# Patient Record
Sex: MALE | Race: WHITE | NOT HISPANIC OR LATINO | Employment: OTHER | URBAN - METROPOLITAN AREA
[De-identification: names, ages, dates, MRNs, and addresses within clinical notes are randomized per-mention and may not be internally consistent; named-entity substitution may affect disease eponyms.]

---

## 2017-01-09 DIAGNOSIS — I48.91 ATRIAL FIBRILLATION (H): Primary | ICD-10-CM

## 2017-01-09 RX ORDER — FLECAINIDE ACETATE 150 MG/1
TABLET ORAL
Qty: 90 TABLET | Refills: 3 | Status: SHIPPED | OUTPATIENT
Start: 2017-01-09 | End: 2017-06-11

## 2017-01-11 ENCOUNTER — PRE VISIT (OUTPATIENT)
Dept: CARDIOLOGY | Facility: CLINIC | Age: 77
End: 2017-01-11

## 2017-01-13 ENCOUNTER — HOSPITAL ENCOUNTER (OUTPATIENT)
Dept: CARDIOLOGY | Facility: CLINIC | Age: 77
Discharge: HOME | End: 2017-01-13
Attending: INTERNAL MEDICINE | Admitting: INTERNAL MEDICINE
Payer: COMMERCIAL

## 2017-01-13 ENCOUNTER — HOSPITAL ENCOUNTER (OUTPATIENT)
Dept: CARDIOLOGY | Facility: CLINIC | Age: 77
End: 2017-01-13
Attending: INTERNAL MEDICINE
Payer: COMMERCIAL

## 2017-01-13 DIAGNOSIS — I10 ESSENTIAL HYPERTENSION WITH GOAL BLOOD PRESSURE LESS THAN 140/90: ICD-10-CM

## 2017-01-13 DIAGNOSIS — I74.9 EMBOLISM AND THROMBOSIS (H): ICD-10-CM

## 2017-01-13 DIAGNOSIS — R00.1 BRADYCARDIA: ICD-10-CM

## 2017-01-13 DIAGNOSIS — E78.5 HYPERLIPIDEMIA LDL GOAL <130: ICD-10-CM

## 2017-01-13 DIAGNOSIS — R55 SYNCOPE, UNSPECIFIED SYNCOPE TYPE: ICD-10-CM

## 2017-01-13 DIAGNOSIS — I48.0 PAROXYSMAL ATRIAL FIBRILLATION (H): ICD-10-CM

## 2017-01-13 LAB
ALT SERPL W P-5'-P-CCNC: <5 U/L (ref 5–30)
ANION GAP SERPL CALCULATED.3IONS-SCNC: 10.2 MMOL/L (ref 6–17)
BUN SERPL-MCNC: 9 MG/DL (ref 7–30)
CALCIUM SERPL-MCNC: 8.7 MG/DL (ref 8.5–10.5)
CHLORIDE SERPL-SCNC: 103 MMOL/L (ref 98–107)
CHOLEST SERPL-MCNC: 185 MG/DL
CO2 SERPL-SCNC: 30 MMOL/L (ref 23–29)
CREAT SERPL-MCNC: 1 MG/DL (ref 0.7–1.3)
GFR SERPL CREATININE-BSD FRML MDRD: 72 ML/MIN/1.7M2
GLUCOSE SERPL-MCNC: 115 MG/DL (ref 70–105)
HDLC SERPL-MCNC: 84 MG/DL
LDLC SERPL CALC-MCNC: 89 MG/DL
NONHDLC SERPL-MCNC: 101 MG/DL
POTASSIUM SERPL-SCNC: 4.2 MMOL/L (ref 3.5–5.1)
SODIUM SERPL-SCNC: 139 MMOL/L (ref 136–145)
TRIGL SERPL-MCNC: 58 MG/DL

## 2017-01-13 PROCEDURE — 93306 TTE W/DOPPLER COMPLETE: CPT

## 2017-01-13 PROCEDURE — 36415 COLL VENOUS BLD VENIPUNCTURE: CPT | Performed by: INTERNAL MEDICINE

## 2017-01-13 PROCEDURE — 93227 XTRNL ECG REC<48 HR R&I: CPT | Performed by: INTERNAL MEDICINE

## 2017-01-13 PROCEDURE — 84460 ALANINE AMINO (ALT) (SGPT): CPT | Performed by: INTERNAL MEDICINE

## 2017-01-13 PROCEDURE — 93306 TTE W/DOPPLER COMPLETE: CPT | Mod: 26 | Performed by: INTERNAL MEDICINE

## 2017-01-13 PROCEDURE — 93225 XTRNL ECG REC<48 HRS REC: CPT

## 2017-01-13 PROCEDURE — 80048 BASIC METABOLIC PNL TOTAL CA: CPT | Performed by: INTERNAL MEDICINE

## 2017-01-13 PROCEDURE — 80061 LIPID PANEL: CPT | Performed by: INTERNAL MEDICINE

## 2017-01-19 ENCOUNTER — OFFICE VISIT (OUTPATIENT)
Dept: CARDIOLOGY | Facility: CLINIC | Age: 77
End: 2017-01-19
Attending: INTERNAL MEDICINE
Payer: COMMERCIAL

## 2017-01-19 VITALS
WEIGHT: 185 LBS | SYSTOLIC BLOOD PRESSURE: 138 MMHG | BODY MASS INDEX: 26.48 KG/M2 | HEIGHT: 70 IN | HEART RATE: 56 BPM | DIASTOLIC BLOOD PRESSURE: 80 MMHG

## 2017-01-19 DIAGNOSIS — R55 SYNCOPE, UNSPECIFIED SYNCOPE TYPE: ICD-10-CM

## 2017-01-19 DIAGNOSIS — R00.1 BRADYCARDIA: ICD-10-CM

## 2017-01-19 DIAGNOSIS — E78.5 HYPERLIPIDEMIA LDL GOAL <130: ICD-10-CM

## 2017-01-19 DIAGNOSIS — I74.9 EMBOLISM AND THROMBOSIS (H): ICD-10-CM

## 2017-01-19 DIAGNOSIS — I10 ESSENTIAL HYPERTENSION WITH GOAL BLOOD PRESSURE LESS THAN 140/90: ICD-10-CM

## 2017-01-19 DIAGNOSIS — I48.0 PAROXYSMAL ATRIAL FIBRILLATION (H): ICD-10-CM

## 2017-01-19 PROCEDURE — 99214 OFFICE O/P EST MOD 30 MIN: CPT | Performed by: INTERNAL MEDICINE

## 2017-01-19 NOTE — MR AVS SNAPSHOT
After Visit Summary   1/19/2017    Eder Horan    MRN: [id number]           Patient Information     Date Of Birth          1940        Visit Information        Provider Department      1/19/2017 2:15 PM Jaya Franklin MD [facility]        Today's Diagnoses     Paroxysmal atrial fibrillation (H)         Essential hypertension with goal blood pressure less than 140/90         Embolism and thrombosis         Bradycardia         Syncope, unspecified syncope type         Hyperlipidemia LDL goal <130            Follow-ups after your visit        Additional Services     Follow-Up with Cardiac Advanced Practice Provider           Follow-Up with Cardiologist                 Your next 10 appointments already scheduled     Feb 02, 2017 11:00 AM   Return Visit with Reece Daniels MD   [facility] ([facility]    [address] [place] [id number] [phone]              Future tests that were ordered for you today     Open Future Orders        Priority Expected Expires Ordered    EKG 12-lead complete w/read - Clinics Routine 7/18/2017 1/19/2018 1/19/2017    Follow-Up with Cardiologist Routine 7/18/2017 1/19/2018 1/19/2017    Basic metabolic panel Routine 7/18/2017 1/19/2018 1/19/2017    Lipid Profile Routine 7/18/2017 1/19/2018 1/19/2017    ALT Routine 7/18/2017 1/19/2018 1/19/2017    Basic metabolic panel Routine 4/19/2017 1/19/2018 1/19/2017    Follow-Up with Cardiac Advanced Practice Provider Routine 4/19/2017 1/19/2018 1/19/2017            Who to contact     If you have questions or need follow up information about today's clinic visit or your schedule please contact [facility] directly at [phone].  Normal or non-critical lab and imaging results will be communicated to you by MyChart, letter or phone within 4 business days after the  "clinic has received the results. If you do not hear from us within 7 days, please contact the clinic through [employer] or phone. If you have a critical or abnormal lab result, we will notify you by phone as soon as possible.  Submit refill requests through [employer] or call your pharmacy and they will forward the refill request to us. Please allow 3 business days for your refill to be completed.          Additional Information About Your Visit        [employer] Information     [employer] gives you secure access to your electronic health record. If you see a primary care provider, you can also send messages to your care team and make appointments. If you have questions, please call your primary care clinic.  If you do not have a primary care provider, please call [phone] and they will assist you.        Care EveryWhere ID     This is your Care EveryWhere ID. This could be used by other organizations to access your [place] medical records  [id number]        Your Vitals Were     Pulse Height BMI (Body Mass Index)             56 1.778 m (5' 10\") 26.54 kg/m2          Blood Pressure from Last 3 Encounters:   01/19/17 138/80   10/28/16 124/90   08/11/16 142/82    Weight from Last 3 Encounters:   01/19/17 83.915 kg (185 lb)   10/28/16 81.693 kg (180 lb 1.6 oz)   07/26/16 78.926 kg (174 lb)              We Performed the Following     Follow-Up with Cardiologist        Primary Care Provider Office Phone # Fax #    Richmond Martinez [phone]       [facility] [address] [place] [id number]        Thank you!     Thank you for choosing [facility] [place]  for your care. Our goal is always to provide you with excellent care. Hearing back from our patients is one way we can continue to improve our services. Please take a few minutes to complete the written survey that you may receive in the mail after your visit with us. Thank you!             Your Updated Medication " List - Protect others around you: Learn how to safely use, store and throw away your medicines at www.disposemymeds.org.          This list is accurate as of: 1/19/17  2:56 PM.  Always use your most recent med list.                   Brand Name Dispense Instructions for use    albuterol 108 (90 BASE) MCG/ACT Inhaler    albuterol    3 Inhaler    Inhale 2 puffs into the lungs every 4 hours as needed for shortness of breath / dyspnea       aspirin 325 MG EC tablet      ONE DAILY       flecainide acetate 150 MG Tabs     90 tablet    Take one half tablet, 75 mg, every 12 hours       fluticasone 110 MCG/ACT Inhaler    FLOVENT HFA    3 Inhaler    Inhale 2 puffs into the lungs 2 times daily       levothyroxine 125 MCG tablet    SYNTHROID/LEVOTHROID    90 tablet    Take 1 tablet (125 mcg) by mouth daily       order for DME     1 Device    Equipment being ordered: spacer for inhaler

## 2017-01-19 NOTE — LETTER
1/19/2017    Richmond Martinez MD  [facility]   [address] [facility] [address]    RE: Eder Horan       Dear Colleague,    I had the pleasure of seeing Eder Horan in the [facility].    The patient is a 77-year-old mildly overweight white male with a history of paroxysmal atrial fibrillation and rapid ventricular response initially admitted to [facility] in 2008.  He was started on Cardizem for rate control as well as warfarin for anticoagulation for DC cardioversion.  This attempt was unsuccessful and he was seen by Electrophysiology and has stopped flecainide with repeat cardioversion being successful.  Initially, he was on flecainide and Cardizem with the latter being discontinued with heart rates in the 40s.  Since his last clinic visit he has had no significant symptoms of chest discomfort, shortness of breath at rest, palpitations, nausea, vomiting, diaphoresis or syncope.  He has occasional lightheadedness if he changes position too quickly.  He denies PND, orthopnea, fevers, chills or sweats.  He has no history of myocardial infarction, active coronary disease or congestive heart failure.  He is able to participate in most activities without significant restriction.  He has had some symptoms of COPD/asthma which is worse in the hot and humid months.  Previous Cardiolite stress testing in 2015 showed no evidence of ischemia or infarct with ejection fraction of 52%.  Holter monitor has shown sinus bradycardia with rate varying from 44 to 89, average rate of 61, with isolated supraventricular and ventricular ectopy.  Echocardiography has shown intact left ventricular function with ejection fraction 55%-60% with mild to moderate dilatation of the left atrium and trace to mild mitral insufficiency present.  Again, the most recent Holter monitor demonstrated a heart rate varying from 42 to 83 with average rate of 58 with rare to occasional  supraventricular ectopic beats and rare ventricular ectopic beats without significant tachydysrhythmia.      MEDICATIONS:   1.  Flecainide 75 mg twice a day.   2.  Levothyroxine 25 mcg a day.   3.  Flovent inhaler as directed.   4.  Albuterol inhaler as directed.   5.  Aspirin 325 mg a day.      LABORATORY DATA:  Demonstrated cholesterol 185, HDL 84, LDL 89, triglycerides 58, sodium 139, potassium 4.2, BUN 9, creatinine 1.0.  ALT is less than 5.      PHYSICAL EXAMINATION:   VITAL SIGNS:  Blood pressure 138/80 with a heart rate of 56 and regular.  Weight was 185 pounds, which is up 9 pounds from clinic visit last summer.   NECK:  Without significant jugular venous distention, carotid bruit or palpable thyroid.   CHEST:  Essentially clear to percussion and auscultation with slight decreased breath sounds at the bases.   CARDIAC:  Regular rhythm is a soft S4 gallop, 1-2/6 systolic murmur at the left sternal border with minimal radiation.  No diastolic murmur, rub or S3.   EXTREMITIES:  Without cyanosis or edema.      CLINICAL IMPRESSION:   1.  Stable cardiac condition.   2.  History of paroxysmal atrial fibrillation, not recently.   3.  Chronic obstructive pulmonary disease with isolated bronchospasm.   4.  Previous history of mild borderline hypertension with slight decrease in systolic blood pressures since he has not been strict with his diet.   5.  History of borderline hyperlipidemia, at adequate goal at this time.      DISCUSSION:  The patient has done reasonably well clinically.  He has not had persistent or recurrent tachydysrhythmia, chest discomfort, shortness of breath, palpitations or syncope.  He continues to prefer not to take Coumadin, although he does have a CHADS-VASc score of at least 2 if not 3 with regard to his hypertension and age.  He has had no recurrent persistent dysrhythmia other than isolated PACs and extreme short runs of PAT.  Anticoagulation and stroke have been discussed with the patient  who wants to continue with aspirin.  He will need close continued followup of his serum lipids, basic metabolic panel and blood pressure.      RECOMMENDATIONS:   1.  Continue present medications.   2.  Diet and exercise as tolerated, avoiding caffeine, salt and cholesterol.   3.  Electrocardiogram in 6 months to follow QT interval.   4.  Aggressive pulmonary therapy.   5.  Routine medical followup.   6.  Cardiology followup up in 6 months.   7.  Close followup of serum lipids, basic metabolic panel and blood pressure with followup with Amira Mendes in 3 months to determine if additional antihypertensive medication may be necessary such as low dose losartan or lisinopril.      Again, thank you for allowing me to participate in the care of your patient.      Sincerely,    Jaya Franklin MD     [facility]

## 2017-01-20 NOTE — PROGRESS NOTES
HISTORY OF PRESENT ILLNESS:  The patient is a 77-year-old mildly overweight white male with a history of paroxysmal atrial fibrillation and rapid ventricular response initially admitted to [facility] in 2008.  He was started on Cardizem for rate control as well as warfarin for anticoagulation for DC cardioversion.  This attempt was unsuccessful and he was seen by Electrophysiology and has stopped flecainide with repeat cardioversion being successful.  Initially, he was on flecainide and Cardizem with the latter being discontinued with heart rates in the 40s.  Since his last clinic visit he has had no significant symptoms of chest discomfort, shortness of breath at rest, palpitations, nausea, vomiting, diaphoresis or syncope.  He has occasional lightheadedness if he changes position too quickly.  He denies PND, orthopnea, fevers, chills or sweats.  He has no history of myocardial infarction, active coronary disease or congestive heart failure.  He is able to participate in most activities without significant restriction.  He has had some symptoms of COPD/asthma which is worse in the hot and humid months.  Previous Cardiolite stress testing in 2015 showed no evidence of ischemia or infarct with ejection fraction of 52%.  Holter monitor has shown sinus bradycardia with rate varying from 44 to 89, average rate of 61, with isolated supraventricular and ventricular ectopy.  Echocardiography has shown intact left ventricular function with ejection fraction 55%-60% with mild to moderate dilatation of the left atrium and trace to mild mitral insufficiency present.  Again, the most recent Holter monitor demonstrated a heart rate varying from 42 to 83 with average rate of 58 with rare to occasional supraventricular ectopic beats and rare ventricular ectopic beats without significant tachydysrhythmia.      MEDICATIONS:   1.  Flecainide 75 mg twice a day.   2.  Levothyroxine 25 mcg a day.   3.  Flovent inhaler  as directed.   4.  Albuterol inhaler as directed.   5.  Aspirin 325 mg a day.      LABORATORY DATA:  Demonstrated cholesterol 185, HDL 84, LDL 89, triglycerides 58, sodium 139, potassium 4.2, BUN 9, creatinine 1.0.  ALT is less than 5.      PHYSICAL EXAMINATION:   VITAL SIGNS:  Blood pressure 138/80 with a heart rate of 56 and regular.  Weight was 185 pounds, which is up 9 pounds from clinic visit last summer.   NECK:  Without significant jugular venous distention, carotid bruit or palpable thyroid.   CHEST:  Essentially clear to percussion and auscultation with slight decreased breath sounds at the bases.   CARDIAC:  Regular rhythm is a soft S4 gallop, 1-2/6 systolic murmur at the left sternal border with minimal radiation.  No diastolic murmur, rub or S3.   EXTREMITIES:  Without cyanosis or edema.      CLINICAL IMPRESSION:   1.  Stable cardiac condition.   2.  History of paroxysmal atrial fibrillation, not recently.   3.  Chronic obstructive pulmonary disease with isolated bronchospasm.   4.  Previous history of mild borderline hypertension with slight decrease in systolic blood pressures since he has not been strict with his diet.   5.  History of borderline hyperlipidemia, at adequate goal at this time.      DISCUSSION:  The patient has done reasonably well clinically.  He has not had persistent or recurrent tachydysrhythmia, chest discomfort, shortness of breath, palpitations or syncope.  He continues to prefer not to take Coumadin, although he does have a CHADS-VASc score of at least 2 if not 3 with regard to his hypertension and age.  He has had no recurrent persistent dysrhythmia other than isolated PACs and extreme short runs of PAT.  Anticoagulation and stroke have been discussed with the patient who wants to continue with aspirin.  He will need close continued followup of his serum lipids, basic metabolic panel and blood pressure.      RECOMMENDATIONS:   1.  Continue present medications.   2.  Diet and  exercise as tolerated, avoiding caffeine, salt and cholesterol.   3.  Electrocardiogram in 6 months to follow QT interval.   4.  Aggressive pulmonary therapy.   5.  Routine medical followup.   6.  Cardiology followup up in 6 months.   7.  Close followup of serum lipids, basic metabolic panel and blood pressure with followup with Amira Mendes in 3 months to determine if additional antihypertensive medication may be necessary such as low dose losartan or lisinopril.      cc:   [place] [address]         MAURIL[facility] CONDE MD, [facility]             D: 2017 15:08   T: 2017 08:17   MT: PATRICIA      Name:     AUGUSTINE MYRICK   MRN:      [id number]        Account:      [id number]   :      1940           Service Date: 2017      Document: [id number]

## 2017-01-30 ENCOUNTER — TRANSFERRED RECORDS (OUTPATIENT)
Dept: HEALTH INFORMATION MANAGEMENT | Facility: CLINIC | Age: 77
End: 2017-01-30

## 2017-02-02 ENCOUNTER — OFFICE VISIT (OUTPATIENT)
Dept: DERMATOLOGY | Facility: CLINIC | Age: 77
End: 2017-02-02
Payer: COMMERCIAL

## 2017-02-02 ENCOUNTER — TELEPHONE (OUTPATIENT)
Dept: DERMATOLOGY | Facility: CLINIC | Age: 77
End: 2017-02-02

## 2017-02-02 VITALS — HEART RATE: 55 BPM | DIASTOLIC BLOOD PRESSURE: 93 MMHG | SYSTOLIC BLOOD PRESSURE: 144 MMHG | OXYGEN SATURATION: 97 %

## 2017-02-02 DIAGNOSIS — L82.1 SK (SEBORRHEIC KERATOSIS): ICD-10-CM

## 2017-02-02 DIAGNOSIS — L81.4 LENTIGO: ICD-10-CM

## 2017-02-02 DIAGNOSIS — C44.319 BASAL CELL CARCINOMA OF RIGHT CHEEK: Primary | ICD-10-CM

## 2017-02-02 DIAGNOSIS — Z85.828 HISTORY OF SKIN CANCER: ICD-10-CM

## 2017-02-02 PROCEDURE — 88331 PATH CONSLTJ SURG 1 BLK 1SPC: CPT | Performed by: DERMATOLOGY

## 2017-02-02 PROCEDURE — 99214 OFFICE O/P EST MOD 30 MIN: CPT | Mod: 25 | Performed by: DERMATOLOGY

## 2017-02-02 PROCEDURE — 11100 HC BIOPSY SKIN/SUBQ/MUC MEM, SINGLE LESION: CPT | Performed by: DERMATOLOGY

## 2017-02-02 NOTE — NURSING NOTE
"Chief Complaint   Patient presents with     Derm Problem     6 month FSE       Initial /93 mmHg  Pulse 55  SpO2 97% Estimated body mass index is 26.54 kg/(m^2) as calculated from the following:    Height as of 1/19/17: 1.778 m (5' 10\").    Weight as of 1/19/17: 83.915 kg (185 lb)..  BP completed using cuff size: bambi Lucas LPN    "

## 2017-02-02 NOTE — PATIENT INSTRUCTIONS
Wound Care Instructions     FOR SUPERFICIAL WOUNDS     [facility] [phone]    [facility] [phone]                       AFTER 24 HOURS YOU SHOULD REMOVE THE BANDAGE AND BEGIN DAILY DRESSING CHANGES AS FOLLOWS:     1) Remove Dressing.     2) Clean and dry the area with tap water using a Q-tip or sterile gauze pad.     3) Apply Vaseline, Aquaphor, Polysporin ointment or Bacitracin ointment over entire wound.  Do NOT use Neosporin ointment.     4) Cover the wound with a band-aid, or a sterile non-stick gauze pad and micropore paper tape      REPEAT THESE INSTRUCTIONS AT LEAST ONCE A DAY UNTIL THE WOUND HAS COMPLETELY HEALED.    It is an old wives tale that a wound heals better when it is exposed to air and allowed to dry out. The wound will heal faster with a better cosmetic result if it is kept moist with ointment and covered with a bandage.    **Do not let the wound dry out.**      Supplies Needed:      *Cotton tipped applicators (Q-tips)    *Polysporin Ointment or Bacitracin Ointment (NOT NEOSPORIN)    *Band-aids or non-stick gauze pads and micropore paper tape.      PATIENT INFORMATION:    During the healing process you will notice a number of changes. All wounds develop a small halo of redness surrounding the wound.  This means healing is occurring. Severe itching with extensive redness usually indicates sensitivity to the ointment or bandage tape used to dress the wound.  You should call our office if this develops.      Swelling  and/or discoloration around your surgical site is common, particularly when performed around the eye.    All wounds normally drain.  The larger the wound the more drainage there will be.  After 7-10 days, you will notice the wound beginning to shrink and new skin will begin to grow.  The wound is healed when you can see skin has formed over the entire area.  A healed wound has a healthy, shiny look to the surface and is red to dark pink in color  to normalize.  Wounds may take approximately 4-6 weeks to heal.  Larger wounds may take 6-8 weeks.  After the wound is healed you may discontinue dressing changes.    You may experience a sensation of tightness as your wound heals. This is normal and will gradually subside.    Your healed wound may be sensitive to temperature changes. This sensitivity improves with time, but if you re having a lot of discomfort, try to avoid temperature extremes.    Patients frequently experience itching after their wound appears to have healed because of the continue healing under the skin.  Plain Vaseline will help relieve the itching.        POSSIBLE COMPLICATIONS    BLEEDIN. Leave the bandage in place.  2. Use tightly rolled up gauze or a cloth to apply direct pressure over the bandage for 30  minutes.  3. Reapply pressure for an additional 30 minutes if necessary  4. Use additional gauze and tape to maintain pressure once the bleeding has stopped.

## 2017-02-02 NOTE — MR AVS SNAPSHOT
After Visit Summary   2/2/2017    Eder Horan    MRN: [id number]           Patient Information     Date Of Birth          1940        Visit Information        Provider Department      2/2/2017 11:00 AM Reece Daniels MD [facility]        Care Instructions          Wound Care Instructions     FOR SUPERFICIAL WOUNDS     [facility] [phone]    [facility] [phone]                       AFTER 24 HOURS YOU SHOULD REMOVE THE BANDAGE AND BEGIN DAILY DRESSING CHANGES AS FOLLOWS:     1) Remove Dressing.     2) Clean and dry the area with tap water using a Q-tip or sterile gauze pad.     3) Apply Vaseline, Aquaphor, Polysporin ointment or Bacitracin ointment over entire wound.  Do NOT use Neosporin ointment.     4) Cover the wound with a band-aid, or a sterile non-stick gauze pad and micropore paper tape      REPEAT THESE INSTRUCTIONS AT LEAST ONCE A DAY UNTIL THE WOUND HAS COMPLETELY HEALED.    It is an old wives tale that a wound heals better when it is exposed to air and allowed to dry out. The wound will heal faster with a better cosmetic result if it is kept moist with ointment and covered with a bandage.    **Do not let the wound dry out.**      Supplies Needed:      *Cotton tipped applicators (Q-tips)    *Polysporin Ointment or Bacitracin Ointment (NOT NEOSPORIN)    *Band-aids or non-stick gauze pads and micropore paper tape.      PATIENT INFORMATION:    During the healing process you will notice a number of changes. All wounds develop a small halo of redness surrounding the wound.  This means healing is occurring. Severe itching with extensive redness usually indicates sensitivity to the ointment or bandage tape used to dress the wound.  You should call our office if this develops.      Swelling  and/or discoloration around your surgical site is common, particularly when performed around the eye.    All wounds normally drain.  The  larger the wound the more drainage there will be.  After 7-10 days, you will notice the wound beginning to shrink and new skin will begin to grow.  The wound is healed when you can see skin has formed over the entire area.  A healed wound has a healthy, shiny look to the surface and is red to dark pink in color to normalize.  Wounds may take approximately 4-6 weeks to heal.  Larger wounds may take 6-8 weeks.  After the wound is healed you may discontinue dressing changes.    You may experience a sensation of tightness as your wound heals. This is normal and will gradually subside.    Your healed wound may be sensitive to temperature changes. This sensitivity improves with time, but if you re having a lot of discomfort, try to avoid temperature extremes.    Patients frequently experience itching after their wound appears to have healed because of the continue healing under the skin.  Plain Vaseline will help relieve the itching.        POSSIBLE COMPLICATIONS    BLEEDIN. Leave the bandage in place.  2. Use tightly rolled up gauze or a cloth to apply direct pressure over the bandage for 30  minutes.  3. Reapply pressure for an additional 30 minutes if necessary  4. Use additional gauze and tape to maintain pressure once the bleeding has stopped.        Follow-ups after your visit        Your next 10 appointments already scheduled     2017 12:10 PM   LAB with [facility]   [facility] ([facility])    [address] [place] [id number] [phone]           Patient must bring picture ID.  Patient should be prepared to give a urine specimen  Please do not eat 10-12 hours before your appointment if you are coming in fasting for labs on lipids, cholesterol, or glucose (sugar).  Pregnant women should follow their Care Team instructions. Water with medications is okay. Do not drink coffee or other fluids.   If you have concerns about taking   your medications, please ask at office or if scheduling via [email or web address], send a message by clicking on Secure Messaging, Message Your Care Team.            Apr 21, 2017  1:10 PM   Return Visit with MARCELO Skinner CNP   [facility]ART AT [place] ([facility])    [address] [place] [id number] [phone]              Who to contact     If you have questions or need follow up information about today's clinic visit or your schedule please contact [facility] directly at [phone].  Normal or non-critical lab and imaging results will be communicated to you by [employer], letter or phone within 4 business days after the clinic has received the results. If you do not hear from us within 7 days, please contact the clinic through [employer] or phone. If you have a critical or abnormal lab result, we will notify you by phone as soon as possible.  Submit refill requests through [email or web address] or call your pharmacy and they will forward the refill request to us. Please allow 3 business days for your refill to be completed.          Additional Information About Your Visit        [employer] Information     [email or web address] gives you secure access to your electronic health record. If you see a primary care provider, you can also send messages to your care team and make appointments. If you have questions, please call your primary care clinic.  If you do not have a primary care provider, please call [phone] and they will assist you.        Care EveryWhere ID     This is your Care EveryWhere ID. This could be used by other organizations to access your [facility] medical records  [id number]        Your Vitals Were     Pulse Pulse Oximetry                55 97%           Blood Pressure from Last 3 Encounters:   02/02/17 144/93   01/19/17 138/80   10/28/16 124/90    Weight from Last 3 Encounters:   01/19/17 83.915 kg (185 lb)   10/28/16 81.693 kg (180 lb 1.6 oz)    07/26/16 78.926 kg (174 lb)              Today, you had the following     No orders found for display       Primary Care Provider Office Phone # Fax #    Richmond Martinez [phone]       [facility] [address]  [place] [facility] [address]        Thank you!     Thank you for choosing [facility]  for your care. Our goal is always to provide you with excellent care. Hearing back from our patients is one way we can continue to improve our services. Please take a few minutes to complete the written survey that you may receive in the mail after your visit with us. Thank you!             Your Updated Medication List - Protect others around you: Learn how to safely use, store and throw away your medicines at www.disposemymeds.org.          This list is accurate as of: 2/2/17 11:53 AM.  Always use your most recent med list.                   Brand Name Dispense Instructions for use    albuterol 108 (90 BASE) MCG/ACT Inhaler    albuterol    3 Inhaler    Inhale 2 puffs into the lungs every 4 hours as needed for shortness of breath / dyspnea       aspirin 325 MG EC tablet      ONE DAILY       flecainide acetate 150 MG Tabs     90 tablet    Take one half tablet, 75 mg, every 12 hours       fluticasone 110 MCG/ACT Inhaler    FLOVENT HFA    3 Inhaler    Inhale 2 puffs into the lungs 2 times daily       levothyroxine 125 MCG tablet    SYNTHROID/LEVOTHROID    90 tablet    Take 1 tablet (125 mcg) by mouth daily       order for DME     1 Device    Equipment being ordered: spacer for inhaler

## 2017-02-02 NOTE — Clinical Note
[facility]  [address]  [address]  [phone]    2/2/2017     Eder Horan  [address] [address]      Dear Eder:    You are scheduled for Mohs Surgery on: March 30 th @ 7 30 am.    Please check in at [facility], [address].     You don't need to arrive more than 5-10 minutes prior to your appointment time.     Be sure to eat a good breakfast and bathe and wash your hair prior to surgery. Please bring  with you.     If you are taking any anti-coagulants that are prescribed by your Doctor (such as Coumadin/Warfarin, Plavix, Aspirin, Ibuprofen), please continue taking them.     However, if you are taking anti-coagulants over the counter without a Doctor's order for a medical condition, please discontinue them 10 days prior to surgery.     Please wear loose comfortable clothing as it could possibly be 4-6 hours until your surgery is completed depending upon how many layers of tissue need to be removed.      Thank you,    VALENTIN Daniels MD

## 2017-02-02 NOTE — PROGRESS NOTES
Eder Horan is a 77 year old year old male patient here today for f/u hx of non-melanoma skin cancer.  Today he notes spot on right cheek   .  Patient states this has been present for new not that long.  Patient reports the following symptoms:  none .  Patient reports the following previous treatments none.  Patient reports the following modifying factors none.  Associated symptoms: none.  Patient has no other skin complaints today.  Remainder of the HPI, Meds, PMH, Allergies, FH, and SH was reviewed in chart.    Pertinent Hx:   Non-melanoma skin cancer    Past Medical History   Diagnosis Date     ALLERGIC RHINITIS       DVT       Left calf 2003. Right calf 6/06     PLANTAR FASCITIS      Tobacco use disorder      Lateral epicondylitis  of elbow 7/05     left     Atrial fibrillation (H) 6/08     COPD (chronic obstructive pulmonary disease) (H)      Prostate cancer (H) 6/09     on Lupron therapy     Actinic keratosis 6/09     face     Back pain      s/p LESI through ortho Feb 2010     Hyperlipidemia LDL goal <130 5/10/2011     TMJ (temporomandibular joint syndrome) 4/12/2012     Syncope      Bradycardia      Hypertension      HYPOTHYROIDISM       hypothyroidism     Squamous cell carcinoma (H)  Sept 2014      right lower lid, s/p MOHS     Basal cell carcinoma      Malignant melanoma (H)        Past Surgical History   Procedure Laterality Date     C nonspecific procedure  1/07     Left groin exploration and left inguinal herniorrhaphy     C nonspecific procedure  5/08     Prostate bx (benign)     C nonspecific procedure  8/09     Wide local excision (left side), robotic-assisted laparoscopic prostatectomy and   Left pelvic lymph node dissection     C nonspecific procedure  August 2010     Right inguinal herniorrhaphy with mesh     Eye surgery       2013 lump from lower  left eye lid removed     Cardioversion  10-16-08     failed     Abdomen surgery  2004,06,09     Hernias (2) Prostate Removal(2009)     Biopsy  2009      Prostate     Colonoscopy  2004     Genitourinary surgery  2009     Prostate     Hernia repair  2004 - 2006        Family History   Problem Relation Age of Onset     C.A.D. Father      heart attack- angina     Coronary Artery Disease Father      Myocardial Infarction Father      CANCER Mother      lung     Lung Cancer Mother      Lung Cancer Sister      Unknown/Adopted Maternal Grandmother      Unknown/Adopted Maternal Grandfather      Unknown/Adopted Paternal Grandmother      Unknown/Adopted Paternal Grandfather      Unknown/Adopted Sister        Social History     Social History     Marital Status:      Spouse Name: N/A     Number of Children: 3     Years of Education: N/A     Occupational History     [employer]      Retired     Social History Main Topics     Smoking status: Former Smoker     Types: Cigars     Quit date: 12/01/2006     Smokeless tobacco: Never Used      Comment: pipe daily     Alcohol Use: 0.0 oz/week     0 Standard drinks or equivalent per week      Comment: 2-3 drinks a day:  Wine/Beer     Drug Use: No     Sexual Activity: No     Other Topics Concern     Parent/Sibling W/ Cabg, Mi Or Angioplasty Before 65f 55m? No     Caffeine Concern No     2-3 cups of coffee a day     Sleep Concern No     Stress Concern No     Weight Concern No     Special Diet No     Exercise No     Seat Belt Yes     Social History Narrative       Outpatient Encounter Prescriptions as of 2/2/2017   Medication Sig Dispense Refill     flecainide acetate 150 MG TABS Take one half tablet, 75 mg, every 12 hours 90 tablet 3     levothyroxine (SYNTHROID, LEVOTHROID) 125 MCG tablet Take 1 tablet (125 mcg) by mouth daily 90 tablet 1     fluticasone (FLOVENT HFA) 110 MCG/ACT inhaler Inhale 2 puffs into the lungs 2 times daily 3 Inhaler 3     albuterol (ALBUTEROL) 108 (90 BASE) MCG/ACT inhaler Inhale 2 puffs into the lungs every 4 hours as needed for shortness of breath / dyspnea 3 Inhaler 3     ORDER FOR DME Equipment being  ordered: spacer for inhaler 1 Device 1     ASPIRIN 325 MG PO TBEC ONE DAILY  3     No facility-administered encounter medications on file as of 2/2/2017.             Review Of Systems  Skin: As above  Eyes: negative  Ears/Nose/Throat: negative  Respiratory: No shortness of breath, dyspnea on exertion, cough, or hemoptysis  Cardiovascular: negative  Gastrointestinal: negative  Genitourinary: negative  Musculoskeletal: negative  Neurologic: negative  Psychiatric: negative  Hematologic/Lymphatic/Immunologic: negative  Endocrine: negative      O:   NAD, WDWN, Alert & Oriented, Mood & Affect wnl, Vitals stable   Here today alone   /93 mmHg  Pulse 55  SpO2 97%   General appearance normal   Vitals stable   Alert, oriented and in no acute distress      Following lymph nodes palpated: Occipital, Cervical, Supraclavicular no lad   Stuck on papules and brown macules on trunk and ext    r lat zygoma 5mm pink pearly papule           The remainder of the full exam was unremarkable; the following areas were examined:  conjunctiva/lids, oral mucosa, neck, peripheral vascular system, abdomen, lymph nodes, digits/nails, eccrine and apocrine glands, scalp/hair, face, neck, chest, abdomen, buttocks, back, RUE, LUE, RLE, LLE       Eyes: Conjunctivae/lids:Normal     ENT: Lips, buccal mucosa, tongue: normal    MSK:Normal    Cardiovascular: peripheral edema none    Pulm: Breathing Normal    Lymph Nodes: No Head and Neck Lymphadenopathy     Neuro/Psych: Orientation:Normal; Mood/Affect:Normal      MICRO:   R lat zygoma:Orthokeratosis of epidermis with a proliferation of nests of basaloid cells, with peripheral palisading and a haphazard arrangement in the center extending into the dermis, forming nodules.  The tumor cells have hyperchromatic nuclei. Poor cytoplasm and intercellular bridging.    A/P:  1. R lat zygoma r/o basal cell carcinoma   TANGENTIAL BIOPSY IN HOUSE:  After consent, anesthesia with LEC and prep, tangential  excision performed and dx above confirmed with frozen section histology.  No complications and routine wound care.  Patient told result basal cell carcinoma  Schedule excision    2. Seborrheic keratosis, lentiog, hx of non-melanoma skin cancer   .      BENIGN LESIONS DISCUSSED WITH PATIENT:  I discussed the specifics of tumor, prognosis, and genetics of benign lesions.  I explained that treatment of these lesions would be purely cosmetic and not medically neccessary.  I discussed with patient different removal options including excision, cautery and /or laser.      Nature and genetics of benign skin lesions dicussed with patient.  Signs and Symptoms of skin cancer discussed with patient.  Patient encouraged to perform monthly skin exams.  UV precautions reviewed with patient.  Skin care regimen reviewed with patient: Eliminate harsh soaps, i.e. Dial, zest, irsih spring; Mild soaps such as Cetaphil or Dove sensitive skin, avoid hot or cold showers, aggressive use of emollients including vanicream, cetaphil or cerave discussed with patient.    Risks of non-melanoma skin cancer discussed with patient   Return to clinic 6 months

## 2017-02-02 NOTE — TELEPHONE ENCOUNTER
----- Message from Reece Daniels MD sent at 2/2/2017 12:55 PM CST -----  r lat zygoma basal cell carcinoma schedule excision

## 2017-02-09 ENCOUNTER — OFFICE VISIT (OUTPATIENT)
Dept: INTERNAL MEDICINE | Facility: CLINIC | Age: 77
End: 2017-02-09
Payer: COMMERCIAL

## 2017-02-09 ENCOUNTER — TELEPHONE (OUTPATIENT)
Dept: DERMATOLOGY | Facility: CLINIC | Age: 77
End: 2017-02-09

## 2017-02-09 VITALS
DIASTOLIC BLOOD PRESSURE: 76 MMHG | TEMPERATURE: 97.9 F | OXYGEN SATURATION: 98 % | WEIGHT: 189 LBS | HEART RATE: 49 BPM | SYSTOLIC BLOOD PRESSURE: 118 MMHG | BODY MASS INDEX: 27.12 KG/M2

## 2017-02-09 DIAGNOSIS — C44.310 BCC (BASAL CELL CARCINOMA), FACE: ICD-10-CM

## 2017-02-09 DIAGNOSIS — J44.9 CHRONIC OBSTRUCTIVE PULMONARY DISEASE, UNSPECIFIED COPD TYPE (H): ICD-10-CM

## 2017-02-09 DIAGNOSIS — E03.9 HYPOTHYROIDISM, UNSPECIFIED TYPE: ICD-10-CM

## 2017-02-09 DIAGNOSIS — I48.0 PAROXYSMAL ATRIAL FIBRILLATION (H): ICD-10-CM

## 2017-02-09 DIAGNOSIS — C61 PROSTATE CANCER (H): ICD-10-CM

## 2017-02-09 LAB — TSH SERPL DL<=0.05 MIU/L-ACNC: 1.52 MU/L (ref 0.4–4)

## 2017-02-09 PROCEDURE — 99214 OFFICE O/P EST MOD 30 MIN: CPT | Performed by: INTERNAL MEDICINE

## 2017-02-09 PROCEDURE — 84443 ASSAY THYROID STIM HORMONE: CPT | Performed by: INTERNAL MEDICINE

## 2017-02-09 PROCEDURE — 36415 COLL VENOUS BLD VENIPUNCTURE: CPT | Performed by: INTERNAL MEDICINE

## 2017-02-09 RX ORDER — LEVOTHYROXINE SODIUM 125 UG/1
125 TABLET ORAL DAILY
Qty: 90 TABLET | Refills: 3 | Status: SHIPPED | OUTPATIENT
Start: 2017-02-09 | End: 2018-02-28

## 2017-02-09 NOTE — PROGRESS NOTES
"  SUBJECTIVE:                                                    Eder Horan is a 77 year old male who presents to clinic today for the following health issues:      Medication Followup     Taking Medication as prescribed: yes- Flovent is no longer covered by insurance    Side Effects:  Lightheadedness on 2-05-17    Medication Helping Symptoms:  yes     Pt's past medical history, family history, habits, medications and allergies were reviewed with the patient today.  See snap shot for  HCM status. Most recent lab results reviewed with pt. Problem list and histories reviewed & adjusted, as indicated.  Additional history as below:    F/U re: COPD and hypothyroidism. Hx parox A fib. NO A fib on recent Holter. On ASA and not willing to use  other AC. See recent cardiology visit for discussion. Will have f/u appt with  Derm for Moh's for right facial cheek BCC. Sees Urology every 4 mos and last  hormone shot 8 mos ago.  Using Flovent 2 puff daily and last Albuterol use Dec 2016.  Breathing stable. Denies SOB today. Rare NP cough. Needs different steroid inhaler. Due for thyroid lab recheck with Levothyroxine. Energy \"OK\"     Additional ROS:   Constitutional, HEENT, Cardiovascular, Pulmonary, GI and , Neuro, MSK and Psych review of systems/symptoms are otherwise negative or unchanged from previous, except as noted above.      OBJECTIVE:  /76 mmHg  Pulse 49  Temp(Src) 97.9  F (36.6  C) (Oral)  Wt 189 lb (85.73 kg)  SpO2 98%   Estimated body mass index is 27.12 kg/(m^2) as calculated from the following:    Height as of 1/19/17: 5' 10\" (1.778 m).    Weight as of this encounter: 189 lb (85.73 kg).  Eye: PERRL, EOMI  HENT: ear canals and TM's normal and nose and mouth without ulcers or lesions . Bandaid on right facial cheek from recent bx. Exam deferred as will be seeing Derm back for Moh's  Neck: no adenopathy. Thyroid normal to palpation. No bruits  Pulm: Lungs clear to auscultation with slight prolonged exp " phase. No W/R  CV: Regular rates and rhythm  GI: Soft, nontender, Normal active bowel sounds, No hepatosplenomegaly or masses palpable  Ext: Peripheral pulses intact. Trace BLE ankle nonpitting edema.  Neuro: Normal strength and tone, sensory exam grossly normal    Assessment/Plan: (See plan discussion below for further details)  1. Chronic obstructive pulmonary disease, unspecified COPD type (H)  Stable. Change Flovent to QVAR as below for formulary  - beclomethasone (QVAR) 40 MCG/ACT Inhaler; Inhale 2 puffs into the lungs 2 times daily  Dispense: 3 Inhaler; Refill: 3    2. Paroxysmal atrial fibrillation (H)  Currently NSR. Declines  Change ASA to AC despite discussion of data with lower stroke risk with AC. Will respect pt's wishes. On Flecainide    3. BCC (basal cell carcinoma), face  Follow-up with Derm as scheduled for Moh's    4. Prostate cancer (H)  Stable. Contnue management per Urology. Urology managing PSA's    5. Hypothyroidism, unspecified type  Continue meds. Labs as ordered  - TSH  - levothyroxine (SYNTHROID/LEVOTHROID) 125 MCG tablet; Take 1 tablet (125 mcg) by mouth daily  Dispense: 90 tablet; Refill: 3  - TSH with free T4 reflex; Future    Plan discussion:   Stop Flovent when run out and then change to QVAR 2 puffs twice a day (or daily if breathing doing well)  Thyroid lab  Continue other meds   Dermatology appt for BCC.  Will have them contact you  re: if need to be off of Aspirin  before the procedure       Richmond Martinez MD  Internal Medicine Department  [facility]

## 2017-02-09 NOTE — NURSING NOTE
"Chief Complaint   Patient presents with     Recheck Medication       Initial /76 mmHg  Pulse 49  Temp(Src) 97.9  F (36.6  C) (Oral)  Wt 189 lb (85.73 kg)  SpO2 98% Estimated body mass index is 27.12 kg/(m^2) as calculated from the following:    Height as of 1/19/17: 5' 10\" (1.778 m).    Weight as of this encounter: 189 lb (85.73 kg).  Medication Reconciliation: complete    "

## 2017-02-09 NOTE — TELEPHONE ENCOUNTER
Pt scheduled for Mohs 3/3 and would like to know if he can continue to take aspirin once daily as he does currently.  Dr. Martinez stopped by Derm.

## 2017-02-09 NOTE — PATIENT INSTRUCTIONS
Bring Health Care Directive to next office visit  Stop Flovent when run out and then change to QVAR 2 puffs twice a day (or daily if breathing doing well)  Thyroid lab  Continue other meds   Dermatology appt for BCC.  Will have them contact you  re: if need to be off of Aspirin  before the procedure

## 2017-02-09 NOTE — MR AVS SNAPSHOT
After Visit Summary   2/9/2017    Eder Horan    MRN: [id number]           Patient Information     Date Of Birth          1940        Visit Information        Provider Department      2/9/2017 3:00 PM Richmond Martinez MD [facility]        Today's Diagnoses     Hypothyroidism, unspecified type    -  1     Chronic obstructive pulmonary disease, unspecified COPD type (H)         Paroxysmal atrial fibrillation (H)         BCC (basal cell carcinoma), face         Prostate cancer (H)           Care Instructions    Bring Health Care Directive to next office visit  Stop Flovent when run out and then change to QVAR 2 puffs twice a day (or daily if breathing doing well)  Thyroid lab  Continue other meds   Dermatology appt for BCC.  Will have them contact you  re: if need to be off of Aspirin  before the procedure        Follow-ups after your visit        Your next 10 appointments already scheduled     Mar 30, 2017  7:30 AM   MOHS with Reece Daniels MD   [facility] ([facility])    [address] [id number] [phone]            Apr 21, 2017 12:10 PM   LAB with PEREZ LAB   [facility] PHYSICIANS HEART AT [place] ([facility])    [address] [place] [id number] [phone]           Patient must bring picture ID.  Patient should be prepared to give a urine specimen  Please do not eat 10-12 hours before your appointment if you are coming in fasting for labs on lipids, cholesterol, or glucose (sugar).  Pregnant women should follow their Care Team instructions. Water with medications is okay. Do not drink coffee or other fluids.   If you have concerns about taking  your medications, please ask at office or if scheduling via Gigit, send a message by clicking on Secure Messaging, Message Your Care Team.            Apr 21, 2017  1:10 PM   Return Visit with Amira CHRISTIANSON  MARCELO Mendes CNP   [facility] ER HEART AT [place] ([facility])    [id number]  [place] [address] [phone]              Who to contact     If you have questions or need follow up information about today's clinic visit or your schedule please contact [facility] directly at [phone].  Normal or non-critical lab and imaging results will be communicated to you by upurskillhart, letter or phone within 4 business days after the clinic has received the results. If you do not hear from us within 7 days, please contact the clinic through upurskillhart or phone. If you have a critical or abnormal lab result, we will notify you by phone as soon as possible.  Submit refill requests through WorldDesk or call your pharmacy and they will forward the refill request to us. Please allow 3 business days for your refill to be completed.          Additional Information About Your Visit        upurskillharSongFlame Information     WorldDesk gives you secure access to your electronic health record. If you see a primary care provider, you can also send messages to your care team and make appointments. If you have questions, please call your primary care clinic.  If you do not have a primary care provider, please call [phone] and they will assist you.        Care EveryWhere ID     This is your Care EveryWhere ID. This could be used by other organizations to access your [place] medical records  [id number]        Your Vitals Were     Pulse Temperature Pulse Oximetry             49 97.9  F (36.6  C) (Oral) 98%          Blood Pressure from Last 3 Encounters:   02/09/17 118/76   02/02/17 144/93   01/19/17 138/80    Weight from Last 3 Encounters:   02/09/17 189 lb (85.73 kg)   01/19/17 185 lb (83.915 kg)   10/28/16 180 lb 1.6 oz (81.693 kg)              We Performed the Following     TSH          Today's Medication Changes          These changes are accurate as of: 2/9/17  3:39 PM.   If you have any questions, ask your nurse or doctor.               Start taking these medicines.        Dose/Directions    beclomethasone 40 MCG/ACT Inhaler   Commonly known as:  QVAR   Used for:  Chronic obstructive pulmonary disease, unspecified COPD type (H)   Started by:  Richmond Martinez MD        Dose:  2 puff   Inhale 2 puffs into the lungs 2 times daily   Quantity:  3 Inhaler   Refills:  3            Where to get your medicines      These medications were sent to [id number] - [facility] [address]  [address] [address] [facility] [place] [facility] [address]     Phone:  [phone]    - beclomethasone 40 MCG/ACT Inhaler             Primary Care Provider Office Phone # Fax #    Richmond Martinez [phone]       [facility] [address]  [facility] [address]        Thank you!     Thank you for choosing [facility]  for your care. Our goal is always to provide you with excellent care. Hearing back from our patients is one way we can continue to improve our services. Please take a few minutes to complete the written survey that you may receive in the mail after your visit with us. Thank you!             Your Updated Medication List - Protect others around you: Learn how to safely use, store and throw away your medicines at www.disposemymeds.org.          This list is accurate as of: 2/9/17  3:39 PM.  Always use your most recent med list.                   Brand Name Dispense Instructions for use    albuterol 108 (90 BASE) MCG/ACT Inhaler    albuterol    3 Inhaler    Inhale 2 puffs into the lungs every 4 hours as needed for shortness of breath / dyspnea       aspirin 325 MG EC tablet      ONE DAILY       beclomethasone 40 MCG/ACT Inhaler    QVAR    3 Inhaler    Inhale 2 puffs into the lungs 2 times daily       flecainide acetate 150 MG Tabs     90 tablet    Take one half tablet, 75 mg, every 12 hours       fluticasone 110 MCG/ACT Inhaler    FLOVENT HFA     3 Inhaler    Inhale 2 puffs into the lungs 2 times daily       levothyroxine 125 MCG tablet    SYNTHROID/LEVOTHROID    90 tablet    Take 1 tablet (125 mcg) by mouth daily       order for DME     1 Device    Equipment being ordered: spacer for inhaler

## 2017-02-09 NOTE — LETTER
[facility]  [address] [address]  [phone]        January 15, 2018    Eder Horan  [address] [address]              Dear Eedr Horan    This is to remind you that your non-fasting labs is due.    You may call our office at [phone] to schedule an appointment.    Please disregard this notice if you have already had your labs drawn or made an appointment.        Sincerely,        Richmond Martinez MD

## 2017-03-16 ENCOUNTER — OFFICE VISIT (OUTPATIENT)
Dept: DERMATOLOGY | Facility: CLINIC | Age: 77
End: 2017-03-16
Payer: COMMERCIAL

## 2017-03-16 VITALS — DIASTOLIC BLOOD PRESSURE: 85 MMHG | HEART RATE: 55 BPM | SYSTOLIC BLOOD PRESSURE: 149 MMHG | OXYGEN SATURATION: 96 %

## 2017-03-16 DIAGNOSIS — C44.319 BASAL CELL CARCINOMA OF RIGHT CHEEK: Primary | ICD-10-CM

## 2017-03-16 PROCEDURE — 13132 CMPLX RPR F/C/C/M/N/AX/G/H/F: CPT | Performed by: DERMATOLOGY

## 2017-03-16 PROCEDURE — 17311 MOHS 1 STAGE H/N/HF/G: CPT | Performed by: DERMATOLOGY

## 2017-03-16 NOTE — NURSING NOTE
Surgical Office Location:  [facility]  [address] [place] [address]

## 2017-03-16 NOTE — NURSING NOTE
"Initial /85  Pulse 55  SpO2 96% Estimated body mass index is 27.12 kg/(m^2) as calculated from the following:    Height as of 1/19/17: 1.778 m (5' 10\").    Weight as of 2/9/17: 85.7 kg (189 lb). .      "

## 2017-03-16 NOTE — PROGRESS NOTES
Eder Horan is a 77 year old year old male patient here today for evaluation and managment of basal cell carcinoma on right cheek.  Patient has no other skin complaints today.  Remainder of the HPI, Meds, PMH, Allergies, FH, and SH was reviewed in chart.    Pertinent Hx:   Non-melanoma skin cancer   Past Medical History   Diagnosis Date     Actinic keratosis 6/09     face     ALLERGIC RHINITIS       Atrial fibrillation (H) 6/08     Back pain      s/p LESI through ortho Feb 2010     Basal cell carcinoma      Bradycardia      COPD (chronic obstructive pulmonary disease) (H)      DVT       Left calf 2003. Right calf 6/06     Hyperlipidemia LDL goal <130 5/10/2011     Hypertension      HYPOTHYROIDISM       hypothyroidism     Lateral epicondylitis  of elbow 7/05     left     Malignant melanoma (H)      PLANTAR FASCITIS      Prostate cancer (H) 6/09     on Lupron therapy     Squamous cell carcinoma (H)  Sept 2014      right lower lid, s/p MOHS     Syncope      TMJ (temporomandibular joint syndrome) 4/12/2012     Tobacco use disorder        Past Surgical History   Procedure Laterality Date     C nonspecific procedure  1/07     Left groin exploration and left inguinal herniorrhaphy     C nonspecific procedure  5/08     Prostate bx (benign)     C nonspecific procedure  8/09     Wide local excision (left side), robotic-assisted laparoscopic prostatectomy and   Left pelvic lymph node dissection     C nonspecific procedure  August 2010     Right inguinal herniorrhaphy with mesh     Eye surgery       2013 lump from lower  left eye lid removed     Cardioversion  10-16-08     failed     Abdomen surgery  2004,06,09     Hernias (2) Prostate Removal(2009)     Biopsy  2009     Prostate     Colonoscopy  2004     Genitourinary surgery  2009     Prostate     Hernia repair  2004 - 2006        Family History   Problem Relation Age of Onset     C.A.D. Father      heart attack- angina     Coronary Artery Disease Father      Myocardial  Infarction Father      CANCER Mother      lung     Lung Cancer Mother      Lung Cancer Sister      Unknown/Adopted Maternal Grandmother      Unknown/Adopted Maternal Grandfather      Unknown/Adopted Paternal Grandmother      Unknown/Adopted Paternal Grandfather      Unknown/Adopted Sister        Social History     Social History     Marital status:      Spouse name: N/A     Number of children: 3     Years of education: N/A     Occupational History     [employer]      Retired     Social History Main Topics     Smoking status: Former Smoker     Types: Cigars     Quit date: 12/1/2006     Smokeless tobacco: Never Used      Comment: pipe daily     Alcohol use 0.0 oz/week     0 Standard drinks or equivalent per week      Comment: 2-3 drinks a day:  Wine/Beer     Drug use: No     Sexual activity: No     Other Topics Concern     Parent/Sibling W/ Cabg, Mi Or Angioplasty Before 65f 55m? No     Caffeine Concern No     2-3 cups of coffee a day     Sleep Concern No     Stress Concern No     Weight Concern No     Special Diet No     Exercise No     Seat Belt Yes     Social History Narrative       Outpatient Encounter Prescriptions as of 3/16/2017   Medication Sig Dispense Refill     beclomethasone (QVAR) 40 MCG/ACT Inhaler Inhale 2 puffs into the lungs 2 times daily 3 Inhaler 3     levothyroxine (SYNTHROID/LEVOTHROID) 125 MCG tablet Take 1 tablet (125 mcg) by mouth daily 90 tablet 3     flecainide acetate 150 MG TABS Take one half tablet, 75 mg, every 12 hours 90 tablet 3     albuterol (ALBUTEROL) 108 (90 BASE) MCG/ACT inhaler Inhale 2 puffs into the lungs every 4 hours as needed for shortness of breath / dyspnea 3 Inhaler 3     ORDER FOR DME Equipment being ordered: spacer for inhaler 1 Device 1     ASPIRIN 325 MG PO TBEC ONE DAILY  3     No facility-administered encounter medications on file as of 3/16/2017.              Review Of Systems  Skin: As above  Eyes: negative  Ears/Nose/Throat: negative  Respiratory: No  shortness of breath, dyspnea on exertion, cough, or hemoptysis  Cardiovascular: negative  Gastrointestinal: negative  Genitourinary: negative  Musculoskeletal: negative  Neurologic: negative  Psychiatric: negative  Hematologic/Lymphatic/Immunologic: negative  Endocrine: negative      O:   NAD, WDWN, Alert & Oriented, Mood & Affect wnl, Vitals stable   Here today alone   /85  Pulse 55  SpO2 96%   General appearance normal   Vitals stable   Alert, oriented and in no acute distress     r cheek 5mm pink pearly papule      Eyes: Conjunctivae/lids:Normal     ENT: Lips, buccal mucosa, tongue: normal    MSK:Normal    Cardiovascular: peripheral edema none    Pulm: Breathing Normal    Neuro/Psych: Orientation:Normal; Mood/Affect:Normal      A/P:  1. r cheek basal cell carcinoma   MOHS:   Location    After PGACAC discussed with patient, decision for Mohs surgery was made. Indication for Mohs was Location. Patient confirmed the site with Dr. Daniels.  After anesthesia with LEC, the tumor was excised using standard Mohs technique in 1 stages(s).  CLEAR MARGINS OBTAINED and Final defect size was 1 cm.       REPAIR COMPLEX: Because of the tightness of the surrounding skin and Because of the size and full thickness nature of the defect, a complex closure was planned. After LEC anesthesia and prep, Burow's triangles were excised in the relaxed skin tension lines. The wound edges were widely undermined by dissection in the subcutaneous plane until adequate tissue mobility was obtained. Hemostasis was obtained. The wound edges were closed in a layered fashion using Vicryl and Fast Absorbing Plain Gut sutures. Postoperative length was 3.6 cm.   EBL minimal; complications none; wound care routine.  The patient was discharged in good condition and will return in one week for wound evaluation.    BENIGN LESIONS DISCUSSED WITH PATIENT:  I discussed the specifics of tumor, prognosis, and genetics of benign lesions.  I explained that  treatment of these lesions would be purely cosmetic and not medically neccessary.  I discussed with patient different removal options including excision, cautery and /or laser.      Nature and genetics of benign skin lesions dicussed with patient.  Signs and Symptoms of skin cancer discussed with patient.  Patient encouraged to perform monthly skin exams.  UV precautions reviewed with patient.  Skin care regimen reviewed with patient: Eliminate harsh soaps, i.e. Dial, zest, irsih spring; Mild soaps such as Cetaphil or Dove sensitive skin, avoid hot or cold showers, aggressive use of emollients including vanicream, cetaphil or cerave discussed with patient.    Risks of non-melanoma skin cancer discussed with patient   Return to clinic 6 months

## 2017-03-16 NOTE — PATIENT INSTRUCTIONS
Sutured Wound Care     [facility]: [phone]    [facility]: [phone]          ? No strenuous activity for 48 hours. Resume moderate activity in 48 hours. No heavy exercising until you are seen for follow up in one week.     ? Take Tylenol as needed for discomfort.                         ? Do not drink alcoholic beverages for 48 hours.     ? Keep the pressure bandage in place for 24 hours. If the bandage becomes blood tinged or loose, reinforce it with gauze and tape.        (Refer to the reverse side of this page for management of bleeding).    ? Remove pressure bandage in 24 hours     ? Leave the flat bandage in place until your follow up appointment.    ? Keep the bandage dry. Wash around it carefully.    ? If the tape becomes soiled or starts to come off, reinforce it with additional paper tape.    ? Do not smoke for 3 weeks; smoking is detrimental to wound healing.    ? It is normal to have swelling and bruising around the surgical site. The bruising will fade in approximately 10-14 days. Elevate the area to reduce swelling.    ? Numbness, itchiness and sensitivity to temperature changes can occur after surgery and may take up to 18 months to normalize.      POSSIBLE COMPLICATIONS    BLEEDIN. Leave the bandage in place.  2. Use tightly rolled up gauze or a cloth to apply direct pressure over the bandage for 20   minutes.  3. Reapply pressure for an additional 20 minutes if necessary  4. Call the office or go to the nearest emergency room if pressure fails to stop the bleeding.  5. Use additional gauze and tape to maintain pressure once the bleeding has stopped.        PAIN:    1. Post operative pain should slowly get better, never worse.  2. A severe increase in pain may indicate a problem. Call the office if this occurs.    In case of emergency phone:Dr Daniels [phone]

## 2017-03-16 NOTE — MR AVS SNAPSHOT
After Visit Summary   3/16/2017    Eder Horan    MRN: [id number]           Patient Information     Date Of Birth          1940        Visit Information        Provider Department      3/16/2017 9:15 AM Reece Daniels MD [facility]        Today's Diagnoses     Basal cell carcinoma of right cheek    -  1      Care Instructions      Sutured Wound Care     [facility]: [phone]    [facility]: [phone]          ? No strenuous activity for 48 hours. Resume moderate activity in 48 hours. No heavy exercising until you are seen for follow up in one week.     ? Take Tylenol as needed for discomfort.                         ? Do not drink alcoholic beverages for 48 hours.     ? Keep the pressure bandage in place for 24 hours. If the bandage becomes blood tinged or loose, reinforce it with gauze and tape.        (Refer to the reverse side of this page for management of bleeding).    ? Remove pressure bandage in 24 hours     ? Leave the flat bandage in place until your follow up appointment.    ? Keep the bandage dry. Wash around it carefully.    ? If the tape becomes soiled or starts to come off, reinforce it with additional paper tape.    ? Do not smoke for 3 weeks; smoking is detrimental to wound healing.    ? It is normal to have swelling and bruising around the surgical site. The bruising will fade in approximately 10-14 days. Elevate the area to reduce swelling.    ? Numbness, itchiness and sensitivity to temperature changes can occur after surgery and may take up to 18 months to normalize.      POSSIBLE COMPLICATIONS    BLEEDIN. Leave the bandage in place.  2. Use tightly rolled up gauze or a cloth to apply direct pressure over the bandage for 20   minutes.  3. Reapply pressure for an additional 20 minutes if necessary  4. Call the office or go to the nearest emergency room if pressure fails to stop the bleeding.  5. Use additional  gauze and tape to maintain pressure once the bleeding has stopped.        PAIN:    1. Post operative pain should slowly get better, never worse.  2. A severe increase in pain may indicate a problem. Call the office if this occurs.    In case of emergency phone:Dr Daniels [phone]          Follow-ups after your visit        Your next 10 appointments already scheduled     Apr 21, 2017 12:10 PM CDT   LAB with PEREZ LAB   [facility] ([facility])    [address] [place] [address] [address]  [facility] [id number] [phone]           Patient must bring picture ID.  Patient should be prepared to give a urine specimen  Please do not eat 10-12 hours before your appointment if you are coming in fasting for labs on lipids, cholesterol, or glucose (sugar).  Pregnant women should follow their Care Team instructions. Water with medications is okay. Do not drink coffee or other fluids.   If you have concerns about taking  your medications, please ask at office or if scheduling via [employer], send a message by clicking on Secure Messaging, Message Your Care Team.            Apr 21, 2017  1:10 PM CDT   Return Visit with MARCELO Skinner CNP   [facility] ([facility])    [address] [place] [address] [address]  [facility] [id number] [phone]              Who to contact     If you have questions or need follow up information about today's clinic visit or your schedule please contact [facility] directly at [phone].  Normal or non-critical lab and imaging results will be communicated to you by MyChart, letter or phone within 4 business days after the clinic has received the results. If you do not hear from us within 7 days, please contact the clinic through MyChart or phone. If you have a critical or abnormal lab result, we will notify you by phone as soon as possible.  Submit refill requests through Lat49hart or call  your pharmacy and they will forward the refill request to us. Please allow 3 business days for your refill to be completed.          Additional Information About Your Visit        Getonichart Information     Scifiniti gives you secure access to your electronic health record. If you see a primary care provider, you can also send messages to your care team and make appointments. If you have questions, please call your primary care clinic.  If you do not have a primary care provider, please call [phone] and they will assist you.        Care EveryWhere ID     This is your Care EveryWhere ID. This could be used by other organizations to access your [place] medical records  [id number]        Your Vitals Were     Pulse Pulse Oximetry                55 96%           Blood Pressure from Last 3 Encounters:   03/16/17 149/85   02/09/17 118/76   02/02/17 (!) 144/93    Weight from Last 3 Encounters:   02/09/17 85.7 kg (189 lb)   01/19/17 83.9 kg (185 lb)   10/28/16 81.7 kg (180 lb 1.6 oz)              We Performed the Following     MOHS HEAD/NCK/HND/FT/GEN 1ST STAGE UP T0 5 BLOCKS     REPAIR COMPLEX, WOUND HEAD/AXIL/GEN/HND/FT 2.6-7.5 CM        Primary Care Provider Office Phone # Fax #    Richmond Martinez [phone]       [facility] [address] [facility] [address]        Thank you!     Thank you for choosing [facility]  for your care. Our goal is always to provide you with excellent care. Hearing back from our patients is one way we can continue to improve our services. Please take a few minutes to complete the written survey that you may receive in the mail after your visit with us. Thank you!             Your Updated Medication List - Protect others around you: Learn how to safely use, store and throw away your medicines at www.disposemymeds.org.          This list is accurate as of: 3/16/17 11:34 AM.  Always use your most recent med list.                   Brand Name  Dispense Instructions for use    albuterol 108 (90 BASE) MCG/ACT Inhaler    albuterol    3 Inhaler    Inhale 2 puffs into the lungs every 4 hours as needed for shortness of breath / dyspnea       aspirin 325 MG EC tablet      ONE DAILY       beclomethasone 40 MCG/ACT Inhaler    QVAR    3 Inhaler    Inhale 2 puffs into the lungs 2 times daily       flecainide acetate 150 MG Tabs     90 tablet    Take one half tablet, 75 mg, every 12 hours       levothyroxine 125 MCG tablet    SYNTHROID/LEVOTHROID    90 tablet    Take 1 tablet (125 mcg) by mouth daily       order for DME     1 Device    Equipment being ordered: spacer for inhaler

## 2017-03-24 ENCOUNTER — ALLIED HEALTH/NURSE VISIT (OUTPATIENT)
Dept: DERMATOLOGY | Facility: CLINIC | Age: 77
End: 2017-03-24
Payer: COMMERCIAL

## 2017-03-24 DIAGNOSIS — C44.319 BASAL CELL CARCINOMA OF CHEEK: Primary | ICD-10-CM

## 2017-03-24 PROCEDURE — 99207 ZZC NO CHARGE NURSE ONLY: CPT

## 2017-03-24 NOTE — MR AVS SNAPSHOT
After Visit Summary   3/24/2017    Eder Horan    MRN: [id number]           Patient Information     Date Of Birth          1940        Visit Information        Provider Department      3/24/2017 9:20 AM  DERM NURSE [facility]        Care Instructions    WOUND CARE INSTRUCTIONS  for  ONE WEEK AFTER SURGERY          1) Leave flat bandage on your skin for one week after today s bandage change.  2) In one week when you remove the bandage, you may resume your regular skin care routine, including washing with mild soap and water, applying moisturizer, make-up and sunscreen.    3) If there are any open or bleeding areas at the incision/graft site you should begin to cover the area with a bandage daily as follows:    1) Clean and dry the area with plain tap water using a Q-tip or sterile gauze pad.  2) Apply Polysporin or Bacitracin ointment to the open area.  3) Cover the wound with a band-aid or a sterile non-stick gauze pad and micropore paper tape.             *Once the bandages are removed, the scar will be red and firm (especially in the lip/chin area). This is normal and will fade in time. It might take 6-12 months for this to happen.     *Massaging the area will help the scar soften and fade quicker. Begin to massage the area one month after the bandages have been removed. To massage apply pressure directly and firmly over the scar with the fingertips and move in a circular motion. Massage the area for a few minutes several times a day. Continue to massage the site for several months.    *Approximately 6-8 weeks after surgery it is not uncommon to see the formation of  tender pimple-like  bump along the scar. This is normal. As the scar continues to mature and the stitches underneath the skin begin to dissolve, this might occur. Do not pick or squeeze, this will resolve on it s own. Should one break open producing a small amount of drainage, apply Polysporin or  Bacitracin ointment a few times a day until the wound is completely healed.    *Numbness in the surgical area is expected. It might take 12-18 months for the feeling to return to normal. During this time sensations of itchiness, tingling and occasional sharp pains might be noted. These feelings are normal and will subside once the nerves have completely healed.         IN CASE OF EMERGENCY: Dr Daniels [phone]       If you were seen in [place] call: [phone]          Follow-ups after your visit        Your next 10 appointments already scheduled     Apr 21, 2017 12:10 PM CDT   LAB with PEREZ LAB   [facility] ([facility])    [address] [place] [address] [id number] [phone]           Patient must bring picture ID.  Patient should be prepared to give a urine specimen  Please do not eat 10-12 hours before your appointment if you are coming in fasting for labs on lipids, cholesterol, or glucose (sugar).  Pregnant women should follow their Care Team instructions. Water with medications is okay. Do not drink coffee or other fluids.   If you have concerns about taking  your medications, please ask at office or if scheduling via [employer], send a message by clicking on Secure Messaging, Message Your Care Team.            Apr 21, 2017  1:10 PM CDT   Return Visit with MARCELO Skinner CNP   [facility] [place] ([facility])    [address] [place] [phone]              Who to contact     If you have questions or need follow up information about today's clinic visit or your schedule please contact [facility] directly at [phone].  Normal or non-critical lab and imaging results will be communicated to you by MyChart, letter or phone within 4 business days after the clinic has received the results. If you do not hear from us within 7 days,  please contact the clinic through [employer] or phone. If you have a critical or abnormal lab result, we will notify you by phone as soon as possible.  Submit refill requests through [employer] or call your pharmacy and they will forward the refill request to us. Please allow 3 business days for your refill to be completed.          Additional Information About Your Visit        [email or web address] Information     [employer] gives you secure access to your electronic health record. If you see a primary care provider, you can also send messages to your care team and make appointments. If you have questions, please call your primary care clinic.  If you do not have a primary care provider, please call [phone] and they will assist you.        Care EveryWhere ID     This is your Care EveryWhere ID. This could be used by other organizations to access your [place] medical records  [id number]         Blood Pressure from Last 3 Encounters:   03/16/17 149/85   02/09/17 118/76   02/02/17 (!) 144/93    Weight from Last 3 Encounters:   02/09/17 85.7 kg (189 lb)   01/19/17 83.9 kg (185 lb)   10/28/16 81.7 kg (180 lb 1.6 oz)              Today, you had the following     No orders found for display       Primary Care Provider Office Phone # Fax #    Richmond Martinez [phone]       [facility] [address] [facility] [address]        Thank you!     Thank you for choosing [facility]  for your care. Our goal is always to provide you with excellent care. Hearing back from our patients is one way we can continue to improve our services. Please take a few minutes to complete the written survey that you may receive in the mail after your visit with us. Thank you!             Your Updated Medication List - Protect others around you: Learn how to safely use, store and throw away your medicines at www.disposemymeds.org.          This list is accurate as of: 3/24/17  9:23 AM.  Always use your most  recent med list.                   Brand Name Dispense Instructions for use    albuterol 108 (90 BASE) MCG/ACT Inhaler    albuterol    3 Inhaler    Inhale 2 puffs into the lungs every 4 hours as needed for shortness of breath / dyspnea       aspirin 325 MG EC tablet      ONE DAILY       beclomethasone 40 MCG/ACT Inhaler    QVAR    3 Inhaler    Inhale 2 puffs into the lungs 2 times daily       flecainide acetate 150 MG Tabs     90 tablet    Take one half tablet, 75 mg, every 12 hours       levothyroxine 125 MCG tablet    SYNTHROID/LEVOTHROID    90 tablet    Take 1 tablet (125 mcg) by mouth daily       order for DME     1 Device    Equipment being ordered: spacer for inhaler

## 2017-03-24 NOTE — PATIENT INSTRUCTIONS
WOUND CARE INSTRUCTIONS  for  ONE WEEK AFTER SURGERY          1) Leave flat bandage on your skin for one week after today s bandage change.  2) In one week when you remove the bandage, you may resume your regular skin care routine, including washing with mild soap and water, applying moisturizer, make-up and sunscreen.    3) If there are any open or bleeding areas at the incision/graft site you should begin to cover the area with a bandage daily as follows:    1) Clean and dry the area with plain tap water using a Q-tip or sterile gauze pad.  2) Apply Polysporin or Bacitracin ointment to the open area.  3) Cover the wound with a band-aid or a sterile non-stick gauze pad and micropore paper tape.             *Once the bandages are removed, the scar will be red and firm (especially in the lip/chin area). This is normal and will fade in time. It might take 6-12 months for this to happen.     *Massaging the area will help the scar soften and fade quicker. Begin to massage the area one month after the bandages have been removed. To massage apply pressure directly and firmly over the scar with the fingertips and move in a circular motion. Massage the area for a few minutes several times a day. Continue to massage the site for several months.    *Approximately 6-8 weeks after surgery it is not uncommon to see the formation of  tender pimple-like  bump along the scar. This is normal. As the scar continues to mature and the stitches underneath the skin begin to dissolve, this might occur. Do not pick or squeeze, this will resolve on it s own. Should one break open producing a small amount of drainage, apply Polysporin or Bacitracin ointment a few times a day until the wound is completely healed.    *Numbness in the surgical area is expected. It might take 12-18 months for the feeling to return to normal. During this time sensations of itchiness, tingling and occasional sharp pains might be noted. These feelings are normal  and will subside once the nerves have completely healed.         IN CASE OF EMERGENCY: Dr Daniels [phone]       If you were seen in [place] call: [phone]

## 2017-03-24 NOTE — NURSING NOTE
Pt returned to clinic for post surgery 1 week follow up bandage change. Pt has no complaints, denies pain. Bandage removed from right cheek, area cleansed with normal saline. Site is healing and wound edges approximating well. Reapplied new steri strips and paper tape.    Advised to watch for signs/sx of infection; spreading redness, drainage, odor, fever. Call or report promptly to clinic. Pt given written instructions and informed to rtc as needed. Patient verbalized understanding.

## 2017-03-28 ENCOUNTER — OFFICE VISIT (OUTPATIENT)
Dept: INTERNAL MEDICINE | Facility: CLINIC | Age: 77
End: 2017-03-28
Payer: COMMERCIAL

## 2017-03-28 VITALS
WEIGHT: 191 LBS | SYSTOLIC BLOOD PRESSURE: 130 MMHG | BODY MASS INDEX: 27.35 KG/M2 | OXYGEN SATURATION: 99 % | DIASTOLIC BLOOD PRESSURE: 88 MMHG | HEIGHT: 70 IN | HEART RATE: 54 BPM | TEMPERATURE: 98.3 F

## 2017-03-28 DIAGNOSIS — L08.9 LOCAL INFECTION OF SKIN AND SUBCUTANEOUS TISSUE: Primary | ICD-10-CM

## 2017-03-28 PROCEDURE — 99213 OFFICE O/P EST LOW 20 MIN: CPT | Performed by: PHYSICIAN ASSISTANT

## 2017-03-28 RX ORDER — CEPHALEXIN 500 MG/1
500 CAPSULE ORAL 3 TIMES DAILY
Qty: 30 CAPSULE | Refills: 0 | Status: SHIPPED | OUTPATIENT
Start: 2017-03-28 | End: 2017-04-07

## 2017-03-28 NOTE — PROGRESS NOTES
"  SUBJECTIVE:                                                    Eder Horan is a 77 year old male who presents to clinic today for the following health issues:      Concern - Lump/Boil under R Armpit     Onset: x2 wks    Description:   Red, Hard, a little painful to the touch     Intensity: mild    Progression of Symptoms:  same    Accompanying Signs & Symptoms:  none       Previous history of similar problem:   no    Precipitating factors:   Worsened by: n/a    Alleviating factors:  Improved by: n/a       Therapies Tried and outcome: none       -------------------------------------    Problem list and histories reviewed & adjusted, as indicated.  Additional history: as documented    Labs reviewed in EPIC    Reviewed and updated as needed this visit by clinical staff  Tobacco  Allergies       Reviewed and updated as needed this visit by Provider         ROS:  Constitutional, derm, cardiovascular, pulmonary,  systems are negative, except as otherwise noted.    OBJECTIVE:                                                    /88 (BP Location: Left arm, Patient Position: Chair, Cuff Size: Adult Regular)  Pulse 54  Temp 98.3  F (36.8  C) (Oral)  Ht 5' 10\" (1.778 m)  Wt 191 lb (86.6 kg)  SpO2 99%  BMI 27.41 kg/m2  Body mass index is 27.41 kg/(m^2).  GENERAL: healthy, alert and no distress  RESP: lungs clear to auscultation - no rales, rhonchi or wheezes  CV: regular rates and rhythm and normal S1 S2, no S3 or S4  SKIN: right axilla,  Red firm tender nodule, warm to touch, inflammed- induration  No fluctuance       Diagnostic Test Results:  none      ASSESSMENT/PLAN:                                                            1. Local infection of skin and subcutaneous tissue    - cephALEXin (KEFLEX) 500 MG capsule; Take 1 capsule (500 mg) by mouth 3 times daily for 10 days  Dispense: 30 capsule; Refill: 0    See Patient Instructions    Cleo Zacarias PA-C  [facility]    "

## 2017-03-28 NOTE — MR AVS SNAPSHOT
After Visit Summary   3/28/2017    Eder Horan    MRN: [id number]           Patient Information     Date Of Birth          1940        Visit Information        Provider Department      3/28/2017 10:00 AM Cleo Zacarias PA-C [facility]        Today's Diagnoses     Local infection of skin and subcutaneous tissue    -  1      Care Instructions    Warm moist heat packs to the arm pit area twice a day    Follow up if not improving.         Follow-ups after your visit        Your next 10 appointments already scheduled     Apr 21, 2017 12:10 PM CDT   LAB with PEREZ LAB   [facility] ([facility])    [address] [place] [phone]           Patient must bring picture ID.  Patient should be prepared to give a urine specimen  Please do not eat 10-12 hours before your appointment if you are coming in fasting for labs on lipids, cholesterol, or glucose (sugar).  Pregnant women should follow their Care Team instructions. Water with medications is okay. Do not drink coffee or other fluids.   If you have concerns about taking  your medications, please ask at office or if scheduling via [employer], send a message by clicking on Secure Messaging, Message Your Care Team.            Apr 21, 2017  1:10 PM CDT   Return Visit with MARCELO Skinner CNP   [facility] ([facility])    [address] [place] [phone]              Who to contact     If you have questions or need follow up information about today's clinic visit or your schedule please contact [facility] directly at [phone].  Normal or non-critical lab and imaging results will be communicated to you by MyChart, letter or phone within 4 business days after the clinic has received the results. If you do not hear from us within 7  "days, please contact the clinic through [employer] or phone. If you have a critical or abnormal lab result, we will notify you by phone as soon as possible.  Submit refill requests through [employer] or call your pharmacy and they will forward the refill request to us. Please allow 3 business days for your refill to be completed.          Additional Information About Your Visit        [employer] Information     [employer] gives you secure access to your electronic health record. If you see a primary care provider, you can also send messages to your care team and make appointments. If you have questions, please call your primary care clinic.  If you do not have a primary care provider, please call [phone] and they will assist you.        Care EveryWhere ID     This is your Care EveryWhere ID. This could be used by other organizations to access your [place] medical records  [id number]        Your Vitals Were     Pulse Temperature Height Pulse Oximetry BMI (Body Mass Index)       54 98.3  F (36.8  C) (Oral) 5' 10\" (1.778 m) 99% 27.41 kg/m2        Blood Pressure from Last 3 Encounters:   03/28/17 130/88   03/16/17 149/85   02/09/17 118/76    Weight from Last 3 Encounters:   03/28/17 191 lb (86.6 kg)   02/09/17 189 lb (85.7 kg)   01/19/17 185 lb (83.9 kg)              Today, you had the following     No orders found for display         Today's Medication Changes          These changes are accurate as of: 3/28/17 10:12 AM.  If you have any questions, ask your nurse or doctor.               Start taking these medicines.        Dose/Directions    cephALEXin 500 MG capsule   Commonly known as:  KEFLEX   Used for:  Local infection of skin and subcutaneous tissue   Started by:  Cleo Zacarias PA-C        Dose:  500 mg   Take 1 capsule (500 mg) by mouth 3 times daily for 10 days   Quantity:  30 capsule   Refills:  0            Where to get your medicines      These medications were sent to [id number] - [place], " [place] [facility] [address] [address] [facility]  [address] [facility] [address]     Phone:  [phone]     cephALEXin 500 MG capsule                Primary Care Provider Office Phone # Fax #    Richmond Martinez [phone]       [facility] [address]  [facility] [address]        Thank you!     Thank you for choosing [facility]  for your care. Our goal is always to provide you with excellent care. Hearing back from our patients is one way we can continue to improve our services. Please take a few minutes to complete the written survey that you may receive in the mail after your visit with us. Thank you!             Your Updated Medication List - Protect others around you: Learn how to safely use, store and throw away your medicines at www.disposemymeds.org.          This list is accurate as of: 3/28/17 10:12 AM.  Always use your most recent med list.                   Brand Name Dispense Instructions for use    albuterol 108 (90 BASE) MCG/ACT Inhaler    albuterol    3 Inhaler    Inhale 2 puffs into the lungs every 4 hours as needed for shortness of breath / dyspnea       aspirin 325 MG EC tablet      ONE DAILY       beclomethasone 40 MCG/ACT Inhaler    QVAR    3 Inhaler    Inhale 2 puffs into the lungs 2 times daily       cephALEXin 500 MG capsule    KEFLEX    30 capsule    Take 1 capsule (500 mg) by mouth 3 times daily for 10 days       flecainide acetate 150 MG Tabs     90 tablet    Take one half tablet, 75 mg, every 12 hours       levothyroxine 125 MCG tablet    SYNTHROID/LEVOTHROID    90 tablet    Take 1 tablet (125 mcg) by mouth daily       order for DME     1 Device    Equipment being ordered: spacer for inhaler

## 2017-03-28 NOTE — NURSING NOTE
"Chief Complaint   Patient presents with     Mass     Under R arm pit x2 wks        Initial /88 (BP Location: Left arm, Patient Position: Chair, Cuff Size: Adult Regular)  Pulse 54  Temp 98.3  F (36.8  C) (Oral)  Ht 5' 10\" (1.778 m)  Wt 191 lb (86.6 kg)  SpO2 99%  BMI 27.41 kg/m2 Estimated body mass index is 27.41 kg/(m^2) as calculated from the following:    Height as of this encounter: 5' 10\" (1.778 m).    Weight as of this encounter: 191 lb (86.6 kg).  Medication Reconciliation: complete    "

## 2017-04-21 ENCOUNTER — OFFICE VISIT (OUTPATIENT)
Dept: CARDIOLOGY | Facility: CLINIC | Age: 77
End: 2017-04-21
Attending: INTERNAL MEDICINE
Payer: COMMERCIAL

## 2017-04-21 VITALS
SYSTOLIC BLOOD PRESSURE: 138 MMHG | BODY MASS INDEX: 26.63 KG/M2 | HEIGHT: 70 IN | DIASTOLIC BLOOD PRESSURE: 79 MMHG | WEIGHT: 186 LBS | HEART RATE: 49 BPM

## 2017-04-21 DIAGNOSIS — I48.0 PAROXYSMAL ATRIAL FIBRILLATION (H): ICD-10-CM

## 2017-04-21 DIAGNOSIS — I10 ESSENTIAL HYPERTENSION WITH GOAL BLOOD PRESSURE LESS THAN 140/90: ICD-10-CM

## 2017-04-21 DIAGNOSIS — I74.9 EMBOLISM AND THROMBOSIS (H): ICD-10-CM

## 2017-04-21 DIAGNOSIS — R55 SYNCOPE, UNSPECIFIED SYNCOPE TYPE: ICD-10-CM

## 2017-04-21 DIAGNOSIS — R00.1 BRADYCARDIA: Primary | ICD-10-CM

## 2017-04-21 DIAGNOSIS — R00.1 BRADYCARDIA: ICD-10-CM

## 2017-04-21 DIAGNOSIS — E78.5 HYPERLIPIDEMIA LDL GOAL <130: ICD-10-CM

## 2017-04-21 LAB
ANION GAP SERPL CALCULATED.3IONS-SCNC: 8.2 MMOL/L (ref 6–17)
BUN SERPL-MCNC: 9 MG/DL (ref 7–30)
CALCIUM SERPL-MCNC: 8.7 MG/DL (ref 8.5–10.5)
CHLORIDE SERPL-SCNC: 105 MMOL/L (ref 98–107)
CO2 SERPL-SCNC: 30 MMOL/L (ref 23–29)
CREAT SERPL-MCNC: 0.8 MG/DL (ref 0.7–1.3)
GFR SERPL CREATININE-BSD FRML MDRD: >90 ML/MIN/1.7M2
GLUCOSE SERPL-MCNC: 109 MG/DL (ref 70–105)
POTASSIUM SERPL-SCNC: 4.2 MMOL/L (ref 3.5–5.1)
SODIUM SERPL-SCNC: 139 MMOL/L (ref 136–145)

## 2017-04-21 PROCEDURE — 80048 BASIC METABOLIC PNL TOTAL CA: CPT | Performed by: INTERNAL MEDICINE

## 2017-04-21 PROCEDURE — 36415 COLL VENOUS BLD VENIPUNCTURE: CPT | Performed by: INTERNAL MEDICINE

## 2017-04-21 PROCEDURE — 99214 OFFICE O/P EST MOD 30 MIN: CPT | Performed by: NURSE PRACTITIONER

## 2017-04-21 NOTE — LETTER
4/21/2017    Richmond Martinez MD  [facility]   [address] [facility] [address]    RE: Eder Horan       Dear Colleague,    I had the pleasure of seeing Eder Horan in the [facility].    Mr. Horan is a delightful 77-year-old gentleman here today for followup.  I had the pleasure of seeing him last 04/30/2015.  He has a history of paroxysmal atrial fibrillation with rapid ventricular response.  He was admitted back in [facility] in 2008.  He was seen by Electrophysiology and placed on flecainide with a repeat cardioversion.  He was previously on Cardizem with flecainide, which was discontinued secondary to bradycardia with heart rates in the 40s.      He also has a history of questionable COPD and asthma.  He is on a QVAR and p.r.n. albuterol.  He also has known hypothyroidism and borderline hypertension.      Last Holter monitor in 01/2017 showed poor heart rate variance.  His minimum heart rate was 42 beats per minute, average of 58 and a max of 83 beats per minute.  The patient has had issues with fatigue, especially with exertion.  He stated that he thought it was always related to his lungs.  He would try to take albuterol and may have a little improvement, but nothing of great significance.  Also, he has been trying to get out in the yard to do some work, but states that he fatigues easily.  He felt it was secondary to being lazy or aging and did not think that it could be related to the low pulse rate.      His blood pressure today is normal at 138/79.  His initial heart rate was noted on automatic cuff at 49 beats per minute and reassessed by myself between 50-54 beats per minute.      Echocardiogram 01/2017 shows an EF of 55%-60% with grade I diastolic dysfunction.  He had mild mitral valve prolapse with mild mitral insufficiency.  He also had mild tricuspid insufficiency.     7/24/2015 stress test demonstrated mainly fixed inferoseptal defect that likely  is due to diaphragmatic attenuation. No ischemia is seen.Compared to the prior study from 2014, there was no significant change . Lipids are well controlled with diet.     He denies lightheadedness, dizziness, syncope or chest discomfort.  All other review of systems and past medical history are noted below.      Outpatient Encounter Prescriptions as of 4/21/2017   Medication Sig Dispense Refill     levothyroxine (SYNTHROID/LEVOTHROID) 125 MCG tablet Take 1 tablet (125 mcg) by mouth daily 90 tablet 3     [DISCONTINUED] beclomethasone (QVAR) 40 MCG/ACT Inhaler Inhale 2 puffs into the lungs 2 times daily 3 Inhaler 3     [DISCONTINUED] flecainide acetate 150 MG TABS Take one half tablet, 75 mg, every 12 hours 90 tablet 3     albuterol (ALBUTEROL) 108 (90 BASE) MCG/ACT inhaler Inhale 2 puffs into the lungs every 4 hours as needed for shortness of breath / dyspnea 3 Inhaler 3     ORDER FOR DME Equipment being ordered: spacer for inhaler 1 Device 1     [DISCONTINUED] ASPIRIN 325 MG PO TBEC ONE DAILY  3     No facility-administered encounter medications on file as of 4/21/2017.      ASSESSMENT AND PLAN:  Mr. Horan is a delightful 77-year-old gentleman here today for followup.  Again, he is being treated for paroxysmal atrial fibrillation with flecainide at 75 mg b.i.d.  He has a CHADS-VASc score of 2, which is age.  He is not on medication for hypertension, and he is normotensive today.      I am concerned about possible chronotropic incompetence.  If we were to stop his flecainide, he will revert back to atrial fibrillation.  He was seen by Dr. Tamez for initial consultation, who placed him on flecainide.  If his fatigue persists, I would recommend that he follow up with EP to discuss possible pacemaker implantation.  We briefly discussed this today.  I did give the patient an informational booklet to review.  He will try to pay more attention to his energy level.  He will discuss this further with Dr. Franklin.  If his energy  decreases or becomes concerning, I have asked that he contact us.      As always, thank you for including us in his care.  Feel free to contact us if you have questions or concerns regarding today's assessment and plan.     Sincerely,    Amira Mendes NP, APRN [facility]

## 2017-04-21 NOTE — PROGRESS NOTES
HPI and Plan: [id number]  See dictation    Orders Placed This Encounter   Procedures     EKG 12-lead complete with read (future- to be scheduled)       No orders of the defined types were placed in this encounter.      There are no discontinued medications.      Encounter Diagnoses   Name Primary?     Paroxysmal atrial fibrillation (H)      Essential hypertension with goal blood pressure less than 140/90      Embolism and thrombosis      Bradycardia      Syncope, unspecified syncope type      Hyperlipidemia LDL goal <130        CURRENT MEDICATIONS:  Current Outpatient Prescriptions   Medication Sig Dispense Refill     beclomethasone (QVAR) 40 MCG/ACT Inhaler Inhale 2 puffs into the lungs 2 times daily 3 Inhaler 3     levothyroxine (SYNTHROID/LEVOTHROID) 125 MCG tablet Take 1 tablet (125 mcg) by mouth daily 90 tablet 3     flecainide acetate 150 MG TABS Take one half tablet, 75 mg, every 12 hours 90 tablet 3     albuterol (ALBUTEROL) 108 (90 BASE) MCG/ACT inhaler Inhale 2 puffs into the lungs every 4 hours as needed for shortness of breath / dyspnea 3 Inhaler 3     ORDER FOR DME Equipment being ordered: spacer for inhaler 1 Device 1     ASPIRIN 325 MG PO TBEC ONE DAILY  3       ALLERGIES     Allergies   Allergen Reactions     Bactrim [Sulfa Drugs] Rash       PAST MEDICAL HISTORY:  Past Medical History:   Diagnosis Date     Actinic keratosis 6/09    face     ALLERGIC RHINITIS       Atrial fibrillation (H) 6/08     Back pain     s/p LESI through ortho Feb 2010     Basal cell carcinoma      Bradycardia      COPD (chronic obstructive pulmonary disease) (H)      DVT      Left calf 2003. Right calf 6/06     Hyperlipidemia LDL goal <130 5/10/2011     Hypertension      HYPOTHYROIDISM      hypothyroidism     Lateral epicondylitis  of elbow 7/05    left     Malignant melanoma (H)      PLANTAR FASCITIS      Prostate cancer (H) 6/09    on Lupron therapy     Squamous cell carcinoma (H)  Sept 2014     right lower lid, s/p MOHS      Syncope      TMJ (temporomandibular joint syndrome) 4/12/2012     Tobacco use disorder        PAST SURGICAL HISTORY:  Past Surgical History:   Procedure Laterality Date     ABDOMEN SURGERY  2004,06,09    Hernias (2) Prostate Removal(2009)     BIOPSY  2009    Prostate     C NONSPECIFIC PROCEDURE  1/07    Left groin exploration and left inguinal herniorrhaphy     C NONSPECIFIC PROCEDURE  5/08    Prostate bx (benign)     C NONSPECIFIC PROCEDURE  8/09    Wide local excision (left side), robotic-assisted laparoscopic prostatectomy and   Left pelvic lymph node dissection     C NONSPECIFIC PROCEDURE  August 2010    Right inguinal herniorrhaphy with mesh     CARDIOVERSION  10-16-08    failed     COLONOSCOPY  2004     EYE SURGERY      2013 lump from lower  left eye lid removed     GENITOURINARY SURGERY  2009    Prostate     HERNIA REPAIR  2004 - 2006       FAMILY HISTORY:  Family History   Problem Relation Age of Onset     C.A.D. Father      heart attack- angina     Coronary Artery Disease Father      Myocardial Infarction Father      CANCER Mother      lung     Lung Cancer Mother      Lung Cancer Sister      Unknown/Adopted Maternal Grandmother      Unknown/Adopted Maternal Grandfather      Unknown/Adopted Paternal Grandmother      Unknown/Adopted Paternal Grandfather      Unknown/Adopted Sister        SOCIAL HISTORY:  Social History     Social History     Marital status:      Spouse name: N/A     Number of children: 3     Years of education: N/A     Occupational History     [employer]      Retired     Social History Main Topics     Smoking status: Former Smoker     Types: Cigars     Quit date: 12/1/2006     Smokeless tobacco: Never Used      Comment: pipe daily     Alcohol use 0.0 oz/week     0 Standard drinks or equivalent per week      Comment: 2-3 drinks a day:  Wine/Beer     Drug use: No     Sexual activity: No     Other Topics Concern     Parent/Sibling W/ Cabg, Mi Or Angioplasty Before 65f 55m? No      "Caffeine Concern No     2-3 cups of coffee a day     Sleep Concern No     Stress Concern No     Weight Concern No     Special Diet No     Exercise No     Seat Belt Yes     Social History Narrative       Review of Systems:  Skin:  Negative       Eyes:  Positive for glasses    ENT:  Negative      Respiratory:  Positive for dyspnea on exertion     Cardiovascular:    Positive for;dizziness    Gastroenterology: Negative      Genitourinary:  Positive for nocturia    Musculoskeletal:  Negative      Neurologic:  Negative      Psychiatric:  Negative      Heme/Lymph/Imm:  Positive for allergies    Endocrine:  Positive for thyroid disorder      Physical Exam:  Vitals: /79  Pulse (!) 49  Ht 1.778 m (5' 10\")  Wt 84.4 kg (186 lb)  BMI 26.69 kg/m2    Constitutional:  cooperative, alert and oriented, well developed, well nourished, in no acute distress        Skin:  warm and dry to the touch, no apparent skin lesions or masses noted        Head:  normocephalic, no masses or lesions        Eyes:  pupils equal and round, conjunctivae and lids unremarkable, sclera white, no xanthalasma, EOMS intact, no nystagmus        ENT:  no pallor or cyanosis, dentition good        Neck:  JVP normal        Chest:  clear to auscultation          Cardiac: regular rhythm;normal S1 and S2 bradycardic S4   systolic murmur;LUSB;grade 1          Abdomen:  abdomen soft        Vascular: not assessed this visit                                        Extremities and Back:  no deformities, clubbing, cyanosis, erythema observed;no edema              Neurological:  affect appropriate, oriented to time, person and place;no gross motor deficits              CC  Jaya Franklin MD   [facility]  [address]  [place] [id number]              "

## 2017-04-21 NOTE — PATIENT INSTRUCTIONS
Monitor your energy level  Are you always tired or unable to do your normal activities?  You can discuss further with  in July or call if you have questions or concerns call

## 2017-04-21 NOTE — MR AVS SNAPSHOT
After Visit Summary   4/21/2017    Eder Horan    MRN: [id number]           Patient Information     Date Of Birth          1940        Visit Information        Provider Department      4/21/2017 1:10 PM Amira Mendes APRN CNP [facility]        Today's Diagnoses     Paroxysmal atrial fibrillation (H)        Essential hypertension with goal blood pressure less than 140/90        Embolism and thrombosis        Bradycardia        Syncope, unspecified syncope type        Hyperlipidemia LDL goal <130          Care Instructions    Monitor your energy level  Are you always tired or unable to do your normal activities?  You can discuss further with  in July or call if you have questions or concerns call        Follow-ups after your visit        Your next 10 appointments already scheduled     Jul 27, 2017  8:20 AM CDT   LAB with [facility]   [facility] ([facility])    [address] [place] [id number] [phone]           Patient must bring picture ID.  Patient should be prepared to give a urine specimen  Please do not eat 10-12 hours before your appointment if you are coming in fasting for labs on lipids, cholesterol, or glucose (sugar).  Pregnant women should follow their Care Team instructions. Water with medications is okay. Do not drink coffee or other fluids.   If you have concerns about taking  your medications, please ask at office or if scheduling via [facility], send a message by clicking on Secure Messaging, Message Your Care Team.            Jul 27, 2017  9:15 AM CDT   Return Visit with Jaya Franklin MD   [facility] ([facility])    [address] [place], [place] [id number]  [facility] [id number] [phone]              Future tests that were ordered for you today     Open Future Orders        Priority Expected Expires Ordered  "   EKG 12-lead complete with read (future- to be scheduled) Routine 7/10/2017 4/21/2018 4/21/2017            Who to contact     If you have questions or need follow up information about today's clinic visit or your schedule please contact [facility] [place] directly at [phone].  Normal or non-critical lab and imaging results will be communicated to you by MyChart, letter or phone within 4 business days after the clinic has received the results. If you do not hear from us within 7 days, please contact the clinic through [facility] Inchart or phone. If you have a critical or abnormal lab result, we will notify you by phone as soon as possible.  Submit refill requests through [employer] or call your pharmacy and they will forward the refill request to us. Please allow 3 business days for your refill to be completed.          Additional Information About Your Visit        [facility] Inchart Information     [facility] gives you secure access to your electronic health record. If you see a primary care provider, you can also send messages to your care team and make appointments. If you have questions, please call your primary care clinic.  If you do not have a primary care provider, please call [phone] and they will assist you.        Care EveryWhere ID     This is your Care EveryWhere ID. This could be used by other organizations to access your [facility] medical records  [id number]        Your Vitals Were     Pulse Height BMI (Body Mass Index)             49 1.778 m (5' 10\") 26.69 kg/m2          Blood Pressure from Last 3 Encounters:   04/21/17 138/79   03/28/17 130/88   03/16/17 149/85    Weight from Last 3 Encounters:   04/21/17 84.4 kg (186 lb)   03/28/17 86.6 kg (191 lb)   02/09/17 85.7 kg (189 lb)              We Performed the Following     Follow-Up with Cardiac Advanced Practice Provider        Primary Care Provider Office Phone # Fax #    Richmond Martinez [phone]       [place] " [facility] [address] [facility] [address]        Thank you!     Thank you for choosing [facility] [place]  for your care. Our goal is always to provide you with excellent care. Hearing back from our patients is one way we can continue to improve our services. Please take a few minutes to complete the written survey that you may receive in the mail after your visit with us. Thank you!             Your Updated Medication List - Protect others around you: Learn how to safely use, store and throw away your medicines at www.disposemymeds.org.          This list is accurate as of: 4/21/17  1:34 PM.  Always use your most recent med list.                   Brand Name Dispense Instructions for use    albuterol 108 (90 BASE) MCG/ACT Inhaler    albuterol    3 Inhaler    Inhale 2 puffs into the lungs every 4 hours as needed for shortness of breath / dyspnea       aspirin 325 MG EC tablet      ONE DAILY       beclomethasone 40 MCG/ACT Inhaler    QVAR    3 Inhaler    Inhale 2 puffs into the lungs 2 times daily       flecainide acetate 150 MG Tabs     90 tablet    Take one half tablet, 75 mg, every 12 hours       levothyroxine 125 MCG tablet    SYNTHROID/LEVOTHROID    90 tablet    Take 1 tablet (125 mcg) by mouth daily       order for DME     1 Device    Equipment being ordered: spacer for inhaler

## 2017-04-24 NOTE — PROGRESS NOTES
HISTORY OF PRESENT ILLNESS:  Mr. Horan is a delightful 77-year-old gentleman here today for followup.  I had the pleasure of seeing him last 04/30/2015.  He has a history of paroxysmal atrial fibrillation with rapid ventricular response.  He was admitted back in [facility] in 2008.  He was seen by Electrophysiology and placed on flecainide with a repeat cardioversion.  He was previously on Cardizem with flecainide, which was discontinued secondary to bradycardia with heart rates in the 40s.      He also has a history of questionable COPD and asthma.  He is on a QVAR and p.r.n. albuterol.  He also has known hypothyroidism and borderline hypertension.      Last Holter monitor in 01/2017 showed poor heart rate variance.  His minimum heart rate was 42 beats per minute, average of 58 and a max of 83 beats per minute.  The patient has had issues with fatigue, especially with exertion.  He stated that he thought it was always related to his lungs.  He would try to take albuterol and may have a little improvement, but nothing of great significance.  Also, he has been trying to get out in the yard to do some work, but states that he fatigues easily.  He felt it was secondary to being lazy or aging and did not think that it could be related to the low pulse rate.      His blood pressure today is normal at 138/79.  His initial heart rate was noted on automatic cuff at 49 beats per minute and reassessed by myself between 50-54 beats per minute.      Echocardiogram 01/2017 shows an EF of 55%-60% with grade I diastolic dysfunction.  He had mild mitral valve prolapse with mild mitral insufficiency.  He also had mild tricuspid insufficiency.     7/24/2015 stress test demonstrated mainly fixed inferoseptal defect that likely is due to diaphragmatic attenuation. No ischemia is seen.Compared to the prior study from 2014, there was no significant change . Lipids are well controlled with diet.     He denies lightheadedness,  dizziness, syncope or chest discomfort.  All other review of systems and past medical history are noted below.      ASSESSMENT AND PLAN:  Mr. Horan is a delightful 77-year-old gentleman here today for followup.  Again, he is being treated for paroxysmal atrial fibrillation with flecainide at 75 mg b.i.d.  He has a CHADS-VASc score of 2, which is age.  He is not on medication for hypertension, and he is normotensive today.      I am concerned about possible chronotropic incompetence.  If we were to stop his flecainide, he will revert back to atrial fibrillation.  He was seen by Dr. Tamez for initial consultation, who placed him on flecainide.  If his fatigue persists, I would recommend that he follow up with EP to discuss possible pacemaker implantation.  We briefly discussed this today.  I did give the patient an informational booklet to review.  He will try to pay more attention to his energy level.  He will discuss this further with Dr. Franklin.  If his energy decreases or becomes concerning, I have asked that he contact us.      As always, thank you for including us in his care.  Feel free to contact us if you have questions or concerns regarding today's assessment and plan.         YUMIKO MURILLO NP             D: 2017 14:57   T: 2017 01:11   MT: NICOLAS      Name:     AUGUSTINE HORAN   MRN:      [id number]        Account:      [id number]   :      1940           Service Date: 2017      Document: [id number]

## 2017-06-11 ENCOUNTER — HOSPITAL ENCOUNTER (EMERGENCY)
Facility: CLINIC | Age: 77
Discharge: OTHER INSTITUTION | End: 2017-06-11
Attending: EMERGENCY MEDICINE | Admitting: EMERGENCY MEDICINE
Payer: COMMERCIAL

## 2017-06-11 ENCOUNTER — APPOINTMENT (OUTPATIENT)
Dept: CT IMAGING | Facility: CLINIC | Age: 77
End: 2017-06-11
Attending: EMERGENCY MEDICINE
Payer: COMMERCIAL

## 2017-06-11 ENCOUNTER — HOSPITAL ENCOUNTER (INPATIENT)
Facility: CLINIC | Age: 77
LOS: 2 days | Discharge: HOME | DRG: 065 | End: 2017-06-13
Attending: PSYCHIATRY & NEUROLOGY | Admitting: PSYCHIATRY & NEUROLOGY
Payer: COMMERCIAL

## 2017-06-11 VITALS
RESPIRATION RATE: 9 BRPM | OXYGEN SATURATION: 98 % | TEMPERATURE: 97.9 F | DIASTOLIC BLOOD PRESSURE: 90 MMHG | BODY MASS INDEX: 26.41 KG/M2 | HEART RATE: 54 BPM | SYSTOLIC BLOOD PRESSURE: 150 MMHG | WEIGHT: 184.5 LBS | HEIGHT: 70 IN

## 2017-06-11 DIAGNOSIS — I63.49 CEREBROVASCULAR ACCIDENT (CVA) DUE TO EMBOLISM OF OTHER CEREBRAL ARTERY (H): ICD-10-CM

## 2017-06-11 DIAGNOSIS — J34.89 DRY NOSE: ICD-10-CM

## 2017-06-11 DIAGNOSIS — E78.00 PURE HYPERCHOLESTEROLEMIA: ICD-10-CM

## 2017-06-11 DIAGNOSIS — I48.0 PAROXYSMAL ATRIAL FIBRILLATION (H): Primary | ICD-10-CM

## 2017-06-11 DIAGNOSIS — I63.9 ACUTE ISCHEMIC STROKE (H): ICD-10-CM

## 2017-06-11 LAB
ALBUMIN UR-MCNC: 10 MG/DL
ANION GAP SERPL CALCULATED.3IONS-SCNC: 5 MMOL/L (ref 3–14)
ANION GAP SERPL CALCULATED.3IONS-SCNC: 7 MMOL/L (ref 3–14)
APPEARANCE UR: CLEAR
APTT PPP: 24 SEC (ref 22–37)
APTT PPP: 30 SEC (ref 22–37)
BASOPHILS # BLD AUTO: 0 10E9/L (ref 0–0.2)
BASOPHILS NFR BLD AUTO: 0.5 %
BILIRUB UR QL STRIP: NEGATIVE
BUN SERPL-MCNC: 12 MG/DL (ref 7–30)
BUN SERPL-MCNC: 12 MG/DL (ref 7–30)
CALCIUM SERPL-MCNC: 8 MG/DL (ref 8.5–10.1)
CALCIUM SERPL-MCNC: 8.4 MG/DL (ref 8.5–10.1)
CHLORIDE SERPL-SCNC: 107 MMOL/L (ref 94–109)
CHLORIDE SERPL-SCNC: 108 MMOL/L (ref 94–109)
CO2 SERPL-SCNC: 25 MMOL/L (ref 20–32)
CO2 SERPL-SCNC: 27 MMOL/L (ref 20–32)
COLOR UR AUTO: YELLOW
CREAT SERPL-MCNC: 0.71 MG/DL (ref 0.66–1.25)
CREAT SERPL-MCNC: 0.81 MG/DL (ref 0.66–1.25)
DIFFERENTIAL METHOD BLD: NORMAL
EOSINOPHIL # BLD AUTO: 0.2 10E9/L (ref 0–0.7)
EOSINOPHIL NFR BLD AUTO: 2.4 %
ERYTHROCYTE [DISTWIDTH] IN BLOOD BY AUTOMATED COUNT: 13.9 % (ref 10–15)
ERYTHROCYTE [DISTWIDTH] IN BLOOD BY AUTOMATED COUNT: 13.9 % (ref 10–15)
ERYTHROCYTE [DISTWIDTH] IN BLOOD BY AUTOMATED COUNT: NORMAL % (ref 10–15)
GFR SERPL CREATININE-BSD FRML MDRD: ABNORMAL ML/MIN/1.7M2
GFR SERPL CREATININE-BSD FRML MDRD: ABNORMAL ML/MIN/1.7M2
GLUCOSE BLDC GLUCOMTR-MCNC: 127 MG/DL (ref 70–99)
GLUCOSE SERPL-MCNC: 113 MG/DL (ref 70–99)
GLUCOSE SERPL-MCNC: 77 MG/DL (ref 70–99)
GLUCOSE UR STRIP-MCNC: NEGATIVE MG/DL
HBA1C MFR BLD: 5.5 % (ref 4.3–6)
HCT VFR BLD AUTO: 42.8 % (ref 40–53)
HCT VFR BLD AUTO: 43.3 % (ref 40–53)
HCT VFR BLD AUTO: NORMAL % (ref 40–53)
HGB BLD-MCNC: 14.1 G/DL (ref 13.3–17.7)
HGB BLD-MCNC: 14.7 G/DL (ref 13.3–17.7)
HGB BLD-MCNC: NORMAL G/DL (ref 13.3–17.7)
HGB UR QL STRIP: NEGATIVE
IMM GRANULOCYTES # BLD: 0 10E9/L (ref 0–0.4)
IMM GRANULOCYTES NFR BLD: 0.2 %
INR PPP: 0.87 (ref 0.86–1.14)
INR PPP: 0.91 (ref 0.86–1.14)
KETONES UR STRIP-MCNC: 40 MG/DL
LEUKOCYTE ESTERASE UR QL STRIP: NEGATIVE
LYMPHOCYTES # BLD AUTO: 1.4 10E9/L (ref 0.8–5.3)
LYMPHOCYTES NFR BLD AUTO: 23.3 %
MCH RBC QN AUTO: 32 PG (ref 26.5–33)
MCH RBC QN AUTO: 32.4 PG (ref 26.5–33)
MCH RBC QN AUTO: NORMAL PG (ref 26.5–33)
MCHC RBC AUTO-ENTMCNC: 32.9 G/DL (ref 31.5–36.5)
MCHC RBC AUTO-ENTMCNC: 33.9 G/DL (ref 31.5–36.5)
MCHC RBC AUTO-ENTMCNC: NORMAL G/DL (ref 31.5–36.5)
MCV RBC AUTO: 95 FL (ref 78–100)
MCV RBC AUTO: 97 FL (ref 78–100)
MCV RBC AUTO: NORMAL FL (ref 78–100)
MONOCYTES # BLD AUTO: 0.8 10E9/L (ref 0–1.3)
MONOCYTES NFR BLD AUTO: 12.8 %
MRSA DNA SPEC QL NAA+PROBE: NORMAL
NEUTROPHILS # BLD AUTO: 3.7 10E9/L (ref 1.6–8.3)
NEUTROPHILS NFR BLD AUTO: 60.8 %
NITRATE UR QL: NEGATIVE
NRBC # BLD AUTO: 0 10*3/UL
NRBC BLD AUTO-RTO: 0 /100
PH UR STRIP: 8 PH (ref 5–7)
PLATELET # BLD AUTO: 198 10E9/L (ref 150–450)
PLATELET # BLD AUTO: 229 10E9/L (ref 150–450)
PLATELET # BLD AUTO: NORMAL 10E9/L (ref 150–450)
POTASSIUM SERPL-SCNC: 4.2 MMOL/L (ref 3.4–5.3)
POTASSIUM SERPL-SCNC: 4.2 MMOL/L (ref 3.4–5.3)
RBC # BLD AUTO: 4.41 10E12/L (ref 4.4–5.9)
RBC # BLD AUTO: 4.54 10E12/L (ref 4.4–5.9)
RBC # BLD AUTO: NORMAL 10E12/L (ref 4.4–5.9)
RBC #/AREA URNS AUTO: 2 /HPF (ref 0–2)
SODIUM SERPL-SCNC: 139 MMOL/L (ref 133–144)
SODIUM SERPL-SCNC: 141 MMOL/L (ref 133–144)
SP GR UR STRIP: >1.05 (ref 1–1.03)
SPECIMEN SOURCE: NORMAL
TROPONIN I SERPL-MCNC: 0.02 UG/L (ref 0–0.04)
URN SPEC COLLECT METH UR: ABNORMAL
UROBILINOGEN UR STRIP-MCNC: NORMAL MG/DL (ref 0–2)
WBC # BLD AUTO: 6.2 10E9/L (ref 4–11)
WBC # BLD AUTO: 9.5 10E9/L (ref 4–11)
WBC # BLD AUTO: NORMAL 10E9/L (ref 4–11)
WBC #/AREA URNS AUTO: <1 /HPF (ref 0–2)

## 2017-06-11 PROCEDURE — 85027 COMPLETE CBC AUTOMATED: CPT | Performed by: PSYCHIATRY & NEUROLOGY

## 2017-06-11 PROCEDURE — 85730 THROMBOPLASTIN TIME PARTIAL: CPT | Performed by: PSYCHIATRY & NEUROLOGY

## 2017-06-11 PROCEDURE — 80048 BASIC METABOLIC PNL TOTAL CA: CPT | Performed by: PSYCHIATRY & NEUROLOGY

## 2017-06-11 PROCEDURE — 36415 COLL VENOUS BLD VENIPUNCTURE: CPT | Performed by: PSYCHIATRY & NEUROLOGY

## 2017-06-11 PROCEDURE — 87640 STAPH A DNA AMP PROBE: CPT | Performed by: PSYCHIATRY & NEUROLOGY

## 2017-06-11 PROCEDURE — 85730 THROMBOPLASTIN TIME PARTIAL: CPT | Performed by: EMERGENCY MEDICINE

## 2017-06-11 PROCEDURE — 25000128 H RX IP 250 OP 636: Performed by: EMERGENCY MEDICINE

## 2017-06-11 PROCEDURE — 70460 CT HEAD/BRAIN W/DYE: CPT

## 2017-06-11 PROCEDURE — 96376 TX/PRO/DX INJ SAME DRUG ADON: CPT | Mod: 59

## 2017-06-11 PROCEDURE — 25000128 H RX IP 250 OP 636

## 2017-06-11 PROCEDURE — 81001 URINALYSIS AUTO W/SCOPE: CPT | Performed by: PSYCHIATRY & NEUROLOGY

## 2017-06-11 PROCEDURE — 87641 MR-STAPH DNA AMP PROBE: CPT | Performed by: PSYCHIATRY & NEUROLOGY

## 2017-06-11 PROCEDURE — 70470 CT HEAD/BRAIN W/O & W/DYE: CPT | Mod: XS

## 2017-06-11 PROCEDURE — 99285 EMERGENCY DEPT VISIT HI MDM: CPT | Mod: 25

## 2017-06-11 PROCEDURE — 25000132 ZZH RX MED GY IP 250 OP 250 PS 637: Performed by: PSYCHIATRY & NEUROLOGY

## 2017-06-11 PROCEDURE — 93005 ELECTROCARDIOGRAM TRACING: CPT

## 2017-06-11 PROCEDURE — 25000125 ZZHC RX 250: Performed by: PSYCHIATRY & NEUROLOGY

## 2017-06-11 PROCEDURE — 25000125 ZZHC RX 250: Performed by: EMERGENCY MEDICINE

## 2017-06-11 PROCEDURE — 96375 TX/PRO/DX INJ NEW DRUG ADDON: CPT

## 2017-06-11 PROCEDURE — 85025 COMPLETE CBC W/AUTO DIFF WBC: CPT | Performed by: EMERGENCY MEDICINE

## 2017-06-11 PROCEDURE — 80048 BASIC METABOLIC PNL TOTAL CA: CPT | Performed by: EMERGENCY MEDICINE

## 2017-06-11 PROCEDURE — 00000146 ZZHCL STATISTIC GLUCOSE BY METER IP

## 2017-06-11 PROCEDURE — 93010 ELECTROCARDIOGRAM REPORT: CPT | Performed by: INTERNAL MEDICINE

## 2017-06-11 PROCEDURE — 85610 PROTHROMBIN TIME: CPT | Performed by: EMERGENCY MEDICINE

## 2017-06-11 PROCEDURE — 20000004 ZZH R&B ICU UMMC

## 2017-06-11 PROCEDURE — 25000128 H RX IP 250 OP 636: Performed by: PSYCHIATRY & NEUROLOGY

## 2017-06-11 PROCEDURE — 85610 PROTHROMBIN TIME: CPT | Performed by: PSYCHIATRY & NEUROLOGY

## 2017-06-11 PROCEDURE — 84484 ASSAY OF TROPONIN QUANT: CPT | Performed by: PSYCHIATRY & NEUROLOGY

## 2017-06-11 PROCEDURE — 83036 HEMOGLOBIN GLYCOSYLATED A1C: CPT | Performed by: PSYCHIATRY & NEUROLOGY

## 2017-06-11 PROCEDURE — 96365 THER/PROPH/DIAG IV INF INIT: CPT | Mod: 59

## 2017-06-11 PROCEDURE — 70498 CT ANGIOGRAPHY NECK: CPT

## 2017-06-11 RX ORDER — POTASSIUM CHLORIDE 750 MG/1
20-40 TABLET, EXTENDED RELEASE ORAL
Status: DISCONTINUED | OUTPATIENT
Start: 2017-06-11 | End: 2017-06-13 | Stop reason: HOSPADM

## 2017-06-11 RX ORDER — SODIUM CHLORIDE 9 MG/ML
INJECTION, SOLUTION INTRAVENOUS CONTINUOUS
Status: DISPENSED | OUTPATIENT
Start: 2017-06-11 | End: 2017-06-12

## 2017-06-11 RX ORDER — POTASSIUM CHLORIDE 29.8 MG/ML
20 INJECTION INTRAVENOUS
Status: DISCONTINUED | OUTPATIENT
Start: 2017-06-11 | End: 2017-06-13 | Stop reason: HOSPADM

## 2017-06-11 RX ORDER — ONDANSETRON 2 MG/ML
INJECTION INTRAMUSCULAR; INTRAVENOUS
Status: COMPLETED
Start: 2017-06-11 | End: 2017-06-11

## 2017-06-11 RX ORDER — ONDANSETRON 2 MG/ML
4 INJECTION INTRAMUSCULAR; INTRAVENOUS ONCE
Status: COMPLETED | OUTPATIENT
Start: 2017-06-11 | End: 2017-06-11

## 2017-06-11 RX ORDER — IOPAMIDOL 755 MG/ML
120 INJECTION, SOLUTION INTRAVASCULAR ONCE
Status: COMPLETED | OUTPATIENT
Start: 2017-06-11 | End: 2017-06-11

## 2017-06-11 RX ORDER — LABETALOL HYDROCHLORIDE 5 MG/ML
10 INJECTION, SOLUTION INTRAVENOUS
Status: DISCONTINUED | OUTPATIENT
Start: 2017-06-11 | End: 2017-06-13 | Stop reason: HOSPADM

## 2017-06-11 RX ORDER — GADOBUTROL 604.72 MG/ML
0.1 INJECTION INTRAVENOUS ONCE
Status: COMPLETED | OUTPATIENT
Start: 2017-06-11 | End: 2017-06-12

## 2017-06-11 RX ORDER — PROCHLORPERAZINE MALEATE 5 MG
5 TABLET ORAL EVERY 6 HOURS PRN
Status: DISCONTINUED | OUTPATIENT
Start: 2017-06-11 | End: 2017-06-13 | Stop reason: HOSPADM

## 2017-06-11 RX ORDER — PROCHLORPERAZINE 25 MG
12.5 SUPPOSITORY, RECTAL RECTAL EVERY 12 HOURS PRN
Status: DISCONTINUED | OUTPATIENT
Start: 2017-06-11 | End: 2017-06-13 | Stop reason: HOSPADM

## 2017-06-11 RX ORDER — NALOXONE HYDROCHLORIDE 0.4 MG/ML
.1-.4 INJECTION, SOLUTION INTRAMUSCULAR; INTRAVENOUS; SUBCUTANEOUS
Status: DISCONTINUED | OUTPATIENT
Start: 2017-06-11 | End: 2017-06-13 | Stop reason: HOSPADM

## 2017-06-11 RX ORDER — ACETAMINOPHEN 325 MG/1
650 TABLET ORAL EVERY 4 HOURS PRN
Status: DISCONTINUED | OUTPATIENT
Start: 2017-06-11 | End: 2017-06-13 | Stop reason: HOSPADM

## 2017-06-11 RX ORDER — POTASSIUM CHLORIDE 1.5 G/1.58G
20-40 POWDER, FOR SOLUTION ORAL
Status: DISCONTINUED | OUTPATIENT
Start: 2017-06-11 | End: 2017-06-13 | Stop reason: HOSPADM

## 2017-06-11 RX ORDER — ONDANSETRON 4 MG/1
4 TABLET, ORALLY DISINTEGRATING ORAL EVERY 6 HOURS PRN
Status: DISCONTINUED | OUTPATIENT
Start: 2017-06-11 | End: 2017-06-13 | Stop reason: HOSPADM

## 2017-06-11 RX ORDER — HYDRALAZINE HYDROCHLORIDE 20 MG/ML
10 INJECTION INTRAMUSCULAR; INTRAVENOUS
Status: DISCONTINUED | OUTPATIENT
Start: 2017-06-11 | End: 2017-06-11 | Stop reason: HOSPADM

## 2017-06-11 RX ORDER — MAGNESIUM SULFATE HEPTAHYDRATE 40 MG/ML
4 INJECTION, SOLUTION INTRAVENOUS EVERY 4 HOURS PRN
Status: DISCONTINUED | OUTPATIENT
Start: 2017-06-11 | End: 2017-06-13 | Stop reason: HOSPADM

## 2017-06-11 RX ORDER — LIDOCAINE 40 MG/G
CREAM TOPICAL
Status: DISCONTINUED | OUTPATIENT
Start: 2017-06-11 | End: 2017-06-13 | Stop reason: HOSPADM

## 2017-06-11 RX ORDER — POTASSIUM CL/LIDO/0.9 % NACL 10MEQ/0.1L
10 INTRAVENOUS SOLUTION, PIGGYBACK (ML) INTRAVENOUS
Status: DISCONTINUED | OUTPATIENT
Start: 2017-06-11 | End: 2017-06-13 | Stop reason: HOSPADM

## 2017-06-11 RX ORDER — LEVOTHYROXINE SODIUM 125 UG/1
125 TABLET ORAL DAILY
Status: DISCONTINUED | OUTPATIENT
Start: 2017-06-12 | End: 2017-06-13 | Stop reason: HOSPADM

## 2017-06-11 RX ORDER — ALBUTEROL SULFATE 90 UG/1
2 AEROSOL, METERED RESPIRATORY (INHALATION) EVERY 4 HOURS PRN
Status: DISCONTINUED | OUTPATIENT
Start: 2017-06-11 | End: 2017-06-13 | Stop reason: HOSPADM

## 2017-06-11 RX ORDER — PANTOPRAZOLE SODIUM 40 MG/1
40 TABLET, DELAYED RELEASE ORAL DAILY
Status: DISCONTINUED | OUTPATIENT
Start: 2017-06-11 | End: 2017-06-13

## 2017-06-11 RX ORDER — HYDRALAZINE HYDROCHLORIDE 20 MG/ML
10-20 INJECTION INTRAMUSCULAR; INTRAVENOUS
Status: DISCONTINUED | OUTPATIENT
Start: 2017-06-11 | End: 2017-06-13 | Stop reason: HOSPADM

## 2017-06-11 RX ORDER — POTASSIUM CHLORIDE 7.45 MG/ML
10 INJECTION INTRAVENOUS
Status: DISCONTINUED | OUTPATIENT
Start: 2017-06-11 | End: 2017-06-13 | Stop reason: HOSPADM

## 2017-06-11 RX ORDER — ONDANSETRON 2 MG/ML
4 INJECTION INTRAMUSCULAR; INTRAVENOUS EVERY 6 HOURS PRN
Status: DISCONTINUED | OUTPATIENT
Start: 2017-06-11 | End: 2017-06-13 | Stop reason: HOSPADM

## 2017-06-11 RX ADMIN — SODIUM CHLORIDE: 9 INJECTION, SOLUTION INTRAVENOUS at 18:21

## 2017-06-11 RX ADMIN — IOPAMIDOL 120 ML: 755 INJECTION, SOLUTION INTRAVENOUS at 11:46

## 2017-06-11 RX ADMIN — ALTEPLASE 7.53 MG: KIT at 12:46

## 2017-06-11 RX ADMIN — HYDRALAZINE HYDROCHLORIDE 10 MG: 20 INJECTION INTRAMUSCULAR; INTRAVENOUS at 20:05

## 2017-06-11 RX ADMIN — ONDANSETRON 4 MG: 2 INJECTION INTRAMUSCULAR; INTRAVENOUS at 17:55

## 2017-06-11 RX ADMIN — ONDANSETRON 4 MG: 2 INJECTION INTRAMUSCULAR; INTRAVENOUS at 15:34

## 2017-06-11 RX ADMIN — SODIUM CHLORIDE 100 ML: 9 INJECTION, SOLUTION INTRAVENOUS at 13:51

## 2017-06-11 RX ADMIN — Medication 75 MG: at 20:05

## 2017-06-11 RX ADMIN — SODIUM CHLORIDE 500 ML: 9 INJECTION, SOLUTION INTRAVENOUS at 12:09

## 2017-06-11 RX ADMIN — PANTOPRAZOLE SODIUM 40 MG: 40 TABLET, DELAYED RELEASE ORAL at 20:05

## 2017-06-11 RX ADMIN — ONDANSETRON 4 MG: 2 SOLUTION INTRAMUSCULAR; INTRAVENOUS at 15:34

## 2017-06-11 RX ADMIN — ALTEPLASE 67.8 MG: KIT at 12:49

## 2017-06-11 RX ADMIN — SODIUM CHLORIDE 100 ML: 9 INJECTION, SOLUTION INTRAVENOUS at 11:46

## 2017-06-11 ASSESSMENT — ACTIVITIES OF DAILY LIVING (ADL)
BATHING: 0-->INDEPENDENT
RETIRED_COMMUNICATION: 0-->UNDERSTANDS/COMMUNICATES WITHOUT DIFFICULTY
DRESS: 0-->INDEPENDENT
RETIRED_EATING: 0-->INDEPENDENT
COGNITION: 0 - NO COGNITION ISSUES REPORTED
FALL_HISTORY_WITHIN_LAST_SIX_MONTHS: NO
TRANSFERRING: 0-->INDEPENDENT
SWALLOWING: 0-->SWALLOWS FOODS/LIQUIDS WITHOUT DIFFICULTY
TOILETING: 0-->INDEPENDENT
WHICH_OF_THE_ABOVE_FUNCTIONAL_RISKS_HAD_A_RECENT_ONSET_OR_CHANGE?: COMMUNICATION/SPEECH
AMBULATION: 0-->INDEPENDENT

## 2017-06-11 ASSESSMENT — VISUAL ACUITY
OU: NORMAL ACUITY
OU: NORMAL ACUITY;GLASSES
OU: NORMAL ACUITY
OU: NORMAL ACUITY;GLASSES
OU: NORMAL ACUITY
OU: NORMAL ACUITY;GLASSES
OU: NORMAL ACUITY
OU: NORMAL ACUITY;GLASSES
OU: NORMAL ACUITY;GLASSES
OU: NORMAL ACUITY
OU: NORMAL ACUITY;GLASSES
OU: NORMAL ACUITY

## 2017-06-11 ASSESSMENT — ENCOUNTER SYMPTOMS
NUMBNESS: 1
SPEECH DIFFICULTY: 1
DIZZINESS: 1
HEADACHES: 0
ABDOMINAL PAIN: 0

## 2017-06-11 NOTE — CONSULTS
NEUROLOGY EVALUATION       REQUESTING PHYSICIAN:  None stated.        HISTORY OF PRESENT ILLNESS:  Mr. Eder Horan is a 77-year-old gentleman I am evaluating urgently for code stroke.  This morning, he got up and was fine.  He went to [employer] and was fine.  He came home and was starting to do some work in the yard when he had the acute onset of symptoms at about 10:45 a.m.  He was noted to have slurring of his speech and tingling in his left upper extremity.  He did notice mild difficulty getting words out.  Blood sugar was 93 as reported by EMS.  He did not have any headache.  There was no chest pain, no visual change with any of this.  As noted above, he felt fine otherwise this morning.  No recent cold or flu-like illnesses.  He has no prior history of stroke or TIA symptoms.  He does have a history of atrial fibrillation and has been on flecainide and aspirin.  The option of Coumadin had been reviewed with his cardiologist and Mr. Horan chose not to be on anticoagulation.  He has also had episodes of bradyarrhythmia.  Other diagnoses listed include a history of prostate cancer, melanoma which is not active, hypothyroidism, hypertension, hyperlipidemia.      MEDICATIONS:  His home medications include aspirin, QVAR, Synthroid, flecainide and albuterol.      PAST SURGICAL HISTORY:  Listed in his medical records.      FAMILY HISTORY:  Reveals his father had coronary artery disease.  His mother had lung cancer.      PERTINENT REVIEW OF SYSTEMS:  As noted above.      PHYSICAL EXAMINATION:  At the time of my examination, blood pressure 152/97, temperature 97.9, pulse 54, respirations 13.  He is alert and oriented.  His speech is mild to moderately dysarthric.  Occasionally, there is a problem with fluency.  Extraocular movements are full, visual fields are intact.  Face, tongue and palate movement are symmetric.  Strength testing of the upper and lower extremities is symmetric.  Alternating motion rates are symmetric.   Finger-to-nose testing is minimally dysmetric on the left compared to the right.  Sensation to light touch and pinprick are diminished on the left side of his body compared to the right.  Joint position sense is intact.  Deep tendon reflexes are symmetric and there are no Babinski signs elicited.  I did not attempt to ambulate him.      DIAGNOSTIC DATA:  CT of the head without contrast does reveal some areas of reduced attenuation in the periventricular white matter consistent with chronic ischemic change.  No definite acute changes are identified.  Chemistry profile is unremarkable, glucose 77.  CBC is normal.  INR is 0.87.  CT perfusion study and CT angiography are pending at this time.      IMPRESSION:  That of an acute stroke.  The primary deficits are primarily dysarthria but even a question of a hint of aphasia.  I do think it is mainly dysarthria though as well as left-sided sensory loss.  He is well within the window for TPA.  I had a lengthy discussion with he and his wife about TPA.  I explained that this medication can hopefully lead to a better outcome from a stroke than not giving it.  However, I did indicate that there are definite risks with TPA including approximately 3% risk of bleeding which on rare occasions can even be fatal.  As a result of our discussion, he would like to move forward with the TPA.  Even though his NIH stroke scale was relatively low at about 2 based on sensory change and dysarthria, the dysarthria is bothersome to him and he would like to move forward with the TPA.  We are awaiting the results of CT angiography to see whether or not he would be a candidate for Interventional Radiology.  This was also reviewed with the patient and his wife.      Finally, as the TPA is hanging we have been informed that there may not be beds available in the Intensive Care Unit.  If that is the case, we may need to consider [facility] or transfer the patient to a facility that could  provide the close followup he needs relative to status post TPA.        I did spend 1 hour in critical care time as he is at high risk of neurological complications and decompensation.         MIKEY GREENFIELD MD             D: 2017 12:47   T: 2017 13:30   MT: maximilian      Name:     AUGUSTINE MYRICK   MRN:      [id number]        Account:       [id number]   :      1940           Consult Date:  2017      Document: [id number]

## 2017-06-11 NOTE — PHARMACY-ADMISSION MEDICATION HISTORY
Admission medication history interview status for the 6/11/2017  admission is complete. See EPIC admission navigator for prior to admission medications     Medication history source reliability:Good    Actions taken by pharmacist (provider contacted, etc):Discussed PTA meds with patient and wife. Patient's wife brought in all medication bottles and inhalers.      Additional medication history information not noted on PTA med list :None    Medication reconciliation/reorder completed by provider prior to medication history? No    Time spent in this activity: 15 minutes     Prior to Admission medications    Medication Sig Last Dose Taking? Auth Provider   ASPIRIN EC PO Take 325 mg by mouth daily 6/11/2017 at am Yes Unknown, Entered By History   FLECAINIDE ACETATE PO Take 75 mg by mouth every 12 hours (takes 1/2 of 150 mg tablet = 75 mg dose) 6/11/2017 at am Yes Unknown, Entered By History   beclomethasone (QVAR) 40 MCG/ACT Inhaler Inhale 2 puffs into the lungs daily 6/11/2017 at am Yes Unknown, Entered By History   levothyroxine (SYNTHROID/LEVOTHROID) 125 MCG tablet Take 1 tablet (125 mcg) by mouth daily 6/11/2017 at am Yes Richmond Martinez MD   albuterol (ALBUTEROL) 108 (90 BASE) MCG/ACT inhaler Inhale 2 puffs into the lungs every 4 hours as needed for shortness of breath / dyspnea Past Week at prn Yes Richmond Martinez MD   ORDER FOR DME Equipment being ordered: spacer for inhaler   Richmond Martinez MD

## 2017-06-11 NOTE — ED NOTES
Bed: ST02  Expected date:   Expected time:   Means of arrival:   Comments:  512  77 M stroke sxs/slurred speech/R sided weakness  1120

## 2017-06-11 NOTE — H&P
[facility], [place]      Neurology Stroke Admission    Patient Name: Eder Horan  : 1940 MRN#: [id number]    STROKE DATA     Stroke Code:  Stroke code not indicated.  Time patient seen:  2017 15:50  Last known normal (pt's baseline):  2017 10:50     TPA treatment:  Given at an outside facility at 12:35 17.  Best I can tell from Notes it is not clearly documented at this time.      National Institutes of Health Stroke Scale (at presentation)  NIHSS done at:  time patient seen      Score    Level of consciousness:  (0)   Alert, keenly responsive    LOC questions:  (0)   Answers both questions correctly    LOC commands:  (0)   Performs both tasks correctly    Best gaze:  (0)   Normal    Visual:  (0)   No visual loss    Facial palsy:  (0)   Normal symmetrical movements    Motor arm (left):  (0)   No drift    Motor arm (right):  (0)   No drift    Motor leg (left):  (0)   No drift    Motor leg (right):  (0)   No drift    Limb ataxia:  (1)   Present in one limb    Sensory:  (0)   Normal- no sensory loss    Best language:  (0)   Normal- no aphasia    Dysarthria:  (1)   Mild to moderate dysarthria    Extinction and inattention:  (0)   No abnormality        NIHSS Total Score:  2        Dysphagia Screen  Time of screenin2017 15:59  Screening results: Dysarthria present - maintain NPO, consult SLP     ASSESSMENT & PLAN BY PROBLEM     Work-up for Ischemic Stroke (post TPA) : 77 year old independent male presented to OSH with acute onset nausea, dysarthria and L sided numbness.  Suspected due to Afib as patient has known dx and was not on anticoagulation and remainder of vasculature looked open on CTA.  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  We will admit for post TPA cares as TPA administered at OSH and complete stroke work up.  Patient passed bedside swallow, however will maintain NPO at this time.       Acute Ischemic  Stroke (post tPA) Plan  - Admit to Neuro ICU, under Neurocritical Care Team  - Close monitoring and neurochecks for any evidence of hemorrhagic transformation or allergic reaction  - Labetalol PRN to maintain BP < 180/105  - Euthermia, Euglycemia  - Head of bed elevated  - Hold aspirin and pharmacologic DVT prophylaxis for at least 24 hrs post-tPA  - Statin  - Repeat HCT 24 hrs post-tPA  - MRI/MRA Stroke Protocol  - TTE with Bubble Study  - Telemetry, EKG  - Bedside Glucose Monitoring  - A1c, Lipid Panel, Troponin x 3  - PT/OT/SLP  - PM&R  - Stroke Education  - Depression Screen  - Apnea Screen     Patient was admitted via transfer from [facility] ED.     The patient will be admitted to the Neuro Critical Care/Stroke team..     Other Medical Problems:  # Hypothyroid: Continue home meds  # Afib: Continue home Flecainide for now    Fluids/Electrolytes/Nutrition  0.9% sodium chloride @ 75 mL/hr  Avoid hypotonic fluids.     Nutrition:   Active Diet Order      NPO for Medical/Clinical Reasons Except for: Meds    Prophylaxis      For VTE Prevention:  - pneumatic compression device  - no pharmacologic until 24 hours post tpa    For Acid Suppression:  - pantoprazole    Code Status  Full Code    HPI  Eder Horan is a 77 year old male with history of AFib not anticoagulated, Hyperlipidemia not on statin and hypothyroid who presented with acute onset dizziness, nausea and L sided numbness after returning home from [place].  Patient also noted his speech was slurred at that time.  His wife called EMS when symptoms persisted for about 10 minutes and stroke code was called.  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  NIHSS was 2 at that time for numbness and dysarthria.  Patient was consented and given TPA.  Since then patient states numbness has resolved but dysarthria persists.  Admits to nausea but denies headache, chest pain, shortness of breath, diarrhea, dysuria.      Pertinent Past  Medical/Surgical History  Past Medical History:   Diagnosis Date     Actinic keratosis 6/09    face     ALLERGIC RHINITIS       Atrial fibrillation (H) 6/08     Back pain     s/p LESI through ortho Feb 2010     Basal cell carcinoma      Bradycardia      COPD (chronic obstructive pulmonary disease) (H)      DVT      Left calf 2003. Right calf 6/06     Hyperlipidemia LDL goal <130 5/10/2011     Hypertension      HYPOTHYROIDISM      hypothyroidism     Lateral epicondylitis  of elbow 7/05    left     Malignant melanoma (H)      PLANTAR FASCITIS      Prostate cancer (H) 6/09    on Lupron therapy     Squamous cell carcinoma (H)  Sept 2014     right lower lid, s/p MOHS     Syncope      TMJ (temporomandibular joint syndrome) 4/12/2012     Tobacco use disorder        Past Surgical History:   Procedure Laterality Date     ABDOMEN SURGERY  2004,06,09    Hernias (2) Prostate Removal(2009)     BIOPSY  2009    Prostate     C NONSPECIFIC PROCEDURE  1/07    Left groin exploration and left inguinal herniorrhaphy     C NONSPECIFIC PROCEDURE  5/08    Prostate bx (benign)     C NONSPECIFIC PROCEDURE  8/09    Wide local excision (left side), robotic-assisted laparoscopic prostatectomy and   Left pelvic lymph node dissection     C NONSPECIFIC PROCEDURE  August 2010    Right inguinal herniorrhaphy with mesh     CARDIOVERSION  10-16-08    failed     COLONOSCOPY  2004     EYE SURGERY      2013 lump from lower  left eye lid removed     GENITOURINARY SURGERY  2009    Prostate     HERNIA REPAIR  2004 - 2006       Medications:   Prescriptions Prior to Admission   Medication Sig Dispense Refill Last Dose     ASPIRIN EC PO Take 325 mg by mouth daily   6/11/2017 at am     FLECAINIDE ACETATE PO Take 75 mg by mouth every 12 hours (takes 1/2 of 150 mg tablet = 75 mg dose)   6/11/2017 at am     beclomethasone (QVAR) 40 MCG/ACT Inhaler Inhale 2 puffs into the lungs daily   6/11/2017 at am     levothyroxine (SYNTHROID/LEVOTHROID) 125 MCG tablet Take 1  tablet (125 mcg) by mouth daily 90 tablet 3 6/11/2017 at am     albuterol (ALBUTEROL) 108 (90 BASE) MCG/ACT inhaler Inhale 2 puffs into the lungs every 4 hours as needed for shortness of breath / dyspnea 3 Inhaler 3 Past Week at prn     ORDER FOR DME Equipment being ordered: spacer for inhaler 1 Device 1 Taking   .    Allergies:   Allergies   Allergen Reactions     Bactrim [Sulfa Drugs] Rash   .    Family History:   Family History   Problem Relation Age of Onset     C.A.D. Father      heart attack- angina     Coronary Artery Disease Father      Myocardial Infarction Father      CANCER Mother      lung     Lung Cancer Mother      Lung Cancer Sister      Unknown/Adopted Maternal Grandmother      Unknown/Adopted Maternal Grandfather      Unknown/Adopted Paternal Grandmother      Unknown/Adopted Paternal Grandfather      Unknown/Adopted Sister    .    Social History:   Social History   Substance Use Topics     Smoking status: Former Smoker     Types: Cigars     Quit date: 12/1/2006     Smokeless tobacco: Never Used      Comment: pipe daily     Alcohol use 0.0 oz/week     0 Standard drinks or equivalent per week      Comment: 2-3 drinks a day:  Wine/Beer   .    Tobacco use: Former smoker, last smoked 2006    ROS:  The 10 point Review of Systems is negative other than noted in the HPI    PHYSICAL EXAMINATION  Vital Signs:  B/P: 157/87,  T: 97.5,  P: Data Unavailable,  R: 18    General:  patient lying in bed without any acute distress    HEENT:  normocephalic/atraumatic  Cardio:  Regular rate and rhythm   Pulmonary:  no respiratory distress  Abdomen:  soft  Extremities:  no edema  Skin:  intact, warm/dry     Neurologic  Mental Status:  fully alert, attentive and oriented, follows commands, dysarthria noted  Cranial Nerves:  visual fields intact, PERRL, EOMI with normal smooth pursuit, facial sensation intact and symmetric, facial movements symmetric, palate elevation symmetric and uvula midline, shoulder shrug strong  bilaterally, tongue protrusion midline, moderate dysarthria  Motor:  no abnormal movements, normal tone throughout, normal muscle bulk, no pronator drift, normal and symmetric rapid finger tapping, able to move all limbs spontaneously, some satelliting of LUE   Reflexes:  2+ and symmetric throughout, toes downgoing  Sensory:  intact to light touch, no neglect  Coordination:  Dysmetria on FNF on R  Station/Gait:  deferred    Labs  Labs and Imaging reviewed and used in developing the plan; pertinent results included.     Lab Results   Component Value Date    GLC 77 06/11/2017     CT imaging reviewed.      Patient was discussed with the Fellow, Dr. Wakefield, who agrees with the plan.    Vidhya Leblanc  PGY-3 Neurology

## 2017-06-11 NOTE — IP AVS SNAPSHOT
MRN:[id number]                      After Visit Summary   6/11/2017    Eder Horan    MRN: [id number]           Thank you!     Thank you for choosing [facility] for your care. Our goal is always to provide you with excellent care. Hearing back from our patients is one way we can continue to improve our services. Please take a few minutes to complete the written survey that you may receive in the mail after you visit with us. Thank you!        Patient Information     Date Of Birth          1940        Designated Caregiver       Most Recent Value    Caregiver    Will someone help with your care after discharge? no      About your hospital stay     You were admitted on:  June 11, 2017 You last received care in the:  [address] [facility] [place]    You were discharged on:  June 13, 2017        Reason for your hospital stay       stroke                  Who to Call     For medical emergencies, please call 911.  For non-urgent questions about your medical care, please call your primary care provider or clinic, [phone]          Attending Provider     Provider Specialty    Derick Ruiz MD Neurology       Primary Care Provider Office Phone # Fax #    Richmond Martinez [phone]      After Care Instructions     Activity       Your activity upon discharge: activity as tolerated            Diet       Follow this diet upon discharge: Orders Placed This Encounter      Regular Diet Adult                  Follow-up Appointments     Follow Up ([facility])       Follow up with primary care provider, Richmond Martinez, within 7 days to evaluate treatment change.  No follow up labs or test are needed.      Appointments on [facility] and/or [facility] (with [facility] or [facility] provider or service). Call [phone] if you haven't heard regarding these appointments within 7 days of discharge.                  Your next 10 appointments already scheduled     Jul 27, 2017  8:20 AM CDT   LAB with CHRIS  LAB   [facility] [place] ([facility])    [address] [place] [address] [address]  [facility] [id number] [phone]           Patient must bring picture ID.  Patient should be prepared to give a urine specimen  Please do not eat 10-12 hours before your appointment if you are coming in fasting for labs on lipids, cholesterol, or glucose (sugar).  Pregnant women should follow their Care Team instructions. Water with medications is okay. Do not drink coffee or other fluids.   If you have concerns about taking  your medications, please ask at office or if scheduling via [employer], send a message by clicking on Secure Messaging, Message Your Care Team.            Jul 27, 2017  9:15 AM CDT   Return Visit with Jaya Franklin MD   [facility] ([facility])    [address] [place], [place] [address]  [facility] [id number] [phone]              Additional Services     Cardiology Eval Adult Referral       Your provider has referred you to:  [facility] [phone]   [email or web address]    Please be aware that coverage of these services is subject to the terms and limitations of your health insurance plan.  Call member services at your health plan with any benefit or coverage questions.      Type of Referral:  Cardiology Follow Up - has seen cardiology previously.     Timeframe requested:  1 Week    Please bring the following to your appointment:  >>   Any x-rays, CTs or MRIs which have been performed.  Contact the facility where they were done to arrange for  prior to your scheduled appointment.    >>   List of current medications  >>   This referral request   >>   Any documents/labs given to you for this referral            NEUROLOGY ADULT REFERRAL       F/u stroke clinic at next  available.    Please be aware that coverage of these services is subject to the terms and limitations of your health insurance plan.  Call member services at your health plan with any benefit or coverage questions.      Please bring the following with you to your appointment:    (1) Any X-Rays, CTs or MRIs which have been performed.  Contact the facility where they were done to arrange for  prior to your scheduled appointment.    (2) List of current medications  (3) This referral request   (4) Any documents/labs given to you for this referral                  Further instructions from your care team         Atorvastatin Calcium Oral tablet  What is this medicine?  ATORVASTATIN (a TORE va sta tin) is known as a HMG-CoA reductase inhibitor or 'statin'. It lowers the level of cholesterol and triglycerides in the blood. This drug may also reduce the risk of heart attack, stroke, or other health problems in patients with risk factors for heart disease. Diet and lifestyle changes are often used with this drug.  This medicine may be used for other purposes; ask your health care provider or pharmacist if you have questions.  What should I tell my health care provider before I take this medicine?  They need to know if you have any of these conditions:    frequently drink alcoholic beverages    history of stroke, TIA    kidney disease    liver disease    muscle aches or weakness    other medical condition    an unusual or allergic reaction to atorvastatin, other medicines, foods, dyes, or preservatives    pregnant or trying to get pregnant    breast-feeding  How should I use this medicine?  Take this medicine by mouth with a glass of water. Follow the directions on the prescription label. You can take this medicine with or without food. Take your doses at regular intervals. Do not take your medicine more often than directed.  Talk to your pediatrician regarding the use of this medicine in children. While this drug may  be prescribed for children as young as 10 years old for selected conditions, precautions do apply.  Overdosage: If you think you have taken too much of this medicine contact a poison control center or emergency room at once.  NOTE: This medicine is only for you. Do not share this medicine with others.  What if I miss a dose?  If you miss a dose, take it as soon as you can. If it is almost time for your next dose, take only that dose. Do not take double or extra doses.  What may interact with this medicine?  Do not take this medicine with any of the following medications:    red yeast rice    telaprevir    telithromycin    voriconazole  This medicine may also interact with the following medications:    alcohol    antiviral medicines for HIV or AIDS    boceprevir    certain antibiotics like clarithromycin, erythromycin, troleandomycin    certain medicines for cholesterol like fenofibrate or gemfibrozil    cimetidine    clarithromycin    colchicine    cyclosporine    digoxin    female hormones, like estrogens or progestins and birth control pills    grapefruit juice    medicines for fungal infections like fluconazole, itraconazole, ketoconazole    niacin    rifampin    spironolactone  This list may not describe all possible interactions. Give your health care provider a list of all the medicines, herbs, non-prescription drugs, or dietary supplements you use. Also tell them if you smoke, drink alcohol, or use illegal drugs. Some items may interact with your medicine.  What should I watch for while using this medicine?  Visit your doctor or health care professional for regular check-ups. You may need regular tests to make sure your liver is working properly.  Tell your doctor or health care professional right away if you get any unexplained muscle pain, tenderness, or weakness, especially if you also have a fever and tiredness. Your doctor or health care professional may tell you to stop taking this medicine if you  develop muscle problems. If your muscle problems do not go away after stopping this medicine, contact your health care professional.  This drug is only part of a total heart-health program. Your doctor or a dietician can suggest a low-cholesterol and low-fat diet to help. Avoid alcohol and smoking, and keep a proper exercise schedule.  Do not use this drug if you are pregnant or breast-feeding. Serious side effects to an unborn child or to an infant are possible. Talk to your doctor or pharmacist for more information.  This medicine may affect blood sugar levels. If you have diabetes, check with your doctor or health care professional before you change your diet or the dose of your diabetic medicine.  If you are going to have surgery tell your health care professional that you are taking this drug.  What side effects may I notice from receiving this medicine?  Side effects that you should report to your doctor or health care professional as soon as possible:    allergic reactions like skin rash, itching or hives, swelling of the face, lips, or tongue    dark urine    fever    joint pain    muscle cramps, pain    redness, blistering, peeling or loosening of the skin, including inside the mouth    trouble passing urine or change in the amount of urine    unusually weak or tired    yellowing of eyes or skin  Side effects that usually do not require medical attention (report to your doctor or health care professional if they continue or are bothersome):    constipation    heartburn    stomach gas, pain, upset  This list may not describe all possible side effects. Call your doctor for medical advice about side effects. You may report side effects to FDA at 4-155-FDA-9332.  Where should I keep my medicine?  Keep out of the reach of children.  Store at room temperature between 20 to 25 degrees C (68 to 77 degrees F). Throw away any unused medicine after the expiration date.  NOTE:This sheet is a summary. It may not cover  all possible information. If you have questions about this medicine, talk to your doctor, pharmacist, or health care provider. Copyright  2016 [employer]          Discharge Instructions for Atrial Fibrillation  You have been diagnosed with atrial fibrillation. With this condition, your heart s two upper chambers quiver rather than squeeze the blood out in a normal pattern. This leads to an irregular and sometimes rapid heartbeat. Some people will develop associated symptoms such as a flip-flopping heartbeat, lightheadedness, or shortness of breath. Other people may have no symptoms at all. Atrial fibrillation is serious because it affects the heart s ability to fill with blood as it should. Blood clots may form. This increases the risk for stroke. Untreated atrial fibrillation can also lead to heart failure. Atrial fibrillation can be controlled. With treatment, most people with atrial fibrillation lead normal lives. It is estimated that over 2.5 million Americans have atrial fibrillation.  Treatment options  Recommended treatment for atrial fibrillation depends on your age, symptoms, how long you have had atrial fibrillation, and other factors. You will have a complete evaluation to find out if you have any abnormalities that caused your heart to go into atrial fibrillation. This might be blocked heart arteries or a thyroid problem. Your doctor will assess your particular case and discuss choices with you.  Treatment choices may include:    Treating an underlying disorder that puts you at risk for atrial fibrillation. For example, correcting an abnormal thyroid or electrolyte problem, or treating a blocked heart artery.    Restoring a normal heart rhythm with an electrical shock (cardioversion) or with an antiarrhythmic medicine (chemical cardioversion)    Using medication to control your heart rate in atrial fibrillation.    Preventing the risk for blood clot and stroke using blood-thinning medicines. Your  doctor will tell you what he or she recommends. Choices may include aspirin, clopidogrel, warfarin, dabigatran, rivaroxaban, or apixaban.    Doing catheter ablation or maze procedure. These use different methods to destroy certain areas of heart tissue. This interrupts the electrical signals causing atrial fibrillation. One of these procedures may be a choice when medicines do not work.    Other treatment choices may be recommended for you by your doctor.  Managing risk factors for stroke and preventing heart failure are important parts of any treatment plan for atrial fibrillation.  Home care    Take your medicines exactly as directed. Don t skip doses.    Work with your doctor to find the right medicaines and doses for you.    Learn to take your own pulse. Keep a record of your results. Ask your doctor which pulse rates mean that you need medical attention. Slowing your pulse is often the goal of treatment. Ask your doctor if it s OK for you to use an automatic machine to check your pulse at home. Sometimes these machines don t count the pulse correctly when you have atrial fibrillation.    Limit your intake of coffee, tea, cola, and other beverages with caffeine to 2 cups per day. Talk with your doctor about whether you should eliminate caffeine.    Avoid over-the-counter medicines that have caffeine in them.    Let your doctor know what medicines you take, including prescription and over-the-counter medicines, as well as any supplements. They interfere with some medicines given for atrial fibrillation.    Ask your doctor about whether you can drink alcohol. Some people need to avoid alcohol to better treat atrial fibrillation. If you are taking blood-thinner medicines, alcohol may interfere with them by increasing their effect.    Never take stimulants such as amphetamines or cocaine. These drugs can speed up your heart rate and trigger atrial fibrillation.  Follow-up  Make a follow-up appointment as directed by  our staff.     When to call your doctor  Call your doctor immediately if you have any of the following:    Weakness    Dizziness    Fainting    Fatigue    Shortness of breath    Chest pain with increased activity    A change in the usual regularity of your heartbeat, or an unusually fast heartbeat     1199-5639 [employer]. [address] [place] [address]. All rights reserved. This information is not intended as a substitute for professional medical care. Always follow your healthcare professional's instructions.      Using Blood Thinners (Anticoagulants)  Blood thinners or anticoagulants are medicines that help prevent blood clots from forming. They include warfarin, heparin, dabigatran, rivaroxaban, apixaban,and edoxaban. Your health care provider will help you decide which medicine is best for you.    Discharge Instructions for Atrial Fibrillation  You have been diagnosed with atrial fibrillation. With this condition, your heart s two upper chambers quiver rather than squeeze the blood out in a normal pattern. This leads to an irregular and sometimes rapid heartbeat. Some people will develop associated symptoms such as a flip-flopping heartbeat, lightheadedness, or shortness of breath. Other people may have no symptoms at all. Atrial fibrillation is serious because it affects the heart s ability to fill with blood as it should. Blood clots may form. This increases the risk for stroke. Untreated atrial fibrillation can also lead to heart failure. Atrial fibrillation can be controlled. With treatment, most people with atrial fibrillation lead normal lives. It is estimated that over 2.5 million Americans have atrial fibrillation.  Treatment options  Recommended treatment for atrial fibrillation depends on your age, symptoms, how long you have had atrial fibrillation, and other factors. You will have a complete evaluation to find out if you have any abnormalities that caused your heart to go  into atrial fibrillation. This might be blocked heart arteries or a thyroid problem. Your doctor will assess your particular case and discuss choices with you.  Treatment choices may include:    Treating an underlying disorder that puts you at risk for atrial fibrillation. For example, correcting an abnormal thyroid or electrolyte problem, or treating a blocked heart artery.    Restoring a normal heart rhythm with an electrical shock (cardioversion) or with an antiarrhythmic medicine (chemical cardioversion)    Using medication to control your heart rate in atrial fibrillation.    Preventing the risk for blood clot and stroke using blood-thinning medicines. Your doctor will tell you what he or she recommends. Choices may include aspirin, clopidogrel, warfarin, dabigatran, rivaroxaban, or apixaban.    Doing catheter ablation or maze procedure. These use different methods to destroy certain areas of heart tissue. This interrupts the electrical signals causing atrial fibrillation. One of these procedures may be a choice when medicines do not work.    Other treatment choices may be recommended for you by your doctor.  Managing risk factors for stroke and preventing heart failure are important parts of any treatment plan for atrial fibrillation.  Home care    Take your medicines exactly as directed. Don t skip doses.    Work with your doctor to find the right medicaines and doses for you.    Learn to take your own pulse. Keep a record of your results. Ask your doctor which pulse rates mean that you need medical attention. Slowing your pulse is often the goal of treatment. Ask your doctor if it s OK for you to use an automatic machine to check your pulse at home. Sometimes these machines don t count the pulse correctly when you have atrial fibrillation.    Limit your intake of coffee, tea, cola, and other beverages with caffeine to 2 cups per day. Talk with your doctor about whether you should eliminate caffeine.    Avoid  over-the-counter medicines that have caffeine in them.    Let your doctor know what medicines you take, including prescription and over-the-counter medicines, as well as any supplements. They interfere with some medicines given for atrial fibrillation.    Ask your doctor about whether you can drink alcohol. Some people need to avoid alcohol to better treat atrial fibrillation. If you are taking blood-thinner medicines, alcohol may interfere with them by increasing their effect.    Never take stimulants such as amphetamines or cocaine. These drugs can speed up your heart rate and trigger atrial fibrillation.  Follow-up  Make a follow-up appointment as directed by our staff.     When to call your doctor  Call your doctor immediately if you have any of the following:    Weakness    Dizziness    Fainting    Fatigue    Shortness of breath    Chest pain with increased activity    A change in the usual regularity of your heartbeat, or an unusually fast heartbeat     4238-2885 [employer]. [address] [place], [place] [id number]. All rights reserved. This information is not intended as a substitute for professional medical care. Always follow your healthcare professional's instructions.        Taking an anticoagulant safely  When you are taking a blood thinner, you will need to take certain steps to stay safe. Too much blood thinner puts you at risk for bleeding. Too little puts you at risk for stroke. Follow these guidelines. Also follow any others that your health care provider gives you.    You may be told you need regular lab testing while taking these medicines. Warfarin requires routine testing to blood-thinning level testing while the other medicines do not.    Tell your doctor about all medicines you take. This includes over the counter medicines, supplements, or herbal remedies. Do not take any medicines (including ones you buy over the counter) that your doctor doesn t know about. Some medicines  can interact with blood thinners and cause serious problems.    Tell any health care provider that you see for care (such as doctors, dentists, chiropractors, home health nurses) that you take a blood thinner.    Carry a medical ID card or wear a medical-alert bracelet that says you take an anticoagulant.    Before taking aspirin, check with your doctor. Aspirin can significantly increase your risk of bleeding.    This medicine makes bleeding harder to stop. To protect yourself:    Avoid activities that may cause injury. If you fall or are injured, contact your health care provider right away. Blood thinners prevent clotting, so you could be bleeding inside without realizing it.    Use a soft-bristle toothbrush and waxed dental floss. Shave with an electric razor rather than a blade.    Don t go barefoot. Don t trim corns or calluses yourself.  Warfarin: Other important information  Several precautions are especially important when you are taking warfarin. Always keep these points in mind:    Be sure to follow your health care provider's instructions for taking warfarin.    Take this medicine at the same time each day. Take it with a full glass of water, with or without food. If you miss a dose, contact your doctor to find out how much to take. Avoid takinga double dose.    Warfarin is an effective drug, but it can be dangerous if not taken properly. It makes your blood less likely to form clots. If you take too much, it can cause serious internal or external bleeding.    You will need to have regular monitoring while you are taking warfarin. This includes blood tests to check your international normalized ratio (INR) and prothrombin time (PT). These tests show how quickly your blood clots. You will also have a complete blood count (CBC) periodically. This looks at your blood and platelet levels. Both of these need to be followed while you're on warfarin. Talk with your health care provider about whether you need to  visit the clinic every week, or if services are available for monitoring in your home.    Certain medicines can affect your INR and PT levels. Tell your health care provider if there are any changes in your medicines. This includes any over-the-counter medicines, supplements like vitamin K, or herbal remedies.    Your diet can also affect your INR and PT levels. Because of this, it's important to eat a consistent diet. It is especially important to eat a consistent amount of foods that are high in vitamin K. Be sure to talk with your health care provider before making any big changes in your diet.    Remember that warfarin increases your risk of bleeding. Be careful not to injure yourself. If you have a significant injury, contact your health care provider right away. It's important to alert your doctor if you've fallen or hurt yourself, even if you don't break your skin. You could be bleeding inside your body without realizing it.     Warfarin: Watch your INR/PT blood levels  Two tests are used to find out how your blood is clotting. One is protime (PT), the other is the international normalized ratio (INR).    Go for your blood tests (INR/PT) as often as directed. Your diet and the other medicines you take can affect your INR/PT levels.    Your INR was between ___ and ___.    Ask your doctor what your goal INR is. My goal INR is between ___ and ___.    My next INR/PT blood draw is due on _____________ (date) at ___________ (time) by ___________ (name of doctor or clinic).    The name of the doctor who is monitoring my anticoagulation therapy is _____________________ and the phone number is _________________.    Follow up with your doctor or as advised by his or her staff. It usually takes a few hours for your doctor to get the results of your clotting tests. Call to get your lab results to find out if your doctor needs to make further changes to your warfarin dose.    If your blood is drawn for these tests at a  location other than your doctor's office, tell your doctor as soon as you get your lab results.   Warfarin: Watch what you eat  Vitamin K helps your blood clot. So you have to watch how much you eat of foods that contain vitamin K. These foods can affect the way warfarin works. They do not affect the other non-warfarin blood thinners.Here are some specific tips:    Try to keep your diet about the same each day. If you change your diet for any reason, such as for illness or to lose weight, be sure to tell your doctor.    Each day, eat the same amount of foods that are high in vitamin K. These include asparagus, avocado, broccoli, cabbage, kale, spinach, and some other leafy green vegetables. Oils, such as soybean, canola, and olive oils, are also high in vitamin K.    Limit fats to 2 to 4 tablespoons a day.    Ask your health care provider if you should avoid alcohol while you are taking a blood thinner.    Avoid teas that contain sweet clover, sweet tamar, or tonka beans. These can affect how your medicine works.    Talk with your health care provider and pharmacist about specific foods or special diets that can affect anticoagulant levels. These include grapefruit juice, cranberries and cranberry juice, fish oil supplements, garlic, elle, licorice, turmeric, and herbal teas and supplements.  Talk with your health care provider if you have concerns about these or other food products and their effects on warfarin.     When to call your doctor if you re taking an anticoagulant  Call your doctor right away if you have any of these:    Bleeding that doesn t stop in 10 minutes    A heavier-than-normal menstrual period or bleeding between periods    Coughing or throwing up blood    Bloody diarrhea or bleeding hemorrhoids     Dark-colored urine or black stools    Red or black-and-blue marks on the skin that get larger    Dizziness or fatigue    Chest pain or trouble breathing  Allergic  reactions:    Rash    Itching    Swelling    Trouble swallowing or breathing   Medical conditions and anticoagulants  Before starting a blood thinner, be sure your doctor knows if you have any of these conditions:    Stomach ulcer now or in the past    Vomited blood or had bloody stools (black or red color)    Aneurysm, pericarditis, or pericardial effusion    Blood disorder    Recent surgery, stroke, mini-stroke, or spinal puncture    Kidney or liver disease, uncontrolled high blood pressure, diabetes, vasculitis, heart failure, lupus, or other collagen-vascular disease, or high cholesterol    Pregnancy or breastfeeding    Younger than 18 years old    Recent or planned dental procedure  Drug interactions and anticoagulants  Many medicines interfere with the effect of blood thinners. Before starting these medicines, be sure your doctor knows about any prescription, over-the-counter, or herbal supplements you take. In particular, tell your provider about:    Antibiotics    Heart medicines    Cimetidine    Aspirin or other anti-inflammatory drugs such as ibuprofen, naproxen, ketoprofen, or other arthritis medicines    Drugs for depression, cancer HIV (protease inhibitors), diabetes, seizures, gout, high cholesterol, or thyroid replacement    Vitamins containing vitamin K or herbal products such as ginkgo, Co-Q10, garlic, or [place]'s wort     Note: This information topic may not include all directions, precautions, medical conditions, drug/food interactions, and warnings for these medicines. Check with your doctor, nurse, or pharmacist for any questions you have.      2399-6427 [employer]. [address] [place] [address]. All rights reserved. This information is not intended as a substitute for professional medical care. Always follow your healthcare professional's instructions.        Discharge Instructions for Stroke  You have been diagnosed with stroke. During a stroke, blood stops flowing  to part of your brain. This can damage areas in the brain that control other parts of the body. Symptoms after a stroke depend on which part of the brain has been affected.  Stroke risk factors  Once you ve had a stroke, you re at greater risk for another one. Listed below are some other factors that can increase your risk for another stroke:    High blood pressure    High cholesterol    Cigarette or cigar smoking    Diabetes    Carotid or other artery disease    Atrial fibrillation, atrial flutter, or other heart disease    Physical inactivity    Obesity    Certain blood disorders (such as sickle cell anemia)    Excessive alcohol use    Abuse of illegal drugs    Race    Gender    Family history of stroke    Diet high in salty, fried, or greasy foods  Changes in daily living  Doing your regular tasks may be difficult after you ve had a stroke, but you can learn new ways to manage your daily activities. In fact, doing daily activities may help you to regain muscle strength and bring back function to affected limbs. Be patient, give yourself time to adjust, and appreciate the progress you make.  Daily activities    You may be at risk of falling. Make changes to your home to help you walk more easily. A therapist will decide if you need an assistive device to walk safely.  You may need to see an occupational or physical therapist to learn new ways of doing things. For example, you may need to make adjustments when bathing or dressing:    Try the following tips for showering or bathing:    Test the water temperature with a hand or foot that was not affected by the stroke.    Use grab bars, a shower seat, a hand-held showerhead, and a long-handled brush.    Try the following tips for dressing:    Dress while sitting, starting with the affected side or limb.    Wear shirts that pull easily over your head and pants or skirts with elastic waistbands.    Use zippers with loops attached to the pull tabs.  Lifestyle  changes    Take your medications exactly as directed. Don t skip doses.    Your health care provider will give you information on dietary changes that you may need to make, based on your situation. Your provider may recommend that you see a registered dietitian for help with diet changes. Changes may include:    Reducing fat and cholesterol intake    Reducing sodium (salt) intake, especially if you have high blood pressure    Increasing your intake of fresh vegetables and fruits    Eating lean proteins, such as fish, poultry, and legumes (beans and peas) and eating less red meat and processed meats    Using low-fat dairy products    Using vegetable and nut oils in limited amounts    Limiting sweets and processed foods such as chips, cookies, and baked goods.    Begin an exercise program. Ask your doctor how to get started and how much activity you should try to get on a daily or weekly basis . You can benefit from simple activities such as walking or gardening.    Limit alcohol intake. Men should have no more than 2 alcoholic drinks a day. Women should limit themselves to 1 alcoholic drink per day.    Know your cholesterol level. Follow your doctor s recommendations about how to keep cholesterol under control.    If you are a smoker, it is time to quit now. Enroll in a stop-smoking program to improve your chances of success. Ask your doctor about medications or other methods to help you quit.    Learn stress management techniques to help you deal with stress in your home and work life.  Follow-up care    Keep your medical appointments. Close follow-up is important to stroke rehabilitation and recovery.    Some medications require blood tests to check for progress or problems. Keep follow-up appointments for any blood tests ordered by your doctors.     When to seek medical care  Call 911 right away if you have any of the following symptoms of stroke:    Weakness, tingling, or loss of feeling on one side of your face  or body    Sudden double vision or trouble seeing in one or both eyes    Sudden trouble talking or slurred speech    Trouble understanding others    Sudden, severe headache    Dizziness, loss of balance, or a sense of falling    Blackouts or seizures  F.A.S.T. is an easy way to remember the signs of stroke. When you see these signs, you know that you need to call 911 fast.  F.A.S.T. stands for:    F is for face drooping - One side of the face is drooping or numb. When the person smiles, the smile is uneven.    A is for arm weakness - One arm is weak or numb. When the person lifts both arms at the same time, one arm may drift downward.    S is for speech difficulty - You may notice slurred speech or difficulty speaking. The person can't repeat a simple sentence correctly when asked.    T is for time to call 911 - If someone shows any of these symptoms, even if they go away, call 911 immediately. Make note of the time the symptoms first appeared.     1861-6025 [employer]. [address] [place] [address]. All rights reserved. This information is not intended as a substitute for professional medical care. Always follow your healthcare professional's instructions.          Stroke Education     Please review the items below to help with stroke prevention.  Additional prevention suggestions are in the Understanding Stroke Handbook that you received.    Stroke risk factor changes that can help with stroke prevention:      Blood Pressure: Maintain your blood pressure within the goal the doctor sets with you.    If you are diabetic, work with your doctor to control your blood sugar and monitor your hemoglobin A1C (HbA1c).     Your doctor may recommend a statin medication to help lower your cholesterol level.    If you have an irregular heartbeat, speak to your doctor about possible treatments.    Maintain a healthy weight.    Eat a healthy diet. We suggest a Mediterranean or heart-healthy diet, but  "discuss what's best for you with your doctor.    Limit alcohol consumption.    If you smoke - QUIT.  We encourage you to get support. Start by talking with your doctor. Another option is to call the Quit Plan Program at 1-183.254.9096.    Stroke  Warning Signs:     It s important to know the warning signs of stroke. Call 911 if you experience one or more warning signs like these:      Sudden numbness or weakness of the face, arm or leg, especially on one side of the body    Sudden confusion, trouble speaking or understanding    Sudden trouble seeing in one or both eyes    Sudden trouble walking, dizziness, loss of balance or coordination    Sudden severe headache with no known cause          Pending Results     Date and Time Order Name Status Description    6/11/2017 1133 INR AND PTT PANEL In process             Statement of Approval     Ordered          06/13/17 1303  I have reviewed and agree with all the recommendations and orders detailed in this document.  EFFECTIVE NOW     Approved and electronically signed by:  Derick Ruiz MD             Admission Information     Date & Time Provider Department Dept. Phone    6/11/2017 Derick Ruiz MD [address] [facility] [phone]      Your Vitals Were     Blood Pressure Temperature Respirations Height Weight Pulse Oximetry    152/94 98  F (36.7  C) (Axillary) 16 1.778 m (5' 10\") 83.4 kg (183 lb 13.8 oz) 94%    BMI (Body Mass Index)                   26.38 kg/m2           MyChart Information     [employer] gives you secure access to your electronic health record. If you see a primary care provider, you can also send messages to your care team and make appointments. If you have questions, please call your primary care clinic.  If you do not have a primary care provider, please call [phone] and they will assist you.        Care EveryWhere ID     This is your Care EveryWhere ID. This could be used by other organizations to access your " [facility] medical records  [id number]           Review of your medicines      START taking        Dose / Directions    apixaban ANTICOAGULANT 5 MG tablet   Commonly known as:  ELIQUIS   Used for:  Paroxysmal atrial fibrillation (H)        Dose:  5 mg   Take 1 tablet (5 mg) by mouth 2 times daily   Quantity:  60 tablet   Refills:  1       atorvastatin 10 MG tablet   Commonly known as:  LIPITOR   Used for:  Pure hypercholesterolemia        Dose:  10 mg   Take 1 tablet (10 mg) by mouth daily   Quantity:  30 tablet   Refills:  0       sodium chloride 0.65 % nasal spray   Commonly known as:  OCEAN   Used for:  Dry nose        Dose:  1 spray   Spray 1 spray into both nostrils every hour as needed for congestion   Quantity:  1 Bottle   Refills:  3         CONTINUE these medicines which have NOT CHANGED        Dose / Directions    albuterol 108 (90 BASE) MCG/ACT Inhaler   Commonly known as:  albuterol   Used for:  COPD (chronic obstructive pulmonary disease) (H)        Dose:  2 puff   Inhale 2 puffs into the lungs every 4 hours as needed for shortness of breath / dyspnea   Quantity:  3 Inhaler   Refills:  3       beclomethasone 40 MCG/ACT Inhaler   Commonly known as:  QVAR        Dose:  2 puff   Inhale 2 puffs into the lungs daily   Refills:  0       FLECAINIDE ACETATE PO        Dose:  75 mg   Take 75 mg by mouth every 12 hours (takes 1/2 of 150 mg tablet = 75 mg dose)   Refills:  0       levothyroxine 125 MCG tablet   Commonly known as:  SYNTHROID/LEVOTHROID   Used for:  Hypothyroidism, unspecified type        Dose:  125 mcg   Take 1 tablet (125 mcg) by mouth daily   Quantity:  90 tablet   Refills:  3       order for DME   Used for:  COPD (chronic obstructive pulmonary disease) (H)        Equipment being ordered: spacer for inhaler   Quantity:  1 Device   Refills:  1         STOP taking     ASPIRIN EC PO                Where to get your medicines      Some of these will need a paper prescription and others can be bought  over the counter. Ask your nurse if you have questions.     Bring a paper prescription for each of these medications     apixaban ANTICOAGULANT 5 MG tablet    atorvastatin 10 MG tablet    sodium chloride 0.65 % nasal spray                Protect others around you: Learn how to safely use, store and throw away your medicines at www.disposemymeds.org.             Medication List: This is a list of all your medications and when to take them. Check marks below indicate your daily home schedule. Keep this list as a reference.      Medications           Morning Afternoon Evening Bedtime As Needed    albuterol 108 (90 BASE) MCG/ACT Inhaler   Commonly known as:  albuterol   Inhale 2 puffs into the lungs every 4 hours as needed for shortness of breath / dyspnea                                apixaban ANTICOAGULANT 5 MG tablet   Commonly known as:  ELIQUIS   Take 1 tablet (5 mg) by mouth 2 times daily                                atorvastatin 10 MG tablet   Commonly known as:  LIPITOR   Take 1 tablet (10 mg) by mouth daily   Last time this was given:  10 mg on 6/13/2017  7:55 AM                                beclomethasone 40 MCG/ACT Inhaler   Commonly known as:  QVAR   Inhale 2 puffs into the lungs daily                                FLECAINIDE ACETATE PO   Take 75 mg by mouth every 12 hours (takes 1/2 of 150 mg tablet = 75 mg dose)   Last time this was given:  75 mg on 6/13/2017  7:55 AM                                levothyroxine 125 MCG tablet   Commonly known as:  SYNTHROID/LEVOTHROID   Take 1 tablet (125 mcg) by mouth daily   Last time this was given:  125 mcg on 6/13/2017  7:55 AM                                order for DME   Equipment being ordered: spacer for inhaler                                sodium chloride 0.65 % nasal spray   Commonly known as:  OCEAN   Spray 1 spray into both nostrils every hour as needed for congestion   Last time this was given:  1 spray on 6/12/2017  8:04 PM

## 2017-06-11 NOTE — ED NOTES
Pt became nauseated after moving from [facility] to [facility]. Zofran given and Dr High aware.

## 2017-06-11 NOTE — PROGRESS NOTES
SEE DICTATION   tpa risks/benefits discussed with patient and wife and will give tpa. Awaiting cta to see if interventional candidate  1 hour critical care time

## 2017-06-11 NOTE — LETTER
Transition Communication Hand-off for Care Transitions to Next Level of Care Provider    Name: Eder Horan  MRN #: [id number]  Primary Care Provider: Richmond Martinez     Primary Clinic: [facility] [address] [facility] [address]     Reason for Hospitalization:  stroke  Stroke (H)  Admit Date/Time: 6/11/2017  4:05 PM  Discharge Date:  6/13/17         Reason for Communication Hand-off Referral:  For continuous of care    Discharge Plan: To home     Follow-up plan:  Future Appointments  Date Time Provider Department Center   6/19/2017 11:00 AM Richmond Martinez MD [facility]X   7/27/2017 8:20 AM [facility] CLIN   7/27/2017 9:15 AM Jaya Franklin MD [facility] PSA CLIN       Any outstanding tests or procedures:        Referrals     Future Labs/Procedures    Cardiology Eval Adult Referral     Comments:    Your provider has referred you to:  [facility]: [facility] [phone]   [email or web address]    Please be aware that coverage of these services is subject to the terms and limitations of your health insurance plan.  Call member services at your health plan with any benefit or coverage questions.      Type of Referral:  Cardiology Follow Up - has seen cardiology previously.     Timeframe requested:  1 Week    Please bring the following to your appointment:  >>   Any x-rays, CTs or MRIs which have been performed.  Contact the facility where they were done to arrange for  prior to your scheduled appointment.    >>   List of current medications  >>   This referral request   >>   Any documents/labs given to you for this referral    NEUROLOGY ADULT REFERRAL     Comments:    F/u stroke clinic at next available.    Please be aware that coverage of these services is subject to the terms and limitations of your health insurance plan.  Call member services at your health plan  with any benefit or coverage questions.      Please bring the following with you to your appointment:    (1) Any X-Rays, CTs or MRIs which have been performed.  Contact the facility where they were done to arrange for  prior to your scheduled appointment.    (2) List of current medications  (3) This referral request   (4) Any documents/labs given to you for this referral            Danyelle Randle RN, BSN  4A and 4E/ ICU  Care Coordinator  Phone: [phone]  Pager: [phone]        AVS/Discharge Summary is the source of truth; this is a helpful guide for improved communication of patient story

## 2017-06-11 NOTE — ED NOTES
Pt attempted to sign the EMTALA form and noted some ataxia. Slow and deliberate finger to nose bilaterally. Some numbness remains on left arm only.

## 2017-06-11 NOTE — IP AVS SNAPSHOT
[address] [address]    Phone:  [phone]                                       After Visit Summary   6/11/2017    Eder Horan    MRN: [id number]           After Visit Summary Signature Page     I have received my discharge instructions, and my questions have been answered. I have discussed any challenges I see with this plan with the nurse or doctor.    ..........................................................................................................................................  Patient/Patient Representative Signature      ..........................................................................................................................................  Patient Representative Print Name and Relationship to Patient    ..................................................               ................................................  Date                                            Time    ..........................................................................................................................................  Reviewed by Signature/Title    ...................................................              ..............................................  Date                                                            Time

## 2017-06-12 ENCOUNTER — APPOINTMENT (OUTPATIENT)
Dept: SPEECH THERAPY | Facility: CLINIC | Age: 77
DRG: 065 | End: 2017-06-12
Attending: PSYCHIATRY & NEUROLOGY
Payer: COMMERCIAL

## 2017-06-12 ENCOUNTER — APPOINTMENT (OUTPATIENT)
Dept: CARDIOLOGY | Facility: CLINIC | Age: 77
DRG: 065 | End: 2017-06-12
Attending: PSYCHIATRY & NEUROLOGY
Payer: COMMERCIAL

## 2017-06-12 ENCOUNTER — APPOINTMENT (OUTPATIENT)
Dept: OCCUPATIONAL THERAPY | Facility: CLINIC | Age: 77
DRG: 065 | End: 2017-06-12
Attending: PSYCHIATRY & NEUROLOGY
Payer: COMMERCIAL

## 2017-06-12 ENCOUNTER — APPOINTMENT (OUTPATIENT)
Dept: MRI IMAGING | Facility: CLINIC | Age: 77
DRG: 065 | End: 2017-06-12
Attending: PSYCHIATRY & NEUROLOGY
Payer: COMMERCIAL

## 2017-06-12 LAB
ABO + RH BLD: NORMAL
ABO + RH BLD: NORMAL
BLD GP AB SCN SERPL QL: NORMAL
BLOOD BANK CMNT PATIENT-IMP: NORMAL
CHOLEST SERPL-MCNC: 167 MG/DL
HDLC SERPL-MCNC: 85 MG/DL
INTERPRETATION ECG - MUSE: NORMAL
LDLC SERPL CALC-MCNC: 73 MG/DL
MAGNESIUM SERPL-MCNC: 2.3 MG/DL (ref 1.6–2.3)
NONHDLC SERPL-MCNC: 81 MG/DL
PHOSPHATE SERPL-MCNC: 2.7 MG/DL (ref 2.5–4.5)
PLATELET # BLD AUTO: 202 10E9/L (ref 150–450)
POTASSIUM SERPL-SCNC: 3.7 MMOL/L (ref 3.4–5.3)
SPECIMEN EXP DATE BLD: NORMAL
TRIGL SERPL-MCNC: 43 MG/DL

## 2017-06-12 PROCEDURE — 40000893 ZZH STATISTIC PT IP EVAL DEFER

## 2017-06-12 PROCEDURE — 84100 ASSAY OF PHOSPHORUS: CPT | Performed by: PSYCHIATRY & NEUROLOGY

## 2017-06-12 PROCEDURE — 40000225 ZZH STATISTIC SLP WARD VISIT

## 2017-06-12 PROCEDURE — 25000128 H RX IP 250 OP 636: Performed by: PSYCHIATRY & NEUROLOGY

## 2017-06-12 PROCEDURE — 93306 TTE W/DOPPLER COMPLETE: CPT

## 2017-06-12 PROCEDURE — 40000133 ZZH STATISTIC OT WARD VISIT: Performed by: OCCUPATIONAL THERAPIST

## 2017-06-12 PROCEDURE — 70549 MR ANGIOGRAPH NECK W/O&W/DYE: CPT

## 2017-06-12 PROCEDURE — 12000001 ZZH R&B MED SURG/OB UMMC

## 2017-06-12 PROCEDURE — 40000264 ZZHC STATISTIC IV PUSH SINGLE INITIAL SUBSTANCE

## 2017-06-12 PROCEDURE — 86900 BLOOD TYPING SEROLOGIC ABO: CPT | Performed by: PSYCHIATRY & NEUROLOGY

## 2017-06-12 PROCEDURE — 97535 SELF CARE MNGMENT TRAINING: CPT | Mod: GO | Performed by: OCCUPATIONAL THERAPIST

## 2017-06-12 PROCEDURE — 80061 LIPID PANEL: CPT | Performed by: PSYCHIATRY & NEUROLOGY

## 2017-06-12 PROCEDURE — 25000132 ZZH RX MED GY IP 250 OP 250 PS 637: Performed by: PSYCHIATRY & NEUROLOGY

## 2017-06-12 PROCEDURE — 25900017 H RX MED GY IP 259 OP 259 PS 637: Performed by: PSYCHIATRY & NEUROLOGY

## 2017-06-12 PROCEDURE — 25000128 H RX IP 250 OP 636: Performed by: RADIOLOGY

## 2017-06-12 PROCEDURE — A9585 GADOBUTROL INJECTION: HCPCS | Performed by: RADIOLOGY

## 2017-06-12 PROCEDURE — 97166 OT EVAL MOD COMPLEX 45 MIN: CPT | Mod: GO | Performed by: OCCUPATIONAL THERAPIST

## 2017-06-12 PROCEDURE — 86850 RBC ANTIBODY SCREEN: CPT | Performed by: PSYCHIATRY & NEUROLOGY

## 2017-06-12 PROCEDURE — 84132 ASSAY OF SERUM POTASSIUM: CPT | Performed by: PSYCHIATRY & NEUROLOGY

## 2017-06-12 PROCEDURE — 70553 MRI BRAIN STEM W/O & W/DYE: CPT

## 2017-06-12 PROCEDURE — 86901 BLOOD TYPING SEROLOGIC RH(D): CPT | Performed by: PSYCHIATRY & NEUROLOGY

## 2017-06-12 PROCEDURE — 93306 TTE W/DOPPLER COMPLETE: CPT | Mod: 26 | Performed by: INTERNAL MEDICINE

## 2017-06-12 PROCEDURE — 92610 EVALUATE SWALLOWING FUNCTION: CPT | Mod: GN

## 2017-06-12 PROCEDURE — 36415 COLL VENOUS BLD VENIPUNCTURE: CPT | Performed by: PSYCHIATRY & NEUROLOGY

## 2017-06-12 PROCEDURE — 85049 AUTOMATED PLATELET COUNT: CPT | Performed by: PSYCHIATRY & NEUROLOGY

## 2017-06-12 PROCEDURE — 25000132 ZZH RX MED GY IP 250 OP 250 PS 637: Performed by: STUDENT IN AN ORGANIZED HEALTH CARE EDUCATION/TRAINING PROGRAM

## 2017-06-12 PROCEDURE — 83735 ASSAY OF MAGNESIUM: CPT | Performed by: PSYCHIATRY & NEUROLOGY

## 2017-06-12 RX ORDER — PIPERACILLIN SODIUM, TAZOBACTAM SODIUM 4; .5 G/20ML; G/20ML
INJECTION, POWDER, LYOPHILIZED, FOR SOLUTION INTRAVENOUS
Status: DISPENSED
Start: 2017-06-12 | End: 2017-06-13

## 2017-06-12 RX ORDER — ATORVASTATIN CALCIUM 10 MG/1
10 TABLET, FILM COATED ORAL DAILY
Status: DISCONTINUED | OUTPATIENT
Start: 2017-06-12 | End: 2017-06-13 | Stop reason: HOSPADM

## 2017-06-12 RX ADMIN — SALINE NASAL SPRAY 1 SPRAY: 1.5 SOLUTION NASAL at 20:04

## 2017-06-12 RX ADMIN — Medication 75 MG: at 08:05

## 2017-06-12 RX ADMIN — Medication 75 MG: at 20:06

## 2017-06-12 RX ADMIN — ONDANSETRON 4 MG: 2 INJECTION INTRAMUSCULAR; INTRAVENOUS at 17:58

## 2017-06-12 RX ADMIN — ATORVASTATIN CALCIUM 10 MG: 10 TABLET, FILM COATED ORAL at 10:30

## 2017-06-12 RX ADMIN — FLUTICASONE FUROATE 1 PUFF: 100 POWDER RESPIRATORY (INHALATION) at 08:12

## 2017-06-12 RX ADMIN — LEVOTHYROXINE SODIUM 125 MCG: 125 TABLET ORAL at 08:05

## 2017-06-12 RX ADMIN — PANTOPRAZOLE SODIUM 40 MG: 40 TABLET, DELAYED RELEASE ORAL at 08:05

## 2017-06-12 RX ADMIN — GADOBUTROL 8.5 ML: 604.72 INJECTION INTRAVENOUS at 18:56

## 2017-06-12 ASSESSMENT — VISUAL ACUITY
OU: NORMAL ACUITY;GLASSES

## 2017-06-12 NOTE — PLAN OF CARE
Problem: Stroke (Ischemic) (Adult)  Goal: Signs and Symptoms of Listed Potential Problems Will be Absent or Manageable (Stroke)  Signs and symptoms of listed potential problems will be absent or manageable by discharge/transition of care (reference Stroke (Ischemic) (Adult) CPG).  Pt admitted to  from [facility] ED for stroke/TPA protocol. TPA bolus given at [facility] at 12:45pm with infusion started at 12:50pm. Upon arrival on  around 15:55 pt alert and oriented x 4, Neuro exam intact except some slurred speech, left hand numbness/tingliness. (slight R and L toes numbness, but pt states this is not new) Slight ataxic on right.  Vomitted 500cc with an hour of arrival. Pt passed the swallow test by MD at bedside. NPO for now except for meds. MRI cancelled til tomorrow d/t vomitting. MD aware. HR 50s, SB.. Maintaining sbp <180. On room air satting mid 90s; however, at times O2 satting high 80s when dozing. Pt did not want the nasal cannula, stating that this caused him to sneeze with running nose excessively from OSH. Report given to night RN and will continue to monitor closely.

## 2017-06-12 NOTE — PLAN OF CARE
Problem: Goal Outcome Summary  Goal: Goal Outcome Summary  PT 4A: Will sign off. Per chart review, discussion with RN and OT, he does not have acute PT needs at this time. Indep with mobility and all needs can be met by OT.

## 2017-06-12 NOTE — PLAN OF CARE
Problem: Goal Outcome Summary  Goal: Goal Outcome Summary     4A: OT eval completed and tx initiated. Pt able to transfer up to EOB with SBA; pt performed several sit<>stand transfers throughout session with SBA-(I). Pt does appear to have mild cognitive deficits which he covers using humor though he does admit to some deficits with STM memory. Pt able to complete functional mobility ~600ft with SBA and no AD; no LOBs - pt notes subjective feeling of unsteadiness but attributes this to bedrest/first time being OOB. Pt reports dizziness at various times throughout session; BP stable; no N/V with dizziness - educated pt it is likely due to location of stroke (cerebellum) and will fade with recovery.      Recommend discharge home with potential OP therapy pending progress with therapy.

## 2017-06-12 NOTE — PLAN OF CARE
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   Neuro- Patient alert and oriented X 4. PERRL. Slightly slurred speech. Moves all extremities, +5 strength. Ataxia in RUE.   CV- Sinus carla without ectopy. Afebrile. SBP < 180 maintained with PRN Hydralazine X 1.   Resp- Lung sounds clear. Patient on room air.   GI- NPO due to N/V yesterday. Emesis X 1 overnight. Bowel sounds auscultated X 4. No BM this shift.   - Voids spontaneously. Adequate urine output.   P: Continue to monitor and notify MD of any changes. MRI this morning.

## 2017-06-12 NOTE — PROGRESS NOTES
"Neuroscience Intensive Care Progress Note  06/12/2017    ON events: no acute events ON. C/o sneezing a lot. Says his numbness is gone.     Problem List:  Patient Active Problem List   Diagnosis     Allergic rhinitis     Paroxysmal atrial fibrillation (H)     Prostate cancer (H)     Advanced directives, counseling/discussion     TMJ (temporomandibular joint syndrome)     Benign essential hypertension     Personal history of tobacco use, presenting hazards to health: [age] y/o @ 1ppd; pipe and occas cig      Pure hypercholesterolemia     Chronic obstructive pulmonary disease, unspecified COPD type (H)     Stroke (H)       Current Medications:    flecainide (TAMBOCOR) half-tab 75 mg  75 mg Oral or Feeding Tube Q12H     levothyroxine  125 mcg Oral or Feeding Tube Daily     pantoprazole  40 mg Oral Daily     sodium chloride (PF)  3 mL Intracatheter Q8H     fluticasone furoate  1 puff Inhalation Daily     gadobutrol  0.1 mL/kg Intravenous Once       PRN Medications:  albuterol, naloxone, lidocaine, lidocaine 4%, hydrALAZINE, acetaminophen **OR** acetaminophen, ondansetron **OR** ondansetron, prochlorperazine **OR** prochlorperazine **OR** prochlorperazine, potassium chloride, potassium chloride, potassium chloride, potassium chloride with lidocaine, potassium chloride, magnesium sulfate, potassium phosphate (KPHOS) in D5W IV, potassium phosphate (KPHOS) in D5W IV, potassium phosphate (KPHOS) in D5W IV, potassium phosphate (KPHOS) in D5W IV, lidocaine, lidocaine 4%, sodium chloride (PF), labetalol    Infusions:    NaCl 75 mL/hr at 06/11/17 2000       Objective findings:  /79  Temp 98.2  F (36.8  C) (Oral)  Resp 12  Ht 1.778 m (5' 10\")  Wt 83.2 kg (183 lb 6.8 oz)  SpO2 93%  BMI 26.32 kg/m2    Ventilator Settings:  Resp: 12    Intake/Output:    Intake/Output Summary (Last 24 hours) at 06/12/17 0802  Last data filed at 06/12/17 0700   Gross per 24 hour   Intake             1050 ml   Output             1550 ml "   Net             -500 ml       Physical Examination:   Vitals:  B/P: 145/79, T: 98.2, P: Data Unavailable, R: 12  General:  Laying in bed  HEENT:  NC/AT, no icterus, op pink and moist, no ear or nose drainage.   Cardiac:  RRR, no m/r/g  Chest:  CTAB  Abdomen:  S/NT/ND  Extremities:  No LE swelling.    Skin:  No rash or lesion.      Neuro Exam:  Mental status: AOx4, speech wnl  Cranial nerves: slurred speech, face intact to light touch BL, EOMI  Motor: 5/5 strength diffusely  Sensory: intact to light touch BL    Labs/Studies:  Recent Labs   Lab Test  06/12/17   0330  06/12/17   0329  06/11/17 1958 06/11/17   1751 06/11/17   1133  04/21/17   1206   NA   --    --    --   141  139  139   POTASSIUM   --   3.7   --   4.2  4.2  4.2   CHLORIDE   --    --    --   108  107  105   CO2   --    --    --   25  27  30*   ANIONGAP   --    --    --   7  5  8.2   GLC   --    --    --   113*  77  109*   BUN   --    --    --   12  12  9   CR   --    --    --   0.71  0.81  0.80   CHRISTIANO   --    --    --   8.0*  8.4*  8.7   WBC   --    --   9.5  Unsatisfactory specimen - clotted   NOTIFIED TOMI JOYNER RN ON 4A 6/11/17 AT 1815 BY MO    6.2   --    RBC   --    --   4.41  Unsatisfactory specimen - clotted   NOTIFIED TOMI JOYNER RN ON 4A 6/11/17 AT 1815 BY MO    4.54   --    HGB   --    --   14.1  Unsatisfactory specimen - clotted   NOTIFIED TOMI JOYNER RN ON 4A 6/11/17 AT 1815 BY MO    14.7   --    PLT  202   --   198  Unsatisfactory specimen - clotted   NOTIFIED TOMI JOYNER RN ON 4A 6/11/17 AT 1815 BY MO    229   --        Recent Labs   Lab Test  06/11/17 1751 06/11/17   1133  08/04/09   0529   INR  0.91  0.87  1.02   PTT  24  30   --        No lab results found in last 7 days.    ==========================================================    ASSESSMENT/PLAN: 77M hx afib off AC, HLD, hypothyroidism p/w dysarthria, heminumbness, and nausea.     NEURO:  ## acute ischemic stroke: p/w dysarthria, heminumbness, and nausea to OSH now s/p tPA on 6/11  at around 1PM. CTA showed superior cerebellar artery occlusion. Hx of afib and HLD, afib most likely cause of stroke. A1c 5.5, LDL 73.   --pain control  --neurochecks  --lipitor 10mg qd  --transfer to floor later today  --f/u TTE  --f/u MRI brain stroke  --PT/OT/SLP  --no aspirin for now    RESPIRATORY:  --no acute issues    ## sneezing:   --normal saline nasal spray    CARDIOVASCULAR:  --maintain SBP <180 today in ICU  --hydralazine/labetalol PRN  --cardiac monitoring    ## afib: known dx PTA. Not on warfarin previously  --PTA flecainide 75mg q12h  --will plan to begin eliquis eventually. F/u MRI    RENAL/FLUIDS/ELECTROLYTES:  --no acute issues  --NS @75/hr    ENDOCRINE:  ## hypothyroidism:   --PTA levothyroxine  --glucose checks    HEME/ONC:  --no acute issues    ID:  --no acute issues    GI/NUTRITION:  --NPO  --f/u SLP today  --PPI: pantoprazole 40mg qd    DVT PPX: SCDs only  Code Status: full   Dispo: floor    =========================================================    This patient was seen & discussed with my attending, Dr. Bright, who agrees with my assessment and plan.     Vladimir Dinh  PGY2 Neurology  [phone]

## 2017-06-12 NOTE — PROGRESS NOTES
06/12/17 1056   Quick Adds   Type of Visit Initial Occupational Therapy Evaluation   Living Environment   Lives With spouse   Living Arrangements house  (2 level with basement; bed on 2nd floor; has bath on both)   Home Accessibility stairs to enter home;stairs within home   Number of Stairs to Enter Home 4   Number of Stairs Within Home 24   Stair Railings at Home outside, present on right side   Transportation Available car;family or friend will provide   Living Environment Comment Pt's spouse is retired and can provide assist as needed; pt could stay on main level of home if necessary   Self-Care   Dominant Hand right   Usual Activity Tolerance good   Current Activity Tolerance moderate   Regular Exercise no   Equipment Currently Used at Home none   Functional Level Prior   Ambulation 0-->independent   Transferring 0-->independent   Toileting 0-->independent   Bathing 0-->independent   Dressing 0-->independent   Eating 0-->independent   Communication 0-->understands/communicates without difficulty   Swallowing 0-->swallows foods/liquids without difficulty   Cognition 0 - no cognition issues reported   Fall history within last six months no   Which of the above functional risks had a recent onset or change? cognition;communication/speech   Prior Functional Level Comment Pt was (I) with all ADL/IADLs including driving; pt is retired   General Information   Onset of Illness/Injury or Date of Surgery - Date 06/11/17   Referring Physician Vidhya Leblanc MD   Patient/Family Goals Statement to be able to do everything he was doing before   Additional Occupational Profile Info/Pertinent History of Current Problem Pt is a 77M hx afib off AC, HLD, hypothyroidism p/w dysarthria, heminumbness, and nausea   Precautions/Limitations no known precautions/limitations   Weight-Bearing Status - LUE full weight-bearing   Weight-Bearing Status - RUE full weight-bearing   Weight-Bearing Status - LLE full weight-bearing    Weight-Bearing Status - RLE full weight-bearing   General Info Comments Neuro team verbally ok'd pt getting up at 11 AM on 6/12   Cognitive Status Examination   Orientation orientation to person, place and time   Level of Consciousness alert   Able to Follow Commands success, 1-step commands   Personal Safety (Cognitive) mild impairment   Memory impaired   Attention Alternating attention impaired, difficulty shifting between tasks   Executive Function Working memory impaired, decreased storage of information for performing tasks   Cognitive Comment Pt's spouse reports he appears at his baseline for cognition; pt reports he has STM deficits at baseline due to age - denies impairment with daily activities due to this; pt with jovial affect that appears to be covering some underlying cognitive deficits - will monitor and further assess as needed   Visual Perception   Visual Perception Wears glasses  (for reading; no new concerns)   Visual Acuity WFL   Visual Field WFL   Visual Attention WFL   Occulomotor WFL   Sensory Examination   Sensory Quick Adds Other (describe)   Sensory Comments Pt reports that he did have some numbness/tingling in LUE when stroke first occured however this has completely resolved   Pain Assessment   Patient Currently in Pain No   Range of Motion (ROM)   ROM Comment BUE/BLEs WFL   Strength   Strength Comments BUEs WFL; BLEs not formally tested - pt reports slight weakness due to bedrest; appear WFL during functional activity   Mobility   Bed Mobility Bed mobility skill: Supine to sit;Bed mobility skill: Scooting/Bridging   Bed Mobility Skill: Scooting/Bridging   Level of [place]: Scooting/Bridging stand-by assist   Physical Assist/Nonphysical Assist: Scooting/Bridging verbal cues;1 person assist   Bed Mobility Skill: Supine to Sit   Level of [place]: Supine/Sit stand-by assist   Physical Assist/Nonphysical Assist: Supine/Sit verbal cues;1 person assist   Transfer Skill: Sit to Stand  "  Level of [place]: Sit/Stand stand-by assist   Physical Assist/Nonphysical Assist: Sit/Stand verbal cues;1 person assist   Lower Body Dressing   Level of [place]: Dress Lower Body contact guard   Physical Assist/Nonphysical Assist: Dress Lower Body verbal cues;1 person assist   Activities of Daily Living Analysis   Impairments Contributing to Impaired Activities of Daily Living cognition impaired;motor control impaired;postural control impaired;sensory feedback impaired;strength decreased   General Therapy Interventions   Planned Therapy Interventions ADL retraining;IADL retraining;bed mobility training;cognition;motor coordination training;neuromuscular re-education;ROM;strengthening;stretching;transfer training;home program guidelines;progressive activity/exercise   Clinical Impression   Criteria for Skilled Therapeutic Interventions Met yes, treatment indicated   OT Diagnosis decreased ADL/IADL (I)   Influenced by the following impairments weakness, cognition   Assessment of Occupational Performance 5 or more Performance Deficits   Identified Performance Deficits bathing, dressing, medications, finances, driving   Clinical Decision Making (Complexity) Moderate complexity   Therapy Frequency daily   Predicted Duration of Therapy Intervention (days/wks) 7 days   Anticipated Equipment Needs at Discharge (TBD pending progress)   Anticipated Discharge Disposition Home with Assist;Home with Outpatient Therapy   Risks and Benefits of Treatment have been explained. Yes   Patient, Family & other staff in agreement with plan of care Yes   [facility] [employer] TM \"6 Clicks\"   2016, Trustees of [facility], under license to [employer].  All rights reserved.   6 Clicks Short Forms Daily Activity Inpatient Short Form   [facility] AM-PAC  \"6 Clicks\" Daily Activity Inpatient Short Form   1. Putting on and taking off regular lower body clothing? 3 - A Little   2. Bathing (including washing, rinsing, " drying)? 3 - A Little   3. Toileting, which includes using toilet, bedpan or urinal? 4 - None   4. Putting on and taking off regular upper body clothing? 4 - None   5. Taking care of personal grooming such as brushing teeth? 4 - None   6. Eating meals? 4 - None   Daily Activity Raw Score (Score out of 24.Lower scores equate to lower levels of function) 22   Total Evaluation Time   Total Evaluation Time (Minutes) 10

## 2017-06-13 ENCOUNTER — TELEPHONE (OUTPATIENT)
Dept: INTERNAL MEDICINE | Facility: CLINIC | Age: 77
End: 2017-06-13

## 2017-06-13 VITALS
TEMPERATURE: 98 F | HEIGHT: 70 IN | DIASTOLIC BLOOD PRESSURE: 94 MMHG | WEIGHT: 183.86 LBS | SYSTOLIC BLOOD PRESSURE: 152 MMHG | RESPIRATION RATE: 16 BRPM | OXYGEN SATURATION: 94 % | BODY MASS INDEX: 26.32 KG/M2

## 2017-06-13 PROBLEM — I63.9 EMBOLIC STROKE (H): Status: ACTIVE | Noted: 2017-06-11

## 2017-06-13 LAB — INTERPRETATION ECG - MUSE: NORMAL

## 2017-06-13 PROCEDURE — 25000132 ZZH RX MED GY IP 250 OP 250 PS 637: Performed by: PSYCHIATRY & NEUROLOGY

## 2017-06-13 PROCEDURE — 25000132 ZZH RX MED GY IP 250 OP 250 PS 637: Performed by: STUDENT IN AN ORGANIZED HEALTH CARE EDUCATION/TRAINING PROGRAM

## 2017-06-13 RX ORDER — ATORVASTATIN CALCIUM 10 MG/1
10 TABLET, FILM COATED ORAL DAILY
Qty: 30 TABLET | Refills: 0 | Status: SHIPPED | OUTPATIENT
Start: 2017-06-13 | End: 2017-06-19

## 2017-06-13 RX ADMIN — LEVOTHYROXINE SODIUM 125 MCG: 125 TABLET ORAL at 07:55

## 2017-06-13 RX ADMIN — Medication 75 MG: at 07:55

## 2017-06-13 RX ADMIN — ATORVASTATIN CALCIUM 10 MG: 10 TABLET, FILM COATED ORAL at 07:55

## 2017-06-13 RX ADMIN — FLUTICASONE FUROATE 1 PUFF: 100 POWDER RESPIRATORY (INHALATION) at 07:55

## 2017-06-13 ASSESSMENT — VISUAL ACUITY
OU: NORMAL ACUITY;GLASSES

## 2017-06-13 ASSESSMENT — PATIENT HEALTH QUESTIONNAIRE - PHQ9: SUM OF ALL RESPONSES TO PHQ QUESTIONS 1-9: 2

## 2017-06-13 NOTE — PROGRESS NOTES
Care Coordinator Progress Note     Admission Date/Time:  6/11/2017  Attending MD:  Derick Ruiz*     Data  Chart reviewed, discussed with interdisciplinary team.   Patient was admitted for:    Paroxysmal atrial fibrillation (H)  Pure hypercholesterolemia  Cerebrovascular accident (CVA) due to embolism of other cerebral artery (H).    Concerns with insurance coverage for discharge needs: None.  Current Living Situation: Patient lives with spouse.  Support System: Supportive and Involved  Services Involved: None.  Transportation: Family or Friend will provide  Barriers to Discharge: None, identified at this time.    Assessment  Nurse Care Coordination was consulted to assess needs of patient following stroke. Chart was reviewed and discussed with interdisciplinary team.  Met with pt and spouse to introduce CC role and assess d/c needs.  CC role discussed and contact info. Provided.  Pt and spouse stated the team have been updating them well about the plan of care.  Pt has been evaluated by OT and out pt therapy is rec. at this time.  Pt stated he is planning to do out pt therapy and declined for any d/c assistance need at this time.      Plan  Anticipated Discharge Date:  TBD.  Anticipated Discharge Plan:   Home with out pt therapy.  Family will provide transport.  CC will cont to follow plan of care.      Danyelle Randle RN, BSN  4A and 4E/ ICU  Care Coordinator  Phone: [phone]  Pager: [phone]

## 2017-06-13 NOTE — PLAN OF CARE
Problem: Goal Outcome Summary  Goal: Goal Outcome Summary  Outcome: Improving  D/I/A: Pt admitted for OH for Stroke, tPA given and completed by 1100 6/12.      Neuro: Alert and oreinted x4. Neuros q4h. Pt reports no numbness and tingling in hands, but baseline numbness in feet. Strong in uppers and lowers. Follows all commands. At times slurred speech and patient complaints about sometimes writing letter higher than previous letters. Moving around room SBA.  CV: Sinus carla 40's-50's. BP stable with systolic goal less than 180, has not required any PRN's. Hx of Afib. In sinus all shift.   Pulm: RA LS clear/diminished  GI: Hyperactive BS, No BM today. Regular diet.   : Voids spontaneously in bathroom via SBA.   Skin: Intact. Rash on bilateral upper arms. Unaware of cause.         P: 6A orders or possibly DC home today. Continue to monitor plan of care, notify MD of any changes.

## 2017-06-13 NOTE — DISCHARGE INSTRUCTIONS
Atorvastatin Calcium Oral tablet  What is this medicine?  ATORVASTATIN (a TORE va sta tin) is known as a HMG-CoA reductase inhibitor or 'statin'. It lowers the level of cholesterol and triglycerides in the blood. This drug may also reduce the risk of heart attack, stroke, or other health problems in patients with risk factors for heart disease. Diet and lifestyle changes are often used with this drug.  This medicine may be used for other purposes; ask your health care provider or pharmacist if you have questions.  What should I tell my health care provider before I take this medicine?  They need to know if you have any of these conditions:    frequently drink alcoholic beverages    history of stroke, TIA    kidney disease    liver disease    muscle aches or weakness    other medical condition    an unusual or allergic reaction to atorvastatin, other medicines, foods, dyes, or preservatives    pregnant or trying to get pregnant    breast-feeding  How should I use this medicine?  Take this medicine by mouth with a glass of water. Follow the directions on the prescription label. You can take this medicine with or without food. Take your doses at regular intervals. Do not take your medicine more often than directed.  Talk to your pediatrician regarding the use of this medicine in children. While this drug may be prescribed for children as young as 10 years old for selected conditions, precautions do apply.  Overdosage: If you think you have taken too much of this medicine contact a poison control center or emergency room at once.  NOTE: This medicine is only for you. Do not share this medicine with others.  What if I miss a dose?  If you miss a dose, take it as soon as you can. If it is almost time for your next dose, take only that dose. Do not take double or extra doses.  What may interact with this medicine?  Do not take this medicine with any of the following medications:    red yeast  rice    telaprevir    telithromycin    voriconazole  This medicine may also interact with the following medications:    alcohol    antiviral medicines for HIV or AIDS    boceprevir    certain antibiotics like clarithromycin, erythromycin, troleandomycin    certain medicines for cholesterol like fenofibrate or gemfibrozil    cimetidine    clarithromycin    colchicine    cyclosporine    digoxin    female hormones, like estrogens or progestins and birth control pills    grapefruit juice    medicines for fungal infections like fluconazole, itraconazole, ketoconazole    niacin    rifampin    spironolactone  This list may not describe all possible interactions. Give your health care provider a list of all the medicines, herbs, non-prescription drugs, or dietary supplements you use. Also tell them if you smoke, drink alcohol, or use illegal drugs. Some items may interact with your medicine.  What should I watch for while using this medicine?  Visit your doctor or health care professional for regular check-ups. You may need regular tests to make sure your liver is working properly.  Tell your doctor or health care professional right away if you get any unexplained muscle pain, tenderness, or weakness, especially if you also have a fever and tiredness. Your doctor or health care professional may tell you to stop taking this medicine if you develop muscle problems. If your muscle problems do not go away after stopping this medicine, contact your health care professional.  This drug is only part of a total heart-health program. Your doctor or a dietician can suggest a low-cholesterol and low-fat diet to help. Avoid alcohol and smoking, and keep a proper exercise schedule.  Do not use this drug if you are pregnant or breast-feeding. Serious side effects to an unborn child or to an infant are possible. Talk to your doctor or pharmacist for more information.  This medicine may affect blood sugar levels. If you have diabetes,  check with your doctor or health care professional before you change your diet or the dose of your diabetic medicine.  If you are going to have surgery tell your health care professional that you are taking this drug.  What side effects may I notice from receiving this medicine?  Side effects that you should report to your doctor or health care professional as soon as possible:    allergic reactions like skin rash, itching or hives, swelling of the face, lips, or tongue    dark urine    fever    joint pain    muscle cramps, pain    redness, blistering, peeling or loosening of the skin, including inside the mouth    trouble passing urine or change in the amount of urine    unusually weak or tired    yellowing of eyes or skin  Side effects that usually do not require medical attention (report to your doctor or health care professional if they continue or are bothersome):    constipation    heartburn    stomach gas, pain, upset  This list may not describe all possible side effects. Call your doctor for medical advice about side effects. You may report side effects to FDA at 3-401-FDA-4972.  Where should I keep my medicine?  Keep out of the reach of children.  Store at room temperature between 20 to 25 degrees C (68 to 77 degrees F). Throw away any unused medicine after the expiration date.  NOTE:This sheet is a summary. It may not cover all possible information. If you have questions about this medicine, talk to your doctor, pharmacist, or health care provider. Copyright  2016 [employer]          Discharge Instructions for Atrial Fibrillation  You have been diagnosed with atrial fibrillation. With this condition, your heart s two upper chambers quiver rather than squeeze the blood out in a normal pattern. This leads to an irregular and sometimes rapid heartbeat. Some people will develop associated symptoms such as a flip-flopping heartbeat, lightheadedness, or shortness of breath. Other people may have no symptoms  at all. Atrial fibrillation is serious because it affects the heart s ability to fill with blood as it should. Blood clots may form. This increases the risk for stroke. Untreated atrial fibrillation can also lead to heart failure. Atrial fibrillation can be controlled. With treatment, most people with atrial fibrillation lead normal lives. It is estimated that over 2.5 million Americans have atrial fibrillation.  Treatment options  Recommended treatment for atrial fibrillation depends on your age, symptoms, how long you have had atrial fibrillation, and other factors. You will have a complete evaluation to find out if you have any abnormalities that caused your heart to go into atrial fibrillation. This might be blocked heart arteries or a thyroid problem. Your doctor will assess your particular case and discuss choices with you.  Treatment choices may include:    Treating an underlying disorder that puts you at risk for atrial fibrillation. For example, correcting an abnormal thyroid or electrolyte problem, or treating a blocked heart artery.    Restoring a normal heart rhythm with an electrical shock (cardioversion) or with an antiarrhythmic medicine (chemical cardioversion)    Using medication to control your heart rate in atrial fibrillation.    Preventing the risk for blood clot and stroke using blood-thinning medicines. Your doctor will tell you what he or she recommends. Choices may include aspirin, clopidogrel, warfarin, dabigatran, rivaroxaban, or apixaban.    Doing catheter ablation or maze procedure. These use different methods to destroy certain areas of heart tissue. This interrupts the electrical signals causing atrial fibrillation. One of these procedures may be a choice when medicines do not work.    Other treatment choices may be recommended for you by your doctor.  Managing risk factors for stroke and preventing heart failure are important parts of any treatment plan for atrial fibrillation.  Home  care    Take your medicines exactly as directed. Don t skip doses.    Work with your doctor to find the right medicaines and doses for you.    Learn to take your own pulse. Keep a record of your results. Ask your doctor which pulse rates mean that you need medical attention. Slowing your pulse is often the goal of treatment. Ask your doctor if it s OK for you to use an automatic machine to check your pulse at home. Sometimes these machines don t count the pulse correctly when you have atrial fibrillation.    Limit your intake of coffee, tea, cola, and other beverages with caffeine to 2 cups per day. Talk with your doctor about whether you should eliminate caffeine.    Avoid over-the-counter medicines that have caffeine in them.    Let your doctor know what medicines you take, including prescription and over-the-counter medicines, as well as any supplements. They interfere with some medicines given for atrial fibrillation.    Ask your doctor about whether you can drink alcohol. Some people need to avoid alcohol to better treat atrial fibrillation. If you are taking blood-thinner medicines, alcohol may interfere with them by increasing their effect.    Never take stimulants such as amphetamines or cocaine. These drugs can speed up your heart rate and trigger atrial fibrillation.  Follow-up  Make a follow-up appointment as directed by our staff.     When to call your doctor  Call your doctor immediately if you have any of the following:    Weakness    Dizziness    Fainting    Fatigue    Shortness of breath    Chest pain with increased activity    A change in the usual regularity of your heartbeat, or an unusually fast heartbeat     7710-9045 [employer]. [address] [place] [address], [place] [address]. All rights reserved. This information is not intended as a substitute for professional medical care. Always follow your healthcare professional's instructions.      Using Blood Thinners (Anticoagulants)  Blood  thinners or anticoagulants are medicines that help prevent blood clots from forming. They include warfarin, heparin, dabigatran, rivaroxaban, apixaban,and edoxaban. Your health care provider will help you decide which medicine is best for you.    Discharge Instructions for Atrial Fibrillation  You have been diagnosed with atrial fibrillation. With this condition, your heart s two upper chambers quiver rather than squeeze the blood out in a normal pattern. This leads to an irregular and sometimes rapid heartbeat. Some people will develop associated symptoms such as a flip-flopping heartbeat, lightheadedness, or shortness of breath. Other people may have no symptoms at all. Atrial fibrillation is serious because it affects the heart s ability to fill with blood as it should. Blood clots may form. This increases the risk for stroke. Untreated atrial fibrillation can also lead to heart failure. Atrial fibrillation can be controlled. With treatment, most people with atrial fibrillation lead normal lives. It is estimated that over 2.5 million Americans have atrial fibrillation.  Treatment options  Recommended treatment for atrial fibrillation depends on your age, symptoms, how long you have had atrial fibrillation, and other factors. You will have a complete evaluation to find out if you have any abnormalities that caused your heart to go into atrial fibrillation. This might be blocked heart arteries or a thyroid problem. Your doctor will assess your particular case and discuss choices with you.  Treatment choices may include:    Treating an underlying disorder that puts you at risk for atrial fibrillation. For example, correcting an abnormal thyroid or electrolyte problem, or treating a blocked heart artery.    Restoring a normal heart rhythm with an electrical shock (cardioversion) or with an antiarrhythmic medicine (chemical cardioversion)    Using medication to control your heart rate in atrial  fibrillation.    Preventing the risk for blood clot and stroke using blood-thinning medicines. Your doctor will tell you what he or she recommends. Choices may include aspirin, clopidogrel, warfarin, dabigatran, rivaroxaban, or apixaban.    Doing catheter ablation or maze procedure. These use different methods to destroy certain areas of heart tissue. This interrupts the electrical signals causing atrial fibrillation. One of these procedures may be a choice when medicines do not work.    Other treatment choices may be recommended for you by your doctor.  Managing risk factors for stroke and preventing heart failure are important parts of any treatment plan for atrial fibrillation.  Home care    Take your medicines exactly as directed. Don t skip doses.    Work with your doctor to find the right medicaines and doses for you.    Learn to take your own pulse. Keep a record of your results. Ask your doctor which pulse rates mean that you need medical attention. Slowing your pulse is often the goal of treatment. Ask your doctor if it s OK for you to use an automatic machine to check your pulse at home. Sometimes these machines don t count the pulse correctly when you have atrial fibrillation.    Limit your intake of coffee, tea, cola, and other beverages with caffeine to 2 cups per day. Talk with your doctor about whether you should eliminate caffeine.    Avoid over-the-counter medicines that have caffeine in them.    Let your doctor know what medicines you take, including prescription and over-the-counter medicines, as well as any supplements. They interfere with some medicines given for atrial fibrillation.    Ask your doctor about whether you can drink alcohol. Some people need to avoid alcohol to better treat atrial fibrillation. If you are taking blood-thinner medicines, alcohol may interfere with them by increasing their effect.    Never take stimulants such as amphetamines or cocaine. These drugs can speed up your  heart rate and trigger atrial fibrillation.  Follow-up  Make a follow-up appointment as directed by our staff.     When to call your doctor  Call your doctor immediately if you have any of the following:    Weakness    Dizziness    Fainting    Fatigue    Shortness of breath    Chest pain with increased activity    A change in the usual regularity of your heartbeat, or an unusually fast heartbeat     4316-4769 [employer]. [address] [place], [place] [id number]. All rights reserved. This information is not intended as a substitute for professional medical care. Always follow your healthcare professional's instructions.        Taking an anticoagulant safely  When you are taking a blood thinner, you will need to take certain steps to stay safe. Too much blood thinner puts you at risk for bleeding. Too little puts you at risk for stroke. Follow these guidelines. Also follow any others that your health care provider gives you.    You may be told you need regular lab testing while taking these medicines. Warfarin requires routine testing to blood-thinning level testing while the other medicines do not.    Tell your doctor about all medicines you take. This includes over the counter medicines, supplements, or herbal remedies. Do not take any medicines (including ones you buy over the counter) that your doctor doesn t know about. Some medicines can interact with blood thinners and cause serious problems.    Tell any health care provider that you see for care (such as doctors, dentists, chiropractors, home health nurses) that you take a blood thinner.    Carry a medical ID card or wear a medical-alert bracelet that says you take an anticoagulant.    Before taking aspirin, check with your doctor. Aspirin can significantly increase your risk of bleeding.    This medicine makes bleeding harder to stop. To protect yourself:    Avoid activities that may cause injury. If you fall or are injured, contact your  health care provider right away. Blood thinners prevent clotting, so you could be bleeding inside without realizing it.    Use a soft-bristle toothbrush and waxed dental floss. Shave with an electric razor rather than a blade.    Don t go barefoot. Don t trim corns or calluses yourself.  Warfarin: Other important information  Several precautions are especially important when you are taking warfarin. Always keep these points in mind:    Be sure to follow your health care provider's instructions for taking warfarin.    Take this medicine at the same time each day. Take it with a full glass of water, with or without food. If you miss a dose, contact your doctor to find out how much to take. Avoid takinga double dose.    Warfarin is an effective drug, but it can be dangerous if not taken properly. It makes your blood less likely to form clots. If you take too much, it can cause serious internal or external bleeding.    You will need to have regular monitoring while you are taking warfarin. This includes blood tests to check your international normalized ratio (INR) and prothrombin time (PT). These tests show how quickly your blood clots. You will also have a complete blood count (CBC) periodically. This looks at your blood and platelet levels. Both of these need to be followed while you're on warfarin. Talk with your health care provider about whether you need to visit the clinic every week, or if services are available for monitoring in your home.    Certain medicines can affect your INR and PT levels. Tell your health care provider if there are any changes in your medicines. This includes any over-the-counter medicines, supplements like vitamin K, or herbal remedies.    Your diet can also affect your INR and PT levels. Because of this, it's important to eat a consistent diet. It is especially important to eat a consistent amount of foods that are high in vitamin K. Be sure to talk with your health care provider before  making any big changes in your diet.    Remember that warfarin increases your risk of bleeding. Be careful not to injure yourself. If you have a significant injury, contact your health care provider right away. It's important to alert your doctor if you've fallen or hurt yourself, even if you don't break your skin. You could be bleeding inside your body without realizing it.     Warfarin: Watch your INR/PT blood levels  Two tests are used to find out how your blood is clotting. One is protime (PT), the other is the international normalized ratio (INR).    Go for your blood tests (INR/PT) as often as directed. Your diet and the other medicines you take can affect your INR/PT levels.    Your INR was between ___ and ___.    Ask your doctor what your goal INR is. My goal INR is between ___ and ___.    My next INR/PT blood draw is due on _____________ (date) at ___________ (time) by ___________ (name of doctor or clinic).    The name of the doctor who is monitoring my anticoagulation therapy is _____________________ and the phone number is _________________.    Follow up with your doctor or as advised by his or her staff. It usually takes a few hours for your doctor to get the results of your clotting tests. Call to get your lab results to find out if your doctor needs to make further changes to your warfarin dose.    If your blood is drawn for these tests at a location other than your doctor's office, tell your doctor as soon as you get your lab results.   Warfarin: Watch what you eat  Vitamin K helps your blood clot. So you have to watch how much you eat of foods that contain vitamin K. These foods can affect the way warfarin works. They do not affect the other non-warfarin blood thinners.Here are some specific tips:    Try to keep your diet about the same each day. If you change your diet for any reason, such as for illness or to lose weight, be sure to tell your doctor.    Each day, eat the same amount of foods that  are high in vitamin K. These include asparagus, avocado, broccoli, cabbage, kale, spinach, and some other leafy green vegetables. Oils, such as soybean, canola, and olive oils, are also high in vitamin K.    Limit fats to 2 to 4 tablespoons a day.    Ask your health care provider if you should avoid alcohol while you are taking a blood thinner.    Avoid teas that contain sweet clover, sweet tamar, or tonka beans. These can affect how your medicine works.    Talk with your health care provider and pharmacist about specific foods or special diets that can affect anticoagulant levels. These include grapefruit juice, cranberries and cranberry juice, fish oil supplements, garlic, elle, licorice, turmeric, and herbal teas and supplements.  Talk with your health care provider if you have concerns about these or other food products and their effects on warfarin.     When to call your doctor if you re taking an anticoagulant  Call your doctor right away if you have any of these:    Bleeding that doesn t stop in 10 minutes    A heavier-than-normal menstrual period or bleeding between periods    Coughing or throwing up blood    Bloody diarrhea or bleeding hemorrhoids     Dark-colored urine or black stools    Red or black-and-blue marks on the skin that get larger    Dizziness or fatigue    Chest pain or trouble breathing  Allergic reactions:    Rash    Itching    Swelling    Trouble swallowing or breathing   Medical conditions and anticoagulants  Before starting a blood thinner, be sure your doctor knows if you have any of these conditions:    Stomach ulcer now or in the past    Vomited blood or had bloody stools (black or red color)    Aneurysm, pericarditis, or pericardial effusion    Blood disorder    Recent surgery, stroke, mini-stroke, or spinal puncture    Kidney or liver disease, uncontrolled high blood pressure, diabetes, vasculitis, heart failure, lupus, or other collagen-vascular disease, or high  cholesterol    Pregnancy or breastfeeding    Younger than 18 years old    Recent or planned dental procedure  Drug interactions and anticoagulants  Many medicines interfere with the effect of blood thinners. Before starting these medicines, be sure your doctor knows about any prescription, over-the-counter, or herbal supplements you take. In particular, tell your provider about:    Antibiotics    Heart medicines    Cimetidine    Aspirin or other anti-inflammatory drugs such as ibuprofen, naproxen, ketoprofen, or other arthritis medicines    Drugs for depression, cancer HIV (protease inhibitors), diabetes, seizures, gout, high cholesterol, or thyroid replacement    Vitamins containing vitamin K or herbal products such as ginkgo, Co-Q10, garlic, or Marck's wort     Note: This information topic may not include all directions, precautions, medical conditions, drug/food interactions, and warnings for these medicines. Check with your doctor, nurse, or pharmacist for any questions you have.      1405-9870 [employer]. [address] [place] [address]. All rights reserved. This information is not intended as a substitute for professional medical care. Always follow your healthcare professional's instructions.        Discharge Instructions for Stroke  You have been diagnosed with stroke. During a stroke, blood stops flowing to part of your brain. This can damage areas in the brain that control other parts of the body. Symptoms after a stroke depend on which part of the brain has been affected.  Stroke risk factors  Once you ve had a stroke, you re at greater risk for another one. Listed below are some other factors that can increase your risk for another stroke:    High blood pressure    High cholesterol    Cigarette or cigar smoking    Diabetes    Carotid or other artery disease    Atrial fibrillation, atrial flutter, or other heart disease    Physical inactivity    Obesity    Certain blood  disorders (such as sickle cell anemia)    Excessive alcohol use    Abuse of illegal drugs    Race    Gender    Family history of stroke    Diet high in salty, fried, or greasy foods  Changes in daily living  Doing your regular tasks may be difficult after you ve had a stroke, but you can learn new ways to manage your daily activities. In fact, doing daily activities may help you to regain muscle strength and bring back function to affected limbs. Be patient, give yourself time to adjust, and appreciate the progress you make.  Daily activities    You may be at risk of falling. Make changes to your home to help you walk more easily. A therapist will decide if you need an assistive device to walk safely.  You may need to see an occupational or physical therapist to learn new ways of doing things. For example, you may need to make adjustments when bathing or dressing:    Try the following tips for showering or bathing:    Test the water temperature with a hand or foot that was not affected by the stroke.    Use grab bars, a shower seat, a hand-held showerhead, and a long-handled brush.    Try the following tips for dressing:    Dress while sitting, starting with the affected side or limb.    Wear shirts that pull easily over your head and pants or skirts with elastic waistbands.    Use zippers with loops attached to the pull tabs.  Lifestyle changes    Take your medications exactly as directed. Don t skip doses.    Your health care provider will give you information on dietary changes that you may need to make, based on your situation. Your provider may recommend that you see a registered dietitian for help with diet changes. Changes may include:    Reducing fat and cholesterol intake    Reducing sodium (salt) intake, especially if you have high blood pressure    Increasing your intake of fresh vegetables and fruits    Eating lean proteins, such as fish, poultry, and legumes (beans and peas) and eating less red meat and  processed meats    Using low-fat dairy products    Using vegetable and nut oils in limited amounts    Limiting sweets and processed foods such as chips, cookies, and baked goods.    Begin an exercise program. Ask your doctor how to get started and how much activity you should try to get on a daily or weekly basis . You can benefit from simple activities such as walking or gardening.    Limit alcohol intake. Men should have no more than 2 alcoholic drinks a day. Women should limit themselves to 1 alcoholic drink per day.    Know your cholesterol level. Follow your doctor s recommendations about how to keep cholesterol under control.    If you are a smoker, it is time to quit now. Enroll in a stop-smoking program to improve your chances of success. Ask your doctor about medications or other methods to help you quit.    Learn stress management techniques to help you deal with stress in your home and work life.  Follow-up care    Keep your medical appointments. Close follow-up is important to stroke rehabilitation and recovery.    Some medications require blood tests to check for progress or problems. Keep follow-up appointments for any blood tests ordered by your doctors.     When to seek medical care  Call 911 right away if you have any of the following symptoms of stroke:    Weakness, tingling, or loss of feeling on one side of your face or body    Sudden double vision or trouble seeing in one or both eyes    Sudden trouble talking or slurred speech    Trouble understanding others    Sudden, severe headache    Dizziness, loss of balance, or a sense of falling    Blackouts or seizures  F.A.S.T. is an easy way to remember the signs of stroke. When you see these signs, you know that you need to call 911 fast.  F.A.S.T. stands for:    F is for face drooping - One side of the face is drooping or numb. When the person smiles, the smile is uneven.    A is for arm weakness - One arm is weak or numb. When the person lifts  both arms at the same time, one arm may drift downward.    S is for speech difficulty - You may notice slurred speech or difficulty speaking. The person can't repeat a simple sentence correctly when asked.    T is for time to call 911 - If someone shows any of these symptoms, even if they go away, call 911 immediately. Make note of the time the symptoms first appeared.     6771-1437 [employer]. [address] [place] [address], [place] [address]. All rights reserved. This information is not intended as a substitute for professional medical care. Always follow your healthcare professional's instructions.

## 2017-06-13 NOTE — DISCHARGE SUMMARY
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  Patient also noted his speech was slurred at that time.  His wife called EMS when symptoms persisted for about 10 minutes and stroke code was called.  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  NIHSS was 2 at that time for numbness and dysarthria.  Patient was consented and given TPA.  Since then patient states numbness has resolved but dysarthria persists.  Admits to nausea but denies headache, chest pain, shortness of breath, diarrhea, dysuria.      Please see H&P dated 6/11/2017 for further details about presentation.    Brief Hospital Course:   ## acute ischemic stroke: p/w dysarthria, heminumbness, and nausea to OSH s/p tPA on 6/11. CTA showed superior cerebellar artery occlusion. MRI with small right lateral cerebellar infarct. Hx of afib and HLD, afib most likely cause of stroke via cardioembolism. Begun on eliquis 5mg BID for afib. A1c 5.5, LDL 73. Begun on lipitor 10mg qd. TTE showed severe LAE, otherwise unremarkable. PT/OT/SLP evaluated and rec'd home DC. No aspirin given eliquis use. Long-term BP goal <140. Symptoms markedly improved to undetectable by day of DC.   --f/u stroke clinic  --f/u PCP for SBP goal <140. Some high pressures in hospital.     ## afib: known dx PTA. Not on warfarin previously d/t Vygct2gocb of 2, which increased to 4 after his stroke. PTA flecainide  75mg q12h to continue. Began eliquis 5mg BID.  --f/u cardiology to determine if flecainide is still indicated given that patient is anticoagulated now.     ## sneezing: normal saline nasal spray     PERTINENT INVESTIGATIONS    Labs  Lipid Panel:   Recent Labs   Lab Test  06/12/17   0329   07/24/15   0842   CHOL  167   < >  195   HDL  85   < >  80   LDL  73   < >  103   TRIG  43   < >  59   CHOLHDLRATIO   --    --   2.4    < > = values in this interval not displayed.     A1C:   Lab Results   Component Value Date    A1C 5.5 06/11/2017     INR:   Recent Labs  Lab 06/11/17  1751 06/11/17  1133   INR 0.91 0.87      Coag Panel / Hypercoag Workup: Not indicated  Pending test results: none    Cardiac Monitoring: Patient had > 24 hrs of cardiac monitor while in hospital.    Findings: No atrial fibrillation was found.    Sleep Apnea Screen:   Questions/Answers      1. Prior to your stroke, have you been told that you snore? No.    2. Prior to your stroke, have you been told that you struggle to breath while you are sleeping? No.    3. Prior to your stroke, do you feel tired and sleepy even after getting a normal night of sleep? No.    Sleep Apnea Screen Findings: Patient has 0-1 symptoms of sleep apnea.  Further sleep study is not recommended at this time.    PHQ-9 Depression Screen Score: 2    Stroke Education was provided including: stroke warning signs, EMS activation, medication changes, follow-up instructions/plan, and risk factor management plan.      PHYSICAL EXAMINATION  Vital Signs:  B/P: 130/84, T: 97.8, P: Data Unavailable, R: 14    General:  Laying in bed  HEENT:  NC/AT, no icterus, op pink and moist, no ear or nose drainage.   Cardiac:  RRR, no m/r/g  Chest:  CTAB  Abdomen:  S/NT/ND  Extremities:  No LE swelling.    Skin:  No rash or lesion.       Neuro Exam:  Mental status: AOx4, speech wnl  Cranial nerves: no slurred speech, face intact to light touch BL, EOMI  Motor: 5/5 strength diffusely  Sensory: intact to  light touch BL    National Institutes of Health Stroke Scale (on day of discharge)  NIHSS Total Score: 0    Modified [place] Scale (on day of discharge): 0-No symptoms    Medications    Current Discharge Medication List      CONTINUE these medications which have NOT CHANGED    Details   ASPIRIN EC PO Take 325 mg by mouth daily      FLECAINIDE ACETATE PO Take 75 mg by mouth every 12 hours (takes 1/2 of 150 mg tablet = 75 mg dose)      beclomethasone (QVAR) 40 MCG/ACT Inhaler Inhale 2 puffs into the lungs daily      levothyroxine (SYNTHROID/LEVOTHROID) 125 MCG tablet Take 1 tablet (125 mcg) by mouth daily  Qty: 90 tablet, Refills: 3    Associated Diagnoses: Hypothyroidism, unspecified type      albuterol (ALBUTEROL) 108 (90 BASE) MCG/ACT inhaler Inhale 2 puffs into the lungs every 4 hours as needed for shortness of breath / dyspnea  Qty: 3 Inhaler, Refills: 3    Associated Diagnoses: COPD (chronic obstructive pulmonary disease) (H)      ORDER FOR DME Equipment being ordered: spacer for inhaler  Qty: 1 Device, Refills: 1    Associated Diagnoses: COPD (chronic obstructive pulmonary disease) (H)             Additional recommendations and follow up:    No discharge procedures on file.    Patient was seen and discussed with the Attending, Dr. Bright.    Vladimir Dinh  PGY2 Neurology  [phone]

## 2017-06-13 NOTE — PROGRESS NOTES
Patient discharged to home. All discharge instructions, medication review, and follow up appointments were reviewed at length with patient and his wife Jocelyn. All questions were answered, they stated they plan to contact cardiology and primary care to notify of hospital stay as well as continue with follow up appointments. All patient belongings were packed up by patient and wife, patient escorted by RN and a wheelchair to curbside for wife to . Patient able to ambulate safely from wheelchair upon discharge and no complications occurred getting into vehicle.    Discharged at 1440

## 2017-06-13 NOTE — CONSULTS
Social Work was consulted to assess needs of patient following stroke. Chart was reviewed and discussed with interdisciplinary team. Social work needs are not identified at this time and RNCC will continue to follow. Please re-consult social work if additional needs are identified.     LORETTA Wagner, [facility]  Adult ICU Clinical   Pager [phone]

## 2017-06-13 NOTE — TELEPHONE ENCOUNTER
Reason for Call:  Same Day Appointment, Requested Provider:  Richmond Martinez MD    PCP: Richmond Martinez    Reason for visit: Follow up stroke.     Duration of symptoms: NA    Have you been treated for this in the past? No    Additional comments: The patient was told to follow up with physician within 7 days. Could not find an appointment that worked (offered Monday 6/19 at 8:00, but that didn't work for the patient). He can come in any day this week, or any day next week except Thursday 22.     Can we leave a detailed message on this number? YES    Phone number patient can be reached at: Home number on file [phone] (home)    Best Time: Any time    Call taken on 6/13/2017 at 4:04 PM by Luann Simms

## 2017-06-13 NOTE — PLAN OF CARE
Problem: Goal Outcome Summary  Goal: Goal Outcome Summary  OT 4A: Cancel- Pt unavailable upon attempts this AM and with PLC this PM.         Occupational Therapy Discharge Summary    Reason for therapy discharge:    Discharged to home.    Progress towards therapy goal(s). See goals on Care Plan in [facility] electronic health record for goal details.  Goals partially met.  Barriers to achieving goals:   discharge from facility.    Therapy recommendation(s):    Continued therapy is recommended.  Rationale/Recommendations:  outpatient OT .

## 2017-06-13 NOTE — PLAN OF CARE
Problem: Goal Outcome Summary  Goal: Goal Outcome Summary  D: Pt admitted to [facility] for dysarthria, dizziness, nausea, L-sided weakness. CT showed cerebellar artery occlusion, tPA given at 1250 on 6/11. Transferred to [facility] for close monitoring. No complications from tPA.      I/A: Neuro: A&O x 4. L-sided hand numbness intermittent, pt reports baseline numbness/tingling in bilat LEs. PERRL 2mm brisk. +5 strength in all extrem.   CV: Sinus carla 43-54 today. Hx of a fib, none seen today. BP stable 130-150/80-90s. SBP goal < 180. DBP goal < 105. No prn BP meds needed this shift.   Resp: Hx COPD. MD approved sats 88-94% on RA. Lungs clear, dim. Good cough.  GI: Hyperactive BS, no BM today.  Advanced to regular diet via speech.   : Voids spontaneously. Up to bathroom. 1.2L out today clear shawna.   Skin: Intact. Rash on bilat upper arms, very mild.      P: Continue current POC. Transfer to  with tele when bed becomes available.

## 2017-06-14 ENCOUNTER — CARE COORDINATION (OUTPATIENT)
Dept: CASE MANAGEMENT | Facility: CLINIC | Age: 77
End: 2017-06-14

## 2017-06-14 NOTE — TELEPHONE ENCOUNTER
Just discharged yesterday. Pt to see me next Monday at the 11:00 or 4:30 same day only appt slot times. Schedule an appt and inform pt

## 2017-06-14 NOTE — PROGRESS NOTES
Clinic Care Navigator Contact  Care Team Conversations    Care navigator talked to patient. He was recently in the hospital for a Stroke. Patient could use some education on stroke. Patient is wondering if the nausea he is still having is related to the stroke and he is not able to taste fruits. Will forward this on to the CC RN to contact patient.    LAURA Ramirez  Care Navigator  [facility]  [phone]

## 2017-06-15 NOTE — PROGRESS NOTES
Clinic Care Coordination Contact  OUTREACH    Referral Information:  Referral Source: IP/TCU Report  Reason for Contact: Hosital discharge  Care Conference: No     Universal Utilization:   ED Visits in last year: 1  Hospital visits in last year: 1  Last PCP appointment: 03/28/17  Missed Appointments: 0  Concerns: stroke  Multiple Providers or Specialists: yes    Clinical Concerns:  Current Medical Concerns:   Patient Active Problem List   Diagnosis     Allergic rhinitis     Paroxysmal atrial fibrillation (H)     Prostate cancer (H)     Advanced directives, counseling/discussion     TMJ (temporomandibular joint syndrome)     Benign essential hypertension     Personal history of tobacco use, presenting hazards to health: 18-23 y/o @ 1ppd; pipe and occas cig      Pure hypercholesterolemia     Chronic obstructive pulmonary disease, unspecified COPD type (H)     Stroke (H)     Embolic stroke (H)       Education Provided to patient: Confirm with caregiver, aware of upcoming appt information and discharge instructions.   Medication Management:  Patient has understanding of regimen and is adherent:  Yes  Caregiver states that patient does not like to take medications, but she will continue to help with compliance.    Functional Status:  Mobility Status: Independent  Equipment Currently Used at Home: none  Transportation: Drives, wife is available to bring if needed.          Psychosocial:  Current living arrangement:: I live in a private home with spouse  Financial/Insurance: [facility]       Resources and Interventions:  Advanced Care Plans/Directives on file:: No     Patient/Caregiver understanding: Caregiver gave verbal understanding  Frequency of Care Coordination: 1-2 weeks  Upcoming appointment: 06/19/17     Plan:   Patient will attend follow up appt with PCP on 6-19-17  RN CC will outreach in 1 week for status update.       Eileen Osuna RN  Clinic Care Coordinator  Mobile: [phone]  [email or web address]

## 2017-06-16 ENCOUNTER — MYC MEDICAL ADVICE (OUTPATIENT)
Dept: INTERNAL MEDICINE | Facility: CLINIC | Age: 77
End: 2017-06-16

## 2017-06-19 ENCOUNTER — OFFICE VISIT (OUTPATIENT)
Dept: INTERNAL MEDICINE | Facility: CLINIC | Age: 77
End: 2017-06-19
Payer: COMMERCIAL

## 2017-06-19 VITALS
OXYGEN SATURATION: 93 % | HEART RATE: 52 BPM | TEMPERATURE: 97.9 F | DIASTOLIC BLOOD PRESSURE: 84 MMHG | WEIGHT: 183 LBS | SYSTOLIC BLOOD PRESSURE: 134 MMHG | BODY MASS INDEX: 26.26 KG/M2

## 2017-06-19 DIAGNOSIS — J44.9 CHRONIC OBSTRUCTIVE PULMONARY DISEASE, UNSPECIFIED COPD TYPE (H): ICD-10-CM

## 2017-06-19 DIAGNOSIS — I48.0 PAROXYSMAL ATRIAL FIBRILLATION (H): ICD-10-CM

## 2017-06-19 DIAGNOSIS — I63.449 CEREBROVASCULAR ACCIDENT (CVA) DUE TO EMBOLISM OF CEREBELLAR ARTERY, UNSPECIFIED BLOOD VESSEL LATERALITY (H): Primary | ICD-10-CM

## 2017-06-19 PROCEDURE — 99495 TRANSJ CARE MGMT MOD F2F 14D: CPT | Performed by: INTERNAL MEDICINE

## 2017-06-19 NOTE — PROGRESS NOTES
SUBJECTIVE:                                                    Eder Horan is a 77 year old male who presents to clinic today for the following health issues:          Hospital Follow-up Visit:    Hospital/Nursing Home/IP Rehab Facility: [facility] (Transfered)  Date of Admission: 6/11/2017  Date of Discharge: 6/13/2017  Reason(s) for Admission: Stroke  Tele contact by CC 6/14/17            Problems taking medications regularly:  None       Medication changes since discharge: None       Problems adhering to non-medication therapy:  None    Summary of hospitalization:  [facility] discharge summary reviewed  Diagnostic Tests/Treatments reviewed.  Follow up needed: none  Other Healthcare Providers Involved in Patient s Care:         Neurology but n/o f/u appt scheduled, cardiology  Update since discharge: improved.     Post Discharge Medication Reconciliation: discharge medications reconciled and changed, per note/orders (see AVS).  Plan of care communicated with patient     Coding guidelines for this visit:  Type of Medical   Decision Making Face-to-Face Visit       within 7 Days of discharge Face-to-Face Visit        within 14 days of discharge   Moderate Complexity [id number]   High Complexity [id number]          Pt's past medical history, family history, habits, medications and allergies were reviewed with the patient today.  See snap shot for  HCM status. Most recent lab results reviewed with pt. Problem list and histories reviewed & adjusted, as indicated.  Additional history as below:    Discharge summary reviewed. Part of the summary as below:    Neurology Stroke Discharge Summary     Date of Admission: 6/11/2017  Date of Discharge: 06/13/2017    Disposition: Discharged to home  Primary Care Physician: Richmond Martinez       Admission Diagnosis:   stroke     Discharge Diagnosis:   Ischemic Stroke due to cardioembolism     Problem Leading to Hospitalization (from HPI):   Eder  AYALA Horan is a 77 year old male with history of AFib not anticoagulated, Hyperlipidemia not on statin and hypothyroid who presented with acute onset dizziness, nausea and L sided numbness after returning home from [facility].  Patient also noted his speech was slurred at that time.  His wife called EMS when symptoms persisted for about 10 minutes and stroke code was called.  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  NIHSS was 2 at that time for numbness and dysarthria.  Patient was consented and given TPA.  Since then patient states numbness has resolved but dysarthria persists.  Admits to nausea but denies headache, chest pain, shortness of breath, diarrhea, dysuria.       Please see H&P dated 6/11/2017 for further details about presentation.     Brief Hospital Course:   ## acute ischemic stroke: p/w dysarthria, heminumbness, and nausea to OSH s/p tPA on 6/11. CTA showed superior cerebellar artery occlusion. MRI with small right lateral cerebellar infarct. Hx of afib and HLD, afib most likely cause of stroke via cardioembolism. Begun on eliquis 5mg BID for afib. A1c 5.5, LDL 73. Begun on lipitor 10mg qd. TTE showed severe LAE, otherwise unremarkable. PT/OT/SLP evaluated and rec'd home DC. No aspirin given eliquis use. Long-term BP goal <140. Symptoms markedly improved to undetectable by day of DC.   --f/u stroke clinic  --f/u PCP for SBP goal <140. Some high pressures in hospital.      ## afib: known dx PTA. Not on warfarin previously d/t [id number] of 2, which increased to 4 after his stroke. PTA flecainide 75mg q12h to continue. Began eliquis 5mg BID.  --f/u cardiology to determine if flecainide is still indicated given that patient is anticoagulated now.        Strength back to normal. Taste off slightly. Has some chronic nasal congestion and some clear rhinorrhea. NO F/C. No sinus pain. Right handed. Writing still slightly clumsy with occ one letter slightly above the others  "when first home. Clear writing today.  No status post. Has COPD.  Was changed to Arnuity  instead of QVAR for steroid inhaler with hospital formulary. Last PFTs done 2013. Asking if qualifies for handicap permit. Can walk about 150yds before stopping. Mows lawn with self propelled mower. Takes about 1 hr to do.     Lab Results   Component Value Date    CHOL 167 06/12/2017     Lab Results   Component Value Date    HDL 85 06/12/2017     Lab Results   Component Value Date    LDL 73 06/12/2017     Lab Results   Component Value Date    TRIG 43 06/12/2017     Lab Results   Component Value Date    CHOLHDLRATIO 2.4 07/24/2015         Additional ROS:   Constitutional, HEENT, Cardiovascular, Pulmonary, GI and , Neuro, MSK and Psych review of systems/symptoms are otherwise negative or unchanged from previous, except as noted above.      OBJECTIVE:  /84  Pulse 52  Temp 97.9  F (36.6  C) (Oral)  Wt 183 lb (83 kg)  SpO2 93%  BMI 26.26 kg/m2   Estimated body mass index is 26.26 kg/(m^2) as calculated from the following:    Height as of 6/11/17: 5' 10\" (1.778 m).    Weight as of this encounter: 183 lb (83 kg).  Eye: PERRL, EOMI  HENT: ear canals and TM's normal and nose and mouth without ulcers or lesions   Neck: no adenopathy. Thyroid normal to palpation. No bruits  Pulm: Lungs clear to auscultation  with slight prolonged exp. No wheeze/rhonchi  CV: Regular bradycardic rhythm  GI: Soft, nontender, Normal active bowel sounds, No hepatosplenomegaly or masses palpable  Ext: Peripheral pulses intact. No edema.  Neuro: Normal strength and tone, sensory exam grossly normal. Normal gait, FNF coordination    Assessment/Plan: (See plan discussion below for further details)  1. Cerebrovascular accident (CVA) due to embolism of cerebellar artery, unspecified blood vessel laterality (H)   patient back to baseline neurologically. No need for future occupational/physical therapy. Since this appears to be a symbolic stroke related " to paroxysmal atrial fibrillation and given baseline excellent lipid status which is improved even compared to January of this year, will discontinue atorvastatin recently started with this hospitalization    2. Paroxysmal atrial fibrillation (H)   Patient will need lifelong anticoagulation now with CHADS-VASC score elevation given stroke episode.  Will follow up with cardiology fr opinion raised by hospitalists whether needs Flecainide still now that pt will be lifelong anticoagulated. Given baseline COPD also present, would favor keeping pt in sinus rhythm as feel  resp status likely to worsen  If loose some of current cardiac function if in A fib longterm    3. Chronic obstructive pulmonary disease, unspecified COPD type (H)  Stable. Pt inquired about handicap permit. PFTS previously have shown FEV1 > 1 liter and pt able to walk about 150 years per his report without stopping and able to mow his lawn slowly so not meeting criteria of State [place]. Offered to repeat PFTs  But pt declines. Continue current meds            Richmond Martinez MD  Internal Medicine Department  [facility]

## 2017-06-19 NOTE — NURSING NOTE
"Chief Complaint   Patient presents with     Hospital F/U       Initial /88  Pulse 52  Temp 97.9  F (36.6  C) (Oral)  Wt 183 lb (83 kg)  SpO2 93%  BMI 26.26 kg/m2 Estimated body mass index is 26.26 kg/(m^2) as calculated from the following:    Height as of 6/11/17: 5' 10\" (1.778 m).    Weight as of this encounter: 183 lb (83 kg).  Medication Reconciliation: complete  "

## 2017-06-19 NOTE — MR AVS SNAPSHOT
After Visit Summary   6/19/2017    Eder Horan    MRN: [id number]           Patient Information     Date Of Birth          1940        Visit Information        Provider Department      6/19/2017 11:00 AM Richmond Martinez MD [facility]         Follow-ups after your visit        Your next 10 appointments already scheduled     Jul 20, 2017  9:30 AM CDT   Office Visit with Richmond Martinez MD   [facility] ([facility]    [address] [id number] [phone]           Bring a current list of meds and any records pertaining to this visit.  For Physicals, please bring immunization records and any forms needing to be filled out.  Please arrive 10 minutes early to complete paperwork.            Jul 27, 2017  8:20 AM CDT   LAB with PEREZ LAB   [facility] ([facility])    [address] [place] [id number] [phone]           Patient must bring picture ID.  Patient should be prepared to give a urine specimen  Please do not eat 10-12 hours before your appointment if you are coming in fasting for labs on lipids, cholesterol, or glucose (sugar).  Pregnant women should follow their Care Team instructions. Water with medications is okay. Do not drink coffee or other fluids.   If you have concerns about taking  your medications, please ask at office or if scheduling via Playlogichart, send a message by clicking on Secure Messaging, Message Your Care Team.            Jul 27, 2017  9:15 AM CDT   Return Visit with Jaya Franklin MD   [facility] ([facility])    [address] [place] [id number] [phone]              Who to contact     If you have questions or need follow up information about today's clinic visit or your schedule please contact [facility]  directly at [phone].  Normal or non-critical lab and imaging results will be communicated to you by MyChart, letter or phone within 4 business days after the clinic has received the results. If you do not hear from us within 7 days, please contact the clinic through Senzarihart or phone. If you have a critical or abnormal lab result, we will notify you by phone as soon as possible.  Submit refill requests through BoostSuite or call your pharmacy and they will forward the refill request to us. Please allow 3 business days for your refill to be completed.          Additional Information About Your Visit        Senzarihart Information     BoostSuite gives you secure access to your electronic health record. If you see a primary care provider, you can also send messages to your care team and make appointments. If you have questions, please call your primary care clinic.  If you do not have a primary care provider, please call [phone] and they will assist you.        Care EveryWhere ID     This is your Care EveryWhere ID. This could be used by other organizations to access your [facility] medical records  [id number]        Your Vitals Were     Pulse Temperature Pulse Oximetry BMI (Body Mass Index)          52 97.9  F (36.6  C) (Oral) 93% 26.26 kg/m2         Blood Pressure from Last 3 Encounters:   06/19/17 134/84   06/13/17 (!) 152/94   06/11/17 150/90    Weight from Last 3 Encounters:   06/19/17 183 lb (83 kg)   06/13/17 183 lb 13.8 oz (83.4 kg)   06/11/17 184 lb 8 oz (83.7 kg)              Today, you had the following     No orders found for display       Primary Care Provider Office Phone # Fax #    Richmond Martinez [phone]       [facility] [address]  [facility] [address]        Thank you!     Thank you for choosing [facility]  for your care. Our goal is always to provide you with excellent care. Hearing back from our patients is one way we can continue to improve  our services. Please take a few minutes to complete the written survey that you may receive in the mail after your visit with us. Thank you!             Your Updated Medication List - Protect others around you: Learn how to safely use, store and throw away your medicines at www.disposemymeds.org.          This list is accurate as of: 6/19/17 12:06 PM.  Always use your most recent med list.                   Brand Name Dispense Instructions for use    albuterol 108 (90 BASE) MCG/ACT Inhaler    albuterol    3 Inhaler    Inhale 2 puffs into the lungs every 4 hours as needed for shortness of breath / dyspnea       apixaban ANTICOAGULANT 5 MG tablet    ELIQUIS    60 tablet    Take 1 tablet (5 mg) by mouth 2 times daily       beclomethasone 40 MCG/ACT Inhaler    QVAR     Inhale 2 puffs into the lungs daily       FLECAINIDE ACETATE PO      Take 75 mg by mouth every 12 hours (takes 1/2 of 150 mg tablet = 75 mg dose)       levothyroxine 125 MCG tablet    SYNTHROID/LEVOTHROID    90 tablet    Take 1 tablet (125 mcg) by mouth daily       order for DME     1 Device    Equipment being ordered: spacer for inhaler       sodium chloride 0.65 % nasal spray    [facility]    1 Bottle    Spray 1 spray into both nostrils every hour as needed for congestion

## 2017-06-26 ENCOUNTER — CARE COORDINATION (OUTPATIENT)
Dept: CASE MANAGEMENT | Facility: CLINIC | Age: 77
End: 2017-06-26

## 2017-06-26 NOTE — PROGRESS NOTES
Clinic Care Coordination Contact  OUTREACH     RN CC follow up outreach for status update.     Patient attended visit with PCP as scheduled and was able to get all questions answered at visit with a clear plan of care. RN CC confirmed patient was aware of upcoming visit date and time.   No new needs identified at this time.          Plan:   Patient will attend upcoming visit with PCP on 7-21   RN CC will be available if future needs identified.     Eileen Osuna RN  Clinic Care Coordinator  Mobile: [phone]  [email or web address]

## 2017-07-03 ENCOUNTER — TELEPHONE (OUTPATIENT)
Dept: INTERNAL MEDICINE | Facility: CLINIC | Age: 77
End: 2017-07-03

## 2017-07-03 DIAGNOSIS — I48.0 PAROXYSMAL ATRIAL FIBRILLATION (H): ICD-10-CM

## 2017-07-03 NOTE — TELEPHONE ENCOUNTER
Reason for call:  Other   Patient called regarding (reason for call): Wanted to talk to the dr about getting a 90 day supply of Eliqust meds instead of the 30 days, as it is much cheaper  Additional comments: Please call patient to discuss this with him.or when its approved    Phone number to reach patient:  Home number on file [phone] (home)    Best Time:  anytime    Can we leave a detailed message on this number?  YES

## 2017-07-06 ENCOUNTER — PRE VISIT (OUTPATIENT)
Dept: CARDIOLOGY | Facility: CLINIC | Age: 77
End: 2017-07-06

## 2017-07-24 ENCOUNTER — OFFICE VISIT (OUTPATIENT)
Dept: INTERNAL MEDICINE | Facility: CLINIC | Age: 77
End: 2017-07-24
Payer: COMMERCIAL

## 2017-07-24 VITALS
DIASTOLIC BLOOD PRESSURE: 80 MMHG | TEMPERATURE: 97.7 F | BODY MASS INDEX: 26.26 KG/M2 | OXYGEN SATURATION: 95 % | SYSTOLIC BLOOD PRESSURE: 136 MMHG | WEIGHT: 183 LBS | HEART RATE: 55 BPM

## 2017-07-24 DIAGNOSIS — I25.10 CAD (CORONARY ARTERY DISEASE): ICD-10-CM

## 2017-07-24 DIAGNOSIS — R35.1 NOCTURIA: ICD-10-CM

## 2017-07-24 DIAGNOSIS — I48.0 PAROXYSMAL ATRIAL FIBRILLATION (H): ICD-10-CM

## 2017-07-24 DIAGNOSIS — J44.9 CHRONIC OBSTRUCTIVE PULMONARY DISEASE, UNSPECIFIED COPD TYPE (H): ICD-10-CM

## 2017-07-24 DIAGNOSIS — R53.83 OTHER FATIGUE: ICD-10-CM

## 2017-07-24 DIAGNOSIS — E78.5 HYPERLIPIDEMIA LDL GOAL <130: ICD-10-CM

## 2017-07-24 DIAGNOSIS — I10 BENIGN ESSENTIAL HYPERTENSION: Primary | ICD-10-CM

## 2017-07-24 DIAGNOSIS — E83.51 HYPOCALCEMIA: ICD-10-CM

## 2017-07-24 DIAGNOSIS — J31.0 CHRONIC RHINITIS, UNSPECIFIED TYPE: Primary | ICD-10-CM

## 2017-07-24 DIAGNOSIS — I10 ESSENTIAL HYPERTENSION WITH GOAL BLOOD PRESSURE LESS THAN 140/90: ICD-10-CM

## 2017-07-24 LAB
ALT SERPL W P-5'-P-CCNC: 14 U/L (ref 0–70)
ANION GAP SERPL CALCULATED.3IONS-SCNC: 2 MMOL/L (ref 3–14)
BUN SERPL-MCNC: 12 MG/DL (ref 7–30)
CALCIUM SERPL-MCNC: 8.2 MG/DL (ref 8.5–10.1)
CHLORIDE SERPL-SCNC: 109 MMOL/L (ref 94–109)
CHOLEST SERPL-MCNC: 176 MG/DL
CO2 SERPL-SCNC: 29 MMOL/L (ref 20–32)
CREAT SERPL-MCNC: 0.79 MG/DL (ref 0.66–1.25)
GFR SERPL CREATININE-BSD FRML MDRD: ABNORMAL ML/MIN/1.7M2
GLUCOSE SERPL-MCNC: 94 MG/DL (ref 70–99)
HDLC SERPL-MCNC: 94 MG/DL
LDLC SERPL CALC-MCNC: 73 MG/DL
NONHDLC SERPL-MCNC: 82 MG/DL
POTASSIUM SERPL-SCNC: 4.2 MMOL/L (ref 3.4–5.3)
SODIUM SERPL-SCNC: 140 MMOL/L (ref 133–144)
TRIGL SERPL-MCNC: 46 MG/DL

## 2017-07-24 PROCEDURE — 84460 ALANINE AMINO (ALT) (SGPT): CPT | Performed by: INTERNAL MEDICINE

## 2017-07-24 PROCEDURE — 80048 BASIC METABOLIC PNL TOTAL CA: CPT | Performed by: INTERNAL MEDICINE

## 2017-07-24 PROCEDURE — 80061 LIPID PANEL: CPT | Performed by: INTERNAL MEDICINE

## 2017-07-24 PROCEDURE — 99214 OFFICE O/P EST MOD 30 MIN: CPT | Performed by: INTERNAL MEDICINE

## 2017-07-24 PROCEDURE — 36415 COLL VENOUS BLD VENIPUNCTURE: CPT | Performed by: INTERNAL MEDICINE

## 2017-07-24 NOTE — PROGRESS NOTES
"  SUBJECTIVE:                                                    Eder Horan is a 77 year old male who presents to clinic today for the following health issues:      Hypertension Follow-up      Outpatient blood pressures are not being checked.    Low Salt Diet: not monitoring salt        Amount of exercise or physical activity: 2-3 days/week for an average of 30-45 minutes    Problems taking medications regularly: No    Medication side effects: none  Diet: regular (no restrictions)    Pt's past medical history, family history, habits, medications and allergies were reviewed with the patient today.  See snap shot for  HCM status. Most recent lab results reviewed with pt. Problem list and histories reviewed & adjusted, as indicated.  Additional history as below:    Has chronic clear nasal drainage. Occ clearing of throat. No deeper cough with use QVAR in AM only. Not using Albuterol every day and breathing \"pretty good\". No chest pain. No palpitations with meds.  No headache or acute vision changes.  No residual sx from previous stroke June except reduced taste such as for milk . Needs f/u lab  for low calcium  Has some general fatigue.  Denies depression. HAs some nocturia every 2-3 hrs. HAs a lot of caffeine with coffee and tea.  Denies orthopnea, PND. NO edema issues now     Additional ROS:   Constitutional, HEENT, Cardiovascular, Pulmonary, GI and , Neuro, MSK and Psych review of systems/symptoms are otherwise negative or unchanged from previous, except as noted above.      OBJECTIVE:  /80  Pulse 55  Temp 97.7  F (36.5  C) (Oral)  Wt 183 lb (83 kg)  SpO2 95%  BMI 26.26 kg/m2   Estimated body mass index is 26.26 kg/(m^2) as calculated from the following:    Height as of 6/11/17: 5' 10\" (1.778 m).    Weight as of this encounter: 183 lb (83 kg).  Eye: PERRL, EOMI  HENT: ear canals and TM's normal and nose and mouth without ulcers or lesions. Nasal mucosa edematous with mild clear nasal discharge   Neck: " no adenopathy. Thyroid normal to palpation. No bruits  Pulm: Lungs clear to auscultation. Slight prolonged exp phase. No wheeze/rhonchi  CV: Regular rates and rhythm  GI: Soft, nontender, Normal active bowel sounds, No hepatosplenomegaly or masses palpable  Ext: Peripheral pulses intact. No edema.  Neuro: Normal strength and tone, sensory exam grossly normal    Assessment/Plan: (See plan discussion below for further details)  1. Chronic rhinitis, unspecified type  See plan discussion below    2. Hypocalcemia  - Basic metabolic panel. If still abnormal, will pursue PTH, Vit D, etc labs    3. Paroxysmal atrial fibrillation (H)   On Flecanide and  Eliquis. In NSR now    4. Chronic obstructive pulmonary disease, unspecified COPD type (H)  Stable. Continue meds    5. Other fatigue   possibly related to sleep abnormalities with nocturia. Patient declines sleep study evaluation. Metabolic causes for fatigue previously normal. Patient prefers to monitor at this time    6. Essential hypertension with goal blood pressure less than 140/90  controlled  - Basic metabolic panel  - Lipid Profile  - ALT    7. Hyperlipidemia LDL goal <130  Labs as ordered by cardiology  - Basic metabolic panel  - Lipid Profile  - ALT    8. Nocturia  Caffeine reduction as above. Discussed med treatment options in future. Pt declines at this time      Plan discussion:  OTC Allegra (Fexofenadine) or Zyrtec (Cetirizine) 1 tab daily for the next 2 weeks to see if nasal drainage/upper cough resolve. If so, then may use as needed. If no improvement and wish further treatment, then email me or call and will prescribe Azelastine  Nasal sprayfor nervous system drainage  Try stopping use of caffeine for 1 week to see if makes difference with urination. If not and more bothersome, can contact me for prescription  for med to lessen urine frequency (though may have some dry mouth side effects)   Labs as ordered  Continue other meds       Richmond Martinez,  MD  Internal Medicine Department  [facility]

## 2017-07-24 NOTE — MR AVS SNAPSHOT
After Visit Summary   7/24/2017    Eder Horan    MRN: [id number]           Patient Information     Date Of Birth          1940        Visit Information        Provider Department      7/24/2017 8:00 AM Richmond Martinez MD [facility]        Today's Diagnoses     Chronic rhinitis, unspecified type    -  1    Hypocalcemia        Other fatigue        Essential hypertension with goal blood pressure less than 140/90        Hyperlipidemia LDL goal <130          Care Instructions    OTC Allegra (Fexofenadine) or Zyrtec (Cetirizine) 1 tab daily for the next 2 weeks to see if nasal drainage/upper cough resolve. If so, then may use as needed. If no improvement and wish further treatment, then email me or call and will prescribe Azelastine  Nasal sprayfor nervous system drainage  Try stopping use of caffeine for 1 week to see if makes difference with urination. If not and bothersome, can contact me for prescription  for med to lessen urine frequency (though may have some dry mouth side effects)   Labs as ordered  Continue other meds          Follow-ups after your visit        Your next 10 appointments already scheduled     Jul 27, 2017  8:20 AM CDT   LAB with PEREZ LAB   [facility] [place] ([facility])    [address] [place] [id number] [phone]           Patient must bring picture ID.  Patient should be prepared to give a urine specimen  Please do not eat 10-12 hours before your appointment if you are coming in fasting for labs on lipids, cholesterol, or glucose (sugar).  Pregnant women should follow their Care Team instructions. Water with medications is okay. Do not drink coffee or other fluids.   If you have concerns about taking  your medications, please ask at office or if scheduling via boosk, send a message by clicking on Secure Messaging, Message Your Care Team.            Jul 27, 2017  9:15 AM CDT   Return  Visit with Jaya Franklin MD   [facility] AT [place] ([facility])    [id number]  [place] [address] [phone]              Who to contact     If you have questions or need follow up information about today's clinic visit or your schedule please contact [facility] directly at [phone].  Normal or non-critical lab and imaging results will be communicated to you by Adhezion Biomedicalhart, letter or phone within 4 business days after the clinic has received the results. If you do not hear from us within 7 days, please contact the clinic through DataParentingt or phone. If you have a critical or abnormal lab result, we will notify you by phone as soon as possible.  Submit refill requests through The Other Guys or call your pharmacy and they will forward the refill request to us. Please allow 3 business days for your refill to be completed.          Additional Information About Your Visit        Adhezion BiomedicalharWisdomTree Information     The Other Guys gives you secure access to your electronic health record. If you see a primary care provider, you can also send messages to your care team and make appointments. If you have questions, please call your primary care clinic.  If you do not have a primary care provider, please call [phone] and they will assist you.        Care EveryWhere ID     This is your Care EveryWhere ID. This could be used by other organizations to access your [facility] medical records  [id number]        Your Vitals Were     Pulse Temperature Pulse Oximetry BMI (Body Mass Index)          55 97.7  F (36.5  C) (Oral) 95% 26.26 kg/m2         Blood Pressure from Last 3 Encounters:   07/24/17 136/80   06/19/17 134/84   06/13/17 (!) 152/94    Weight from Last 3 Encounters:   07/24/17 183 lb (83 kg)   06/19/17 183 lb (83 kg)   06/13/17 183 lb 13.8 oz (83.4 kg)              We Performed the Following     ALT     Basic metabolic panel     Lipid Profile         Primary Care Provider Office Phone # Fax #    Richmond Martinez [phone]       [facility] [address] [facility] [address]        Equal Access to Services     GOLD SR : Jass rincon ku hadsaraho Somarianneali, waaxda luqadaha, qaybta kaalmada adeegyada, chun rivasn frandy saavedra gissel jeffery. So [facility] [phone].    ATENCIÓN: Si habla español, tiene a charlton disposición servicios gratuitos de asistencia lingüística. Llame al [phone].    We comply with applicable federal civil rights laws and [place] laws. We do not discriminate on the basis of race, color, national origin, age, disability sex, sexual orientation or gender identity.            Thank you!     Thank you for choosing [facility]  for your care. Our goal is always to provide you with excellent care. Hearing back from our patients is one way we can continue to improve our services. Please take a few minutes to complete the written survey that you may receive in the mail after your visit with us. Thank you!             Your Updated Medication List - Protect others around you: Learn how to safely use, store and throw away your medicines at www.disposemymeds.org.          This list is accurate as of: 7/24/17  8:45 AM.  Always use your most recent med list.                   Brand Name Dispense Instructions for use Diagnosis    albuterol 108 (90 BASE) MCG/ACT Inhaler    albuterol    3 Inhaler    Inhale 2 puffs into the lungs every 4 hours as needed for shortness of breath / dyspnea    COPD (chronic obstructive pulmonary disease) (H)       apixaban ANTICOAGULANT 5 MG tablet    ELIQUIS    180 tablet    Take 1 tablet (5 mg) by mouth 2 times daily    Paroxysmal atrial fibrillation (H)       beclomethasone 40 MCG/ACT Inhaler    QVAR     Inhale 2 puffs into the lungs daily        FLECAINIDE ACETATE PO      Take 75 mg by mouth every 12 hours (takes 1/2 of 150 mg tablet = 75 mg dose)        levothyroxine 125 MCG  tablet    SYNTHROID/LEVOTHROID    90 tablet    Take 1 tablet (125 mcg) by mouth daily    Hypothyroidism, unspecified type       order for DME     1 Device    Equipment being ordered: spacer for inhaler    COPD (chronic obstructive pulmonary disease) (H)       sodium chloride 0.65 % nasal spray    OCEAN    1 Bottle    Spray 1 spray into both nostrils every hour as needed for congestion    Dry nose

## 2017-07-24 NOTE — PATIENT INSTRUCTIONS
OTC Allegra (Fexofenadine) or Zyrtec (Cetirizine) 1 tab daily for the next 2 weeks to see if nasal drainage/upper cough resolve. If so, then may use as needed. If no improvement and wish further treatment, then email me or call and will prescribe Azelastine  Nasal sprayfor nervous system drainage  Try stopping use of caffeine for 1 week to see if makes difference with urination. If not and bothersome, can contact me for prescription  for med to lessen urine frequency (though may have some dry mouth side effects)   Labs as ordered  Continue other meds

## 2017-07-24 NOTE — NURSING NOTE
"Chief Complaint   Patient presents with     Hypertension       Initial /80  Pulse 55  Temp 97.7  F (36.5  C) (Oral)  Wt 183 lb (83 kg)  SpO2 95%  BMI 26.26 kg/m2 Estimated body mass index is 26.26 kg/(m^2) as calculated from the following:    Height as of 6/11/17: 5' 10\" (1.778 m).    Weight as of this encounter: 183 lb (83 kg).  Medication Reconciliation: complete  "

## 2017-07-27 ENCOUNTER — OFFICE VISIT (OUTPATIENT)
Dept: CARDIOLOGY | Facility: CLINIC | Age: 77
End: 2017-07-27
Attending: INTERNAL MEDICINE
Payer: COMMERCIAL

## 2017-07-27 VITALS
HEART RATE: 54 BPM | HEIGHT: 70 IN | SYSTOLIC BLOOD PRESSURE: 150 MMHG | DIASTOLIC BLOOD PRESSURE: 92 MMHG | BODY MASS INDEX: 25.91 KG/M2 | WEIGHT: 181 LBS

## 2017-07-27 DIAGNOSIS — I48.0 PAROXYSMAL ATRIAL FIBRILLATION (H): ICD-10-CM

## 2017-07-27 DIAGNOSIS — R00.1 BRADYCARDIA: ICD-10-CM

## 2017-07-27 DIAGNOSIS — R55 SYNCOPE, UNSPECIFIED SYNCOPE TYPE: ICD-10-CM

## 2017-07-27 DIAGNOSIS — I10 ESSENTIAL HYPERTENSION WITH GOAL BLOOD PRESSURE LESS THAN 140/90: ICD-10-CM

## 2017-07-27 DIAGNOSIS — E78.5 HYPERLIPIDEMIA LDL GOAL <130: ICD-10-CM

## 2017-07-27 DIAGNOSIS — I74.9 EMBOLISM AND THROMBOSIS (H): ICD-10-CM

## 2017-07-27 PROCEDURE — 99215 OFFICE O/P EST HI 40 MIN: CPT | Mod: 25 | Performed by: INTERNAL MEDICINE

## 2017-07-27 PROCEDURE — 93005 ELECTROCARDIOGRAM TRACING: CPT | Performed by: INTERNAL MEDICINE

## 2017-07-27 RX ORDER — LOSARTAN POTASSIUM 25 MG/1
25 TABLET ORAL DAILY
Qty: 30 TABLET | Refills: 11 | Status: SHIPPED | OUTPATIENT
Start: 2017-07-27 | End: 2017-09-05

## 2017-07-27 NOTE — LETTER
7/27/2017    Richmond Martinez MD  [facility]   [address]  [facility] [address]    RE: Eder Horan       Dear Colleague,    I had the pleasure of seeing Eder Horan in the [facility].    Office Visit     7/27/2017  [facility]YS[facility] HE[facility] AT [place]    Jaya Franklin MD   Cardiology    Paroxysmal atrial fibrillation (H) +5 more   Dx    Heart Problem , FU Cardiac testing , Results ; Referred by Jaya Franklin MD   Reason for visit    Progress Notes      HISTORY OF PRESENT ILLNESS:  The patient is a 77-year-old mildly overweight white male with a history of paroxysmal atrial fibrillation and rapid ventricular response initially admitted to [facility] in 2008.  He was started on Cardizem for rate control as well as warfarin for anticoagulation for DC cardioversion.  This attempt was unsuccessful and he was seen by Electrophysiology and has stopped flecainide with repeat cardioversion being successful.  Initially, he was on flecainide and Cardizem with the latter being discontinued with heart rates in the 40s.  Since his last clinic visit he has had no significant symptoms of chest discomfort, shortness of breath at rest, palpitations, nausea, vomiting, diaphoresis or syncope.  He has occasional lightheadedness if he changes position too quickly.  He denies PND, orthopnea, fevers, chills or sweats.  He has no history of myocardial infarction, active coronary disease or congestive heart failure.  He is able to participate in most activities without significant restriction.  He has had some symptoms of COPD/asthma which is worse in the hot and humid months.  Previous Cardiolite stress testing in 2015 showed no evidence of ischemia or infarct with ejection fraction of 52%.  Holter monitor has shown sinus bradycardia with rate varying from 44 to 89, average rate of 61, with isolated supraventricular and ventricular ectopy.   Echocardiography has shown intact left ventricular function with ejection fraction 55%-60% with mild to moderate dilatation of the left atrium and trace to mild mitral insufficiency present.  Again, the most recent Holter monitor demonstrated a heart rate varying from 42 to 83 with average rate of 58 with rare to occasional supraventricular ectopic beats and rare ventricular ectopic beats without significant tachydysrhythmia.    Since his last clinic visit, the patient unfortunately experienced slurred speech and lack of balance consistent with an acute CVA treated with TPA at the [facility] with successful resolution of most of the symptoms.  He has been started on chronic anticoagulation and referred back to cardiology clinic for possible treatment of hypertension.  He has been able to participate in most activities without significant restriction and deny his chest discomfort, just of breath, palpitations, dizziness       MEDICATIONS:   1.  Flecainide 75 mg twice a day.   2.  Levothyroxine 25 mcg a day.   3.  Flovent inhaler as directed.   4.  Albuterol inhaler as directed.   5.  Aspirin 325 mg a day.  7.  Eliquis 5 mg bid       LABORATORY DATA:  Demonstrated cholesterol 176, HDL 94, LDL 73, triglycerides 46, sodium 140, potassium 4.2, BUN 12, creatinine 0.79.  ALT is less than 14.       PHYSICAL EXAMINATION:   VITAL SIGNS:  Blood pressure 150/80 with a heart rate of 54 and regular.  Weight was 181 pounds, which is down 4 pounds from clinic visit last summer.   NECK:  Without significant jugular venous distention, carotid bruit or palpable thyroid.   CHEST:  Essentially clear to percussion and auscultation with slight decreased breath sounds at the bases.   CARDIAC:  Regular rhythm is a soft S4 gallop, 1-2/6 systolic murmur at the left sternal border with minimal radiation.  No diastolic murmur, rub or S3.   EXTREMITIES:  Without cyanosis or edema.       CLINICAL IMPRESSION:   1.   Stable cardiac condition.   2.  History of paroxysmal atrial fibrillation, not recently.   3.  Chronic obstructive pulmonary disease with isolated bronchospasm.   4.   Mild to moderate hypertension  5.  History of borderline hyperlipidemia, at adequate goal at this time  6.  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       DISCUSSION:  The patient has done reasonably well clinically since his most recent hospitalization.  He has mostly recovered from his CVA and continues on anticoagulation but has mild to moderate elevation of systolic and diastolic blood pressure.  He will be started on a small dose of losartan 25 mg a day that may need further adjustment depending upon the blood pressure..  He has not had persistent or recurrent tachydysrhythmia, chest discomfort, shortness of breath, palpitations or syncope.  He will need close continued followup of his serum lipids, basic metabolic panel and blood pressure.       RECOMMENDATIONS:   1.  Continue present medications except to add losartan 25 mg qday.   2.  Diet and exercise as tolerated, avoiding caffeine, salt and cholesterol.   4.  Aggressive pulmonary therapy.   5.  Routine medical followup.   6.  Cardiology followup up in 4 months.   7.  Close followup of serum lipids, basic metabolic panel and blood pressure with followup with Amira Mendes in 1 month      cc:   Richmond Martinez MD   [address]  [address]           MAURILIO FRANKLIN MD, [facility]          Thank you for allowing me to participate in the care of your patient.    Sincerely,     Maurilio Franklin MD     [facility]

## 2017-07-27 NOTE — MR AVS SNAPSHOT
After Visit Summary   7/27/2017    Eder Horan    MRN: [id number]           Patient Information     Date Of Birth          1940        Visit Information        Provider Department      7/27/2017 9:15 AM Jaya Franklin MD [facility] HEART [facility]        Today's Diagnoses     Paroxysmal atrial fibrillation (H)        Essential hypertension with goal blood pressure less than 140/90        Embolism and thrombosis        Bradycardia        Syncope, unspecified syncope type        Hyperlipidemia LDL goal <130           Follow-ups after your visit        Additional Services     Follow-Up with Cardiac Advanced Practice Provider           Follow-Up with Cardiologist                 Future tests that were ordered for you today     Open Future Orders        Priority Expected Expires Ordered    Basic metabolic panel Routine 11/24/2017 7/27/2018 7/27/2017    Lipid Profile Routine 11/24/2017 7/27/2018 7/27/2017    ALT Routine 11/24/2017 7/27/2018 7/27/2017    Follow-Up with Cardiologist Routine 11/24/2017 7/27/2018 7/27/2017    Basic metabolic panel Routine 8/26/2017 7/27/2018 7/27/2017    Follow-Up with Cardiac Advanced Practice Provider Routine 8/26/2017 7/27/2018 7/27/2017            Who to contact     If you have questions or need follow up information about today's clinic visit or your schedule please contact [facility] directly at [phone].  Normal or non-critical lab and imaging results will be communicated to you by MyChart, letter or phone within 4 business days after the clinic has received the results. If you do not hear from us within 7 days, please contact the clinic through MyChart or phone. If you have a critical or abnormal lab result, we will notify you by phone as soon as possible.  Submit refill requests through [employer] or call your pharmacy and they will forward the refill request to us. Please allow 3  "business days for your refill to be completed.          Additional Information About Your Visit        MyChart Information     [employer] gives you secure access to your electronic health record. If you see a primary care provider, you can also send messages to your care team and make appointments. If you have questions, please call your primary care clinic.  If you do not have a primary care provider, please call [phone] and they will assist you.        Care EveryWhere ID     This is your Care EveryWhere ID. This could be used by other organizations to access your [place] medical records  [id number]        Your Vitals Were     Pulse Height BMI (Body Mass Index)             54 1.778 m (5' 10\") 25.97 kg/m2          Blood Pressure from Last 3 Encounters:   07/27/17 (!) 150/92   07/24/17 136/80   06/19/17 134/84    Weight from Last 3 Encounters:   07/27/17 82.1 kg (181 lb)   07/24/17 83 kg (183 lb)   06/19/17 83 kg (183 lb)              We Performed the Following     EKG 12-lead complete w/read - Clinics (performed today)     Follow-Up with Cardiologist          Today's Medication Changes          These changes are accurate as of: 7/27/17 10:27 AM.  If you have any questions, ask your nurse or doctor.               Start taking these medicines.        Dose/Directions    losartan 25 MG tablet   Commonly known as:  COZAAR   Used for:  Essential hypertension with goal blood pressure less than 140/90   Started by:  Jaya Franklin MD        Dose:  25 mg   Take 1 tablet (25 mg) by mouth daily   Quantity:  30 tablet   Refills:  11            Where to get your medicines      These medications were sent to [id number] - [place], [place] - [address] [facility]  [address] [address] [facility] [address]     Phone:  [phone]     losartan 25 MG tablet                Primary Care Provider Office Phone # Fax #    Richmond Martinez [phone]       [facility] [address] " [facility] [address]        Equal Access to Services     GOLD SR : Hadii aad ku hadsarahgray Symoneali, wajohnathanda lupaxtonbekaha, qathomasta maykelrajwinderchun hernandez. So [facility] [phone].    ATENCIÓN: Si habla español, tiene a charlton disposición servicios gratuitos de asistencia lingüística. Thony al [phone].    We comply with applicable federal civil rights laws and [place] laws. We do not discriminate on the basis of race, color, national origin, age, disability sex, sexual orientation or gender identity.            Thank you!     Thank you for choosing [facility]YSIC[facility] AT [place]  for your care. Our goal is always to provide you with excellent care. Hearing back from our patients is one way we can continue to improve our services. Please take a few minutes to complete the written survey that you may receive in the mail after your visit with us. Thank you!             Your Updated Medication List - Protect others around you: Learn how to safely use, store and throw away your medicines at www.disposemymeds.org.          This list is accurate as of: 7/27/17 10:27 AM.  Always use your most recent med list.                   Brand Name Dispense Instructions for use Diagnosis    albuterol 108 (90 BASE) MCG/ACT Inhaler    albuterol    3 Inhaler    Inhale 2 puffs into the lungs every 4 hours as needed for shortness of breath / dyspnea    COPD (chronic obstructive pulmonary disease) (H)       apixaban ANTICOAGULANT 5 MG tablet    ELIQUIS    180 tablet    Take 1 tablet (5 mg) by mouth 2 times daily    Paroxysmal atrial fibrillation (H)       beclomethasone 40 MCG/ACT Inhaler    QVAR     Inhale 2 puffs into the lungs daily        FLECAINIDE ACETATE PO      Take 75 mg by mouth every 12 hours (takes 1/2 of 150 mg tablet = 75 mg dose)        levothyroxine 125 MCG tablet    SYNTHROID/LEVOTHROID    90 tablet    Take 1 tablet (125 mcg) by mouth daily    Hypothyroidism,  unspecified type       losartan 25 MG tablet    COZAAR    30 tablet    Take 1 tablet (25 mg) by mouth daily    Essential hypertension with goal blood pressure less than 140/90       order for DME     1 Device    Equipment being ordered: spacer for inhaler    COPD (chronic obstructive pulmonary disease) (H)       sodium chloride 0.65 % nasal spray    OCEAN    1 Bottle    Spray 1 spray into both nostrils every hour as needed for congestion    Dry nose

## 2017-07-27 NOTE — PROGRESS NOTES
Office Visit     7/27/2017  [facility] PHYSIC[facility] HEART AT [place]    Jaya Franklin MD   Cardiology    Paroxysmal atrial fibrillation (H) +5 more   Dx    Heart Problem , FU Cardiac testing , Results ; Referred by Jaya Franklin MD   Reason for visit    Progress Notes      HISTORY OF PRESENT ILLNESS:  The patient is a 77-year-old mildly overweight white male with a history of paroxysmal atrial fibrillation and rapid ventricular response initially admitted to [facility] in 2008.  He was started on Cardizem for rate control as well as warfarin for anticoagulation for DC cardioversion.  This attempt was unsuccessful and he was seen by Electrophysiology and has stopped flecainide with repeat cardioversion being successful.  Initially, he was on flecainide and Cardizem with the latter being discontinued with heart rates in the 40s.  Since his last clinic visit he has had no significant symptoms of chest discomfort, shortness of breath at rest, palpitations, nausea, vomiting, diaphoresis or syncope.  He has occasional lightheadedness if he changes position too quickly.  He denies PND, orthopnea, fevers, chills or sweats.  He has no history of myocardial infarction, active coronary disease or congestive heart failure.  He is able to participate in most activities without significant restriction.  He has had some symptoms of COPD/asthma which is worse in the hot and humid months.  Previous Cardiolite stress testing in 2015 showed no evidence of ischemia or infarct with ejection fraction of 52%.  Holter monitor has shown sinus bradycardia with rate varying from 44 to 89, average rate of 61, with isolated supraventricular and ventricular ectopy.  Echocardiography has shown intact left ventricular function with ejection fraction 55%-60% with mild to moderate dilatation of the left atrium and trace to mild mitral insufficiency present.  Again, the most recent Holter monitor  demonstrated a heart rate varying from 42 to 83 with average rate of 58 with rare to occasional supraventricular ectopic beats and rare ventricular ectopic beats without significant tachydysrhythmia.    Since his last clinic visit, the patient unfortunately experienced slurred speech and lack of balance consistent with an acute CVA treated with TPA at the [facility] with successful resolution of most of the symptoms.  He has been started on chronic anticoagulation and referred back to cardiology clinic for possible treatment of hypertension.  He has been able to participate in most activities without significant restriction and deny his chest discomfort, just of breath, palpitations, dizziness       MEDICATIONS:   1.  Flecainide 75 mg twice a day.   2.  Levothyroxine 25 mcg a day.   3.  Flovent inhaler as directed.   4.  Albuterol inhaler as directed.   5.  Aspirin 325 mg a day.  7.  Eliquis 5 mg bid       LABORATORY DATA:  Demonstrated cholesterol 176, HDL 94, LDL 73, triglycerides 46, sodium 140, potassium 4.2, BUN 12, creatinine 0.79.  ALT is less than 14.       PHYSICAL EXAMINATION:   VITAL SIGNS:  Blood pressure 150/80 with a heart rate of 54 and regular.  Weight was 181 pounds, which is down 4 pounds from clinic visit last summer.   NECK:  Without significant jugular venous distention, carotid bruit or palpable thyroid.   CHEST:  Essentially clear to percussion and auscultation with slight decreased breath sounds at the bases.   CARDIAC:  Regular rhythm is a soft S4 gallop, 1-2/6 systolic murmur at the left sternal border with minimal radiation.  No diastolic murmur, rub or S3.   EXTREMITIES:  Without cyanosis or edema.       CLINICAL IMPRESSION:   1.  Stable cardiac condition.   2.  History of paroxysmal atrial fibrillation, not recently.   3.  Chronic obstructive pulmonary disease with isolated bronchospasm.   4.   Mild to moderate hypertension  5.  History of borderline  hyperlipidemia, at adequate goal at this time  6.  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       DISCUSSION:  The patient has done reasonably well clinically since his most recent hospitalization.  He has mostly recovered from his CVA and continues on anticoagulation but has mild to moderate elevation of systolic and diastolic blood pressure.  He will be started on a small dose of losartan 25 mg a day that may need further adjustment depending upon the blood pressure..  He has not had persistent or recurrent tachydysrhythmia, chest discomfort, shortness of breath, palpitations or syncope.  He will need close continued followup of his serum lipids, basic metabolic panel and blood pressure.       RECOMMENDATIONS:   1.  Continue present medications except to add losartan 25 mg qday.   2.  Diet and exercise as tolerated, avoiding caffeine, salt and cholesterol.   4.  Aggressive pulmonary therapy.   5.  Routine medical followup.   6.  Cardiology followup up in 4 months.   7.  Close followup of serum lipids, basic metabolic panel and blood pressure with followup with Amira Mendes in 1 month      cc:   Richmond Martinez MD   [address]  [address]           MAURILIO CONDE MD, FACC

## 2017-08-28 ENCOUNTER — CARE COORDINATION (OUTPATIENT)
Dept: NEUROLOGY | Facility: CLINIC | Age: 77
End: 2017-08-28

## 2017-08-28 NOTE — PROGRESS NOTES
Patient was contacted to complete the mRS questionnaire. Patient's score was 1. Patient denies any concerns. He does not wish to follow-up with stroke neurology. Patient has contact information and was instructed to call with any new concerns.

## 2017-09-01 ENCOUNTER — OFFICE VISIT (OUTPATIENT)
Dept: CARDIOLOGY | Facility: CLINIC | Age: 77
End: 2017-09-01
Attending: INTERNAL MEDICINE
Payer: COMMERCIAL

## 2017-09-01 VITALS
WEIGHT: 183.5 LBS | SYSTOLIC BLOOD PRESSURE: 138 MMHG | DIASTOLIC BLOOD PRESSURE: 77 MMHG | HEART RATE: 43 BPM | BODY MASS INDEX: 26.27 KG/M2 | HEIGHT: 70 IN

## 2017-09-01 DIAGNOSIS — I48.0 PAROXYSMAL ATRIAL FIBRILLATION (H): ICD-10-CM

## 2017-09-01 DIAGNOSIS — I74.9 EMBOLISM AND THROMBOSIS (H): ICD-10-CM

## 2017-09-01 DIAGNOSIS — R55 SYNCOPE, UNSPECIFIED SYNCOPE TYPE: ICD-10-CM

## 2017-09-01 DIAGNOSIS — I10 ESSENTIAL HYPERTENSION WITH GOAL BLOOD PRESSURE LESS THAN 140/90: ICD-10-CM

## 2017-09-01 DIAGNOSIS — R00.1 BRADYCARDIA: ICD-10-CM

## 2017-09-01 DIAGNOSIS — E78.5 HYPERLIPIDEMIA LDL GOAL <130: ICD-10-CM

## 2017-09-01 LAB
ANION GAP SERPL CALCULATED.3IONS-SCNC: 8.5 MMOL/L (ref 6–17)
BUN SERPL-MCNC: 14 MG/DL (ref 7–30)
CALCIUM SERPL-MCNC: 8.6 MG/DL (ref 8.5–10.5)
CHLORIDE SERPL-SCNC: 105 MMOL/L (ref 98–107)
CO2 SERPL-SCNC: 31 MMOL/L (ref 23–29)
CREAT SERPL-MCNC: 0.87 MG/DL (ref 0.7–1.3)
GFR SERPL CREATININE-BSD FRML MDRD: 85 ML/MIN/1.7M2
GLUCOSE SERPL-MCNC: 99 MG/DL (ref 70–105)
POTASSIUM SERPL-SCNC: 4.5 MMOL/L (ref 3.5–5.1)
SODIUM SERPL-SCNC: 140 MMOL/L (ref 136–145)

## 2017-09-01 PROCEDURE — 80048 BASIC METABOLIC PNL TOTAL CA: CPT | Performed by: INTERNAL MEDICINE

## 2017-09-01 PROCEDURE — 99214 OFFICE O/P EST MOD 30 MIN: CPT | Performed by: NURSE PRACTITIONER

## 2017-09-01 PROCEDURE — 36415 COLL VENOUS BLD VENIPUNCTURE: CPT | Performed by: INTERNAL MEDICINE

## 2017-09-01 NOTE — PROGRESS NOTES
HISTORY OF PRESENT ILLNESS:  Mr. Horan is a delightful 77-year-old gentleman that I am having the pleasure of seeing again today in followup.  He has a history of paroxysmal atrial fibrillation with rapid ventricular response.  He was admitted for this in 2008.  He was placed on Cardizem and cardioverted and then placed on flecainide therapy.  He is on Eliquis as well.  He has maintained sinus rhythm since that time.      He has had sinus bradycardia with which he has been fairly asymptomatic.  He did undergo a 24-hour Holter monitor earlier this year which showed heart rate variation from 40-49 beats per minute with an average rate of 61 beats per minute.      Last echocardiogram shows an EF of 55%-60% with mild to moderate dilatation of the left atrium.  He had mild mitral insufficiency.      Unfortunately, he experienced slurred speech and lack of balance.  He was sent to the [facility] for an acute CVA and treated with TPA therapy.  He had complete resolution of his symptoms.  The date of his ischemic stroke was 06/11.  He was discharged home on 06/13.  His CHADS-VASc score prior to his stroke was 2 (age and hypertension).  After his stroke, his CHADS-VASc is now 4.  It was at this time that they placed him on Eliquis therapy.      At today's visit, his biggest concern is intermittent fatigue.  He states that if he gets up, he can eventually find the energy to do things; however, he does feel like he is slowing down.  He was noted to be hypertensive at his last visit and Dr. Franklin started him on losartan 25 mg daily.  He is normotensive today at 138/77.  BMP is normal.      Resting heart rate is quite low at 43 beats per minute.  A 12-lead EKG a few weeks ago showed sinus carla at 47 beats per minute.  He denies chest discomfort.  During our discussion, I do think he may be suffering from some chronotropic incompetence.  All other review of systems and past medical history  are noted below.      ASSESSMENT AND PLAN:  Mr. Horan is a delightful 77-year-old gentleman here today for followup.   1.  Paroxysmal atrial fibrillation treated with flecainide 75 mg b.i.d.  Previous CHADS-VASc score was 2.  CHADS-VASc score is now 4 in the setting of acute ischemic stroke that happened in .  He received TPA therapy and his symptoms resolved.  He is now on Eliquis therapy.   2.  Sinus bradycardia.  He has been fairly asymptomatic; however, I am concerned about chronotropic incompetence.  His fatigue is slightly worse.  I have discussed pacemaker implantation with him in the past.  I have asked that we do a repeat 24-hour Holter monitor to assure that he has adequate heart rate variation.  I do think he would benefit from a visit with one of our electrophysiologists.  I will discuss that with Dr. Franklin.     3.  Hypertension.  Improved with low dose losartan.  I would avoid AV blocking agents in Mr. Horan due to his resting bradycardia.     4.  Hypothyroidism.  Last TSH was 1.52 on 2017.      I will call Mr. Horan to review the Holter results.  If there are questions or concerns, I have asked that he contact us.        As always, thank you for including me in the care of this delightful gentleman.         YUMIKO MURILLO NP             D: 2017 16:37   T: 2017 17:08   MT: JENNIFER      Name:     AUGUSTINE HORAN   MRN:      [id number]        Account:      [id number]   :      1940           Service Date: 2017      Document: [id number]

## 2017-09-01 NOTE — MR AVS SNAPSHOT
After Visit Summary   9/1/2017    Augustine Horan    MRN: [id number]           Patient Information     Date Of Birth          1940        Visit Information        Provider Department      9/1/2017 3:50 PM Amira Mendes APRN CNP [facility] PHYS[facility] AT [place]        Today's Diagnoses     Bradycardia        Paroxysmal atrial fibrillation (H)        Essential hypertension with goal blood pressure less than 140/90          Care Instructions    Will recheck a holter monitor      Results for AUGUSTINE HORAN (MRN [id number]) as of 9/1/2017 15:55   Ref. Range 7/24/2017 08:45 9/1/2017 14:41   Sodium Latest Ref Range: 136 - 145 mmol/L 140 140   Potassium Latest Ref Range: 3.5 - 5.1 mmol/L 4.2 4.5   Chloride Latest Ref Range: 98 - 107 mmol/L 109 105   Carbon Dioxide Latest Ref Range: 23 - 29 mmol/L 29 31 (H)   Urea Nitrogen Latest Ref Range: 7 - 30 mg/dL 12 14   Creatinine Latest Ref Range: 0.70 - 1.30 mg/dL 0.79 0.87   GFR Estimate Latest Ref Range: >60 mL/min/1.7m2 >90... 85   GFR Estimate If Black Latest Ref Range: >60 mL/min/1.7m2 >90... >90   Calcium Latest Ref Range: 8.5 - 10.5 mg/dL 8.2 (L) 8.6   Anion Gap Latest Ref Range: 6 - 17 mmol/L 2 (L) 8.5             Follow-ups after your visit        Your next 10 appointments already scheduled     Sep 13, 2017 10:00 AM CDT   Holter Monitor with OMER   [facility] Radiology - [facility] ([facility])    [address]  [facility] [address] [phone]            Oct 12, 2017 11:45 AM CDT   Return Visit with Reece Daniels MD   [facility] ([facility]    [address] [address] [phone]              Future tests that were ordered for you today     Open Future Orders        Priority Expected Expires Ordered    Holter Monitor 24 hour - Adult Routine 9/8/2017 9/1/2018 9/1/2017            Who to contact     If you  "have questions or need follow up information about today's clinic visit or your schedule please contact [facility] [place] directly at [phone].  Normal or non-critical lab and imaging results will be communicated to you by MyChart, letter or phone within 4 business days after the clinic has received the results. If you do not hear from us within 7 days, please contact the clinic through [employer] or phone. If you have a critical or abnormal lab result, we will notify you by phone as soon as possible.  Submit refill requests through [employer] or call your pharmacy and they will forward the refill request to us. Please allow 3 business days for your refill to be completed.          Additional Information About Your Visit        [employer] Information     [employer] gives you secure access to your electronic health record. If you see a primary care provider, you can also send messages to your care team and make appointments. If you have questions, please call your primary care clinic.  If you do not have a primary care provider, please call [phone] and they will assist you.        Care EveryWhere ID     This is your Care EveryWhere ID. This could be used by other organizations to access your [place] medical records  [id number]        Your Vitals Were     Pulse Height BMI (Body Mass Index)             43 1.778 m (5' 10\") 26.33 kg/m2          Blood Pressure from Last 3 Encounters:   09/01/17 138/77   07/27/17 (!) 150/92   07/24/17 136/80    Weight from Last 3 Encounters:   09/01/17 83.2 kg (183 lb 8 oz)   07/27/17 82.1 kg (181 lb)   07/24/17 83 kg (183 lb)              We Performed the Following     Follow-Up with Cardiac Advanced Practice Provider        Primary Care Provider Office Phone # Fax #    Richmond Martinez [phone]       [address] [place] [address]        Equal Access to Services     GOLD SR AH: elda [place], " marcos maykelcarolyn kristopherchun adamaan ah. [facility] [phone].    ATENCIÓN: Si lori concepcion, tiene a charlton disposición servicios gratuitos de asistencia lingüística. Thony al [phone].    We comply with applicable federal civil rights laws and [place] laws. We do not discriminate on the basis of race, color, national origin, age, disability sex, sexual orientation or gender identity.            Thank you!     Thank you for choosing [facility]YSIC[facility] AT [place]  for your care. Our goal is always to provide you with excellent care. Hearing back from our patients is one way we can continue to improve our services. Please take a few minutes to complete the written survey that you may receive in the mail after your visit with us. Thank you!             Your Updated Medication List - Protect others around you: Learn how to safely use, store and throw away your medicines at www.disposemymeds.org.          This list is accurate as of: 9/1/17  4:41 PM.  Always use your most recent med list.                   Brand Name Dispense Instructions for use Diagnosis    albuterol 108 (90 BASE) MCG/ACT Inhaler    PROAIR HFA    3 Inhaler    Inhale 2 puffs into the lungs every 4 hours as needed for shortness of breath / dyspnea    COPD (chronic obstructive pulmonary disease) (H)       apixaban ANTICOAGULANT 5 MG tablet    ELIQUIS    180 tablet    Take 1 tablet (5 mg) by mouth 2 times daily    Paroxysmal atrial fibrillation (H)       beclomethasone 40 MCG/ACT Inhaler    QVAR     Inhale 2 puffs into the lungs daily        FLECAINIDE ACETATE PO      Take 75 mg by mouth every 12 hours (takes 1/2 of 150 mg tablet = 75 mg dose)        levothyroxine 125 MCG tablet    SYNTHROID/LEVOTHROID    90 tablet    Take 1 tablet (125 mcg) by mouth daily    Hypothyroidism, unspecified type       losartan 25 MG tablet    COZAAR    30 tablet    Take 1 tablet (25 mg) by mouth daily    Essential  hypertension with goal blood pressure less than 140/90       order for DME     1 Device    Equipment being ordered: spacer for inhaler    COPD (chronic obstructive pulmonary disease) (H)       sodium chloride 0.65 % nasal spray    OCEAN    1 Bottle    Spray 1 spray into both nostrils every hour as needed for congestion    Dry nose

## 2017-09-01 NOTE — LETTER
9/1/2017    Richmond Martinez MD  [address] [facility] [address]    RE: Eder Horan       Dear Colleague,    I had the pleasure of seeing Eder Horan in the [facility].    Mr. Horan is a delightful 77-year-old gentleman that I am having the pleasure of seeing again today in followup.  He has a history of paroxysmal atrial fibrillation with rapid ventricular response.  He was admitted for this in 2008.  He was placed on Cardizem and cardioverted and then placed on flecainide therapy.  He is on Eliquis as well.  He has maintained sinus rhythm since that time.      He has had sinus bradycardia with which he has been fairly asymptomatic.  He did undergo a 24-hour Holter monitor earlier this year which showed heart rate variation from 40-49 beats per minute with an average rate of 61 beats per minute.      Last echocardiogram shows an EF of 55%-60% with mild to moderate dilatation of the left atrium.  He had mild mitral insufficiency.      Unfortunately, he experienced slurred speech and lack of balance.  He was sent to the [facility] for an acute CVA and treated with TPA therapy.  He had complete resolution of his symptoms.  The date of his ischemic stroke was 06/11.  He was discharged home on 06/13.  His CHADS-VASc score prior to his stroke was 2 (age and hypertension).  After his stroke, his CHADS-VASc is now 4.  It was at this time that they placed him on Eliquis therapy.      At today's visit, his biggest concern is intermittent fatigue.  He states that if he gets up, he can eventually find the energy to do things; however, he does feel like he is slowing down.  He was noted to be hypertensive at his last visit and Dr. Franklin started him on losartan 25 mg daily.  He is normotensive today at 138/77.  BMP is normal.      Resting heart rate is quite low at 43 beats per minute.  A 12-lead EKG a few weeks ago showed sinus carla at 47 beats per  minute.  He denies chest discomfort.  During our discussion, I do think he may be suffering from some chronotropic incompetence.  All other review of systems and past medical history are noted below.   Outpatient Encounter Prescriptions as of 9/1/2017   Medication Sig Dispense Refill     [DISCONTINUED] losartan (COZAAR) 25 MG tablet Take 1 tablet (25 mg) by mouth daily 30 tablet 11     apixaban ANTICOAGULANT (ELIQUIS) 5 MG tablet Take 1 tablet (5 mg) by mouth 2 times daily 180 tablet 3     [DISCONTINUED] sodium chloride (OCEAN) 0.65 % nasal spray Spray 1 spray into both nostrils every hour as needed for congestion 1 Bottle 3     FLECAINIDE ACETATE PO Take 75 mg by mouth every 12 hours (takes 1/2 of 150 mg tablet = 75 mg dose)       [DISCONTINUED] beclomethasone (QVAR) 40 MCG/ACT Inhaler Inhale 2 puffs into the lungs daily       levothyroxine (SYNTHROID/LEVOTHROID) 125 MCG tablet Take 1 tablet (125 mcg) by mouth daily 90 tablet 3     [DISCONTINUED] albuterol (ALBUTEROL) 108 (90 BASE) MCG/ACT inhaler Inhale 2 puffs into the lungs every 4 hours as needed for shortness of breath / dyspnea 3 Inhaler 3     ORDER FOR DME Equipment being ordered: spacer for inhaler 1 Device 1     No facility-administered encounter medications on file as of 9/1/2017.       ASSESSMENT AND PLAN:  Mr. Horan is a delightful 77-year-old gentleman here today for followup.   1.  Paroxysmal atrial fibrillation treated with flecainide 75 mg b.i.d.  Previous CHADS-VASc score was 2.  CHADS-VASc score is now 4 in the setting of acute ischemic stroke that happened in June.  He received TPA therapy and his symptoms resolved.  He is now on Eliquis therapy.   2.  Sinus bradycardia.  He has been fairly asymptomatic; however, I am concerned about chronotropic incompetence.  His fatigue is slightly worse.  I have discussed pacemaker implantation with him in the past.  I have asked that we do a repeat 24-hour Holter monitor to assure that he has adequate heart  rate variation.  I do think he would benefit from a visit with one of our electrophysiologists.  I will discuss that with Dr. Franklin.     3.  Hypertension.  Improved with low dose losartan.  I would avoid AV blocking agents in Mr. Horan due to his resting bradycardia.     4.  Hypothyroidism.  Last TSH was 1.52 on 02/09/2017.      I will call Mr. Horan to review the Holter results.  If there are questions or concerns, I have asked that he contact us.        As always, thank you for including me in the care of this delightful gentleman.     Sincerely,    Amira Mendes NP, APRN [facility]

## 2017-09-01 NOTE — PROGRESS NOTES
HPI and Plan: #[id number]  See dictation    Orders Placed This Encounter   Procedures     Holter Monitor 24 hour - Adult       No orders of the defined types were placed in this encounter.      There are no discontinued medications.      Encounter Diagnoses   Name Primary?     Paroxysmal atrial fibrillation (H)      Essential hypertension with goal blood pressure less than 140/90      Embolism and thrombosis      Bradycardia      Syncope, unspecified syncope type      Hyperlipidemia LDL goal <130        CURRENT MEDICATIONS:  Current Outpatient Prescriptions   Medication Sig Dispense Refill     losartan (COZAAR) 25 MG tablet Take 1 tablet (25 mg) by mouth daily 30 tablet 11     apixaban ANTICOAGULANT (ELIQUIS) 5 MG tablet Take 1 tablet (5 mg) by mouth 2 times daily 180 tablet 3     sodium chloride (OCEAN) 0.65 % nasal spray Spray 1 spray into both nostrils every hour as needed for congestion 1 Bottle 3     FLECAINIDE ACETATE PO Take 75 mg by mouth every 12 hours (takes 1/2 of 150 mg tablet = 75 mg dose)       beclomethasone (QVAR) 40 MCG/ACT Inhaler Inhale 2 puffs into the lungs daily       levothyroxine (SYNTHROID/LEVOTHROID) 125 MCG tablet Take 1 tablet (125 mcg) by mouth daily 90 tablet 3     albuterol (ALBUTEROL) 108 (90 BASE) MCG/ACT inhaler Inhale 2 puffs into the lungs every 4 hours as needed for shortness of breath / dyspnea 3 Inhaler 3     ORDER FOR DME Equipment being ordered: spacer for inhaler 1 Device 1       ALLERGIES     Allergies   Allergen Reactions     Bactrim [Sulfa Drugs] Rash       PAST MEDICAL HISTORY:  Past Medical History:   Diagnosis Date     Actinic keratosis 6/09    face     ALLERGIC RHINITIS       Atrial fibrillation (H) 6/08     Back pain     s/p LESI through ortho Feb 2010     Basal cell carcinoma      Bradycardia      COPD (chronic obstructive pulmonary disease) (H)      DVT      Left calf 2003. Right calf 6/06     Embolic stroke (H) 06/11/2017    superior cerebellar artery occlusion. Hx A  fib     Hyperlipidemia LDL goal <130 5/10/2011     Hypertension      HYPOTHYROIDISM      hypothyroidism     Lateral epicondylitis  of elbow 7/05    left     Malignant melanoma (H)      PLANTAR FASCITIS      Prostate cancer (H) 6/09    on Lupron therapy     Squamous cell carcinoma  Sept 2014     right lower lid, s/p MOHS     Syncope      TMJ (temporomandibular joint syndrome) 4/12/2012     Tobacco use disorder        PAST SURGICAL HISTORY:  Past Surgical History:   Procedure Laterality Date     ABDOMEN SURGERY  2004,06,09    Hernias (2) Prostate Removal(2009)     BIOPSY  2009    Prostate     C NONSPECIFIC PROCEDURE  1/07    Left groin exploration and left inguinal herniorrhaphy     C NONSPECIFIC PROCEDURE  5/08    Prostate bx (benign)     C NONSPECIFIC PROCEDURE  8/09    Wide local excision (left side), robotic-assisted laparoscopic prostatectomy and   Left pelvic lymph node dissection     C NONSPECIFIC PROCEDURE  August 2010    Right inguinal herniorrhaphy with mesh     CARDIOVERSION  10-16-08    failed     COLONOSCOPY  2004     EYE SURGERY      2013 lump from lower  left eye lid removed     GENITOURINARY SURGERY  2009    Prostate     HERNIA REPAIR  2004 - 2006       FAMILY HISTORY:  Family History   Problem Relation Age of Onset     C.A.D. Father      heart attack- angina     Coronary Artery Disease Father      Myocardial Infarction Father      CANCER Mother      lung     Lung Cancer Mother      Lung Cancer Sister      Unknown/Adopted Maternal Grandmother      Unknown/Adopted Maternal Grandfather      Unknown/Adopted Paternal Grandmother      Unknown/Adopted Paternal Grandfather      Unknown/Adopted Sister        SOCIAL HISTORY:  Social History     Social History     Marital status:      Spouse name: N/A     Number of children: 3     Years of education: N/A     Occupational History     [employer]      Retired     Social History Main Topics     Smoking status: Former Smoker     Types: Cigars     Quit  "date: 12/1/2006     Smokeless tobacco: Never Used      Comment: pipe daily     Alcohol use 0.0 oz/week     0 Standard drinks or equivalent per week      Comment: 2-3 drinks a day:  Wine/Beer     Drug use: No     Sexual activity: No     Other Topics Concern     Parent/Sibling W/ Cabg, Mi Or Angioplasty Before 65f 55m? No     Caffeine Concern No     2-3 cups of coffee a day     Sleep Concern No     Stress Concern No     Weight Concern No     Special Diet No     Exercise No     Seat Belt Yes     Social History Narrative       Review of Systems:  Skin:  Negative       Eyes:  Positive for glasses    ENT:  Negative      Respiratory:  Positive for dyspnea on exertion (stairs)     Cardiovascular:    Positive for;edema    Gastroenterology: Negative      Genitourinary:  Negative      Musculoskeletal:  Positive for joint pain    Neurologic:  Negative      Psychiatric:  Negative      Heme/Lymph/Imm:  Positive for allergies    Endocrine:  Positive for thyroid disorder      Physical Exam:  Vitals: /77  Pulse (!) 43  Ht 1.778 m (5' 10\")  Wt 83.2 kg (183 lb 8 oz)  BMI 26.33 kg/m2    Constitutional:  cooperative, alert and oriented, well developed, well nourished, in no acute distress overweight      Skin:  warm and dry to the touch, no apparent skin lesions or masses noted        Head:  normocephalic, no masses or lesions        Eyes:  pupils equal and round, conjunctivae and lids unremarkable, sclera white, no xanthalasma, EOMS intact, no nystagmus        ENT:  no pallor or cyanosis, dentition good        Neck:  JVP normal        Chest:  normal breath sounds, clear to auscultation, normal A-P diameter, normal symmetry, normal respiratory excursion, no use of accessory muscles prolonged expiration        Cardiac: regular rhythm;normal S1 and S2 bradycardic S4   systolic murmur;LUSB;grade 1          Extremities and Back:  no deformities, clubbing, cyanosis, erythema observed;no edema              Neurological:  affect " appropriate, oriented to time, person and place;no gross motor deficits              CC  Jaya Franklin MD  [address]  [place] [id number]

## 2017-09-01 NOTE — PATIENT INSTRUCTIONS
Will recheck a holter monitor      Results for AUGUSTINE MYRICK (MRN [id number]) as of 9/1/2017 15:55   Ref. Range 7/24/2017 08:45 9/1/2017 14:41   Sodium Latest Ref Range: 136 - 145 mmol/L 140 140   Potassium Latest Ref Range: 3.5 - 5.1 mmol/L 4.2 4.5   Chloride Latest Ref Range: 98 - 107 mmol/L 109 105   Carbon Dioxide Latest Ref Range: 23 - 29 mmol/L 29 31 (H)   Urea Nitrogen Latest Ref Range: 7 - 30 mg/dL 12 14   Creatinine Latest Ref Range: 0.70 - 1.30 mg/dL 0.79 0.87   GFR Estimate Latest Ref Range: >60 mL/min/1.7m2 >90... 85   GFR Estimate If Black Latest Ref Range: >60 mL/min/1.7m2 >90... >90   Calcium Latest Ref Range: 8.5 - 10.5 mg/dL 8.2 (L) 8.6   Anion Gap Latest Ref Range: 6 - 17 mmol/L 2 (L) 8.5

## 2017-09-02 DIAGNOSIS — J44.9 COPD (CHRONIC OBSTRUCTIVE PULMONARY DISEASE) (H): ICD-10-CM

## 2017-09-03 NOTE — TELEPHONE ENCOUNTER
albuterol (ALBUTEROL) 108 (90 BASE) MCG/ACT inhaler       Last Written Prescription Date: 8/24/15  Last Fill Quantity: 3 INHALER, # refills: 3    Last Office Visit with G, P or [facility] prescribing provider:  7/24/17   Future Office Visit:    Next 5 appointments (look out 90 days)     Oct 12, 2017 11:45 AM CDT   Return Visit with Reece Daniels MD   [facility] ([facility]    [address] [address] [phone]                   Date of Last Asthma Action Plan Letter:   There are no preventive care reminders to display for this patient.   Asthma Control Test: No flowsheet data found.    Date of Last Spirometry Test:   No results found for this or any previous visit.

## 2017-09-05 DIAGNOSIS — I10 ESSENTIAL HYPERTENSION WITH GOAL BLOOD PRESSURE LESS THAN 140/90: ICD-10-CM

## 2017-09-05 RX ORDER — LOSARTAN POTASSIUM 25 MG/1
25 TABLET ORAL DAILY
Qty: 90 TABLET | Refills: 3 | Status: SHIPPED | OUTPATIENT
Start: 2017-09-05 | End: 2018-08-28

## 2017-09-06 ENCOUNTER — TELEPHONE (OUTPATIENT)
Dept: INTERNAL MEDICINE | Facility: CLINIC | Age: 77
End: 2017-09-06

## 2017-09-06 DIAGNOSIS — J44.9 CHRONIC OBSTRUCTIVE PULMONARY DISEASE, UNSPECIFIED COPD TYPE (H): Primary | ICD-10-CM

## 2017-09-06 RX ORDER — ALBUTEROL SULFATE 90 UG/1
AEROSOL, METERED RESPIRATORY (INHALATION)
Qty: 54 G | Refills: 2 | Status: SHIPPED | OUTPATIENT
Start: 2017-09-06 | End: 2018-11-28

## 2017-09-06 NOTE — TELEPHONE ENCOUNTER
Reason for Call:  Medication or medication refill:    Do you use a [facility]?  Name of the pharmacy and phone number for the current request:  [facility] [address] [place] - [phone]    Name of the medication requested:beclomethasone (QVAR) 40 MCG/ACT Inhaler     Other request:     Can we leave a detailed message on this number? YES    Phone number patient can be reached at: Home number on file [phone] (home)    Best Time: anytime    Call taken on 9/6/2017 at 10:19 AM by ALINE ALVARADO

## 2017-09-06 NOTE — TELEPHONE ENCOUNTER
beclomethasone (QVAR) 40 MCG/ACT Inhaler  Last Written Prescription Date: n/a  Last Fill Quantity: n/a, # refills: n/a    Last Office Visit with [facility], [facility] or [facility] prescribing provider:  n/a   Future Office Visit:    Next 5 appointments (look out 90 days)     Oct 12, 2017 11:45 AM CDT   Return Visit with Reece Daniels MD   [facility] ([facility])    [address] [address] [phone]                   Date of Last Asthma Action Plan Letter:   There are no preventive care reminders to display for this patient.   Asthma Control Test: No flowsheet data found.    Date of Last Spirometry Test:   No results found for this or any previous visit.

## 2017-09-13 ENCOUNTER — HOSPITAL ENCOUNTER (OUTPATIENT)
Dept: CARDIOLOGY | Facility: CLINIC | Age: 77
Discharge: HOME | End: 2017-09-13
Attending: NURSE PRACTITIONER | Admitting: NURSE PRACTITIONER
Payer: COMMERCIAL

## 2017-09-13 DIAGNOSIS — R00.1 BRADYCARDIA: ICD-10-CM

## 2017-09-13 PROCEDURE — 93227 XTRNL ECG REC<48 HR R&I: CPT | Performed by: INTERNAL MEDICINE

## 2017-09-13 PROCEDURE — 93225 XTRNL ECG REC<48 HRS REC: CPT

## 2017-09-20 ENCOUNTER — DOCUMENTATION ONLY (OUTPATIENT)
Dept: CARDIOLOGY | Facility: CLINIC | Age: 77
End: 2017-09-20

## 2017-09-20 NOTE — PROGRESS NOTES
Message from ELISHA Amira Mendes: Hi! I was thinking he may benefit from a pacer. Peak at his holter and let me know if you agree. Thx Amira  Will message Dr. Franklin to review review note and holter

## 2017-10-05 NOTE — TELEPHONE ENCOUNTER
He's symptomatic, but pt states it's tolerable.  felt he might feel better with a pacer. Should I have him meet with an EP doc to discuss or wait until he sees you next? Kaley

## 2017-10-12 ENCOUNTER — OFFICE VISIT (OUTPATIENT)
Dept: DERMATOLOGY | Facility: CLINIC | Age: 77
End: 2017-10-12
Payer: COMMERCIAL

## 2017-10-12 VITALS — WEIGHT: 183 LBS | BODY MASS INDEX: 26.2 KG/M2 | OXYGEN SATURATION: 96 % | HEART RATE: 58 BPM | HEIGHT: 70 IN

## 2017-10-12 DIAGNOSIS — D18.00 ANGIOMA: ICD-10-CM

## 2017-10-12 DIAGNOSIS — L57.0 AK (ACTINIC KERATOSIS): ICD-10-CM

## 2017-10-12 DIAGNOSIS — Z85.828 HISTORY OF SKIN CANCER: Primary | ICD-10-CM

## 2017-10-12 DIAGNOSIS — L81.4 LENTIGO: ICD-10-CM

## 2017-10-12 DIAGNOSIS — L82.1 SK (SEBORRHEIC KERATOSIS): ICD-10-CM

## 2017-10-12 PROCEDURE — 17003 DESTRUCT PREMALG LES 2-14: CPT | Performed by: DERMATOLOGY

## 2017-10-12 PROCEDURE — 99213 OFFICE O/P EST LOW 20 MIN: CPT | Mod: 25 | Performed by: DERMATOLOGY

## 2017-10-12 PROCEDURE — 17000 DESTRUCT PREMALG LESION: CPT | Performed by: DERMATOLOGY

## 2017-10-12 RX ORDER — FLUOROURACIL 50 MG/G
CREAM TOPICAL
Qty: 40 G | Refills: 1 | Status: SHIPPED | OUTPATIENT
Start: 2017-10-12 | End: 2017-10-26

## 2017-10-12 RX ORDER — CALCIPOTRIENE 50 UG/G
CREAM TOPICAL 2 TIMES DAILY
Qty: 100 G | Refills: 3 | Status: SHIPPED | OUTPATIENT
Start: 2017-10-12 | End: 2017-10-26

## 2017-10-12 NOTE — PROGRESS NOTES
Eder Horan is a 77 year old year old male patient here today for f/u hx of non-melanoma skin cancer.  Today he notes rough spot on face and left hand.   .  Patient states this has been present for a while .  Patient reports the following symptoms:  rough.  Patient reports the following previous treatments none.  Patient reports the following modifying factors none.  Associated symptoms: none.  Patient has no other skin complaints today.  Remainder of the HPI, Meds, PMH, Allergies, FH, and SH was reviewed in chart.    Pertinent Hx:   Non-melanoma skin cancer   Past Medical History:   Diagnosis Date     Actinic keratosis 6/09    face     ALLERGIC RHINITIS       Atrial fibrillation (H) 6/08     Back pain     s/p LESI through ortho Feb 2010     Basal cell carcinoma      Bradycardia      COPD (chronic obstructive pulmonary disease) (H)      DVT      Left calf 2003. Right calf 6/06     Embolic stroke (H) 06/11/2017    superior cerebellar artery occlusion. Hx A fib     Hyperlipidemia LDL goal <130 5/10/2011     Hypertension      HYPOTHYROIDISM      hypothyroidism     Lateral epicondylitis  of elbow 7/05    left     Malignant melanoma (H)      PLANTAR FASCITIS      Prostate cancer (H) 6/09    on Lupron therapy     Squamous cell carcinoma  Sept 2014     right lower lid, s/p MOHS     Syncope      TMJ (temporomandibular joint syndrome) 4/12/2012     Tobacco use disorder        Past Surgical History:   Procedure Laterality Date     ABDOMEN SURGERY  2004,06,09    Hernias (2) Prostate Removal(2009)     BIOPSY  2009    Prostate     C NONSPECIFIC PROCEDURE  1/07    Left groin exploration and left inguinal herniorrhaphy     C NONSPECIFIC PROCEDURE  5/08    Prostate bx (benign)     C NONSPECIFIC PROCEDURE  8/09    Wide local excision (left side), robotic-assisted laparoscopic prostatectomy and   Left pelvic lymph node dissection     C NONSPECIFIC PROCEDURE  August 2010    Right inguinal herniorrhaphy with mesh     CARDIOVERSION   10-16-08    failed     COLONOSCOPY  2004     EYE SURGERY      2013 lump from lower  left eye lid removed     GENITOURINARY SURGERY  2009    Prostate     HERNIA REPAIR  2004 - 2006        Family History   Problem Relation Age of Onset     C.A.D. Father      heart attack- angina     Coronary Artery Disease Father      Myocardial Infarction Father      CANCER Mother      lung     Lung Cancer Mother      Lung Cancer Sister      Unknown/Adopted Maternal Grandmother      Unknown/Adopted Maternal Grandfather      Unknown/Adopted Paternal Grandmother      Unknown/Adopted Paternal Grandfather      Unknown/Adopted Sister        Social History     Social History     Marital status:      Spouse name: N/A     Number of children: 3     Years of education: N/A     Occupational History     [employer]      Retired     Social History Main Topics     Smoking status: Former Smoker     Types: Cigars     Quit date: 12/1/2006     Smokeless tobacco: Never Used      Comment: pipe daily     Alcohol use 0.0 oz/week     0 Standard drinks or equivalent per week      Comment: 2-3 drinks a day:  Wine/Beer     Drug use: No     Sexual activity: No     Other Topics Concern     Parent/Sibling W/ Cabg, Mi Or Angioplasty Before 65f 55m? No     Caffeine Concern No     2-3 cups of coffee a day     Sleep Concern No     Stress Concern No     Weight Concern No     Special Diet No     Exercise No     Seat Belt Yes     Social History Narrative       Outpatient Encounter Prescriptions as of 10/12/2017   Medication Sig Dispense Refill     PROAIR  (90 BASE) MCG/ACT inhaler INHALE 2 PUFFS INTO THE LUNGS EVERY 4 HOURS AS NEEDED FOR SHORTNESS OF BREATH / DYSPNEA 54 g 2     beclomethasone (QVAR) 40 MCG/ACT Inhaler Inhale 2 puffs into the lungs daily 3 Inhaler 3     losartan (COZAAR) 25 MG tablet Take 1 tablet (25 mg) by mouth daily 90 tablet 3     apixaban ANTICOAGULANT (ELIQUIS) 5 MG tablet Take 1 tablet (5 mg) by mouth 2 times daily 180 tablet  3     sodium chloride (OCEAN) 0.65 % nasal spray Spray 1 spray into both nostrils every hour as needed for congestion 1 Bottle 3     FLECAINIDE ACETATE PO Take 75 mg by mouth every 12 hours (takes 1/2 of 150 mg tablet = 75 mg dose)       levothyroxine (SYNTHROID/LEVOTHROID) 125 MCG tablet Take 1 tablet (125 mcg) by mouth daily 90 tablet 3     ORDER FOR DME Equipment being ordered: spacer for inhaler 1 Device 1     No facility-administered encounter medications on file as of 10/12/2017.              Review Of Systems  Skin: As above  Eyes: negative  Ears/Nose/Throat: negative  Respiratory: No shortness of breath, dyspnea on exertion, cough, or hemoptysis  Cardiovascular: negative  Gastrointestinal: negative  Genitourinary: negative  Musculoskeletal: negative  Neurologic: negative  Psychiatric: negative  Hematologic/Lymphatic/Immunologic: negative  Endocrine: negative      O:   NAD, WDWN, Alert & Oriented, Mood & Affect wnl, Vitals stable   Here today alone   There were no vitals taken for this visit.   General appearance normal   Vitals stable   Alert, oriented and in no acute distress      Following lymph nodes palpated: Occipital, Cervical, Supraclavicular no lad  Stuck on papules and brown macules on trunk and ext   Red papules on trunk and ext   Gritty papules on face and L hand   The remainder of the full exam was unremarkable; the following areas were examined:  conjunctiva/lids, oral mucosa, neck, peripheral vascular system, abdomen, lymph nodes, digits/nails, eccrine and apocrine glands, scalp/hair, face, neck, chest, abdomen, buttocks, back, RUE, LUE, RLE, LLE       Eyes: Conjunctivae/lids:Normal     ENT: Lips, buccal mucosa, tongue: normal    MSK:Normal    Cardiovascular: peripheral edema none    Pulm: Breathing Normal    Lymph Nodes: No Head and Neck Lymphadenopathy     Neuro/Psych: Orientation:Normal; Mood/Affect:Normal      A/P:  1. Hx of non-melanoma skin cancer, seborrheic keratosis, lentigo,  angioma  2. Actinic keratosis   L hand x1  R cheek x4  L cheekx3  Nasal bridge x1    LN2:  Treated with LN2 for 5s for 1-2 cycles. Warned risks of blistering, pain, pigment change, scarring, and incomplete resolution.  Advised patient to return if lesions do not completely resolve.  Wound care sheet given.    Efudex and doveonex discussed with patient twice daily for one week     BENIGN LESIONS DISCUSSED WITH PATIENT:  I discussed the specifics of tumor, prognosis, and genetics of benign lesions.  I explained that treatment of these lesions would be purely cosmetic and not medically neccessary.  I discussed with patient different removal options including excision, cautery and /or laser.      Nature and genetics of benign skin lesions dicussed with patient.  Signs and Symptoms of skin cancer discussed with patient.  Patient encouraged to perform monthly skin exams.  UV precautions reviewed with patient.  Skin care regimen reviewed with patient: Eliminate harsh soaps, i.e. Dial, zest, irsih spring; Mild soaps such as Cetaphil or Dove sensitive skin, avoid hot or cold showers, aggressive use of emollients including vanicream, cetaphil or cerave discussed with patient.    Risks of non-melanoma skin cancer discussed with patient   Return to clinic 6 months

## 2017-10-12 NOTE — NURSING NOTE
"Chief Complaint   Patient presents with     Derm Problem       Initial Pulse 58  Ht 1.778 m (5' 10\")  Wt 83 kg (183 lb)  SpO2 96%  BMI 26.26 kg/m2 Estimated body mass index is 26.26 kg/(m^2) as calculated from the following:    Height as of this encounter: 1.778 m (5' 10\").    Weight as of this encounter: 83 kg (183 lb).  Medication Reconciliation: complete    "

## 2017-10-12 NOTE — LETTER
10/12/2017         RE: Eder Horan  [address] [address]        Dear Colleague,    Thank you for referring your patient, Eder Horan, to the [facility]. Please see a copy of my visit note below.    Eder Horan is a 77 year old year old male patient here today for f/u hx of non-melanoma skin cancer.  Today he notes rough spot on face and left hand.   .  Patient states this has been present for a while .  Patient reports the following symptoms:  rough.  Patient reports the following previous treatments none.  Patient reports the following modifying factors none.  Associated symptoms: none.  Patient has no other skin complaints today.  Remainder of the HPI, Meds, PMH, Allergies, FH, and SH was reviewed in chart.    Pertinent Hx:   Non-melanoma skin cancer   Past Medical History:   Diagnosis Date     Actinic keratosis 6/09    face     ALLERGIC RHINITIS       Atrial fibrillation (H) 6/08     Back pain     s/p LESI through ortho Feb 2010     Basal cell carcinoma      Bradycardia      COPD (chronic obstructive pulmonary disease) (H)      DVT      Left calf 2003. Right calf 6/06     Embolic stroke (H) 06/11/2017    superior cerebellar artery occlusion. Hx A fib     Hyperlipidemia LDL goal <130 5/10/2011     Hypertension      HYPOTHYROIDISM      hypothyroidism     Lateral epicondylitis  of elbow 7/05    left     Malignant melanoma (H)      PLANTAR FASCITIS      Prostate cancer (H) 6/09    on Lupron therapy     Squamous cell carcinoma  Sept 2014     right lower lid, s/p MOHS     Syncope      TMJ (temporomandibular joint syndrome) 4/12/2012     Tobacco use disorder        Past Surgical History:   Procedure Laterality Date     ABDOMEN SURGERY  2004,06,09    Hernias (2) Prostate Removal(2009)     BIOPSY  2009    Prostate     C NONSPECIFIC PROCEDURE  1/07    Left groin exploration and left inguinal herniorrhaphy     C NONSPECIFIC PROCEDURE  5/08    Prostate bx (benign)      C NONSPECIFIC PROCEDURE  8/09    Wide local excision (left side), robotic-assisted laparoscopic prostatectomy and   Left pelvic lymph node dissection     C NONSPECIFIC PROCEDURE  August 2010    Right inguinal herniorrhaphy with mesh     CARDIOVERSION  10-16-08    failed     COLONOSCOPY  2004     EYE SURGERY      2013 lump from lower  left eye lid removed     GENITOURINARY SURGERY  2009    Prostate     HERNIA REPAIR  2004 - 2006        Family History   Problem Relation Age of Onset     C.A.D. Father      heart attack- angina     Coronary Artery Disease Father      Myocardial Infarction Father      CANCER Mother      lung     Lung Cancer Mother      Lung Cancer Sister      Unknown/Adopted Maternal Grandmother      Unknown/Adopted Maternal Grandfather      Unknown/Adopted Paternal Grandmother      Unknown/Adopted Paternal Grandfather      Unknown/Adopted Sister        Social History     Social History     Marital status:      Spouse name: N/A     Number of children: 3     Years of education: N/A     Occupational History     [employer]      Retired     Social History Main Topics     Smoking status: Former Smoker     Types: Cigars     Quit date: 12/1/2006     Smokeless tobacco: Never Used      Comment: pipe daily     Alcohol use 0.0 oz/week     0 Standard drinks or equivalent per week      Comment: 2-3 drinks a day:  Wine/Beer     Drug use: No     Sexual activity: No     Other Topics Concern     Parent/Sibling W/ Cabg, Mi Or Angioplasty Before 65f 55m? No     Caffeine Concern No     2-3 cups of coffee a day     Sleep Concern No     Stress Concern No     Weight Concern No     Special Diet No     Exercise No     Seat Belt Yes     Social History Narrative       Outpatient Encounter Prescriptions as of 10/12/2017   Medication Sig Dispense Refill     PROAIR  (90 BASE) MCG/ACT inhaler INHALE 2 PUFFS INTO THE LUNGS EVERY 4 HOURS AS NEEDED FOR SHORTNESS OF BREATH / DYSPNEA 54 g 2     beclomethasone (QVAR)  40 MCG/ACT Inhaler Inhale 2 puffs into the lungs daily 3 Inhaler 3     losartan (COZAAR) 25 MG tablet Take 1 tablet (25 mg) by mouth daily 90 tablet 3     apixaban ANTICOAGULANT (ELIQUIS) 5 MG tablet Take 1 tablet (5 mg) by mouth 2 times daily 180 tablet 3     sodium chloride (OCEAN) 0.65 % nasal spray Spray 1 spray into both nostrils every hour as needed for congestion 1 Bottle 3     FLECAINIDE ACETATE PO Take 75 mg by mouth every 12 hours (takes 1/2 of 150 mg tablet = 75 mg dose)       levothyroxine (SYNTHROID/LEVOTHROID) 125 MCG tablet Take 1 tablet (125 mcg) by mouth daily 90 tablet 3     ORDER FOR DME Equipment being ordered: spacer for inhaler 1 Device 1     No facility-administered encounter medications on file as of 10/12/2017.              Review Of Systems  Skin: As above  Eyes: negative  Ears/Nose/Throat: negative  Respiratory: No shortness of breath, dyspnea on exertion, cough, or hemoptysis  Cardiovascular: negative  Gastrointestinal: negative  Genitourinary: negative  Musculoskeletal: negative  Neurologic: negative  Psychiatric: negative  Hematologic/Lymphatic/Immunologic: negative  Endocrine: negative      O:   NAD, WDWN, Alert & Oriented, Mood & Affect wnl, Vitals stable   Here today alone   There were no vitals taken for this visit.   General appearance normal   Vitals stable   Alert, oriented and in no acute distress      Following lymph nodes palpated: Occipital, Cervical, Supraclavicular no lad  Stuck on papules and brown macules on trunk and ext   Red papules on trunk and ext   Gritty papules on face and L hand   The remainder of the full exam was unremarkable; the following areas were examined:  conjunctiva/lids, oral mucosa, neck, peripheral vascular system, abdomen, lymph nodes, digits/nails, eccrine and apocrine glands, scalp/hair, face, neck, chest, abdomen, buttocks, back, RUE, LUE, RLE, LLE       Eyes: Conjunctivae/lids:Normal     ENT: Lips, buccal mucosa, tongue:  normal    MSK:Normal    Cardiovascular: peripheral edema none    Pulm: Breathing Normal    Lymph Nodes: No Head and Neck Lymphadenopathy     Neuro/Psych: Orientation:Normal; Mood/Affect:Normal      A/P:  1. Hx of non-melanoma skin cancer, seborrheic keratosis, lentigo, angioma  2. Actinic keratosis   L hand x1  R cheek x4  L cheekx3  Nasal bridge x1    LN2:  Treated with LN2 for 5s for 1-2 cycles. Warned risks of blistering, pain, pigment change, scarring, and incomplete resolution.  Advised patient to return if lesions do not completely resolve.  Wound care sheet given.    Efudex and doveonex discussed with patient twice daily for one week     BENIGN LESIONS DISCUSSED WITH PATIENT:  I discussed the specifics of tumor, prognosis, and genetics of benign lesions.  I explained that treatment of these lesions would be purely cosmetic and not medically neccessary.  I discussed with patient different removal options including excision, cautery and /or laser.      Nature and genetics of benign skin lesions dicussed with patient.  Signs and Symptoms of skin cancer discussed with patient.  Patient encouraged to perform monthly skin exams.  UV precautions reviewed with patient.  Skin care regimen reviewed with patient: Eliminate harsh soaps, i.e. Dial, zest, irsih spring; Mild soaps such as Cetaphil or Dove sensitive skin, avoid hot or cold showers, aggressive use of emollients including vanicream, cetaphil or cerave discussed with patient.    Risks of non-melanoma skin cancer discussed with patient   Return to clinic 6 months      Again, thank you for allowing me to participate in the care of your patient.        Sincerely,        Reece Daniels MD

## 2017-10-12 NOTE — MR AVS SNAPSHOT
After Visit Summary   10/12/2017    Eder Horan    MRN: [id number]           Patient Information     Date Of Birth          1940        Visit Information        Provider Department      10/12/2017 11:45 AM Reece Daniels MD [facility]        Today's Diagnoses     History of skin cancer    -  1    Lentigo        Angioma        SK (seborrheic keratosis)        AK (actinic keratosis)          Care Instructions    WOUND CARE INSTRUCTIONS   FOR CRYOSURGERY   This area treated with liquid nitrogen will form a blister. You do not need to bandage the area until after the blister forms and breaks (which may be a few days). When the blister breaks, begin daily dressing changes as follows:   1) Clean and dry the area with tap water using clean Q-tip or sterile gauze pad.   2) Apply Polysporin ointment or Bacitracin ointment over entire wound. Do NOT use Neosporin ointment.   3) Cover the wound with a band-aid or sterile non-stick gauze pad and micropore paper tape.   REPEAT THESE INSTRUCTIONS AT LEAST ONCE A DAY UNTIL THE WOUND HAS COMPLETELY HEALED.   It is an old wives tale that a wound heals better when it is exposed to air and allowed to dry out. The wound will heal faster with a better cosmetic result if it is kept moist with ointment and covered with a bandage.   Do not let the wound dry out.   IMPORTANT INFORMATION ON REVERSE SIDE   Supplies Needed:   *Cotton tipped applicators (Q-tips)   *Polysporin ointment or Bacitracin ointment (NOT NEOSPORIN)   *Band-aids, or non stick gauze pads and micropore paper tape   PATIENT INFORMATION   During the healing process you will notice a number of changes. All wounds develop a small halo of redness surrounding the wound. This means healing is occurring. Severe itching with extensive redness usually indicates sensitivity to the ointment or bandage tape used to dress the wound. You should call our office if this develops.    Swelling and/or discoloration around your surgical site is common, particularly when performed around the eye.   All wounds normally drain. The larger the wound the more drainage there will be. After 7-10 days, you will notice the wound beginning to shrink and new skin will begin to grow. The wound is healed when you can see skin has formed over the entire area. A healed wound has a healthy, shiny look to the surface and is red to dark pink in color to normalize. Wounds may take approximately 4-6 weeks to heal. Larger wounds may take 6-8 weeks. After the wound is healed you may discontinue dressing changes.   You may experience a sensation of tightness as your wound heals. This is normal and will gradually subside.   Your healed wound may be sensitive to temperature changes. This sensitivity improves with time, but if you re having a lot of discomfort, try to avoid temperature extremes.   Patients frequently experience itching after their wound appears to have healed because of the continue healing under the skin. Plain Vaseline will help relieve the itching.                 Follow-ups after your visit        Who to contact     If you have questions or need follow up information about today's clinic visit or your schedule please contact [facility] directly at [phone].  Normal or non-critical lab and imaging results will be communicated to you by MyChart, letter or phone within 4 business days after the clinic has received the results. If you do not hear from us within 7 days, please contact the clinic through [employer] or phone. If you have a critical or abnormal lab result, we will notify you by phone as soon as possible.  Submit refill requests through [facility] or call your pharmacy and they will forward the refill request to us. Please allow 3 business days for your refill to be completed.          Additional Information About Your Visit        [employer] Business Plan Information     [facility] gives  "you secure access to your electronic health record. If you see a primary care provider, you can also send messages to your care team and make appointments. If you have questions, please call your primary care clinic.  If you do not have a primary care provider, please call [phone] and they will assist you.        Care EveryWhere ID     This is your Care EveryWhere ID. This could be used by other organizations to access your [facility] medical records  [id number]        Your Vitals Were     Pulse Height Pulse Oximetry BMI (Body Mass Index)          58 1.778 m (5' 10\") 96% 26.26 kg/m2         Blood Pressure from Last 3 Encounters:   09/01/17 138/77   07/27/17 (!) 150/92   07/24/17 136/80    Weight from Last 3 Encounters:   10/12/17 83 kg (183 lb)   09/01/17 83.2 kg (183 lb 8 oz)   07/27/17 82.1 kg (181 lb)              Today, you had the following     No orders found for display       Primary Care Provider Office Phone # Fax #    Richmond Martinez [phone]       [address] [facility] [address]        Equal Access to Services     GOLD SR AH: Hadii aad ku hadasho Soomaali, waaxda luqadaha, qaybta kaalmada adeegyada, chun saldana haygibrann frandy saavedra latommie ah. So [facility] [phone].    ATENCIÓN: Si habla español, tiene a charlton disposición servicios gratuitos de asistencia lingüística. Ll[facility] [phone].    We comply with applicable federal civil rights laws and [place] laws. We do not discriminate on the basis of race, color, national origin, age, disability, sex, sexual orientation, or gender identity.            Thank you!     Thank you for choosing [facility]  for your care. Our goal is always to provide you with excellent care. Hearing back from our patients is one way we can continue to improve our services. Please take a few minutes to complete the written survey that you may receive in the mail after your visit with us. Thank you!             Your Updated " Medication List - Protect others around you: Learn how to safely use, store and throw away your medicines at www.disposemymeds.org.          This list is accurate as of: 10/12/17 11:53 AM.  Always use your most recent med list.                   Brand Name Dispense Instructions for use Diagnosis    apixaban ANTICOAGULANT 5 MG tablet    ELIQUIS    180 tablet    Take 1 tablet (5 mg) by mouth 2 times daily    Paroxysmal atrial fibrillation (H)       beclomethasone 40 MCG/ACT Inhaler    QVAR    3 Inhaler    Inhale 2 puffs into the lungs daily    Chronic obstructive pulmonary disease, unspecified COPD type (H)       FLECAINIDE ACETATE PO      Take 75 mg by mouth every 12 hours (takes 1/2 of 150 mg tablet = 75 mg dose)        levothyroxine 125 MCG tablet    SYNTHROID/LEVOTHROID    90 tablet    Take 1 tablet (125 mcg) by mouth daily    Hypothyroidism, unspecified type       losartan 25 MG tablet    COZAAR    90 tablet    Take 1 tablet (25 mg) by mouth daily    Essential hypertension with goal blood pressure less than 140/90       order for DME     1 Device    Equipment being ordered: spacer for inhaler    COPD (chronic obstructive pulmonary disease) (H)       PROAIR  (90 BASE) MCG/ACT Inhaler   Generic drug:  albuterol     54 g    INHALE 2 PUFFS INTO THE LUNGS EVERY 4 HOURS AS NEEDED FOR SHORTNESS OF BREATH / DYSPNEA    COPD (chronic obstructive pulmonary disease) (H)       sodium chloride 0.65 % nasal spray    OCEAN    1 Bottle    Spray 1 spray into both nostrils every hour as needed for congestion    Dry nose

## 2017-10-21 ENCOUNTER — OFFICE VISIT (OUTPATIENT)
Dept: URGENT CARE | Facility: URGENT CARE | Age: 77
End: 2017-10-21
Payer: COMMERCIAL

## 2017-10-21 VITALS
TEMPERATURE: 97.6 F | HEART RATE: 58 BPM | OXYGEN SATURATION: 97 % | DIASTOLIC BLOOD PRESSURE: 81 MMHG | SYSTOLIC BLOOD PRESSURE: 126 MMHG | WEIGHT: 183.2 LBS | BODY MASS INDEX: 26.29 KG/M2

## 2017-10-21 DIAGNOSIS — H57.12 EYE PAIN, LEFT: Primary | ICD-10-CM

## 2017-10-21 PROCEDURE — 99213 OFFICE O/P EST LOW 20 MIN: CPT | Performed by: FAMILY MEDICINE

## 2017-10-21 RX ORDER — PROPARACAINE HYDROCHLORIDE 5 MG/ML
2 SOLUTION/ DROPS OPHTHALMIC ONCE
Qty: 0.25 ML | Refills: 0
Start: 2017-10-21 | End: 2017-10-21

## 2017-10-21 NOTE — NURSING NOTE
"Chief Complaint   Patient presents with     Eye Problem     Feels like something is in Lt eye, irritation x 2 days        Initial /81  Pulse 58  Temp 97.6  F (36.4  C) (Oral)  Wt 183 lb 3.2 oz (83.1 kg)  SpO2 97%  BMI 26.29 kg/m2 Estimated body mass index is 26.29 kg/(m^2) as calculated from the following:    Height as of 10/12/17: 5' 10\" (1.778 m).    Weight as of this encounter: 183 lb 3.2 oz (83.1 kg).  Medication Reconciliation: complete    "

## 2017-10-21 NOTE — PROGRESS NOTES
SUBJECTIVE:Chief Complaint:   Chief Complaint   Patient presents with     Eye Problem     Feels like something is in Lt eye, irritation x 2 days       History of Present Illness: Eder Horan is a 77 year old male who presents complaining of left eye pain for 2 days.  Onset/timing: gradual.   Associated Signs and Symptoms: none   Treatment measures tried include: none   Contact wearer : No    Past Medical History:   Diagnosis Date     Actinic keratosis 6/09    face     ALLERGIC RHINITIS       Atrial fibrillation (H) 6/08     Back pain     s/p LESI through ortho Feb 2010     Basal cell carcinoma      Bradycardia      COPD (chronic obstructive pulmonary disease) (H)      DVT      Left calf 2003. Right calf 6/06     Embolic stroke (H) 06/11/2017    superior cerebellar artery occlusion. Hx A fib     Hyperlipidemia LDL goal <130 5/10/2011     Hypertension      HYPOTHYROIDISM      hypothyroidism     Lateral epicondylitis  of elbow 7/05    left     Malignant melanoma (H)      PLANTAR FASCITIS      Prostate cancer (H) 6/09    on Lupron therapy     Squamous cell carcinoma  Sept 2014     right lower lid, s/p MOHS     Syncope      TMJ (temporomandibular joint syndrome) 4/12/2012     Tobacco use disorder      Allergies   Allergen Reactions     Bactrim [Sulfa Drugs] Rash     Social History   Substance Use Topics     Smoking status: Former Smoker     Types: Cigars     Quit date: 12/1/2006     Smokeless tobacco: Never Used      Comment: pipe daily     Alcohol use 0.0 oz/week     0 Standard drinks or equivalent per week      Comment: 2-3 drinks a day:  Wine/Beer       ROS:  negative for photophobia, vision change    OBJECTIVE:  /81  Pulse 58  Temp 97.6  F (36.4  C) (Oral)  Wt 183 lb 3.2 oz (83.1 kg)  SpO2 97%  BMI 26.29 kg/m2   General: no acute distress  Eye exam: right eye normal lid, conjunctiva, cornea, pupil and fundus, left eye abnormal findings: conjunctivitis with erythema, discharge and matting noted.  DENITA  EOMI, fundi normal, corneas normal, no foreign bodies, flourescein staining is negative, visual acuity normal both eyes, no periorbital cellulitis      ICD-10-CM    1. Eye pain, left H57.12 proparacaine (ALCAINE) 0.5 % ophthalmic solution     Unclear etiology for pts eye pain, will go through ED for cont w/u

## 2017-10-21 NOTE — MR AVS SNAPSHOT
After Visit Summary   10/21/2017    Eder Horan    MRN: [id number]           Patient Information     Date Of Birth          1940        Visit Information        Provider Department      10/21/2017 11:30 AM Catrachito Elkins,  [facility]        Today's Diagnoses     Eye pain, left    -  1       Follow-ups after your visit        Your next 10 appointments already scheduled     Jan 18, 2018  8:50 AM CST   LAB with [facility]   [facility] ([facility])    [address] [place] [id number]   [phone]           Patient must bring picture ID. Patient should be prepared to give a urine specimen  Please do not eat 10-12 hours before your appointment if you are coming in fasting for labs on lipids, cholesterol, or glucose (sugar). Pregnant women should follow their Care Team instructions. Water with medications is okay. Do not drink coffee or other fluids. If you have concerns about taking  your medications, please ask at office or if scheduling via [employer], send a message by clicking on Secure Messaging, Message Your Care Team.            Jan 18, 2018  9:45 AM CST   Return Visit with Jaya Franklin MD   [facility] [place] ([facility])    [address] [place], [place] [id number]  [facility] [address] [phone]            Apr 12, 2018 11:30 AM CDT   Return Visit with Reece Daniels MD   [facility] ([facility])    [address] [address] [phone]              Who to contact     If you have questions or need follow up information about today's clinic visit or your schedule please contact [facility] directly at [phone].  Normal or non-critical lab and imaging results will be communicated to you by MyChart, letter or phone within 4 business days  after the clinic has received the results. If you do not hear from us within 7 days, please contact the clinic through [employer] or phone. If you have a critical or abnormal lab result, we will notify you by phone as soon as possible.  Submit refill requests through [employer] or call your pharmacy and they will forward the refill request to us. Please allow 3 business days for your refill to be completed.          Additional Information About Your Visit        [employer] Information     [employer] gives you secure access to your electronic health record. If you see a primary care provider, you can also send messages to your care team and make appointments. If you have questions, please call your primary care clinic.  If you do not have a primary care provider, please call [phone] and they will assist you.        Care EveryWhere ID     This is your Care EveryWhere ID. This could be used by other organizations to access your [facility] medical records  [id number]        Your Vitals Were     Pulse Temperature Pulse Oximetry BMI (Body Mass Index)          58 97.6  F (36.4  C) (Oral) 97% 26.29 kg/m2         Blood Pressure from Last 3 Encounters:   10/21/17 126/81   09/01/17 138/77   07/27/17 (!) 150/92    Weight from Last 3 Encounters:   10/21/17 183 lb 3.2 oz (83.1 kg)   10/12/17 183 lb (83 kg)   09/01/17 183 lb 8 oz (83.2 kg)              Today, you had the following     No orders found for display         Today's Medication Changes          These changes are accurate as of: 10/21/17 11:56 AM.  If you have any questions, ask your nurse or doctor.               Start taking these medicines.        Dose/Directions    proparacaine 0.5 % ophthalmic solution   Commonly known as:  ALCAINE   Used for:  Eye pain, left        Dose:  2 drop   Place 2 drops Into the left eye once for 1 dose   Quantity:  0.25 mL   Refills:  0            Where to get your medicines      Some of these will need a paper prescription and others can be  bought over the counter.  Ask your nurse if you have questions.     You don't need a prescription for these medications     proparacaine 0.5 % ophthalmic solution                Primary Care Provider Office Phone # Fax #    Richmond Martinez [phone]       [address] [facility] [address]        Equal Access to Services     GOLD SR : Hadii aad ku hadasho Soomaali, waaxda luqadaha, qaybta kaalmada adeegyada, waxay idiin hayaan adeeg kharash la'aan ah. So [facility] [phone].    ATENCIÓN: Si habla español, tiene a charlton disposición servicios gratuitos de asistencia lingüística. Llame al [phone].    We comply with applicable federal civil rights laws and [place] laws. We do not discriminate on the basis of race, color, national origin, age, disability, sex, sexual orientation, or gender identity.            Thank you!     Thank you for choosing [facility]  for your care. Our goal is always to provide you with excellent care. Hearing back from our patients is one way we can continue to improve our services. Please take a few minutes to complete the written survey that you may receive in the mail after your visit with us. Thank you!             Your Updated Medication List - Protect others around you: Learn how to safely use, store and throw away your medicines at www.disposemymeds.org.          This list is accurate as of: 10/21/17 11:56 AM.  Always use your most recent med list.                   Brand Name Dispense Instructions for use Diagnosis    apixaban ANTICOAGULANT 5 MG tablet    ELIQUIS    180 tablet    Take 1 tablet (5 mg) by mouth 2 times daily    Paroxysmal atrial fibrillation (H)       beclomethasone 40 MCG/ACT Inhaler    QVAR    3 Inhaler    Inhale 2 puffs into the lungs daily    Chronic obstructive pulmonary disease, unspecified COPD type (H)       calcipotriene 0.005 % cream    DOVONOX    100 g    Apply topically 2 times daily    History of skin  cancer, Lentigo, Angioma, SK (seborrheic keratosis), AK (actinic keratosis)       FLECAINIDE ACETATE PO      Take 75 mg by mouth every 12 hours (takes 1/2 of 150 mg tablet = 75 mg dose)        fluorouracil 5 % cream    EFUDEX    40 g    Mix with efudex and apply twice daily for one week to face    History of skin cancer, Lentigo, Angioma, SK (seborrheic keratosis), AK (actinic keratosis)       levothyroxine 125 MCG tablet    SYNTHROID/LEVOTHROID    90 tablet    Take 1 tablet (125 mcg) by mouth daily    Hypothyroidism, unspecified type       losartan 25 MG tablet    COZAAR    90 tablet    Take 1 tablet (25 mg) by mouth daily    Essential hypertension with goal blood pressure less than 140/90       order for DME     1 Device    Equipment being ordered: spacer for inhaler    COPD (chronic obstructive pulmonary disease) (H)       PROAIR  (90 BASE) MCG/ACT Inhaler   Generic drug:  albuterol     54 g    INHALE 2 PUFFS INTO THE LUNGS EVERY 4 HOURS AS NEEDED FOR SHORTNESS OF BREATH / DYSPNEA    COPD (chronic obstructive pulmonary disease) (H)       proparacaine 0.5 % ophthalmic solution    ALCAINE    0.25 mL    Place 2 drops Into the left eye once for 1 dose    Eye pain, left       sodium chloride 0.65 % nasal spray    OCEAN    1 Bottle    Spray 1 spray into both nostrils every hour as needed for congestion    Dry nose

## 2017-10-23 ENCOUNTER — TELEPHONE (OUTPATIENT)
Dept: NURSING | Facility: CLINIC | Age: 77
End: 2017-10-23

## 2017-10-23 DIAGNOSIS — H57.12 EYE PAIN, LEFT: Primary | ICD-10-CM

## 2017-10-23 NOTE — TELEPHONE ENCOUNTER
Pt should be seen by ophthalmology to see if injury to eye,etc. JUANITO DUNCAN did not feel was infection. Pt to call for appt to be seen today or tomorrow by eye doctor for dilated eye exam, check of eye pressures, etc. Pt to call for appt at either:    1.  [facility] [place] [phone]  Or    2. [facility] - [place] [phone]     If eye worsens between now and appt with eye doctor, then pt to be seen in ER

## 2017-10-23 NOTE — TELEPHONE ENCOUNTER
Patient calling looking for next step with eye problem. Was in  10/21. Said last week went to Dr. Daniels and got skin lotion. Maybe got some in eye, but did not think so. Left eye started to water badly. Continues to water and is painful- feels like someone poking his eye with sharp stick. Feels like something is in his eye. Also has now developed discomfort around outside of eye-thinks started last night. Pain area growing.  recommended he go to ER if issues persists. Pain in eye itself has diminished since in , says he was given a numbing drop. Going through a box of kleenex a day. Eye runny and nose runny.  doctor did not see anything in eye per pt. Had something similar to this happen last summer, eye drops helped. Discharge from eye is clear. Some redness in eye. Does not see blood in colored part of eye. Pain in eye was 8/10 pain, 4-5 out of 10 now. Area around the eye is a little swollen. Left eye area warmer than right eye. Vision is cloudy but once wipes eye it is fine.   Recommended ER as per  recommendations but patient does not think that would be best. Please advise.

## 2017-10-26 ENCOUNTER — OFFICE VISIT (OUTPATIENT)
Dept: CARDIOLOGY | Facility: CLINIC | Age: 77
End: 2017-10-26
Payer: COMMERCIAL

## 2017-10-26 VITALS
HEIGHT: 70 IN | BODY MASS INDEX: 25.56 KG/M2 | SYSTOLIC BLOOD PRESSURE: 120 MMHG | WEIGHT: 178.5 LBS | DIASTOLIC BLOOD PRESSURE: 72 MMHG | HEART RATE: 65 BPM

## 2017-10-26 DIAGNOSIS — I48.0 PAROXYSMAL ATRIAL FIBRILLATION (H): Primary | ICD-10-CM

## 2017-10-26 DIAGNOSIS — J44.9 CHRONIC OBSTRUCTIVE PULMONARY DISEASE, UNSPECIFIED COPD TYPE (H): ICD-10-CM

## 2017-10-26 DIAGNOSIS — R00.1 BRADYCARDIA: ICD-10-CM

## 2017-10-26 DIAGNOSIS — I10 BENIGN ESSENTIAL HYPERTENSION: ICD-10-CM

## 2017-10-26 PROCEDURE — 99204 OFFICE O/P NEW MOD 45 MIN: CPT | Performed by: INTERNAL MEDICINE

## 2017-10-26 NOTE — PROGRESS NOTES
HISTORY OF PRESENT ILLNESS:    I had the pleasure of seeing Mr. Eder Horan, a delightful 77-year-old gentleman, for evaluation of sinus node dysfunction with chronotropic incompetence.  He has been referred by my colleagues, Dr. Gael Franklin, and Amiar Mendes NP.      Mr. Horan has paroxysmal atrial fibrillation treated with flecainide and apixaban over the past few years.  He has normal LV function.  He had a recent stroke treated with TPA (06/2017).  The patient had not been on anticoagulation prior to this event as per his preference.  He was started on Eliquis after the stroke.      The reason I am seeing him today is because of dyspnea on exertion and fatigue.  There is concern this is due to bradycardia.  A Holter monitor was recently hooked up.  The minimum heart rate was 46, maximum rate of 98 with average of 59.  The patient did not have atrial fibrillation.  I will also add that, on occasion, during clinic visits he has had heart rates in the 40s.      Mr. Horan describes decreased stamina and exercise intolerance that has come on gradually over the years.  He has COPD and had been a long-term smoker.  His wife states that he snores and Mr. Horan confirmed he does not sleep well because he has to get up at night several times to go to the bathroom.      He has not had syncope or near-syncope and does not have chest pain with exertion.      PHYSICAL EXAMINATION:   VITAL SIGNS:  Blood pressure 120/72, pulse 65 and regular at rest.  I went for a walk with him, approximately 100 yards.  He did not appear short of breath.  Interestingly, his O2 saturation which was 94% at baseline dropped to 90% after the walk.  His heart rate increased from 60 to 87.   GENERAL:  He is a pleasant gentleman with a rash on his face.  He said he had a chemical peel about 1 week ago.   HEENT:  Sclerae anicteric.   NECK:  Supple, without thyromegaly, lymphadenopathy or carotid bruits.   LUNGS:  Clear.  No crackles or wheezes.    CARDIOVASCULAR:  Normal JVP, regular rhythm.  No gallop, murmur or rub.   ABDOMEN:  Soft, nontender.   EXTREMITIES:  No significant edema.   SKIN:  Macular rash on the face as above.   NEUROLOGIC:  Alert and oriented x3.      DIAGNOSTIC STUDIES:    Most recent labs:  Sodium 140, potassium 4.5, creatinine 0.87.   His 12-lead ECG on 07/27/2013 showed sinus bradycardia at 47 beats per minute.      IMPRESSION:   1.  Sinus node dysfunction aggravated by the use of flecainide.  Flecainide has been successfully used for suppression of symptomatic paroxysmal atrial fibrillation.  The patient exhibits mild to moderate sinus bradycardia as seen on previous ECGs and his recent Holter.  During a brief walk today his heart rate appropriately increased by 25 beats per minute.      There is no question Mr. Horan has an element of SSS and chronotropic incompetence, but it is unclear how much a pacemaker would improve his fatigue and dyspnea on exertion.  I believe that much of his dyspnea is due to his lung disease while his fatigue is possibly related to sleep apnea.      It is unclear whether a pacemaker would significantly improve his symptoms.  In my mind there is less than 50% likelihood he would have meaningful clinical improvement from a permanent pacemaker.  I cannot recommend it at the moment.      I agree with flecainide and apixaban for treatment of his paroxysmal atrial fibrillation.      RECOMMENDATIONS:   A.  A permanent pacemaker is not clearly indicated at this time.   B.  An overnight sleep oximetry to see whether hypoxia may play a role in his fatigue symptoms.      Thank you for the opportunity to be part of his care.         EPHRAMI DURHAM MD, FACC         cc:   Richmond Martinez MD   [facility]    [address]  [address]      Amira Mendes NP    [facility] [address]  [address]          D: 10/26/2017 11:55   T:  10/26/2017 14:17   MT: HALEY      Name:     AUGUSTINE MYRICK   MRN:      [id number]        Account:      [id number]   :      1940           Service Date: 10/26/2017      Document: [id number]

## 2017-10-26 NOTE — LETTER
10/26/2017    Richmond Martinez MD  [address] [facility] [address]    RE: Eder Horan       Dear Colleague,    I had the pleasure of seeing Eder Horan in the [facility].    HISTORY OF PRESENT ILLNESS:    I had the pleasure of seeing Mr. Eder Horan, a delightful 77-year-old gentleman, for evaluation of sinus node dysfunction with chronotropic incompetence.  He has been referred by my colleagues, Dr. Gael Franklin, and Amira Mendes NP.      Mr. Horan has paroxysmal atrial fibrillation treated with flecainide and apixaban over the past few years.  He has normal LV function.  He had a recent stroke treated with TPA (06/2017).  The patient had not been on anticoagulation prior to this event as per his preference.  He was started on Eliquis after the stroke.      The reason I am seeing him today is because of dyspnea on exertion and fatigue.  There is concern this is due to bradycardia.  A Holter monitor was recently hooked up.  The minimum heart rate was 46, maximum rate of 98 with average of 59.  The patient did not have atrial fibrillation.  I will also add that, on occasion, during clinic visits he has had heart rates in the 40s.      Mr. Horan describes decreased stamina and exercise intolerance that has come on gradually over the years.  He has COPD and had been a long-term smoker.  His wife states that he snores and Mr. Horan confirmed he does not sleep well because he has to get up at night several times to go to the bathroom.      He has not had syncope or near-syncope and does not have chest pain with exertion.      PHYSICAL EXAMINATION:   VITAL SIGNS:  Blood pressure 120/72, pulse 65 and regular at rest.  I went for a walk with him, approximately 100 yards.  He did not appear short of breath.  Interestingly, his O2 saturation which was 94% at baseline dropped to 90% after the walk.  His heart rate increased from 60 to 87.   GENERAL:  He is a pleasant gentleman with a rash on his  face.  He said he had a chemical peel about 1 week ago.   HEENT:  Sclerae anicteric.   NECK:  Supple, without thyromegaly, lymphadenopathy or carotid bruits.   LUNGS:  Clear.  No crackles or wheezes.   CARDIOVASCULAR:  Normal JVP, regular rhythm.  No gallop, murmur or rub.   ABDOMEN:  Soft, nontender.   EXTREMITIES:  No significant edema.   SKIN:  Macular rash on the face as above.   NEUROLOGIC:  Alert and oriented x3.      DIAGNOSTIC STUDIES:    Most recent labs:  Sodium 140, potassium 4.5, creatinine 0.87.   His 12-lead ECG on 07/27/2013 showed sinus bradycardia at 47 beats per minute.     Outpatient Encounter Prescriptions as of 10/26/2017   Medication Sig Dispense Refill     PROAIR  (90 BASE) MCG/ACT inhaler INHALE 2 PUFFS INTO THE LUNGS EVERY 4 HOURS AS NEEDED FOR SHORTNESS OF BREATH / DYSPNEA 54 g 2     beclomethasone (QVAR) 40 MCG/ACT Inhaler Inhale 2 puffs into the lungs daily 3 Inhaler 3     losartan (COZAAR) 25 MG tablet Take 1 tablet (25 mg) by mouth daily 90 tablet 3     apixaban ANTICOAGULANT (ELIQUIS) 5 MG tablet Take 1 tablet (5 mg) by mouth 2 times daily 180 tablet 3     FLECAINIDE ACETATE PO Take 75 mg by mouth every 12 hours (takes 1/2 of 150 mg tablet = 75 mg dose)       levothyroxine (SYNTHROID/LEVOTHROID) 125 MCG tablet Take 1 tablet (125 mcg) by mouth daily 90 tablet 3     [DISCONTINUED] calcipotriene (DOVONOX) 0.005 % cream Apply topically 2 times daily 100 g 3     [DISCONTINUED] fluorouracil (EFUDEX) 5 % cream Mix with efudex and apply twice daily for one week to face 40 g 1     [DISCONTINUED] sodium chloride (OCEAN) 0.65 % nasal spray Spray 1 spray into both nostrils every hour as needed for congestion 1 Bottle 3     ORDER FOR DME Equipment being ordered: spacer for inhaler 1 Device 1     No facility-administered encounter medications on file as of 10/26/2017.       IMPRESSION:   1.  Sinus node dysfunction aggravated by the use of flecainide.  Flecainide has been successfully used for  suppression of symptomatic paroxysmal atrial fibrillation.  The patient exhibits mild to moderate sinus bradycardia as seen on previous ECGs and his recent Holter.  During a brief walk today his heart rate appropriately increased by 25 beats per minute.      There is no question Mr. Horan has an element of SSS and chronotropic incompetence, but it is unclear how much a pacemaker would improve his fatigue and dyspnea on exertion.  I believe that much of his dyspnea is due to his lung disease while his fatigue is possibly related to sleep apnea.      It is unclear whether a pacemaker would significantly improve his symptoms.  In my mind there is less than 50% likelihood he would have meaningful clinical improvement from a permanent pacemaker.  I cannot recommend it at the moment.      I agree with flecainide and apixaban for treatment of his paroxysmal atrial fibrillation.      RECOMMENDATIONS:   A.  A permanent pacemaker is not clearly indicated at this time.   B.  An overnight sleep oximetry to see whether hypoxia may play a role in his fatigue symptoms.      Thank you for the opportunity to be part of his care.     Sincerely,    Effie Hawkins MD     [facility]    cc:   Amira Mendes NP    [place] [address]

## 2017-10-26 NOTE — MR AVS SNAPSHOT
After Visit Summary   10/26/2017    Eder Horan    MRN: [id number]           Patient Information     Date Of Birth          1940        Visit Information        Provider Department      10/26/2017 10:15 AM Effie Hawkins MD [facility]        Today's Diagnoses     Paroxysmal atrial fibrillation (H)    -  1    Benign essential hypertension        Bradycardia        Chronic obstructive pulmonary disease, unspecified COPD type (H)           Follow-ups after your visit        Your next 10 appointments already scheduled     Jan 18, 2018  8:50 AM CST   LAB with [facility]   [facility] ([facility])    [address] [place] [id number]   [phone]           Patient must bring picture ID. Patient should be prepared to give a urine specimen  Please do not eat 10-12 hours before your appointment if you are coming in fasting for labs on lipids, cholesterol, or glucose (sugar). Pregnant women should follow their Care Team instructions. Water with medications is okay. Do not drink coffee or other fluids. If you have concerns about taking  your medications, please ask at office or if scheduling via [employer], send a message by clicking on Secure Messaging, Message Your Care Team.            Jan 18, 2018  9:45 AM CST   Return Visit with Jaya Franklin MD   [facility] ([facility])    [address] [place] [id number] [phone]            Apr 12, 2018 11:30 AM CDT   Return Visit with Reece Daniels MD   [facility]    [address] [place] [id number] [phone]              Future tests that were ordered for you today     Open Future Orders        Priority Expected Expires Ordered    Overnight oximetry study Routine  "11/2/2017 10/26/2018 10/26/2017            Who to contact     If you have questions or need follow up information about today's clinic visit or your schedule please contact [facility] [place] directly at [phone].  Normal or non-critical lab and imaging results will be communicated to you by MyChart, letter or phone within 4 business days after the clinic has received the results. If you do not hear from us within 7 days, please contact the clinic through MyChart or phone. If you have a critical or abnormal lab result, we will notify you by phone as soon as possible.  Submit refill requests through [employer] or call your pharmacy and they will forward the refill request to us. Please allow 3 business days for your refill to be completed.          Additional Information About Your Visit        MyChart Information     [employer] gives you secure access to your electronic health record. If you see a primary care provider, you can also send messages to your care team and make appointments. If you have questions, please call your primary care clinic.  If you do not have a primary care provider, please call [phone] and they will assist you.        Care EveryWhere ID     This is your Care EveryWhere ID. This could be used by other organizations to access your [place] medical records  [id number]        Your Vitals Were     Pulse Height BMI (Body Mass Index)             65 1.778 m (5' 10\") 25.61 kg/m2          Blood Pressure from Last 3 Encounters:   10/26/17 120/72   10/21/17 126/81   09/01/17 138/77    Weight from Last 3 Encounters:   10/26/17 81 kg (178 lb 8 oz)   10/21/17 83.1 kg (183 lb 3.2 oz)   10/12/17 83 kg (183 lb)               Primary Care Provider Office Phone # Fax #    Richmond Martinez [phone]       [address] [place] [id number]        Equal Access to Services     GOLD SR AH: Jass Ruiz, wajohnathanda luqadajuana, qaybta maykelalmanicole " chun aguilarlizeth whitpj suarez'aan ah. So [facility] [phone].    ATENCIÓN: Si bellala carlene, tiene a charlton disposición servicios gratuitos de asistencia lingüística. Thony al [phone].    We comply with applicable federal civil rights laws and [place] laws. We do not discriminate on the basis of race, color, national origin, age, disability, sex, sexual orientation, or gender identity.            Thank you!     Thank you for choosing [facility] [place]  for your care. Our goal is always to provide you with excellent care. Hearing back from our patients is one way we can continue to improve our services. Please take a few minutes to complete the written survey that you may receive in the mail after your visit with us. Thank you!             Your Updated Medication List - Protect others around you: Learn how to safely use, store and throw away your medicines at www.disposemymeds.org.          This list is accurate as of: 10/26/17 11:56 AM.  Always use your most recent med list.                   Brand Name Dispense Instructions for use Diagnosis    apixaban ANTICOAGULANT 5 MG tablet    ELIQUIS    180 tablet    Take 1 tablet (5 mg) by mouth 2 times daily    Paroxysmal atrial fibrillation (H)       beclomethasone 40 MCG/ACT Inhaler    QVAR    3 Inhaler    Inhale 2 puffs into the lungs daily    Chronic obstructive pulmonary disease, unspecified COPD type (H)       FLECAINIDE ACETATE PO      Take 75 mg by mouth every 12 hours (takes 1/2 of 150 mg tablet = 75 mg dose)        levothyroxine 125 MCG tablet    SYNTHROID/LEVOTHROID    90 tablet    Take 1 tablet (125 mcg) by mouth daily    Hypothyroidism, unspecified type       losartan 25 MG tablet    COZAAR    90 tablet    Take 1 tablet (25 mg) by mouth daily    Essential hypertension with goal blood pressure less than 140/90       order for DME     1 Device    Equipment being ordered: spacer for inhaler    COPD (chronic  obstructive pulmonary disease) (H)       PROAIR  (90 BASE) MCG/ACT Inhaler   Generic drug:  albuterol     54 g    INHALE 2 PUFFS INTO THE LUNGS EVERY 4 HOURS AS NEEDED FOR SHORTNESS OF BREATH / DYSPNEA    COPD (chronic obstructive pulmonary disease) (H)

## 2017-10-26 NOTE — PROGRESS NOTES
HPI and Plan:   See dictation    Orders Placed This Encounter   Procedures     Overnight oximetry study       No orders of the defined types were placed in this encounter.      Medications Discontinued During This Encounter   Medication Reason     calcipotriene (DOVONOX) 0.005 % cream Therapy completed     fluorouracil (EFUDEX) 5 % cream Therapy completed     sodium chloride (OCEAN) 0.65 % nasal spray Stopped by Patient         Encounter Diagnoses   Name Primary?     Paroxysmal atrial fibrillation (H) Yes     Benign essential hypertension      Bradycardia      Chronic obstructive pulmonary disease, unspecified COPD type (H)        CURRENT MEDICATIONS:  Current Outpatient Prescriptions   Medication Sig Dispense Refill     PROAIR  (90 BASE) MCG/ACT inhaler INHALE 2 PUFFS INTO THE LUNGS EVERY 4 HOURS AS NEEDED FOR SHORTNESS OF BREATH / DYSPNEA 54 g 2     beclomethasone (QVAR) 40 MCG/ACT Inhaler Inhale 2 puffs into the lungs daily 3 Inhaler 3     losartan (COZAAR) 25 MG tablet Take 1 tablet (25 mg) by mouth daily 90 tablet 3     apixaban ANTICOAGULANT (ELIQUIS) 5 MG tablet Take 1 tablet (5 mg) by mouth 2 times daily 180 tablet 3     FLECAINIDE ACETATE PO Take 75 mg by mouth every 12 hours (takes 1/2 of 150 mg tablet = 75 mg dose)       levothyroxine (SYNTHROID/LEVOTHROID) 125 MCG tablet Take 1 tablet (125 mcg) by mouth daily 90 tablet 3     ORDER FOR DME Equipment being ordered: spacer for inhaler 1 Device 1       ALLERGIES     Allergies   Allergen Reactions     Bactrim [Sulfa Drugs] Rash       PAST MEDICAL HISTORY:  Past Medical History:   Diagnosis Date     Actinic keratosis 6/09    face     ALLERGIC RHINITIS       Atrial fibrillation (H) 6/08     Back pain     s/p LESI through ortho Feb 2010     Basal cell carcinoma      Bradycardia      COPD (chronic obstructive pulmonary disease) (H)      DVT      Left calf 2003. Right calf 6/06     Embolic stroke (H) 06/11/2017    superior cerebellar artery occlusion. Hx A  fib     Hyperlipidemia LDL goal <130 5/10/2011     Hypertension      HYPOTHYROIDISM      hypothyroidism     Lateral epicondylitis  of elbow 7/05    left     Malignant melanoma (H)      PLANTAR FASCITIS      Prostate cancer (H) 6/09    on Lupron therapy     Squamous cell carcinoma  Sept 2014     right lower lid, s/p MOHS     Syncope      TMJ (temporomandibular joint syndrome) 4/12/2012     Tobacco use disorder        PAST SURGICAL HISTORY:  Past Surgical History:   Procedure Laterality Date     ABDOMEN SURGERY  2004,06,09    Hernias (2) Prostate Removal(2009)     BIOPSY  2009    Prostate     C NONSPECIFIC PROCEDURE  1/07    Left groin exploration and left inguinal herniorrhaphy     C NONSPECIFIC PROCEDURE  5/08    Prostate bx (benign)     C NONSPECIFIC PROCEDURE  8/09    Wide local excision (left side), robotic-assisted laparoscopic prostatectomy and   Left pelvic lymph node dissection     C NONSPECIFIC PROCEDURE  August 2010    Right inguinal herniorrhaphy with mesh     CARDIOVERSION  10-16-08    failed     COLONOSCOPY  2004     EYE SURGERY      2013 lump from lower  left eye lid removed     GENITOURINARY SURGERY  2009    Prostate     HERNIA REPAIR  2004 - 2006       FAMILY HISTORY:  Family History   Problem Relation Age of Onset     C.A.D. Father      heart attack- angina     Coronary Artery Disease Father      Myocardial Infarction Father      CANCER Mother      lung     Lung Cancer Mother      Lung Cancer Sister      Unknown/Adopted Maternal Grandmother      Unknown/Adopted Maternal Grandfather      Unknown/Adopted Paternal Grandmother      Unknown/Adopted Paternal Grandfather      Unknown/Adopted Sister        SOCIAL HISTORY:  Social History     Social History     Marital status:      Spouse name: N/A     Number of children: 3     Years of education: N/A     Occupational History     [employer]      Retired     Social History Main Topics     Smoking status: Former Smoker     Types: Cigars     Quit  date: 12/1/2006     Smokeless tobacco: Never Used      Comment: pipe daily     Alcohol use 0.0 oz/week     0 Standard drinks or equivalent per week      Comment: 2-3 drinks a day:  Wine/Beer     Drug use: No     Sexual activity: No     Other Topics Concern     Parent/Sibling W/ Cabg, Mi Or Angioplasty Before 65f 55m? No     Caffeine Concern No     2-3 cups of coffee a day     Sleep Concern No     Stress Concern No     Weight Concern No     Special Diet No     Exercise No     Seat Belt Yes     Social History Narrative       Review of Systems:  Skin:  Negative       Eyes:  Positive for glasses    ENT:  Negative      Respiratory:  Positive for dyspnea on exertion (stairs)     Cardiovascular:    Positive for;edema    Gastroenterology: Negative      Genitourinary:  Negative      Musculoskeletal:  Positive for joint pain    Neurologic:  Negative      Psychiatric:  Negative      Heme/Lymph/Imm:  Positive for allergies    Endocrine:  Positive for thyroid disorder      [id number]

## 2017-10-27 ENCOUNTER — TRANSFERRED RECORDS (OUTPATIENT)
Dept: HEALTH INFORMATION MANAGEMENT | Facility: CLINIC | Age: 77
End: 2017-10-27

## 2017-11-14 ENCOUNTER — TRANSFERRED RECORDS (OUTPATIENT)
Dept: HEALTH INFORMATION MANAGEMENT | Facility: CLINIC | Age: 77
End: 2017-11-14

## 2017-11-17 ENCOUNTER — TELEPHONE (OUTPATIENT)
Dept: INTERNAL MEDICINE | Facility: CLINIC | Age: 77
End: 2017-11-17

## 2017-11-17 DIAGNOSIS — B02.29 POST HERPETIC NEURALGIA: Primary | ICD-10-CM

## 2017-11-17 RX ORDER — GABAPENTIN 300 MG/1
CAPSULE ORAL
Qty: 60 CAPSULE | Refills: 5 | Status: SHIPPED | OUTPATIENT
Start: 2017-11-17 | End: 2017-12-13 | Stop reason: ALTCHOICE

## 2017-11-17 NOTE — TELEPHONE ENCOUNTER
Pt was diagnosed beginning of November with shingles in and around his left eye / left side of head.  Pt was just seen this morning (about 1 hour ago) by ophthalmologist  Dr. Thomas at [facility].   Pt says the area around the left eye will itch, and when touching  the skin it feels like the skin is burning.   The Eye DR  Recommended pt follow-up with Dr. Martinez regarding this issue, and suggested that RX for gabapentin, would be something that could alleviate the burning sensation  around the eye.   He says the shingles is on the entire Left side of his face--- in the hairline, left side of forehead, left temple, left cheek, left side of nose, and when he touches it , it is like you are touching a burn. And then when irritated, it will itch.  At night anything that touches is very uncomfortable. Keeps him awake at night.   Eye DR had pt taking:   Anti-viral / Valacyclovir for the eye itself. (but Is no longer taking it).  And He has been applying eye drops to the left eye  (Simbrinza, and Durezol).     Pharmacy is pending.  Please advise pt what can be done to help alleviate the discomfort, burning sensations, and itching (all on the left side of his face, and surrounding the left eye).

## 2017-11-18 NOTE — TELEPHONE ENCOUNTER
Spoke with pt. Was treated with Valtrex. Will start with Gabapentin 300mg at bedtime for next 2 days. If pain not controlled, will increase to 300mg  AM and bedtime. Pt [facility] re: pain  [facility] Warned about possible lightheadedness/dizziness with med. If has side effects, then pt to call MD on call over the WE to start with lower 100mg capsules to titrate up dose. Rx for Gabapentin faxed

## 2017-11-21 ENCOUNTER — TELEPHONE (OUTPATIENT)
Dept: CARDIOLOGY | Facility: CLINIC | Age: 77
End: 2017-11-21

## 2017-11-21 NOTE — TELEPHONE ENCOUNTER
Received report from overnight oximetry monitoring. Report placed on Dr Shruthi feliz for review. SERGEY Zaman

## 2017-11-22 ENCOUNTER — TELEPHONE (OUTPATIENT)
Dept: INTERNAL MEDICINE | Facility: CLINIC | Age: 77
End: 2017-11-22

## 2017-11-22 ENCOUNTER — DOCUMENTATION ONLY (OUTPATIENT)
Dept: CARDIOLOGY | Facility: CLINIC | Age: 77
End: 2017-11-22

## 2017-11-22 NOTE — TELEPHONE ENCOUNTER
Contacted pt with Overnight Oximetry results. Had lengthy discussion with patient regarding proceeding with Pacemaker implant. Reviewed what all this entailed, from the actual procedure to follow up care. Patient will discuss this with wife and make a decision at a later time. He is currently dealing with a shingles flare up on his eye. SERGEY Zaman  Per Dr Hawkins:  Overnight sleep ox was not of best quality.  Didn't see major O2 desaturation, however.  Please let him know.  We could implant a PM, if he wishes (please see my recent note).  In my opinion, there is about 50% likelihood his sx's will improve significantly.  MARILIA Mckenzie

## 2017-11-22 NOTE — TELEPHONE ENCOUNTER
Patient was told to call with an update regarding his shingles.     Pt states that the pain is significantly better but now has itching on left side of face around his eye/nose, left temple, and on top of his head.  It happens spontaneously and also if anything touches the area.      Is currently taking gabapentin 300mg 1 tablet at bedtime.

## 2017-11-22 NOTE — TELEPHONE ENCOUNTER
Increase gabapentin to 300 mg capsule, 1 capsule in the morning and 1 capsule at bedtime. The itching is likely related to nerve irritation from the previous shingles infection and should improve over time. If itching and pain resolved after 1 month, patient may then try stopping the gabapentin. If dryness on his face, may use over-the-counter Vanicream moisturizer as needed also.  If symptoms remain quite bothersome despite these changes, then see me in clinic.

## 2017-11-22 NOTE — PROGRESS NOTES
Overnight sleep ox was not of best quality.  Didn't see major O2 desaturation, however.  Please let him know.  We could implant a PM, if he wishes (please see my recent note).  In my opinion, there is about 50% likelihood his sx's will improve significantly.  MARILIA Mckenzie

## 2017-12-08 ENCOUNTER — OFFICE VISIT (OUTPATIENT)
Dept: INTERNAL MEDICINE | Facility: CLINIC | Age: 77
End: 2017-12-08
Payer: COMMERCIAL

## 2017-12-08 DIAGNOSIS — R09.81 NASAL CONGESTION: Primary | ICD-10-CM

## 2017-12-08 DIAGNOSIS — B02.9 HERPES ZOSTER WITHOUT COMPLICATION: ICD-10-CM

## 2017-12-08 PROCEDURE — 99214 OFFICE O/P EST MOD 30 MIN: CPT | Performed by: INTERNAL MEDICINE

## 2017-12-08 RX ORDER — TRIAMCINOLONE ACETONIDE 55 UG/1
2 SPRAY, METERED NASAL DAILY
Qty: 1 BOTTLE | Refills: 0 | Status: SHIPPED | OUTPATIENT
Start: 2017-12-08 | End: 2018-01-18

## 2017-12-08 RX ORDER — PREGABALIN 25 MG/1
25 CAPSULE ORAL 2 TIMES DAILY
Qty: 60 CAPSULE | Refills: 5 | Status: SHIPPED | OUTPATIENT
Start: 2017-12-08 | End: 2018-05-11

## 2017-12-08 NOTE — PATIENT INSTRUCTIONS
Stop gabapentin  Start Lyrica 25mg capsule, 1 capsule at bedtime. If pain not controlled after 1-2 days, then increase to 1 capsule twice a day  Email update to me early next week  Nasacort 2 spray each nostril daily at bedtime  If headache not better next week, will get MRI brain

## 2017-12-08 NOTE — NURSING NOTE
"Chief Complaint   Patient presents with     Follow Up For     shingles and fatigue       Initial /67  Pulse (!) 48  Temp 97.5  F (36.4  C) (Oral)  Wt 174 lb (78.9 kg)  SpO2 98%  BMI 24.97 kg/m2 Estimated body mass index is 24.97 kg/(m^2) as calculated from the following:    Height as of 10/26/17: 5' 10\" (1.778 m).    Weight as of this encounter: 174 lb (78.9 kg).  Medication Reconciliation: complete  "

## 2017-12-08 NOTE — PROGRESS NOTES
"  SUBJECTIVE:   Eder Horan is a 77 year old male who presents to clinic today for the following health issues:      Chief Complaint   Patient presents with     Follow Up For     shingles and fatigue     Pt's past medical history, family history, habits, medications and allergies were reviewed with the patient today.  See snap shot for  HCM status. Most recent lab results reviewed with pt. Problem list and histories reviewed & adjusted, as indicated.  Additional history as below:     History of shingles diagnosis in late October.  Currently on gabapentin but having pain still the facial dermatome.  Patient also has a feeling of pain deeper in the left forehead and sinus area frontal and maxillary.  Some nasal congestion.  Denies nasal discharge.  No ear pain, fevers, chills.  Patient has seen ophthalmology late October.  Denies current vision changes.  No coordination issues.     Additional ROS:   Constitutional, HEENT, Cardiovascular, Pulmonary, GI and , Neuro, MSK and Psych review of systems/symptoms are otherwise negative or unchanged from previous, except as noted above.      OBJECTIVE:  /67  Pulse 56  Temp 97.5  F (36.4  C) (Oral)  Wt 174 lb (78.9 kg)  SpO2 98%  BMI 24.97 kg/m2   Estimated body mass index is 24.97 kg/(m^2) as calculated from the following:    Height as of 10/26/17: 5' 10\" (1.778 m).    Weight as of this encounter: 174 lb (78.9 kg).  Eye: PERRL, EOMI  HENT: ear canals and TM's normal and nose and mouth without ulcers or lesions.  Nasal mucosa edematous with some clear nasal discharge.  Minimal tenderness to palpation left frontal maxillary sinuses.   Neck: no adenopathy. Thyroid normal to palpation. No bruits  Pulm: Lungs clear to auscultation   CV: Regular rates and rhythm  GI: Soft, nontender, Normal active bowel sounds, No hepatosplenomegaly or masses palpable  Ext: Peripheral pulses intact. No edema.  Neuro: Normal strength and tone, sensory exam grossly normal except for " hypersensitivity left facial and forehead area in general.  No increase in hypersensitivity/tenderness to palpation left temporal artery area and nontender right    Assessment/Plan: (See plan discussion below for further details)  1. Herpes zoster without complication  Pain not controlled with gabapentin.  Patient has completed antiviral therapy.  Will start Lyrica as below  - pregabalin (LYRICA) 25 MG capsule; Take 1 capsule (25 mg) by mouth 2 times daily  Dispense: 60 capsule; Refill: 5    2. Nasal congestion  Sinus infection unlikely.  Nasacort trial as below  - triamcinolone (NASACORT AQ) 55 MCG/ACT Inhaler; Spray 2 sprays into both nostrils daily  Dispense: 1 Bottle; Refill: 0    Plan discussion:   Stop gabapentin  Start Lyrica 25mg capsule, 1 capsule at bedtime. If pain not controlled after 1-2 days, then increase to 1 capsule twice a day  Email update to me early next week  Nasacort 2 spray each nostril daily at bedtime  If headache not better next week, patient will inform physician and will get MRI brain, ESR lab       Richmond Martinez MD  Internal Medicine Department  [facility]

## 2017-12-08 NOTE — MR AVS SNAPSHOT
After Visit Summary   12/8/2017    Eder Horan    MRN: [id number]           Patient Information     Date Of Birth          1940        Visit Information        Provider Department      12/8/2017 3:00 PM Richmond Martinez MD [facility]        Today's Diagnoses     Herpes zoster with ophthalmic complication, unspecified herpes zoster eye disease    -  1    Nasal congestion          Care Instructions     Stop gabapentin  Start Lyrica 25mg capsule, 1 capsule at bedtime. If pain not controlled after 1-2 days, then increase to 1 capsule twice a day  Email update to me early next week  Nasacort 2 spray each nostril daily at bedtime  If headache not better next week, will get MRI brain          Follow-ups after your visit        Your next 10 appointments already scheduled     Jan 18, 2018  8:50 AM CST   LAB with [facility]   [facility] ([facility])    [address] [place] [address]   [phone]           Please do not eat 10-12 hours before your appointment if you are coming in fasting for labs on lipids, cholesterol, or glucose (sugar). This does not apply to pregnant women. Water, hot tea and black coffee (with nothing added) are okay. Do not drink other fluids, diet soda or chew gum.            Jan 18, 2018  9:45 AM CST   Return Visit with Jaya Franklin MD   [facility] ([facility])    [address] [place] [address]   [phone]            Apr 12, 2018 11:30 AM CDT   Return Visit with Reece Daniels MD   [facility] ([facility])    [address] [id number] [phone]              Who to contact     If you have questions or need follow up information about today's clinic visit or your schedule please contact [facility]  directly at [phone].  Normal or non-critical lab and imaging results will be communicated to you by [employer], letter or phone within 4 business days after the clinic has received the results. If you do not hear from us within 7 days, please contact the clinic through [employer] or phone. If you have a critical or abnormal lab result, we will notify you by phone as soon as possible.  Submit refill requests through [employer] or call your pharmacy and they will forward the refill request to us. Please allow 3 business days for your refill to be completed.          Additional Information About Your Visit        [employer] Information     [employer] gives you secure access to your electronic health record. If you see a primary care provider, you can also send messages to your care team and make appointments. If you have questions, please call your primary care clinic.  If you do not have a primary care provider, please call [phone] and they will assist you.        Care EveryWhere ID     This is your Care EveryWhere ID. This could be used by other organizations to access your [place] medical records  [id number]        Your Vitals Were     Pulse Temperature Pulse Oximetry BMI (Body Mass Index)          48 97.5  F (36.4  C) (Oral) 98% 24.97 kg/m2         Blood Pressure from Last 3 Encounters:   12/08/17 128/67   10/26/17 120/72   10/21/17 126/81    Weight from Last 3 Encounters:   12/08/17 174 lb (78.9 kg)   10/26/17 178 lb 8 oz (81 kg)   10/21/17 183 lb 3.2 oz (83.1 kg)              Today, you had the following     No orders found for display         Today's Medication Changes          These changes are accurate as of: 12/8/17  3:47 PM.  If you have any questions, ask your nurse or doctor.               Start taking these medicines.        Dose/Directions    pregabalin 25 MG capsule   Commonly known as:  LYRICA   Used for:  Herpes zoster with ophthalmic complication, unspecified herpes zoster eye disease   Started by:   Richmond Martinez MD        Dose:  25 mg   Take 1 capsule (25 mg) by mouth 2 times daily   Quantity:  60 capsule   Refills:  5       triamcinolone 55 MCG/ACT Inhaler   Commonly known as:  NASACORT AQ   Used for:  Nasal congestion   Started by:  Richmond Martinez MD        Dose:  2 spray   Spray 2 sprays into both nostrils daily   Quantity:  1 Bottle   Refills:  0            Where to get your medicines      Some of these will need a paper prescription and others can be bought over the counter.  Ask your nurse if you have questions.     Bring a paper prescription for each of these medications     pregabalin 25 MG capsule    triamcinolone 55 MCG/ACT Inhaler                Primary Care Provider Office Phone # Fax #    Richmond Martinez [phone]       [address] [place] [address]        Equal Access to Services     [facility] : Hadii aad ku hadasho Soomaali, waaxda luqadaha, qaybta kaalmada adelizethyada, chun chauhan . So [facility] [phone].    ATENCIÓN: Si habla español, tiene a charlton disposición servicios gratuitos de asistencia lingüística. Llame al [phone].    We comply with applicable federal civil rights laws and [place] laws. We do not discriminate on the basis of race, color, national origin, age, disability, sex, sexual orientation, or gender identity.            Thank you!     Thank you for choosing [facility]  for your care. Our goal is always to provide you with excellent care. Hearing back from our patients is one way we can continue to improve our services. Please take a few minutes to complete the written survey that you may receive in the mail after your visit with us. Thank you!             Your Updated Medication List - Protect others around you: Learn how to safely use, store and throw away your medicines at www.disposemymeds.org.          This list is accurate as of: 12/8/17  3:47 PM.  Always use your most recent med list.                    Brand Name Dispense Instructions for use Diagnosis    apixaban ANTICOAGULANT 5 MG tablet    ELIQUIS    180 tablet    Take 1 tablet (5 mg) by mouth 2 times daily    Paroxysmal atrial fibrillation (H)       beclomethasone 40 MCG/ACT Inhaler    QVAR    3 Inhaler    Inhale 2 puffs into the lungs daily    Chronic obstructive pulmonary disease, unspecified COPD type (H)       FLECAINIDE ACETATE PO      Take 75 mg by mouth every 12 hours (takes 1/2 of 150 mg tablet = 75 mg dose)        gabapentin 300 MG capsule    NEURONTIN    60 capsule    1 capsules twice a day    Post herpetic neuralgia       levothyroxine 125 MCG tablet    SYNTHROID/LEVOTHROID    90 tablet    Take 1 tablet (125 mcg) by mouth daily    Hypothyroidism, unspecified type       losartan 25 MG tablet    COZAAR    90 tablet    Take 1 tablet (25 mg) by mouth daily    Essential hypertension with goal blood pressure less than 140/90       order for DME     1 Device    Equipment being ordered: spacer for inhaler    COPD (chronic obstructive pulmonary disease) (H)       pregabalin 25 MG capsule    LYRICA    60 capsule    Take 1 capsule (25 mg) by mouth 2 times daily    Herpes zoster with ophthalmic complication, unspecified herpes zoster eye disease       PROAIR  (90 BASE) MCG/ACT Inhaler   Generic drug:  albuterol     54 g    INHALE 2 PUFFS INTO THE LUNGS EVERY 4 HOURS AS NEEDED FOR SHORTNESS OF BREATH / DYSPNEA    COPD (chronic obstructive pulmonary disease) (H)       triamcinolone 55 MCG/ACT Inhaler    NASACORT AQ    1 Bottle    Spray 2 sprays into both nostrils daily    Nasal congestion

## 2017-12-13 ENCOUNTER — MYC MEDICAL ADVICE (OUTPATIENT)
Dept: INTERNAL MEDICINE | Facility: CLINIC | Age: 77
End: 2017-12-13

## 2017-12-19 ENCOUNTER — PRE VISIT (OUTPATIENT)
Dept: CARDIOLOGY | Facility: CLINIC | Age: 77
End: 2017-12-19

## 2017-12-20 VITALS
BODY MASS INDEX: 24.97 KG/M2 | WEIGHT: 174 LBS | TEMPERATURE: 97.5 F | HEART RATE: 56 BPM | OXYGEN SATURATION: 98 % | DIASTOLIC BLOOD PRESSURE: 67 MMHG | SYSTOLIC BLOOD PRESSURE: 128 MMHG

## 2017-12-22 ENCOUNTER — MYC MEDICAL ADVICE (OUTPATIENT)
Dept: INTERNAL MEDICINE | Facility: CLINIC | Age: 77
End: 2017-12-22

## 2018-01-05 DIAGNOSIS — I48.91 ATRIAL FIBRILLATION (H): Primary | ICD-10-CM

## 2018-01-05 RX ORDER — FLECAINIDE ACETATE 150 MG/1
TABLET ORAL
Qty: 45 TABLET | Refills: 2 | Status: SHIPPED | OUTPATIENT
Start: 2018-01-05 | End: 2018-05-11

## 2018-01-18 ENCOUNTER — OFFICE VISIT (OUTPATIENT)
Dept: CARDIOLOGY | Facility: CLINIC | Age: 78
End: 2018-01-18
Attending: INTERNAL MEDICINE
Payer: COMMERCIAL

## 2018-01-18 VITALS
DIASTOLIC BLOOD PRESSURE: 68 MMHG | BODY MASS INDEX: 24.91 KG/M2 | SYSTOLIC BLOOD PRESSURE: 126 MMHG | HEART RATE: 60 BPM | HEIGHT: 70 IN | WEIGHT: 174 LBS

## 2018-01-18 DIAGNOSIS — I48.0 PAROXYSMAL ATRIAL FIBRILLATION (H): ICD-10-CM

## 2018-01-18 DIAGNOSIS — I74.9 EMBOLISM AND THROMBOSIS (H): ICD-10-CM

## 2018-01-18 DIAGNOSIS — R00.1 BRADYCARDIA: ICD-10-CM

## 2018-01-18 DIAGNOSIS — E78.5 HYPERLIPIDEMIA LDL GOAL <130: ICD-10-CM

## 2018-01-18 DIAGNOSIS — R55 SYNCOPE, UNSPECIFIED SYNCOPE TYPE: ICD-10-CM

## 2018-01-18 DIAGNOSIS — I10 ESSENTIAL HYPERTENSION WITH GOAL BLOOD PRESSURE LESS THAN 140/90: ICD-10-CM

## 2018-01-18 LAB
ALT SERPL W P-5'-P-CCNC: <5 U/L (ref 5–30)
ANION GAP SERPL CALCULATED.3IONS-SCNC: 8.5 MMOL/L (ref 6–17)
BUN SERPL-MCNC: 9 MG/DL (ref 7–30)
CALCIUM SERPL-MCNC: 9.1 MG/DL (ref 8.5–10.5)
CHLORIDE SERPL-SCNC: 105 MMOL/L (ref 98–107)
CHOLEST SERPL-MCNC: 176 MG/DL
CO2 SERPL-SCNC: 31 MMOL/L (ref 23–29)
CREAT SERPL-MCNC: 0.95 MG/DL (ref 0.7–1.3)
GFR SERPL CREATININE-BSD FRML MDRD: 77 ML/MIN/1.7M2
GLUCOSE SERPL-MCNC: 110 MG/DL (ref 70–105)
HDLC SERPL-MCNC: 84 MG/DL
LDLC SERPL CALC-MCNC: 82 MG/DL
NONHDLC SERPL-MCNC: 92 MG/DL
POTASSIUM SERPL-SCNC: 4.5 MMOL/L (ref 3.5–5.1)
SODIUM SERPL-SCNC: 140 MMOL/L (ref 136–145)
TRIGL SERPL-MCNC: 50 MG/DL

## 2018-01-18 PROCEDURE — 99214 OFFICE O/P EST MOD 30 MIN: CPT | Performed by: INTERNAL MEDICINE

## 2018-01-18 PROCEDURE — 84460 ALANINE AMINO (ALT) (SGPT): CPT | Performed by: INTERNAL MEDICINE

## 2018-01-18 PROCEDURE — 80061 LIPID PANEL: CPT | Performed by: INTERNAL MEDICINE

## 2018-01-18 PROCEDURE — 80048 BASIC METABOLIC PNL TOTAL CA: CPT | Performed by: INTERNAL MEDICINE

## 2018-01-18 PROCEDURE — 93000 ELECTROCARDIOGRAM COMPLETE: CPT | Performed by: INTERNAL MEDICINE

## 2018-01-18 PROCEDURE — 36415 COLL VENOUS BLD VENIPUNCTURE: CPT | Performed by: INTERNAL MEDICINE

## 2018-01-18 NOTE — LETTER
1/18/2018      Richmond Martinez MD  [address] [facility] [address]      RE: Eder Horan       Dear Colleague,    I had the pleasure of seeing Eder Horan in the [facility].    HISTORY OF PRESENT ILLNESS:  The patient is a 78-year-old mildly overweight white male with a history of paroxysmal atrial fibrillation with rapid ventricular response previously admitted to [facility] in 2008, was started on Cardizem for rate control as well as warfarin for anticoagulation and for DC cardioversion.  The attempt was unsuccessful.  He was seen by Electrophysiology with institution of flecainide with repeat cardioversion that was successful.  Initially on flecainide and Cardizem he had heart rates in the low 40s, with Cardizem being discontinued.  Since his last clinic visit, he has actually done well with no significant symptoms of chest discomfort, shortness of breath at rest, dizziness, palpitations, nausea, vomiting, diaphoresis or syncope.  He has occasional lightheadedness if he changes position too quickly and has easy fatigability and general malaise.  He denies PND, orthopnea, fevers, chills or sweats.  He has no history of myocardial infarction, congestive heart failure, rheumatic heart disease, congenital heart disease or heart murmur.  He is mostly limited by easy fatigability, malaise and arthritis.  He had Cardiolite stress testing performed in 2015 that showed no evidence of ischemia or infarct.  Holter monitor performed in the past has shown heart rate varying from 44 to 89 with an average rate of 61, with isolated supraventricular and ventricular ectopy.  This has been confirmed on a second Holter monitor showing heart rate varying from 42 to 83 with an average rate of 58 with occasional supraventricular ectopic beats and no persistent tachydysrhythmia.  Previous echocardiography has shown intact left ventricular function with ejection fraction 55%-60% with  mild to moderate dilatation of the left atrium and trace to mild mitral insufficiency present.  Earlier last year he had an episode of slurred speech, lack of balance consistent with acute CVA treated with tPA at the [facility] with successful resolution of most of his symptoms.  He was started on chronic anticoagulation and referred back to Cardiology Clinic for treatment of his hypertension.  He has been able to participate in most activities without significant restriction, although he does have easy fatigability.      MEDICATIONS:   1.  Flecainide 75 mg twice a day.   2.  Lyrica 25 mg twice a day.   3.  ProAir inhaler as directed.   4.  Beclomethasone inhaler 2 puffs daily.   5.  Losartan 25 mg a day.   6.  Apixaban 5 mg twice a day.   7.  Levothyroxine 125 mcg a day.      LABORATORY DATA:  Demonstrated cholesterol 176, HDL 84, LDL 82, triglyceride 50, sodium 140, potassium 4.5, BUN 9, creatinine 0.95, ALT less than 5.      PHYSICAL EXAMINATION:   VITAL SIGNS:  Blood pressure 126/68 with a heart rate of 60-70 and regular.  Weight was 174 pounds, which was down 7 pounds from previous clinic visit.   NECK:  Without jugular venous distention, carotid bruit or palpable thyroid.   CHEST:  Essentially clear to percussion and auscultation with slight decreased breath sounds at the bases.   CARDIAC:  Regular rhythm with a soft S4 gallop, 1 to 2/6 systolic murmur at the left sternal border with minimal radiation.  No diastolic murmur, rub or S3.   EXTREMITIES:  Without cyanosis or edema.      CLINICAL IMPRESSION:   1.  Stable cardiac condition.   2.  History of paroxysmal atrial fibrillation, not recent symptoms.   3.  Chronic obstructive pulmonary disease with isolated bronchospasm.   4.  Mild to moderate hypertension, well controlled.   5.  History of borderline hyperlipidemia.   6.  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      DISCUSSION:  The patient has done  well since his last clinic visit.  He is able to participate in activities without significant restriction.  He does have easy fatigability and general malaise.  He has not noted significant palpitations at this time.  Consideration was given for a pacemaker insertion because of relative bradycardia and chronotropic incompetence; however, it was unclear whether his symptoms were totally related to these entities.  It would not be unreasonable to consider Holter monitor later this year as well as followup echocardiogram for left ventricular function.  He will also need close followup of his serum lipids, basic metabolic panel and blood pressure.      RECOMMENDATIONS:   1.  Continue present medications.   2.  Diet and exercise as tolerated, avoid caffeine, salt and cholesterol.   3.  Holter monitor to evaluate rhythm in 4 months.   4.  Echocardiogram in 4 months to evaluate left ventricular function.   5.  Close followup of serum lipids, basic metabolic panel and blood pressure.   6.  Routine medical followup.   7.  Consider possible EP followup after additional testing.   8.  Cardiology followup in 4 months.        Outpatient Encounter Prescriptions as of 1/18/2018   Medication Sig Dispense Refill     flecainide acetate 150 MG TABS (takes 1/2 of 150 mg tablet = 75 mg dose) (Patient taking differently: 2 times daily (takes 1/2 of 150 mg tablet = 75 mg dose, twice daily)) 45 tablet 2     pregabalin (LYRICA) 25 MG capsule Take 1 capsule (25 mg) by mouth 2 times daily 60 capsule 5     PROAIR  (90 BASE) MCG/ACT inhaler INHALE 2 PUFFS INTO THE LUNGS EVERY 4 HOURS AS NEEDED FOR SHORTNESS OF BREATH / DYSPNEA 54 g 2     beclomethasone (QVAR) 40 MCG/ACT Inhaler Inhale 2 puffs into the lungs daily 3 Inhaler 3     losartan (COZAAR) 25 MG tablet Take 1 tablet (25 mg) by mouth daily 90 tablet 3     apixaban ANTICOAGULANT (ELIQUIS) 5 MG tablet Take 1 tablet (5 mg) by mouth 2 times daily 180 tablet 3     levothyroxine  (SYNTHROID/LEVOTHROID) 125 MCG tablet Take 1 tablet (125 mcg) by mouth daily 90 tablet 3     [DISCONTINUED] triamcinolone (NASACORT AQ) 55 MCG/ACT Inhaler Spray 2 sprays into both nostrils daily 1 Bottle 0     ORDER FOR DME Equipment being ordered: spacer for inhaler 1 Device 1     No facility-administered encounter medications on file as of 1/18/2018.        Again, thank you for allowing me to participate in the care of your patient.      Sincerely,    Jaya Franklin MD     [facility]

## 2018-01-18 NOTE — MR AVS SNAPSHOT
After Visit Summary   1/18/2018    Eder Horan    MRN: [id number]           Patient Information     Date Of Birth          1940        Visit Information        Provider Department      1/18/2018 9:45 AM Jaya Franklin MD [facility]        Today's Diagnoses     Paroxysmal atrial fibrillation (H)        Essential hypertension with goal blood pressure less than 140/90        Embolism and thrombosis        Bradycardia        Syncope, unspecified syncope type        Hyperlipidemia LDL goal <130           Follow-ups after your visit        Additional Services     Follow-Up with Cardiologist                 Your next 10 appointments already scheduled     Apr 12, 2018 11:30 AM CDT   [facility] Visit with Reece Daniels MD   [facility]    [address] [address] [phone]              Future tests that were ordered for you today     Open Future Orders        Priority Expected Expires Ordered    Basic metabolic panel Routine 5/18/2018 1/18/2019 1/18/2018    Lipid Profile Routine 5/18/2018 1/18/2019 1/18/2018    ALT Routine 5/18/2018 1/18/2019 1/18/2018    Echocardiogram Routine 5/18/2018 1/18/2019 1/18/2018    Holter Monitor 24 hour - Adult Routine 5/18/2018 1/18/2019 1/18/2018    Follow-Up with Cardiologist Routine 5/18/2018 1/18/2019 1/18/2018            Who to contact     If you have questions or need follow up information about today's clinic visit or your schedule please contact [facility] directly at [phone].  Normal or non-critical lab and imaging results will be communicated to you by MyChart, letter or phone within 4 business days after the clinic has received the results. If you do not hear from us within 7 days, please contact the clinic through MyChart or phone. If you have a critical or abnormal lab result,  "we will notify you by phone as soon as possible.  Submit refill requests through [employer] or call your pharmacy and they will forward the refill request to us. Please allow 3 business days for your refill to be completed.          Additional Information About Your Visit        [employer] Information     [employer] gives you secure access to your electronic health record. If you see a primary care provider, you can also send messages to your care team and make appointments. If you have questions, please call your primary care clinic.  If you do not have a primary care provider, please call [phone] and they will assist you.        Care EveryWhere ID     This is your Care EveryWhere ID. This could be used by other organizations to access your [place] medical records  [id number]        Your Vitals Were     Pulse Height BMI (Body Mass Index)             60 1.778 m (5' 10\") 24.97 kg/m2          Blood Pressure from Last 3 Encounters:   01/18/18 126/68   12/08/17 128/67   10/26/17 120/72    Weight from Last 3 Encounters:   01/18/18 78.9 kg (174 lb)   12/08/17 78.9 kg (174 lb)   10/26/17 81 kg (178 lb 8 oz)              We Performed the Following     EKG 12-lead complete w/read - Clinics (performed today)     Follow-Up with Cardiologist          Today's Medication Changes          These changes are accurate as of: 1/18/18 10:48 AM.  If you have any questions, ask your nurse or doctor.               These medicines have changed or have updated prescriptions.        Dose/Directions    flecainide acetate 150 MG Tabs   This may have changed:    - when to take this  - additional instructions   Used for:  Atrial fibrillation (H)        (takes 1/2 of 150 mg tablet = 75 mg dose)   Quantity:  45 tablet   Refills:  2                Primary Care Provider Office Phone # Fax #    Richmond Martinez [phone]       [address] [place] [address]        Equal Access to Services     GOLD SR AH: Jass mckeon " Sara, sunda lupaxtonadaha, qathomasta kacarolyn aguilar, chun krishna kristopherilzeth whitpj ladarronmiranda latia. So [facility] [phone].    ATENCIÓN: Si lori concepcion, tiene a charlton disposición servicios gratuitos de asistencia lingüística. Thony al [phone].    We comply with applicable federal civil rights laws and [place] laws. We do not discriminate on the basis of race, color, national origin, age, disability, sex, sexual orientation, or gender identity.            Thank you!     Thank you for choosing [facility]  for your care. Our goal is always to provide you with excellent care. Hearing back from our patients is one way we can continue to improve our services. Please take a few minutes to complete the written survey that you may receive in the mail after your visit with us. Thank you!             Your Updated Medication List - Protect others around you: Learn how to safely use, store and throw away your medicines at www.disposemymeds.org.          This list is accurate as of: 1/18/18 10:48 AM.  Always use your most recent med list.                   Brand Name Dispense Instructions for use Diagnosis    apixaban ANTICOAGULANT 5 MG tablet    ELIQUIS    180 tablet    Take 1 tablet (5 mg) by mouth 2 times daily    Paroxysmal atrial fibrillation (H)       beclomethasone 40 MCG/ACT Inhaler    QVAR    3 Inhaler    Inhale 2 puffs into the lungs daily    Chronic obstructive pulmonary disease, unspecified COPD type (H)       flecainide acetate 150 MG Tabs     45 tablet    (takes 1/2 of 150 mg tablet = 75 mg dose)    Atrial fibrillation (H)       levothyroxine 125 MCG tablet    SYNTHROID/LEVOTHROID    90 tablet    Take 1 tablet (125 mcg) by mouth daily    Hypothyroidism, unspecified type       losartan 25 MG tablet    COZAAR    90 tablet    Take 1 tablet (25 mg) by mouth daily    Essential hypertension with goal blood pressure less than 140/90       order for DME     1 Device    Equipment being  ordered: spacer for inhaler    COPD (chronic obstructive pulmonary disease) (H)       pregabalin 25 MG capsule    LYRICA    60 capsule    Take 1 capsule (25 mg) by mouth 2 times daily    Herpes zoster without complication       PROAIR  (90 BASE) MCG/ACT Inhaler   Generic drug:  albuterol     54 g    INHALE 2 PUFFS INTO THE LUNGS EVERY 4 HOURS AS NEEDED FOR SHORTNESS OF BREATH / DYSPNEA    COPD (chronic obstructive pulmonary disease) (H)

## 2018-01-19 NOTE — PROGRESS NOTES
HISTORY OF PRESENT ILLNESS:  The patient is a 78-year-old mildly overweight white male with a history of paroxysmal atrial fibrillation with rapid ventricular response previously admitted to [facility] in 2008, was started on Cardizem for rate control as well as warfarin for anticoagulation and for DC cardioversion.  The attempt was unsuccessful.  He was seen by Electrophysiology with institution of flecainide with repeat cardioversion that was successful.  Initially on flecainide and Cardizem he had heart rates in the low 40s, with Cardizem being discontinued.  Since his last clinic visit, he has actually done well with no significant symptoms of chest discomfort, shortness of breath at rest, dizziness, palpitations, nausea, vomiting, diaphoresis or syncope.  He has occasional lightheadedness if he changes position too quickly and has easy fatigability and general malaise.  He denies PND, orthopnea, fevers, chills or sweats.  He has no history of myocardial infarction, congestive heart failure, rheumatic heart disease, congenital heart disease or heart murmur.  He is mostly limited by easy fatigability, malaise and arthritis.  He had Cardiolite stress testing performed in 2015 that showed no evidence of ischemia or infarct.  Holter monitor performed in the past has shown heart rate varying from 44 to 89 with an average rate of 61, with isolated supraventricular and ventricular ectopy.  This has been confirmed on a second Holter monitor showing heart rate varying from 42 to 83 with an average rate of 58 with occasional supraventricular ectopic beats and no persistent tachydysrhythmia.  Previous echocardiography has shown intact left ventricular function with ejection fraction 55%-60% with mild to moderate dilatation of the left atrium and trace to mild mitral insufficiency present.  Earlier last year he had an episode of slurred speech, lack of balance consistent with acute CVA treated with tPA at  the [facility] with successful resolution of most of his symptoms.  He was started on chronic anticoagulation and referred back to Cardiology Clinic for treatment of his hypertension.  He has been able to participate in most activities without significant restriction, although he does have easy fatigability.      MEDICATIONS:   1.  Flecainide 75 mg twice a day.   2.  Lyrica 25 mg twice a day.   3.  ProAir inhaler as directed.   4.  Beclomethasone inhaler 2 puffs daily.   5.  Losartan 25 mg a day.   6.  Apixaban 5 mg twice a day.   7.  Levothyroxine 125 mcg a day.      LABORATORY DATA:  Demonstrated cholesterol 176, HDL 84, LDL 82, triglyceride 50, sodium 140, potassium 4.5, BUN 9, creatinine 0.95, ALT less than 5.      PHYSICAL EXAMINATION:   VITAL SIGNS:  Blood pressure 126/68 with a heart rate of 60-70 and regular.  Weight was 174 pounds, which was down 7 pounds from previous clinic visit.   NECK:  Without jugular venous distention, carotid bruit or palpable thyroid.   CHEST:  Essentially clear to percussion and auscultation with slight decreased breath sounds at the bases.   CARDIAC:  Regular rhythm with a soft S4 gallop, 1 to 2/6 systolic murmur at the left sternal border with minimal radiation.  No diastolic murmur, rub or S3.   EXTREMITIES:  Without cyanosis or edema.      CLINICAL IMPRESSION:   1.  Stable cardiac condition.   2.  History of paroxysmal atrial fibrillation, not recent symptoms.   3.  Chronic obstructive pulmonary disease with isolated bronchospasm.   4.  Mild to moderate hypertension, well controlled.   5.  History of borderline hyperlipidemia.   6.  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      DISCUSSION:  The patient has done well since his last clinic visit.  He is able to participate in activities without significant restriction.  He does have easy fatigability and general malaise.  He has not noted significant palpitations at this  time.  Consideration was given for a pacemaker insertion because of relative bradycardia and chronotropic incompetence; however, it was unclear whether his symptoms were totally related to these entities.  It would not be unreasonable to consider Holter monitor later this year as well as followup echocardiogram for left ventricular function.  He will also need close followup of his serum lipids, basic metabolic panel and blood pressure.      RECOMMENDATIONS:   1.  Continue present medications.   2.  Diet and exercise as tolerated, avoid caffeine, salt and cholesterol.   3.  Holter monitor to evaluate rhythm in 4 months.   4.  Echocardiogram in 4 months to evaluate left ventricular function.   5.  Close followup of serum lipids, basic metabolic panel and blood pressure.   6.  Routine medical followup.   7.  Consider possible EP followup after additional testing.   8.  Cardiology followup in 4 months.      cc:   [place] [address]         MAURILIO CONDE MD, FACC             D: 2018 17:11   T: 2018 23:28   MT: NICOLAS      Name:     AUGUSTINE MYRICK   MRN:      [id number]        Account:      [id number]   :      1940           Service Date: 2018      Document: [id number]

## 2018-02-28 DIAGNOSIS — E03.9 HYPOTHYROIDISM, UNSPECIFIED TYPE: ICD-10-CM

## 2018-02-28 RX ORDER — LEVOTHYROXINE SODIUM 125 UG/1
TABLET ORAL
Qty: 30 TABLET | Refills: 0 | Status: SHIPPED | OUTPATIENT
Start: 2018-02-28 | End: 2018-03-07

## 2018-02-28 NOTE — TELEPHONE ENCOUNTER
"Requested Prescriptions   Pending Prescriptions Disp Refills     levothyroxine (SYNTHROID/LEVOTHROID) 125 MCG tablet [Pharmacy Med Name: Levothyroxine Sodium Oral Tablet 125 MCG] 90 tablet 2     Sig: TAKE ONE TABLET BY MOUTH ONE TIME DAILY    Thyroid Protocol Failed    2/28/2018 12:23 PM       Failed - Normal TSH on file in past 12 months    Recent Labs   Lab Test  02/09/17   1544   TSH  1.52             Passed - Patient is 12 years or older       Passed - Recent or future visit with authorizing provider's specialty    Patient had office visit in the last year or has a visit in the next 30 days with authorizing provider.  See \"Patient Info\" tab in inbasket, or \"Choose Columns\" in Meds & Orders section of the refill encounter.             Medication is being filled for 1 time refill only due to:  Patient needs labs tsh.    "

## 2018-02-28 NOTE — LETTER
[facility]  [address] [id number] [phone]            Eder Horan  [address] [address]        February 28, 2018    Dear Eder,    While refilling your prescription today, we noticed that you are due to have labs drawn.  We will refill your prescription for 30 days, but a follow-up appointment must be made before any additional refills can be approved.     Taking care of your health is important to us and we look forward to seeing you in the near future.  Please call us at [phone] or [phone] (or use [employer]) to schedule an appointment.     Please disregard this notice if you have already made an appointment.    Sincerely,        [facility]

## 2018-03-02 ENCOUNTER — TRANSFERRED RECORDS (OUTPATIENT)
Dept: HEALTH INFORMATION MANAGEMENT | Facility: CLINIC | Age: 78
End: 2018-03-02

## 2018-03-07 ENCOUNTER — MYC MEDICAL ADVICE (OUTPATIENT)
Dept: INTERNAL MEDICINE | Facility: CLINIC | Age: 78
End: 2018-03-07

## 2018-03-07 DIAGNOSIS — E03.9 HYPOTHYROIDISM, UNSPECIFIED TYPE: ICD-10-CM

## 2018-03-07 LAB — TSH SERPL DL<=0.005 MIU/L-ACNC: 0.68 MU/L (ref 0.4–4)

## 2018-03-07 PROCEDURE — 36415 COLL VENOUS BLD VENIPUNCTURE: CPT | Performed by: INTERNAL MEDICINE

## 2018-03-07 PROCEDURE — 84443 ASSAY THYROID STIM HORMONE: CPT | Performed by: INTERNAL MEDICINE

## 2018-03-07 RX ORDER — LEVOTHYROXINE SODIUM 125 UG/1
125 TABLET ORAL DAILY
Qty: 90 TABLET | Refills: 2 | Status: SHIPPED | OUTPATIENT
Start: 2018-03-07 | End: 2019-01-08

## 2018-04-12 ENCOUNTER — OFFICE VISIT (OUTPATIENT)
Dept: DERMATOLOGY | Facility: CLINIC | Age: 78
End: 2018-04-12
Payer: COMMERCIAL

## 2018-04-12 VITALS — HEART RATE: 71 BPM | DIASTOLIC BLOOD PRESSURE: 83 MMHG | OXYGEN SATURATION: 91 % | SYSTOLIC BLOOD PRESSURE: 133 MMHG

## 2018-04-12 DIAGNOSIS — D18.00 ANGIOMA: ICD-10-CM

## 2018-04-12 DIAGNOSIS — D23.9 DERMAL NEVUS: ICD-10-CM

## 2018-04-12 DIAGNOSIS — L81.4 LENTIGO: ICD-10-CM

## 2018-04-12 DIAGNOSIS — L82.1 SK (SEBORRHEIC KERATOSIS): ICD-10-CM

## 2018-04-12 DIAGNOSIS — L85.3 ASTEATOSIS: Primary | ICD-10-CM

## 2018-04-12 PROCEDURE — 99213 OFFICE O/P EST LOW 20 MIN: CPT | Performed by: DERMATOLOGY

## 2018-04-12 RX ORDER — FLUOCINONIDE 0.5 MG/G
CREAM TOPICAL 2 TIMES DAILY
Qty: 120 G | Refills: 3 | Status: SHIPPED | OUTPATIENT
Start: 2018-04-12 | End: 2021-08-16

## 2018-04-12 NOTE — MR AVS SNAPSHOT
After Visit Summary   4/12/2018    Eder Horan    MRN: [id number]           Patient Information     Date Of Birth          1940        Visit Information        Provider Department      4/12/2018 11:30 AM Reece Daniels MD [facility]        Today's Diagnoses     Asteatosis    -  1    Lentigo        SK (seborrheic keratosis)        Angioma        Dermal nevus           Follow-ups after your visit        Your next 10 appointments already scheduled     Apr 12, 2018 11:30 AM CDT   Return Visit with Reece Daniels MD   [facility] ([facility])    [address] [id number] [phone]            Apr 23, 2018  7:45 AM CDT   [facility]   [facility] CV Echocardiography (Cardiovascular Imaging at [facility])    [address]  [facility] [address] [phone]           1. Please bring or wear a comfortable two-piece outfit. 2. You may eat, drink and take your normal medicines. 3. For any questions that cannot be answered, please contact the ordering physician            Apr 23, 2018  8:30 AM CDT   Holter Monitor with OMER   [facility] Radiology - [facility] Heart Imaging ([facility])    [address]  [facility] [address] [phone]            Apr 23, 2018  9:00 AM CDT   LAB with [facility]   [facility] [place] ([facility])    [address]  [facility] [address] [phone]           Please do not eat 10-12 hours before your appointment if you are coming in fasting for labs on lipids, cholesterol, or glucose (sugar). This does not apply to pregnant women. Water, hot tea and black coffee (with nothing added) are okay. Do not drink other fluids, diet soda or chew gum.            May 11, 2018  7:45 AM CDT   Return Visit with Jaya Burnett  MD Rigoberto   [facility] ([facility])    [address]  [facility] [address] [phone] OPT 2              Who to contact     If you have questions or need follow up information about today's clinic visit or your schedule please contact [facility] directly at [phone].  Normal or non-critical lab and imaging results will be communicated to you by [employer], letter or phone within 4 business days after the clinic has received the results. If you do not hear from us within 7 days, please contact the clinic through [employer] or phone. If you have a critical or abnormal lab result, we will notify you by phone as soon as possible.  Submit refill requests through [employer] or call your pharmacy and they will forward the refill request to us. Please allow 3 business days for your refill to be completed.          Additional Information About Your Visit        ShangPinhart Information     [employer] gives you secure access to your electronic health record. If you see a primary care provider, you can also send messages to your care team and make appointments. If you have questions, please call your primary care clinic.  If you do not have a primary care provider, please call [phone] and they will assist you.        Care EveryWhere ID     This is your Care EveryWhere ID. This could be used by other organizations to access your [place] medical records  [id number]        Your Vitals Were     Pulse Pulse Oximetry                71 91%           Blood Pressure from Last 3 Encounters:   04/12/18 133/83   01/18/18 126/68   12/08/17 128/67    Weight from Last 3 Encounters:   01/18/18 78.9 kg (174 lb)   12/08/17 78.9 kg (174 lb)   10/26/17 81 kg (178 lb 8 oz)              Today, you had the following     No orders found for display         Today's Medication Changes          These changes are accurate as of 4/12/18 11:27 AM.  If you have any  questions, ask your nurse or doctor.               Start taking these medicines.        Dose/Directions    fluocinonide 0.05 % cream   Commonly known as:  LIDEX   Used for:  Asteatosis, Lentigo, SK (seborrheic keratosis), Angioma, Dermal nevus   Started by:  Reece Daniels MD        Apply topically 2 times daily   Quantity:  120 g   Refills:  3         These medicines have changed or have updated prescriptions.        Dose/Directions    flecainide acetate 150 MG Tabs   This may have changed:    - when to take this  - additional instructions   Used for:  Atrial fibrillation (H)        (takes 1/2 of 150 mg tablet = 75 mg dose)   Quantity:  45 tablet   Refills:  2            Where to get your medicines      Information about where to get these medications is not yet available     ! Ask your nurse or doctor about these medications     fluocinonide 0.05 % cream                Primary Care Provider Office Phone # Fax #    Richmond Martinez [phone]       [address] [place] [address]        Equal Access to Services     [facility]: Hadii sergey ku hadasho Soomaali, waaxda luqadaha, qaybta kaalmada adeegyada, waxay chapinin haymichele chauhan . So [facility] [phone].    ATENCIÓN: Si lori espgavi, tiene a charlton disposición servicios gratuitos de asistencia lingüística. Llame al [phone].    We comply with applicable federal civil rights laws and [place] laws. We do not discriminate on the basis of race, color, national origin, age, disability, sex, sexual orientation, or gender identity.            Thank you!     Thank you for choosing [facility]  for your care. Our goal is always to provide you with excellent care. Hearing back from our patients is one way we can continue to improve our services. Please take a few minutes to complete the written survey that you may receive in the mail after your visit with us. Thank you!             Your Updated Medication  List - Protect others around you: Learn how to safely use, store and throw away your medicines at www.disposemymeds.org.          This list is accurate as of 4/12/18 11:27 AM.  Always use your most recent med list.                   Brand Name Dispense Instructions for use Diagnosis    apixaban ANTICOAGULANT 5 MG tablet    ELIQUIS    180 tablet    Take 1 tablet (5 mg) by mouth 2 times daily    Paroxysmal atrial fibrillation (H)       beclomethasone 40 MCG/ACT Inhaler    QVAR    3 Inhaler    Inhale 2 puffs into the lungs daily    Chronic obstructive pulmonary disease, unspecified COPD type (H)       flecainide acetate 150 MG Tabs     45 tablet    (takes 1/2 of 150 mg tablet = 75 mg dose)    Atrial fibrillation (H)       fluocinonide 0.05 % cream    LIDEX    120 g    Apply topically 2 times daily    Asteatosis, Lentigo, SK (seborrheic keratosis), Angioma, Dermal nevus       levothyroxine 125 MCG tablet    SYNTHROID/LEVOTHROID    90 tablet    Take 1 tablet (125 mcg) by mouth daily    Hypothyroidism, unspecified type       losartan 25 MG tablet    COZAAR    90 tablet    Take 1 tablet (25 mg) by mouth daily    Essential hypertension with goal blood pressure less than 140/90       order for DME     1 Device    Equipment being ordered: spacer for inhaler    COPD (chronic obstructive pulmonary disease) (H)       pregabalin 25 MG capsule    LYRICA    60 capsule    Take 1 capsule (25 mg) by mouth 2 times daily    Herpes zoster without complication       PROAIR  (90 Base) MCG/ACT Inhaler   Generic drug:  albuterol     54 g    INHALE 2 PUFFS INTO THE LUNGS EVERY 4 HOURS AS NEEDED FOR SHORTNESS OF BREATH / DYSPNEA    COPD (chronic obstructive pulmonary disease) (H)

## 2018-04-12 NOTE — PROGRESS NOTES
Eder Horan is a 78 year old year old male patient here today for hx of non-melanoma skin cancer.  Today he notes spot on left cheek and itching on legs.   .  Patient states this has been present for  months.  Patient reports the following symptoms:  Itching on legs.  Patient reports the following previous treatments none.  Patient reports the following modifying factors none.  Associated symptoms: none.  Patient has no other skin complaints today.  Remainder of the HPI, Meds, PMH, Allergies, FH, and SH was reviewed in chart.      Past Medical History:   Diagnosis Date     Actinic keratosis 6/09    face     ALLERGIC RHINITIS       Atrial fibrillation (H) 6/08     Back pain     s/p LESI through ortho Feb 2010     Basal cell carcinoma      Bradycardia      Chronotropic incompetence with sinus node dysfunction (H)      COPD (chronic obstructive pulmonary disease) (H)      DVT      Left calf 2003. Right calf 6/06     Embolic stroke (H) 06/11/2017    superior cerebellar artery occlusion. Hx A fib     Hyperlipidemia LDL goal <130 5/10/2011     Hypertension      HYPOTHYROIDISM      hypothyroidism     Lateral epicondylitis  of elbow 7/05    left     Malignant melanoma (H)      PLANTAR FASCITIS      Prostate cancer (H) 6/09    on Lupron therapy     Squamous cell carcinoma  Sept 2014     right lower lid, s/p MOHS     Syncope      TMJ (temporomandibular joint syndrome) 4/12/2012     Tobacco use disorder        Past Surgical History:   Procedure Laterality Date     ABDOMEN SURGERY  2004,06,09    Hernias (2) Prostate Removal(2009)     BIOPSY  2009    Prostate     C NONSPECIFIC PROCEDURE  1/07    Left groin exploration and left inguinal herniorrhaphy     C NONSPECIFIC PROCEDURE  5/08    Prostate bx (benign)     C NONSPECIFIC PROCEDURE  8/09    Wide local excision (left side), robotic-assisted laparoscopic prostatectomy and   Left pelvic lymph node dissection     C NONSPECIFIC PROCEDURE  August 2010    Right inguinal  herniorrhaphy with mesh     CARDIOVERSION  10-16-08    failed     COLONOSCOPY  2004     EYE SURGERY      2013 lump from lower  left eye lid removed     GENITOURINARY SURGERY  2009    Prostate     HERNIA REPAIR  2004 - 2006        Family History   Problem Relation Age of Onset     C.A.D. Father      heart attack- angina     Coronary Artery Disease Father      Myocardial Infarction Father      CANCER Mother      lung     Lung Cancer Mother      Lung Cancer Sister      Unknown/Adopted Maternal Grandmother      Unknown/Adopted Maternal Grandfather      Unknown/Adopted Paternal Grandmother      Unknown/Adopted Paternal Grandfather      Unknown/Adopted Sister        Social History     Social History     Marital status:      Spouse name: N/A     Number of children: 3     Years of education: N/A     Occupational History     [employer]      Retired     Social History Main Topics     Smoking status: Former Smoker     Types: Cigars     Quit date: 12/1/2006     Smokeless tobacco: Never Used      Comment: pipe daily     Alcohol use 0.0 oz/week     0 Standard drinks or equivalent per week      Comment: 2-3 drinks a day:  Wine/Beer     Drug use: No     Sexual activity: No     Other Topics Concern     Parent/Sibling W/ Cabg, Mi Or Angioplasty Before 65f 55m? No     Caffeine Concern No     2-3 cups of coffee a day     Sleep Concern No     Stress Concern No     Weight Concern No     Special Diet No     Exercise No     Seat Belt Yes     Social History Narrative       Outpatient Encounter Prescriptions as of 4/12/2018   Medication Sig Dispense Refill     levothyroxine (SYNTHROID/LEVOTHROID) 125 MCG tablet Take 1 tablet (125 mcg) by mouth daily 90 tablet 2     flecainide acetate 150 MG TABS (takes 1/2 of 150 mg tablet = 75 mg dose) (Patient taking differently: 2 times daily (takes 1/2 of 150 mg tablet = 75 mg dose, twice daily)) 45 tablet 2     pregabalin (LYRICA) 25 MG capsule Take 1 capsule (25 mg) by mouth 2 times daily  60 capsule 5     PROAIR  (90 BASE) MCG/ACT inhaler INHALE 2 PUFFS INTO THE LUNGS EVERY 4 HOURS AS NEEDED FOR SHORTNESS OF BREATH / DYSPNEA 54 g 2     beclomethasone (QVAR) 40 MCG/ACT Inhaler Inhale 2 puffs into the lungs daily 3 Inhaler 3     losartan (COZAAR) 25 MG tablet Take 1 tablet (25 mg) by mouth daily 90 tablet 3     apixaban ANTICOAGULANT (ELIQUIS) 5 MG tablet Take 1 tablet (5 mg) by mouth 2 times daily 180 tablet 3     ORDER FOR DME Equipment being ordered: spacer for inhaler 1 Device 1     No facility-administered encounter medications on file as of 4/12/2018.              Review Of Systems  Skin: As above  Eyes: negative  Ears/Nose/Throat: negative  Respiratory: No shortness of breath, dyspnea on exertion, cough, or hemoptysis  Cardiovascular: negative  Gastrointestinal: negative  Genitourinary: negative  Musculoskeletal: negative  Neurologic: negative  Psychiatric: negative  Hematologic/Lymphatic/Immunologic: negative  Endocrine: negative      O:   NAD, WDWN, Alert & Oriented, Mood & Affect wnl, Vitals stable   Here today alone   /83  Pulse 71  SpO2 91%   General appearance normal   Vitals stable   Alert, oriented and in no acute distress      Following lymph nodes palpated: Occipital, Cervical, Supraclavicular no lad   Stuck on papules and brown macules on trunk and ext    L cheek stuck on appule   Red papules on trunk    Flesh colored papules on trunk   Asteatotic plaque son legs        The remainder of the full exam was unremarkable; the following areas were examined:  conjunctiva/lids, oral mucosa, neck, peripheral vascular system, abdomen, lymph nodes, digits/nails, eccrine and apocrine glands, scalp/hair, face, neck, chest, abdomen, buttocks, back, RUE, LUE, RLE, LLE       Eyes: Conjunctivae/lids:Normal     ENT: Lips, buccal mucosa, tongue: normal    MSK:Normal    Cardiovascular: peripheral edema none    Pulm: Breathing Normal    Lymph Nodes: No Head and Neck Lymphadenopathy      Neuro/Psych: Orientation:Normal; Mood/Affect:Normal      A/P:  1. Hx of non-melanoma skin cancer, seborrheic keratosis, eltnigo, angioma, dermal nevus  2. ASteatosis   Lidex prn  Emollient use eucerin discussed with patient   BENIGN LESIONS DISCUSSED WITH PATIENT:  I discussed the specifics of tumor, prognosis, and genetics of benign lesions.  I explained that treatment of these lesions would be purely cosmetic and not medically neccessary.  I discussed with patient different removal options including excision, cautery and /or laser.      Nature and genetics of benign skin lesions dicussed with patient.  Signs and Symptoms of skin cancer discussed with patient.  Patient encouraged to perform monthly skin exams.  UV precautions reviewed with patient.  Patient to follow up with Primary Care provider regarding elevated blood pressure.  Skin care regimen reviewed with patient: Eliminate harsh soaps, i.e. Dial, zest, irsih spring; Mild soaps such as Cetaphil or Dove sensitive skin, avoid hot or cold showers, aggressive use of emollients including vanicream, cetaphil or cerave discussed with patient.    Risks of non-melanoma skin cancer discussed with patient   Return to clinic 12 months

## 2018-04-12 NOTE — LETTER
4/12/2018         RE: Eder Horan  [address] [address]        Dear Colleague,    Thank you for referring your patient, Eder Horan, to the [facility]. Please see a copy of my visit note below.    Eder Horan is a 78 year old year old male patient here today for hx of non-melanoma skin cancer.  Today he notes spot on left cheek and itching on legs.   .  Patient states this has been present for  months.  Patient reports the following symptoms:  Itching on legs.  Patient reports the following previous treatments none.  Patient reports the following modifying factors none.  Associated symptoms: none.  Patient has no other skin complaints today.  Remainder of the HPI, Meds, PMH, Allergies, FH, and SH was reviewed in chart.      Past Medical History:   Diagnosis Date     Actinic keratosis 6/09    face     ALLERGIC RHINITIS       Atrial fibrillation (H) 6/08     Back pain     s/p LESI through ortho Feb 2010     Basal cell carcinoma      Bradycardia      Chronotropic incompetence with sinus node dysfunction (H)      COPD (chronic obstructive pulmonary disease) (H)      DVT      Left calf 2003. Right calf 6/06     Embolic stroke (H) 06/11/2017    superior cerebellar artery occlusion. Hx A fib     Hyperlipidemia LDL goal <130 5/10/2011     Hypertension      HYPOTHYROIDISM      hypothyroidism     Lateral epicondylitis  of elbow 7/05    left     Malignant melanoma (H)      PLANTAR FASCITIS      Prostate cancer (H) 6/09    on Lupron therapy     Squamous cell carcinoma  Sept 2014     right lower lid, s/p MOHS     Syncope      TMJ (temporomandibular joint syndrome) 4/12/2012     Tobacco use disorder        Past Surgical History:   Procedure Laterality Date     ABDOMEN SURGERY  2004,06,09    Hernias (2) Prostate Removal(2009)     BIOPSY  2009    Prostate     C NONSPECIFIC PROCEDURE  1/07    Left groin exploration and left inguinal herniorrhaphy     C NONSPECIFIC  PROCEDURE  5/08    Prostate bx (benign)     C NONSPECIFIC PROCEDURE  8/09    Wide local excision (left side), robotic-assisted laparoscopic prostatectomy and   Left pelvic lymph node dissection     C NONSPECIFIC PROCEDURE  August 2010    Right inguinal herniorrhaphy with mesh     CARDIOVERSION  10-16-08    failed     COLONOSCOPY  2004     EYE SURGERY      2013 lump from lower  left eye lid removed     GENITOURINARY SURGERY  2009    Prostate     HERNIA REPAIR  2004 - 2006        Family History   Problem Relation Age of Onset     C.A.D. Father      heart attack- angina     Coronary Artery Disease Father      Myocardial Infarction Father      CANCER Mother      lung     Lung Cancer Mother      Lung Cancer Sister      Unknown/Adopted Maternal Grandmother      Unknown/Adopted Maternal Grandfather      Unknown/Adopted Paternal Grandmother      Unknown/Adopted Paternal Grandfather      Unknown/Adopted Sister        Social History     Social History     Marital status:      Spouse name: N/A     Number of children: 3     Years of education: N/A     Occupational History     [employer]      Retired     Social History Main Topics     Smoking status: Former Smoker     Types: Cigars     Quit date: 12/1/2006     Smokeless tobacco: Never Used      Comment: pipe daily     Alcohol use 0.0 oz/week     0 Standard drinks or equivalent per week      Comment: 2-3 drinks a day:  Wine/Beer     Drug use: No     Sexual activity: No     Other Topics Concern     Parent/Sibling W/ Cabg, Mi Or Angioplasty Before 65f 55m? No     Caffeine Concern No     2-3 cups of coffee a day     Sleep Concern No     Stress Concern No     Weight Concern No     Special Diet No     Exercise No     Seat Belt Yes     Social History Narrative       Outpatient Encounter Prescriptions as of 4/12/2018   Medication Sig Dispense Refill     levothyroxine (SYNTHROID/LEVOTHROID) 125 MCG tablet Take 1 tablet (125 mcg) by mouth daily 90 tablet 2     flecainide  acetate 150 MG TABS (takes 1/2 of 150 mg tablet = 75 mg dose) (Patient taking differently: 2 times daily (takes 1/2 of 150 mg tablet = 75 mg dose, twice daily)) 45 tablet 2     pregabalin (LYRICA) 25 MG capsule Take 1 capsule (25 mg) by mouth 2 times daily 60 capsule 5     PROAIR  (90 BASE) MCG/ACT inhaler INHALE 2 PUFFS INTO THE LUNGS EVERY 4 HOURS AS NEEDED FOR SHORTNESS OF BREATH / DYSPNEA 54 g 2     beclomethasone (QVAR) 40 MCG/ACT Inhaler Inhale 2 puffs into the lungs daily 3 Inhaler 3     losartan (COZAAR) 25 MG tablet Take 1 tablet (25 mg) by mouth daily 90 tablet 3     apixaban ANTICOAGULANT (ELIQUIS) 5 MG tablet Take 1 tablet (5 mg) by mouth 2 times daily 180 tablet 3     ORDER FOR DME Equipment being ordered: spacer for inhaler 1 Device 1     No facility-administered encounter medications on file as of 4/12/2018.              Review Of Systems  Skin: As above  Eyes: negative  Ears/Nose/Throat: negative  Respiratory: No shortness of breath, dyspnea on exertion, cough, or hemoptysis  Cardiovascular: negative  Gastrointestinal: negative  Genitourinary: negative  Musculoskeletal: negative  Neurologic: negative  Psychiatric: negative  Hematologic/Lymphatic/Immunologic: negative  Endocrine: negative      O:   NAD, WDWN, Alert & Oriented, Mood & Affect wnl, Vitals stable   Here today alone   /83  Pulse 71  SpO2 91%   General appearance normal   Vitals stable   Alert, oriented and in no acute distress      Following lymph nodes palpated: Occipital, Cervical, Supraclavicular no lad   Stuck on papules and brown macules on trunk and ext    L cheek stuck on appule   Red papules on trunk    Flesh colored papules on trunk   Asteatotic plaque son legs        The remainder of the full exam was unremarkable; the following areas were examined:  conjunctiva/lids, oral mucosa, neck, peripheral vascular system, abdomen, lymph nodes, digits/nails, eccrine and apocrine glands, scalp/hair, face, neck, chest,  abdomen, buttocks, back, RUE, LUE, RLE, LLE       Eyes: Conjunctivae/lids:Normal     ENT: Lips, buccal mucosa, tongue: normal    MSK:Normal    Cardiovascular: peripheral edema none    Pulm: Breathing Normal    Lymph Nodes: No Head and Neck Lymphadenopathy     Neuro/Psych: Orientation:Normal; Mood/Affect:Normal      A/P:  1. Hx of non-melanoma skin cancer, seborrheic keratosis, eltnigo, angioma, dermal nevus  2. ASteatosis   Lidex prn  Emollient use eucerin discussed with patient   BENIGN LESIONS DISCUSSED WITH PATIENT:  I discussed the specifics of tumor, prognosis, and genetics of benign lesions.  I explained that treatment of these lesions would be purely cosmetic and not medically neccessary.  I discussed with patient different removal options including excision, cautery and /or laser.      Nature and genetics of benign skin lesions dicussed with patient.  Signs and Symptoms of skin cancer discussed with patient.  Patient encouraged to perform monthly skin exams.  UV precautions reviewed with patient.  Patient to follow up with Primary Care provider regarding elevated blood pressure.  Skin care regimen reviewed with patient: Eliminate harsh soaps, i.e. Dial, zest, irsih spring; Mild soaps such as Cetaphil or Dove sensitive skin, avoid hot or cold showers, aggressive use of emollients including vanicream, cetaphil or cerave discussed with patient.    Risks of non-melanoma skin cancer discussed with patient   Return to clinic 12 months      Again, thank you for allowing me to participate in the care of your patient.        Sincerely,        Reece Daniels MD

## 2018-04-12 NOTE — NURSING NOTE
"Initial /83  Pulse 71  SpO2 91% Estimated body mass index is 24.97 kg/(m^2) as calculated from the following:    Height as of 1/18/18: 1.778 m (5' 10\").    Weight as of 1/18/18: 78.9 kg (174 lb). .  SERGEY Olea-BSN  [facility]  [phone]  "

## 2018-04-18 ENCOUNTER — PRE VISIT (OUTPATIENT)
Dept: CARDIOLOGY | Facility: CLINIC | Age: 78
End: 2018-04-18

## 2018-04-23 ENCOUNTER — HOSPITAL ENCOUNTER (OUTPATIENT)
Dept: CARDIOLOGY | Facility: CLINIC | Age: 78
End: 2018-04-23
Attending: INTERNAL MEDICINE
Payer: COMMERCIAL

## 2018-04-23 ENCOUNTER — HOSPITAL ENCOUNTER (OUTPATIENT)
Dept: CARDIOLOGY | Facility: CLINIC | Age: 78
Discharge: HOME | End: 2018-04-23
Attending: INTERNAL MEDICINE | Admitting: INTERNAL MEDICINE
Payer: COMMERCIAL

## 2018-04-23 DIAGNOSIS — I48.0 PAROXYSMAL ATRIAL FIBRILLATION (H): ICD-10-CM

## 2018-04-23 DIAGNOSIS — R55 SYNCOPE, UNSPECIFIED SYNCOPE TYPE: ICD-10-CM

## 2018-04-23 DIAGNOSIS — R00.1 BRADYCARDIA: ICD-10-CM

## 2018-04-23 DIAGNOSIS — I74.9 EMBOLISM AND THROMBOSIS (H): ICD-10-CM

## 2018-04-23 DIAGNOSIS — E78.5 HYPERLIPIDEMIA LDL GOAL <130: ICD-10-CM

## 2018-04-23 DIAGNOSIS — I10 ESSENTIAL HYPERTENSION WITH GOAL BLOOD PRESSURE LESS THAN 140/90: ICD-10-CM

## 2018-04-23 LAB
ALT SERPL W P-5'-P-CCNC: <5 U/L (ref 5–30)
ANION GAP SERPL CALCULATED.3IONS-SCNC: 12.2 MMOL/L (ref 6–17)
BUN SERPL-MCNC: 13 MG/DL (ref 7–30)
CALCIUM SERPL-MCNC: 9 MG/DL (ref 8.5–10.5)
CHLORIDE SERPL-SCNC: 105 MMOL/L (ref 98–107)
CHOLEST SERPL-MCNC: 203 MG/DL
CO2 SERPL-SCNC: 27 MMOL/L (ref 23–29)
CREAT SERPL-MCNC: 0.95 MG/DL (ref 0.7–1.3)
GFR SERPL CREATININE-BSD FRML MDRD: 77 ML/MIN/1.7M2
GLUCOSE SERPL-MCNC: 102 MG/DL (ref 70–105)
HDLC SERPL-MCNC: 99 MG/DL
LDLC SERPL CALC-MCNC: 94 MG/DL
NONHDLC SERPL-MCNC: 104 MG/DL
POTASSIUM SERPL-SCNC: 4.2 MMOL/L (ref 3.5–5.1)
SODIUM SERPL-SCNC: 140 MMOL/L (ref 136–145)
TRIGL SERPL-MCNC: 50 MG/DL

## 2018-04-23 PROCEDURE — 36415 COLL VENOUS BLD VENIPUNCTURE: CPT | Performed by: INTERNAL MEDICINE

## 2018-04-23 PROCEDURE — 80061 LIPID PANEL: CPT | Performed by: INTERNAL MEDICINE

## 2018-04-23 PROCEDURE — 93227 XTRNL ECG REC<48 HR R&I: CPT | Performed by: INTERNAL MEDICINE

## 2018-04-23 PROCEDURE — 84460 ALANINE AMINO (ALT) (SGPT): CPT | Performed by: INTERNAL MEDICINE

## 2018-04-23 PROCEDURE — 93306 TTE W/DOPPLER COMPLETE: CPT | Mod: 26 | Performed by: INTERNAL MEDICINE

## 2018-04-23 PROCEDURE — 93226 XTRNL ECG REC<48 HR SCAN A/R: CPT

## 2018-04-23 PROCEDURE — 80048 BASIC METABOLIC PNL TOTAL CA: CPT | Performed by: INTERNAL MEDICINE

## 2018-04-23 PROCEDURE — 93306 TTE W/DOPPLER COMPLETE: CPT

## 2018-04-25 LAB — INTERPRETATION MONITOR -MUSE: NORMAL

## 2018-05-11 ENCOUNTER — OFFICE VISIT (OUTPATIENT)
Dept: CARDIOLOGY | Facility: CLINIC | Age: 78
End: 2018-05-11
Attending: INTERNAL MEDICINE
Payer: COMMERCIAL

## 2018-05-11 VITALS
SYSTOLIC BLOOD PRESSURE: 128 MMHG | HEART RATE: 60 BPM | WEIGHT: 171 LBS | BODY MASS INDEX: 24.48 KG/M2 | DIASTOLIC BLOOD PRESSURE: 78 MMHG | HEIGHT: 70 IN

## 2018-05-11 DIAGNOSIS — R55 SYNCOPE, UNSPECIFIED SYNCOPE TYPE: ICD-10-CM

## 2018-05-11 DIAGNOSIS — I10 ESSENTIAL HYPERTENSION WITH GOAL BLOOD PRESSURE LESS THAN 140/90: ICD-10-CM

## 2018-05-11 DIAGNOSIS — I74.9 EMBOLISM AND THROMBOSIS (H): ICD-10-CM

## 2018-05-11 DIAGNOSIS — I48.0 PAROXYSMAL ATRIAL FIBRILLATION (H): ICD-10-CM

## 2018-05-11 DIAGNOSIS — R00.1 BRADYCARDIA: ICD-10-CM

## 2018-05-11 DIAGNOSIS — E78.5 HYPERLIPIDEMIA LDL GOAL <130: ICD-10-CM

## 2018-05-11 PROCEDURE — 99214 OFFICE O/P EST MOD 30 MIN: CPT | Performed by: INTERNAL MEDICINE

## 2018-05-11 RX ORDER — VALACYCLOVIR HYDROCHLORIDE 1 G/1
500 TABLET, FILM COATED ORAL DAILY
Refills: 1 | COMMUNITY
Start: 2018-03-19 | End: 2019-01-18

## 2018-05-11 RX ORDER — FLECAINIDE ACETATE 150 MG/1
75 TABLET ORAL DAILY
COMMUNITY
End: 2018-08-10

## 2018-05-11 NOTE — PROGRESS NOTES
Service Date: 05/11/2018      HISTORY OF PRESENT ILLNESS:  The patient is a 78-year-old mildly overweight white male with history of paroxysmal atrial fibrillation with rapid ventricular response.  He was admitted to [facility] in 2008 and started on Cardizem for rate control as well as warfarin for anticoagulation and DC cardioversion.  Initial attempt was unsuccessful.  He was seen by Electrophysiology with institution of flecainide with repeat cardioversion that was successful initially on flecainide and Cardizem.  He had heart rates in the low 40s so the Cardizem is being discontinued.        Since the last clinic visit, he has done well.  He has had no significant symptoms of chest discomfort, shortness of breath at rest, dizziness, palpitations, nausea, vomiting, diaphoresis or syncope.  He has occasional lightheadedness if he changes position too quickly and does have some easy fatigability and general malaise.  He denies PND, orthopnea, fever, chills or sweats.  He has no documented history of myocardial infarction, congestive heart failure, rheumatic heart disease, congenital heart disease or heart murmur.  He is mostly limited by easy fatigability, malaise and arthritis.        Cardiac stress testing was performed in 2015 and showed no evidence of ischemia or infarct.  Holter monitor demonstrates a sinus rhythm with no evidence of atrial fibrillation with a heart rate varying from 46-94 and average rate of 57.  There were isolated supraventricular beats and ventricular beats with one 6-beat run of supraventricular tachycardia but no persistent tachydysrhythmia.        The patient's most recent echocardiogram demonstrated a normal-sized left ventricle with intact systolic function.  Ejection fraction 55%-60% with grade 2 or moderate diastolic dysfunction.  There is mild biatrial enlargement.  There was mild tricuspid insufficiency with a right ventricular systolic pressure noted at 26 mmHg  plus right atrial pressure.        MEDICATIONS:   1.  Eliquis 5 mg twice a day.   2.  Qvar inhaler as directed.   3.  Flecainide 75 mg a day.   4.  Levothyroxine 125 mcg a day.   5.  Losartan 25 mg a day.   6.  ProAir inhaler as directed.   7.  Valtrex 500 mg twice daily as needed.        He participates in activities without significant restriction.  He did have an episode in 2017, an apparent TIA/CVA with slurred speech and lack of balance.  With tPA, there was successful resolution of most of his symptoms.  At that time, he was continued on chronic anticoagulation and referred for further evaluation of hypertension.      LABORATORY DATA:  Demonstrated cholesterol 203, HDL 99, LDL 94, triglyceride 50.  Sodium 140, potassium 4.2, BUN 13, creatinine 0.95.  ALT less than 5.      PHYSICAL EXAMINATION:   VITAL SIGNS:  Blood pressure 128/78 with a heart rate of 60 and regular.  Weight was 171 pounds, which is up 4 pounds from previous weight.   NECK:  Without jugular venous distention, carotid bruit or palpable thyroid.   CHEST:  Essentially clear to percussion and auscultation with slight decreased breath sounds in the bases.   CARDIAC:  Regular rhythm with a soft S4 gallop, 1-2/6 systolic murmur at the left sternal border with radiation.  No diastolic murmur, rub or S3.   EXTREMITIES:  Without cyanosis or edema.      CLINICAL IMPRESSION:   1.  Stable cardiac condition.   2.  History of paroxysmal atrial fibrillation.   3.  Chronic obstructive pulmonary disease with isolated bronchospasm.   4.  Hypertension, well controlled.   5.  Borderline hyperlipidemia.   6.  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      DISCUSSION:  The patient has done well since his last clinic visit.  He is able to participate in most activities without significant restriction.  He does have easy fatigability and general malaise.  He has not noted significant palpitations at this time.  Consideration was given  for pacemaker insertion because of chronotropic incompetence; however, this has not been pursued at this time.  He will need close followup of his serum lipids, basic metabolic panel and blood pressure.  Echocardiogram shows intact left ventricular function.  He will also continue his anticoagulation.  He will also be advised to use a cane or walking stick because of his risk for followup.      RECOMMENDATIONS:   1.  Continue present medications.   2.  Diet and exercise as tolerated, avoiding caffeine, salt and cholesterol.   3.  Close followup of serum lipids, basic metabolic panel and blood pressure with followup with Amira Mendes in 3 months.   4.  Routine medical followup.   5.  EP followup in 6 months.      cc:      Richmond Martinez MD    [facility]   [address] [place] [address]         MAURILIO CONDE MD, [facility]             D: 2018   T: 2018   MT: JENNIFER      Name:     AUGUSTINE MYRICK   MRN:      [id number]        Account:      [id number]   :      1940           Service Date: 2018      Document: [id number]

## 2018-05-11 NOTE — MR AVS SNAPSHOT
After Visit Summary   5/11/2018    Eder Horan    MRN: [id number]           Patient Information     Date Of Birth          1940        Visit Information        Provider Department      5/11/2018 7:45 AM Jaya Franklin MD [facility]        Today's Diagnoses     Paroxysmal atrial fibrillation (H)        Essential hypertension with goal blood pressure less than 140/90        Embolism and thrombosis        Bradycardia        Syncope, unspecified syncope type        Hyperlipidemia LDL goal <130           Follow-ups after your visit        Additional Services     Follow-Up with Cardiac Advanced Practice Provider           Follow-Up with Electrophysiologist                 Your next 10 appointments already scheduled     Apr 11, 2019 10:30 AM CDT   Return Visit with Reece Daniels MD   [facility] ([facility]    [address] [place] [id number] [phone]              Future tests that were ordered for you today     Open Future Orders        Priority Expected Expires Ordered    Basic metabolic panel Routine 11/7/2018 5/11/2019 5/11/2018    Lipid Profile Routine 11/7/2018 5/11/2019 5/11/2018    ALT Routine 11/7/2018 5/11/2019 5/11/2018    Follow-Up with Electrophysiologist Routine 11/7/2018 5/11/2019 5/11/2018    Basic metabolic panel Routine 8/9/2018 5/11/2019 5/11/2018    Lipid Profile Routine 8/9/2018 5/11/2019 5/11/2018    ALT Routine 8/9/2018 5/11/2019 5/11/2018    Follow-Up with Cardiac Advanced Practice Provider Routine 8/9/2018 5/11/2019 5/11/2018            Who to contact     If you have questions or need follow up information about today's clinic visit or your schedule please contact [facility] directly at [phone].  Normal or non-critical lab and imaging results will be communicated to you by MyChart, letter or phone within 4  "business days after the clinic has received the results. If you do not hear from us within 7 days, please contact the clinic through [employer] or phone. If you have a critical or abnormal lab result, we will notify you by phone as soon as possible.  Submit refill requests through [employer] or call your pharmacy and they will forward the refill request to us. Please allow 3 business days for your refill to be completed.          Additional Information About Your Visit        [employer] Information     [employer] gives you secure access to your electronic health record. If you see a primary care provider, you can also send messages to your care team and make appointments. If you have questions, please call your primary care clinic.  If you do not have a primary care provider, please call [phone] and they will assist you.        Care EveryWhere ID     This is your Care EveryWhere ID. This could be used by other organizations to access your [facility] medical records  [id number]        Your Vitals Were     Pulse Height BMI (Body Mass Index)             60 1.778 m (5' 10\") 24.54 kg/m2          Blood Pressure from Last 3 Encounters:   05/11/18 128/78   04/12/18 133/83   01/18/18 126/68    Weight from Last 3 Encounters:   05/11/18 77.6 kg (171 lb)   01/18/18 78.9 kg (174 lb)   12/08/17 78.9 kg (174 lb)              We Performed the Following     Follow-Up with Cardiologist        Primary Care Provider Office Phone # Fax #    Richmond Martinez [phone]       [address] [place] [address]        Equal Access to Services     [facility]: Hadii aad ku hadasho Soomaali, waaxda luqadaha, qaybta kaalmada chun aguilar . So [facility] [phone].    ATENCIÓN: Si habla español, tiene a charlton disposición servicios gratuitos de asistencia lingüística. Llame al [phone].    We comply with applicable federal civil rights laws and [place] laws. We do not discriminate on the basis of " race, color, national origin, age, disability, sex, sexual orientation, or gender identity.            Thank you!     Thank you for choosing [facility]  for your care. Our goal is always to provide you with excellent care. Hearing back from our patients is one way we can continue to improve our services. Please take a few minutes to complete the written survey that you may receive in the mail after your visit with us. Thank you!             Your Updated Medication List - Protect others around you: Learn how to safely use, store and throw away your medicines at www.disposemymeds.org.          This list is accurate as of 5/11/18  8:41 AM.  Always use your most recent med list.                   Brand Name Dispense Instructions for use Diagnosis    apixaban ANTICOAGULANT 5 MG tablet    ELIQUIS    180 tablet    Take 1 tablet (5 mg) by mouth 2 times daily    Paroxysmal atrial fibrillation (H)       beclomethasone 40 MCG/ACT Inhaler    QVAR    3 Inhaler    Inhale 2 puffs into the lungs daily    Chronic obstructive pulmonary disease, unspecified COPD type (H)       flecainide acetate 150 MG Tabs      Take 75 mg by mouth daily        fluocinonide 0.05 % cream    LIDEX    120 g    Apply topically 2 times daily    Asteatosis, Lentigo, SK (seborrheic keratosis), Angioma, Dermal nevus       levothyroxine 125 MCG tablet    SYNTHROID/LEVOTHROID    90 tablet    Take 1 tablet (125 mcg) by mouth daily    Hypothyroidism, unspecified type       losartan 25 MG tablet    COZAAR    90 tablet    Take 1 tablet (25 mg) by mouth daily    Essential hypertension with goal blood pressure less than 140/90       order for DME     1 Device    Equipment being ordered: spacer for inhaler    COPD (chronic obstructive pulmonary disease) (H)       PROAIR  (90 Base) MCG/ACT Inhaler   Generic drug:  albuterol     54 g    INHALE 2 PUFFS INTO THE LUNGS EVERY 4 HOURS AS NEEDED FOR SHORTNESS OF BREATH / DYSPNEA     COPD (chronic obstructive pulmonary disease) (H)       valACYclovir 1000 mg tablet    VALTREX     500 mg 2 times daily

## 2018-05-11 NOTE — LETTER
5/11/2018    Richmond Martinez MD  [address] [facility] [address]    RE: Eder Horan       Dear Colleague,    I had the pleasure of seeing Eder Horan in the [facility].    Service Date: 05/11/2018      HISTORY OF PRESENT ILLNESS:  The patient is a 78-year-old mildly overweight white male with history of paroxysmal atrial fibrillation with rapid ventricular response.  He was admitted to [facility] in 2008 and started on Cardizem for rate control as well as warfarin for anticoagulation and DC cardioversion.  Initial attempt was unsuccessful.  He was seen by Electrophysiology with institution of flecainide with repeat cardioversion that was successful initially on flecainide and Cardizem.  He had heart rates in the low 40s so the Cardizem is being discontinued.        Since the last clinic visit, he has done well.  He has had no significant symptoms of chest discomfort, shortness of breath at rest, dizziness, palpitations, nausea, vomiting, diaphoresis or syncope.  He has occasional lightheadedness if he changes position too quickly and does have some easy fatigability and general malaise.  He denies PND, orthopnea, fever, chills or sweats.  He has no documented history of myocardial infarction, congestive heart failure, rheumatic heart disease, congenital heart disease or heart murmur.  He is mostly limited by easy fatigability, malaise and arthritis.        Cardiac stress testing was performed in 2015 and showed no evidence of ischemia or infarct.  Holter monitor demonstrates a sinus rhythm with no evidence of atrial fibrillation with a heart rate varying from 46-94 and average rate of 57.  There were isolated supraventricular beats and ventricular beats with one 6-beat run of supraventricular tachycardia but no persistent tachydysrhythmia.        The patient's most recent echocardiogram demonstrated a normal-sized left ventricle with intact systolic function.   Ejection fraction 55%-60% with grade 2 or moderate diastolic dysfunction.  There is mild biatrial enlargement.  There was mild tricuspid insufficiency with a right ventricular systolic pressure noted at 26 mmHg plus right atrial pressure.        MEDICATIONS:   1.  Eliquis 5 mg twice a day.   2.  Qvar inhaler as directed.   3.  Flecainide 75 mg a day.   4.  Levothyroxine 125 mcg a day.   5.  Losartan 25 mg a day.   6.  ProAir inhaler as directed.   7.  Valtrex 500 mg twice daily as needed.        He participates in activities without significant restriction.  He did have an episode in 2017, an apparent TIA/CVA with slurred speech and lack of balance.  With tPA, there was successful resolution of most of his symptoms.  At that time, he was continued on chronic anticoagulation and referred for further evaluation of hypertension.      LABORATORY DATA:  Demonstrated cholesterol 203, HDL 99, LDL 94, triglyceride 50.  Sodium 140, potassium 4.2, BUN 13, creatinine 0.95.  ALT less than 5.      PHYSICAL EXAMINATION:   VITAL SIGNS:  Blood pressure 128/78 with a heart rate of 60 and regular.  Weight was 171 pounds, which is up 4 pounds from previous weight.   NECK:  Without jugular venous distention, carotid bruit or palpable thyroid.   CHEST:  Essentially clear to percussion and auscultation with slight decreased breath sounds in the bases.   CARDIAC:  Regular rhythm with a soft S4 gallop, 1-2/6 systolic murmur at the left sternal border with radiation.  No diastolic murmur, rub or S3.   EXTREMITIES:  Without cyanosis or edema.      CLINICAL IMPRESSION:   1.  Stable cardiac condition.   2.  History of paroxysmal atrial fibrillation.   3.  Chronic obstructive pulmonary disease with isolated bronchospasm.   4.  Hypertension, well controlled.   5.  Borderline hyperlipidemia.   6.  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      DISCUSSION:  The patient has done well since his last clinic visit.  He  is able to participate in most activities without significant restriction.  He does have easy fatigability and general malaise.  He has not noted significant palpitations at this time.  Consideration was given for pacemaker insertion because of chronotropic incompetence; however, this has not been pursued at this time.  He will need close followup of his serum lipids, basic metabolic panel and blood pressure.  Echocardiogram shows intact left ventricular function.  He will also continue his anticoagulation.  He will also be advised to use a cane or walking stick because of his risk for followup.      RECOMMENDATIONS:   1.  Continue present medications.   2.  Diet and exercise as tolerated, avoiding caffeine, salt and cholesterol.   3.  Close followup of serum lipids, basic metabolic panel and blood pressure with followup with Amira Mendes in 3 months.   4.  Routine medical followup.   5.  EP followup in 6 months.      cc:      Richmond Martinez MD    [facility]   [address] [place] [id number]         MAURILIO CONDE MD, [facility]             D: 2018   T: 2018   MT:       Name:     AUGUSTINE MYRICK   MRN:      [id number]        Account:      [id number]   :      1940           Service Date: 2018      Document: [id number]       Thank you for allowing me to participate in the care of your patient.      Sincerely,     Maurilio Conde MD     [facility]

## 2018-06-24 DIAGNOSIS — I48.0 PAROXYSMAL ATRIAL FIBRILLATION (H): ICD-10-CM

## 2018-06-24 NOTE — LETTER
[facility]  [address] [id number] [phone]            Eder Horan  [address] [address]        June 25, 2018    Dear Eder,    While refilling your prescription today, we noticed that you are due to have labs drawn.  We will refill your prescription for 30 days, but a follow-up appointment must be made before any additional refills can be approved.     Taking care of your health is important to us and we look forward to seeing you in the near future.  Please call us at [phone] or [phone] (or use [employer]) to schedule an appointment.     Please disregard this notice if you have already made an appointment.    Sincerely,        [facility]

## 2018-06-25 RX ORDER — APIXABAN 5 MG/1
TABLET, FILM COATED ORAL
Qty: 60 TABLET | Refills: 0 | Status: SHIPPED | OUTPATIENT
Start: 2018-06-25 | End: 2018-06-28

## 2018-06-25 NOTE — TELEPHONE ENCOUNTER
"Requested Prescriptions   Pending Prescriptions Disp Refills     ELIQUIS 5 MG tablet [Pharmacy Med Name: Eliquis Oral Tablet 5 MG] 180 tablet 2     Sig: TAKE ONE TABLET BY MOUTH TWICE DAILY.    Platelet Inhibitors Failed    6/24/2018  4:00 PM       Failed - Normal ALT on file in past 12 months    Recent Labs   Lab Test  04/23/18   0842   ALT  <5*            Failed - Normal HGB on file in past 12 months    Recent Labs   Lab Test  06/11/17   1958   HGB  14.1              Failed - Normal AST on file in past 12 months    Recent Labs   Lab Test  04/08/13   1024   AST  25            Failed - Normal Platelets on file in past 12 months    Recent Labs   Lab Test  06/12/17   0330   PLT  202              Passed - Recent (12 mo) or future (30 days) visit within the authorizing provider's specialty    Patient had office visit in the last 12 months or has a visit in the next 30 days with authorizing provider or within the authorizing provider's specialty.  See \"Patient Info\" tab in inbasket, or \"Choose Columns\" in Meds & Orders section of the refill encounter.           Passed - Patient is age 18 or older       Passed - Normal serum creatinine on file in past 12 months    Recent Labs   Lab Test  04/23/18   0842   CR  0.95             Last office visit: 12/8/2017 with prescribing provider:     Future Office Visit:   Next 5 appointments (look out 90 days)     Aug 10, 2018 10:30 AM CDT   Return Visit with MARECLO Skinner CNP   [facility] ([facility])    [address] [place] [id number] [phone] OPT 2                   "

## 2018-06-28 DIAGNOSIS — I48.0 PAROXYSMAL ATRIAL FIBRILLATION (H): ICD-10-CM

## 2018-06-28 LAB
AST SERPL W P-5'-P-CCNC: 12 U/L (ref 0–45)
ERYTHROCYTE [DISTWIDTH] IN BLOOD BY AUTOMATED COUNT: 14 % (ref 10–15)
HCT VFR BLD AUTO: 37.8 % (ref 40–53)
HGB BLD-MCNC: 13 G/DL (ref 13.3–17.7)
MCH RBC QN AUTO: 35.7 PG (ref 26.5–33)
MCHC RBC AUTO-ENTMCNC: 34.4 G/DL (ref 31.5–36.5)
MCV RBC AUTO: 104 FL (ref 78–100)
PLATELET # BLD AUTO: 230 10E9/L (ref 150–450)
RBC # BLD AUTO: 3.64 10E12/L (ref 4.4–5.9)
WBC # BLD AUTO: 5.6 10E9/L (ref 4–11)

## 2018-06-28 PROCEDURE — 85027 COMPLETE CBC AUTOMATED: CPT | Performed by: INTERNAL MEDICINE

## 2018-06-28 PROCEDURE — 84450 TRANSFERASE (AST) (SGOT): CPT | Performed by: INTERNAL MEDICINE

## 2018-06-28 PROCEDURE — 36415 COLL VENOUS BLD VENIPUNCTURE: CPT | Performed by: INTERNAL MEDICINE

## 2018-06-28 NOTE — TELEPHONE ENCOUNTER
Patient calling back.  Expressed frustration about only being prescribed a 30 day supply of Eliquis.  Had labs done through cardiology on 5/11/18.  Upset he has to have labs drawn again so soon.  Patient will schedule another lab appointment but requesting message be sent to PCP to see if any other labs are needed before hand.  Also, requesting 90 day supply of Eliquis be sent to pharmacy.  As a 30 day supply costs as much as a 90 day supply.

## 2018-07-06 ENCOUNTER — TRANSFERRED RECORDS (OUTPATIENT)
Dept: HEALTH INFORMATION MANAGEMENT | Facility: CLINIC | Age: 78
End: 2018-07-06

## 2018-08-10 ENCOUNTER — OFFICE VISIT (OUTPATIENT)
Dept: CARDIOLOGY | Facility: CLINIC | Age: 78
End: 2018-08-10
Attending: INTERNAL MEDICINE
Payer: COMMERCIAL

## 2018-08-10 VITALS
DIASTOLIC BLOOD PRESSURE: 74 MMHG | WEIGHT: 167 LBS | HEIGHT: 70 IN | BODY MASS INDEX: 23.91 KG/M2 | HEART RATE: 54 BPM | SYSTOLIC BLOOD PRESSURE: 110 MMHG

## 2018-08-10 DIAGNOSIS — I10 ESSENTIAL HYPERTENSION WITH GOAL BLOOD PRESSURE LESS THAN 140/90: ICD-10-CM

## 2018-08-10 DIAGNOSIS — E78.5 HYPERLIPIDEMIA LDL GOAL <130: ICD-10-CM

## 2018-08-10 DIAGNOSIS — I48.0 PAROXYSMAL ATRIAL FIBRILLATION (H): Primary | ICD-10-CM

## 2018-08-10 DIAGNOSIS — I74.9 EMBOLISM AND THROMBOSIS (H): ICD-10-CM

## 2018-08-10 DIAGNOSIS — R00.1 BRADYCARDIA: ICD-10-CM

## 2018-08-10 DIAGNOSIS — R55 SYNCOPE, UNSPECIFIED SYNCOPE TYPE: ICD-10-CM

## 2018-08-10 DIAGNOSIS — I48.0 PAROXYSMAL ATRIAL FIBRILLATION (H): ICD-10-CM

## 2018-08-10 LAB
ALT SERPL W P-5'-P-CCNC: <5 U/L (ref 5–30)
ANION GAP SERPL CALCULATED.3IONS-SCNC: 9.8 MMOL/L (ref 6–17)
BUN SERPL-MCNC: 10 MG/DL (ref 7–30)
CALCIUM SERPL-MCNC: 8.4 MG/DL (ref 8.5–10.5)
CHLORIDE SERPL-SCNC: 104 MMOL/L (ref 98–107)
CHOLEST SERPL-MCNC: 171 MG/DL
CO2 SERPL-SCNC: 28 MMOL/L (ref 23–29)
CREAT SERPL-MCNC: 0.9 MG/DL (ref 0.7–1.3)
GFR SERPL CREATININE-BSD FRML MDRD: 81 ML/MIN/1.7M2
GLUCOSE SERPL-MCNC: 103 MG/DL (ref 70–105)
HDLC SERPL-MCNC: 80 MG/DL
LDLC SERPL CALC-MCNC: 82 MG/DL
NONHDLC SERPL-MCNC: 91 MG/DL
POTASSIUM SERPL-SCNC: 3.8 MMOL/L (ref 3.5–5.1)
SODIUM SERPL-SCNC: 138 MMOL/L (ref 136–145)
TRIGL SERPL-MCNC: 47 MG/DL

## 2018-08-10 PROCEDURE — 80048 BASIC METABOLIC PNL TOTAL CA: CPT | Performed by: INTERNAL MEDICINE

## 2018-08-10 PROCEDURE — 80061 LIPID PANEL: CPT | Performed by: INTERNAL MEDICINE

## 2018-08-10 PROCEDURE — 84460 ALANINE AMINO (ALT) (SGPT): CPT | Performed by: INTERNAL MEDICINE

## 2018-08-10 PROCEDURE — 36415 COLL VENOUS BLD VENIPUNCTURE: CPT | Performed by: INTERNAL MEDICINE

## 2018-08-10 PROCEDURE — 99214 OFFICE O/P EST MOD 30 MIN: CPT | Performed by: NURSE PRACTITIONER

## 2018-08-10 RX ORDER — PREDNISOLONE ACETATE 10 MG/ML
1-2 SUSPENSION/ DROPS OPHTHALMIC 3 TIMES DAILY
COMMUNITY
End: 2019-01-18

## 2018-08-10 RX ORDER — LATANOPROST 50 UG/ML
1 SOLUTION/ DROPS OPHTHALMIC DAILY
COMMUNITY
End: 2019-01-18

## 2018-08-10 RX ORDER — FLECAINIDE ACETATE 150 MG/1
TABLET ORAL
Qty: 45 TABLET | Refills: 3 | Status: SHIPPED | OUTPATIENT
Start: 2018-08-10 | End: 2019-08-26

## 2018-08-10 NOTE — MR AVS SNAPSHOT
After Visit Summary   8/10/2018    Eder Horan    MRN: [id number]           Patient Information     Date Of Birth          1940        Visit Information        Provider Department      8/10/2018 10:30 AM Amira Mendes APRN CNP [facility]        Today's Diagnoses     Paroxysmal atrial fibrillation (H)    -  1    Essential hypertension with goal blood pressure less than 140/90        Bradycardia        Hyperlipidemia LDL goal <130          Care Instructions    Labs look good  If you have afib again please call          Follow-ups after your visit        Your next 10 appointments already scheduled     Nov 07, 2018  9:45 AM CST   [facility] EP RETURN with Effie Hawkins MD   [facility] ([facility])    [address] [place] [address] [address]  [facility] [id number] [phone] OPT 2            Apr 11, 2019 10:30 AM CDT   Return Visit with Reece Daniels MD   [facility] ([facility]    [address] [address] [phone]              Future tests that were ordered for you today     Open Future Orders        Priority Expected Expires Ordered    EKG 12-lead complete w/read - Clinics Routine 11/8/2018 8/10/2019 8/10/2018            Who to contact     If you have questions or need follow up information about today's clinic visit or your schedule please contact [facility] directly at [phone].  Normal or non-critical lab and imaging results will be communicated to you by MyChart, letter or phone within 4 business days after the clinic has received the results. If you do not hear from us within 7 days, please contact the clinic through MyChart or phone. If you have a critical or abnormal lab result, we will notify you by phone as soon as possible.  Submit refill requests through [employer] or call  "your pharmacy and they will forward the refill request to us. Please allow 3 business days for your refill to be completed.          Additional Information About Your Visit        [employer] Information     [employer] gives you secure access to your electronic health record. If you see a primary care provider, you can also send messages to your care team and make appointments. If you have questions, please call your primary care clinic.  If you do not have a primary care provider, please call [phone] and they will assist you.        Care EveryWhere ID     This is your Care EveryWhere ID. This could be used by other organizations to access your [facility] medical records  [id number]        Your Vitals Were     Pulse Height BMI (Body Mass Index)             54 1.778 m (5' 10\") 23.96 kg/m2          Blood Pressure from Last 3 Encounters:   08/10/18 110/74   05/11/18 128/78   04/12/18 133/83    Weight from Last 3 Encounters:   08/10/18 75.8 kg (167 lb)   05/11/18 77.6 kg (171 lb)   01/18/18 78.9 kg (174 lb)              We Performed the Following     Follow-Up with Cardiac Advanced Practice Provider          Today's Medication Changes          These changes are accurate as of 8/10/18 12:31 PM.  If you have any questions, ask your nurse or doctor.               These medicines have changed or have updated prescriptions.        Dose/Directions    flecainide acetate 150 MG Tabs   This may have changed:    - how much to take  - how to take this  - when to take this  - additional instructions   Used for:  Paroxysmal atrial fibrillation (H)   Changed by:  Amira Mendes, APRN CNP        1/2 tablet once daily   Quantity:  45 tablet   Refills:  3            Where to get your medicines      These medications were sent to [id number] - [facility] [address] [facility]  [address] [facility] [address]     Phone:  [phone]     flecainide acetate 150 MG Tabs                Primary Care Provider Office " Phone # Fax #    Richmond Martinez [phone]       [address] [facility] [address]        Equal Access to Services     GOLD SR : Jass russo karlagray Sara, wajohnathanda luqadaha, qathomasta karajwinderda jeff, chun krishna kristopherlizeth saavedra ladarronmiranda jeffery. So [facility] [phone].    ATENCIÓN: Si habla español, tiene a charlton disposición servicios gratuitos de asistencia lingüística. Llame al [phone].    We comply with applicable federal civil rights laws and [place] laws. We do not discriminate on the basis of race, color, national origin, age, disability, sex, sexual orientation, or gender identity.            Thank you!     Thank you for choosing [facility]  for your care. Our goal is always to provide you with excellent care. Hearing back from our patients is one way we can continue to improve our services. Please take a few minutes to complete the written survey that you may receive in the mail after your visit with us. Thank you!             Your Updated Medication List - Protect others around you: Learn how to safely use, store and throw away your medicines at www.disposemymeds.org.          This list is accurate as of 8/10/18 12:31 PM.  Always use your most recent med list.                   Brand Name Dispense Instructions for use Diagnosis    apixaban ANTICOAGULANT 5 MG tablet    ELIQUIS    180 tablet    TAKE ONE TABLET BY MOUTH TWICE DAILY.    Paroxysmal atrial fibrillation (H)       beclomethasone 40 MCG/ACT Inhaler    QVAR    3 Inhaler    Inhale 2 puffs into the lungs daily    Chronic obstructive pulmonary disease, unspecified COPD type (H)       flecainide acetate 150 MG Tabs     45 tablet    1/2 tablet once daily    Paroxysmal atrial fibrillation (H)       fluocinonide 0.05 % cream    LIDEX    120 g    Apply topically 2 times daily    Asteatosis, Lentigo, SK (seborrheic keratosis), Angioma, Dermal nevus       latanoprost 0.005 % ophthalmic solution     XALATAN     Place 1 drop Into the left eye daily        levothyroxine 125 MCG tablet    SYNTHROID/LEVOTHROID    90 tablet    Take 1 tablet (125 mcg) by mouth daily    Hypothyroidism, unspecified type       losartan 25 MG tablet    COZAAR    90 tablet    Take 1 tablet (25 mg) by mouth daily    Essential hypertension with goal blood pressure less than 140/90       order for DME     1 Device    Equipment being ordered: spacer for inhaler    COPD (chronic obstructive pulmonary disease) (H)       prednisoLONE acetate 1 % ophthalmic susp    PRED FORTE     Place 1-2 drops Into the left eye 3 times daily        PROAIR  (90 Base) MCG/ACT inhaler   Generic drug:  albuterol     54 g    INHALE 2 PUFFS INTO THE LUNGS EVERY 4 HOURS AS NEEDED FOR SHORTNESS OF BREATH / DYSPNEA    COPD (chronic obstructive pulmonary disease) (H)       valACYclovir 1000 mg tablet    VALTREX     500 mg daily

## 2018-08-10 NOTE — LETTER
8/10/2018      Richmond Martinez MD  [address] [facility] [address]      RE: Eder Horna       Dear Colleague,    I had the pleasure of seeing Eder Horan in the [facility].    Service Date: 08/10/2018      HISTORY OF PRESENT ILLNESS:  Mr. Horan is a delightful 78-year-old gentleman that I am having the pleasure of seeing again today in followup.  Briefly, he has a history of symptomatic paroxysmal atrial fibrillation treated with flecainide and apixaban therapy.  Over the past year, his flecainide has been decreased to 75 mg once daily.  He has normal LV function.  He did suffer from a stroke in 06/2017, treated with TPA therapy.  The patient had not been on anticoagulation prior to this event.  Eliquis was started after the stroke.      I did refer him to Dr. Hawkins for concern of chronotropic incompetence.  He does have evidence of some sinus node dysfunction.  At this time, Mr. Horan continues to complain of fatigue.  His resting heart rate is in the mid 50s today.  Blood pressure is 110/74.  Two weeks ago, the patient states that he awoke in the middle of the night with his heart racing.  He states they last several hours and then he converted back to normal rhythm.  This is the first time he has had AFib for a number of years.      He underwent a Holter monitor in 04/2018 which showed sinus bradycardia with an average heart rate of 57 beats per minute, maximum of 94 beats per minute and a minimum of 40 beats per minute.  He had a few isolated PVCs as well as a brief SVT.  No pauses were noted.      Dr. Hawkins ordered an overnight sleep oximetry which was not the best quality.  There was no major oxygen desaturation.  Dr. Hawkins did contact the patient to state that we can proceed with a pacemaker.  However, he felt there was a 50% likelihood his symptoms would improve.  Mr. Horan and I discussed that today.  He states that 50% is not a high enough percentage to make an impact on  his everyday life.  The patient is not interested in proceeding with a pacemaker at this time.      Currently, he denies chest pain; however, he does have dyspnea with exertion and occasional lightheadedness and dizziness.  Again, palpitations happened several weeks ago and lasted several hours.  All other review of systems and past medical history are noted below.      ASSESSMENT AND PLAN:  Mr. Horan is a delightful 78-year-old gentleman here today for followup.   1.  Sinus node dysfunction and paroxysmal atrial fibrillation.  I am reluctant to increase his flecainide back to b.i.d. dosing.  He is currently taking 75 mg once daily.  The patient does have symptomatic bradycardia.  If he has another episode of atrial fibrillation, I would recommend going back to 50 mg b.i.d.  He remains on Eliquis therapy at 5 mg b.i.d.   2.  Hypertension.  He is normotensive today on losartan 25 mg daily.      As always, we appreciate being involved in the care of this delightful gentleman.  Dr. Franklin is retiring and he has placed an order for the patient to see Dr. Hawkins in November for a 6-month Cardiology followup.  If Mr. Horan's fatigue increases, I have asked that he please contact us or if he has any reoccurrence of AFib again.  The patient would be willing to undergo pacemaker implantation in the future if Dr. Hawkins felt that it would make an impact on how he feels.  Otherwise, the patient does not feel that it is warranted at this time.      Thank you as always for including us in his care.  I will be happy to see him in followup per Dr. Hawkins' discretion.         YUMIKO MURILLO NP             D: 08/10/2018   T: 08/10/2018   MT: HALEY      Name:     AUGUSTINE HORAN   MRN:      [id number]        Account:      [id number]   :      1940           Service Date: 08/10/2018      Document: [id number]         Outpatient Encounter Prescriptions as of 8/10/2018   Medication Sig Dispense Refill     apixaban ANTICOAGULANT  (ELIQUIS) 5 MG tablet TAKE ONE TABLET BY MOUTH TWICE DAILY. 180 tablet 3     beclomethasone (QVAR) 40 MCG/ACT Inhaler Inhale 2 puffs into the lungs daily 3 Inhaler 3     flecainide acetate 150 MG TABS 1/2 tablet once daily 45 tablet 3     fluocinonide (LIDEX) 0.05 % cream Apply topically 2 times daily 120 g 3     latanoprost (XALATAN) 0.005 % ophthalmic solution Place 1 drop Into the left eye daily       levothyroxine (SYNTHROID/LEVOTHROID) 125 MCG tablet Take 1 tablet (125 mcg) by mouth daily 90 tablet 2     losartan (COZAAR) 25 MG tablet Take 1 tablet (25 mg) by mouth daily 90 tablet 3     prednisoLONE acetate (PRED FORTE) 1 % ophthalmic susp Place 1-2 drops Into the left eye 3 times daily       PROAIR  (90 BASE) MCG/ACT inhaler INHALE 2 PUFFS INTO THE LUNGS EVERY 4 HOURS AS NEEDED FOR SHORTNESS OF BREATH / DYSPNEA 54 g 2     valACYclovir (VALTREX) 1000 mg tablet 500 mg daily   1     ORDER FOR DME Equipment being ordered: spacer for inhaler 1 Device 1     [DISCONTINUED] flecainide acetate 150 MG TABS Take 75 mg by mouth daily       No facility-administered encounter medications on file as of 8/10/2018.        Again, thank you for allowing me to participate in the care of your patient.      Sincerely,    Amira Mendes, NP, APRN CNP     [facility]

## 2018-08-10 NOTE — PROGRESS NOTES
HPI and Plan: #[id number]  See dictation    Orders Placed This Encounter   Procedures     EKG 12-lead complete w/read - Clinics       Orders Placed This Encounter   Medications     latanoprost (XALATAN) 0.005 % ophthalmic solution     Sig: Place 1 drop Into the left eye daily     prednisoLONE acetate (PRED FORTE) 1 % ophthalmic susp     Sig: Place 1-2 drops Into the left eye 3 times daily     flecainide acetate 150 MG TABS     Si/2 tablet once daily     Dispense:  45 tablet     Refill:  3       Medications Discontinued During This Encounter   Medication Reason     flecainide acetate 150 MG TABS Reorder         Encounter Diagnoses   Name Primary?     Paroxysmal atrial fibrillation (H) Yes     Essential hypertension with goal blood pressure less than 140/90      Bradycardia      Hyperlipidemia LDL goal <130        CURRENT MEDICATIONS:  Current Outpatient Prescriptions   Medication Sig Dispense Refill     apixaban ANTICOAGULANT (ELIQUIS) 5 MG tablet TAKE ONE TABLET BY MOUTH TWICE DAILY. 180 tablet 3     beclomethasone (QVAR) 40 MCG/ACT Inhaler Inhale 2 puffs into the lungs daily 3 Inhaler 3     flecainide acetate 150 MG TABS 1/2 tablet once daily 45 tablet 3     fluocinonide (LIDEX) 0.05 % cream Apply topically 2 times daily 120 g 3     latanoprost (XALATAN) 0.005 % ophthalmic solution Place 1 drop Into the left eye daily       levothyroxine (SYNTHROID/LEVOTHROID) 125 MCG tablet Take 1 tablet (125 mcg) by mouth daily 90 tablet 2     losartan (COZAAR) 25 MG tablet Take 1 tablet (25 mg) by mouth daily 90 tablet 3     prednisoLONE acetate (PRED FORTE) 1 % ophthalmic susp Place 1-2 drops Into the left eye 3 times daily       PROAIR  (90 BASE) MCG/ACT inhaler INHALE 2 PUFFS INTO THE LUNGS EVERY 4 HOURS AS NEEDED FOR SHORTNESS OF BREATH / DYSPNEA 54 g 2     valACYclovir (VALTREX) 1000 mg tablet 500 mg daily   1     ORDER FOR DME Equipment being ordered: spacer for inhaler 1 Device 1     [DISCONTINUED] flecainide  acetate 150 MG TABS Take 75 mg by mouth daily         ALLERGIES     Allergies   Allergen Reactions     Bactrim [Sulfa Drugs] Rash       PAST MEDICAL HISTORY:  Past Medical History:   Diagnosis Date     Actinic keratosis 6/09    face     ALLERGIC RHINITIS       Atrial fibrillation (H) 6/08     Back pain     s/p LESI through ortho Feb 2010     Basal cell carcinoma      Bradycardia      Chronotropic incompetence with sinus node dysfunction (H)      COPD (chronic obstructive pulmonary disease) (H)      DVT      Left calf 2003. Right calf 6/06     Embolic stroke (H) 06/11/2017    superior cerebellar artery occlusion. Hx A fib     Hyperlipidemia LDL goal <130 5/10/2011     Hypertension      HYPOTHYROIDISM      hypothyroidism     Lateral epicondylitis  of elbow 7/05    left     Malignant melanoma (H)      PLANTAR FASCITIS      Prostate cancer (H) 6/09    on Lupron therapy     Squamous cell carcinoma  Sept 2014     right lower lid, s/p MOHS     Syncope      TMJ (temporomandibular joint syndrome) 4/12/2012     Tobacco use disorder        PAST SURGICAL HISTORY:  Past Surgical History:   Procedure Laterality Date     ABDOMEN SURGERY  2004,06,09    Hernias (2) Prostate Removal(2009)     BIOPSY  2009    Prostate     C NONSPECIFIC PROCEDURE  1/07    Left groin exploration and left inguinal herniorrhaphy     C NONSPECIFIC PROCEDURE  5/08    Prostate bx (benign)     C NONSPECIFIC PROCEDURE  8/09    Wide local excision (left side), robotic-assisted laparoscopic prostatectomy and   Left pelvic lymph node dissection     C NONSPECIFIC PROCEDURE  August 2010    Right inguinal herniorrhaphy with mesh     CARDIOVERSION  10-16-08    failed     COLONOSCOPY  2004     EYE SURGERY      2013 lump from lower  left eye lid removed     GENITOURINARY SURGERY  2009    Prostate     HERNIA REPAIR  2004 - 2006       FAMILY HISTORY:  Family History   Problem Relation Age of Onset     C.A.D. Father      heart attack- angina     Coronary Artery Disease  "Father      Myocardial Infarction Father      Cancer Mother      lung     Lung Cancer Mother      Lung Cancer Sister      Unknown/Adopted Maternal Grandmother      Unknown/Adopted Maternal Grandfather      Unknown/Adopted Paternal Grandmother      Unknown/Adopted Paternal Grandfather      Unknown/Adopted Sister        SOCIAL HISTORY:  Social History     Social History     Marital status:      Spouse name: N/A     Number of children: 3     Years of education: N/A     Occupational History     [employer]      Retired     Social History Main Topics     Smoking status: Former Smoker     Types: Cigars     Quit date: 12/1/2006     Smokeless tobacco: Never Used      Comment: pipe daily, occasionally cigar     Alcohol use 0.0 oz/week     0 Standard drinks or equivalent per week      Comment: 2-3 drinks a day:  Wine/Beer     Drug use: No     Sexual activity: No     Other Topics Concern     Parent/Sibling W/ Cabg, Mi Or Angioplasty Before 65f 55m? No     Caffeine Concern No     2-3 cups of coffee a day     Sleep Concern No     Stress Concern No     Weight Concern No     Special Diet No     Exercise No     Seat Belt Yes     Social History Narrative       Review of Systems:  Skin:  Negative       Eyes:  Positive for glasses shingles in the left eye  ENT:  Negative      Respiratory:  Positive for dyspnea on exertion     Cardiovascular:    Positive for;edema;lightheadedness;palpitations had an episode of palpitations 2 weeks ago, lasted 3 hours - uneven heart rate, dizziness  Gastroenterology: Negative      Genitourinary:  Negative      Musculoskeletal:  Positive for joint pain    Neurologic:  Negative      Psychiatric:  Negative      Heme/Lymph/Imm:  Positive for allergies    Endocrine:  Positive for thyroid disorder      Physical Exam:  Vitals: /74  Pulse 54  Ht 1.778 m (5' 10\")  Wt 75.8 kg (167 lb)  BMI 23.96 kg/m2    Constitutional:  cooperative, alert and oriented, well developed, well nourished, in no " acute distress overweight      Skin:  warm and dry to the touch, no apparent skin lesions or masses noted          Head:  normocephalic, no masses or lesions        Eyes:  pupils equal and round, conjunctivae and lids unremarkable, sclera white, no xanthalasma, EOMS intact, no nystagmus        ENT:  no pallor or cyanosis, dentition good        Neck:  carotid pulses are full and equal bilaterally, JVP normal, no carotid bruit        Respiratory:  normal breath sounds, clear to auscultation, normal A-P diameter, normal symmetry, normal respiratory excursion, no use of accessory muscles prolonged expiration       Cardiac: regular rhythm;normal S1 and S2 bradycardic     systolic murmur;grade 1          Extremities and Muscular Skeletal:  no deformities, clubbing, cyanosis, erythema observed;no edema              Neurological:  no gross motor deficits        Psych:  Alert and Oriented x 3;affect appropriate, oriented to time, person and place;oriented to time, person and place        CC  Jaya Franklin MD  [address]  [place] [id number]

## 2018-08-10 NOTE — PROGRESS NOTES
Service Date: 08/10/2018      HISTORY OF PRESENT ILLNESS:  Mr. Horan is a delightful 78-year-old gentleman that I am having the pleasure of seeing again today in followup.  Briefly, he has a history of symptomatic paroxysmal atrial fibrillation treated with flecainide and apixaban therapy.  Over the past year, his flecainide has been decreased to 75 mg once daily.  He has normal LV function.  He did suffer from a stroke in 06/2017, treated with TPA therapy.  The patient had not been on anticoagulation prior to this event.  Eliquis was started after the stroke.      I did refer him to Dr. Hawkins for concern of chronotropic incompetence.  He does have evidence of some sinus node dysfunction.  At this time, Mr. Horan continues to complain of fatigue.  His resting heart rate is in the mid 50s today.  Blood pressure is 110/74.  Two weeks ago, the patient states that he awoke in the middle of the night with his heart racing.  He states they last several hours and then he converted back to normal rhythm.  This is the first time he has had AFib for a number of years.      He underwent a Holter monitor in 04/2018 which showed sinus bradycardia with an average heart rate of 57 beats per minute, maximum of 94 beats per minute and a minimum of 40 beats per minute.  He had a few isolated PVCs as well as a brief SVT.  No pauses were noted.      Dr. Hawkins ordered an overnight sleep oximetry which was not the best quality.  There was no major oxygen desaturation.  Dr. Hawkins did contact the patient to state that we can proceed with a pacemaker.  However, he felt there was a 50% likelihood his symptoms would improve.  Mr. Horan and I discussed that today.  He states that 50% is not a high enough percentage to make an impact on his everyday life.  The patient is not interested in proceeding with a pacemaker at this time.      Currently, he denies chest pain; however, he does have dyspnea with exertion and occasional lightheadedness and  dizziness.  Again, palpitations happened several weeks ago and lasted several hours.  All other review of systems and past medical history are noted below.      ASSESSMENT AND PLAN:  Mr. Horan is a delightful 78-year-old gentleman here today for followup.   1.  Sinus node dysfunction and paroxysmal atrial fibrillation.  I am reluctant to increase his flecainide back to b.i.d. dosing.  He is currently taking 75 mg once daily.  The patient does have symptomatic bradycardia.  If he has another episode of atrial fibrillation, I would recommend going back to 50 mg b.i.d.  He remains on Eliquis therapy at 5 mg b.i.d.   2.  Hypertension.  He is normotensive today on losartan 25 mg daily.      As always, we appreciate being involved in the care of this delightful gentleman.  Dr. Franklin is retiring and he has placed an order for the patient to see Dr. Hawkins in November for a 6-month Cardiology followup.  If Mr. Horan's fatigue increases, I have asked that he please contact us or if he has any reoccurrence of AFib again.  The patient would be willing to undergo pacemaker implantation in the future if Dr. Hawkins felt that it would make an impact on how he feels.  Otherwise, the patient does not feel that it is warranted at this time.      Thank you as always for including us in his care.  I will be happy to see him in followup per Dr. Hawkins' discretion.         YUMIKO MURILLO NP             D: 08/10/2018   T: 08/10/2018   MT: HALEY      Name:     AUGUSTINE HORAN   MRN:      [id number]        Account:      [id number]   :      1940           Service Date: 08/10/2018      Document: [id number]

## 2018-08-28 DIAGNOSIS — I10 ESSENTIAL HYPERTENSION WITH GOAL BLOOD PRESSURE LESS THAN 140/90: ICD-10-CM

## 2018-08-28 RX ORDER — LOSARTAN POTASSIUM 25 MG/1
25 TABLET ORAL DAILY
Qty: 90 TABLET | Refills: 0 | Status: SHIPPED | OUTPATIENT
Start: 2018-08-28 | End: 2018-11-26 | Stop reason: ALTCHOICE

## 2018-08-31 DIAGNOSIS — J44.9 CHRONIC OBSTRUCTIVE PULMONARY DISEASE, UNSPECIFIED COPD TYPE (H): ICD-10-CM

## 2018-08-31 NOTE — TELEPHONE ENCOUNTER
"Requested Prescriptions   Pending Prescriptions Disp Refills     beclomethasone (QVAR) 40 MCG/ACT Inhaler 3 Inhaler 3    Last Written Prescription Date:  9/6/2017  Last Fill Quantity: 3,  # refills: 3   Last office visit: 12/8/2017 with prescribing provider:  12/8/2017   Future Office Visit:     Sig: Inhale 2 puffs into the lungs daily    Inhaled Steroids Protocol Passed    8/31/2018  3:14 PM       Passed - Patient is age 12 or older       Passed - Recent (12 mo) or future (30 days) visit within the authorizing provider's specialty    Patient had office visit in the last 12 months or has a visit in the next 30 days with authorizing provider or within the authorizing provider's specialty.  See \"Patient Info\" tab in inbasket, or \"Choose Columns\" in Meds & Orders section of the refill encounter.              "

## 2018-11-07 ENCOUNTER — TRANSFERRED RECORDS (OUTPATIENT)
Dept: HEALTH INFORMATION MANAGEMENT | Facility: CLINIC | Age: 78
End: 2018-11-07

## 2018-11-07 ENCOUNTER — OFFICE VISIT (OUTPATIENT)
Dept: CARDIOLOGY | Facility: CLINIC | Age: 78
End: 2018-11-07
Attending: INTERNAL MEDICINE
Payer: COMMERCIAL

## 2018-11-07 VITALS
SYSTOLIC BLOOD PRESSURE: 118 MMHG | OXYGEN SATURATION: 96 % | HEART RATE: 55 BPM | HEIGHT: 70 IN | WEIGHT: 170 LBS | BODY MASS INDEX: 24.34 KG/M2 | DIASTOLIC BLOOD PRESSURE: 74 MMHG

## 2018-11-07 DIAGNOSIS — I74.9 EMBOLISM AND THROMBOSIS (H): ICD-10-CM

## 2018-11-07 DIAGNOSIS — I48.0 PAROXYSMAL ATRIAL FIBRILLATION (H): ICD-10-CM

## 2018-11-07 DIAGNOSIS — E78.5 HYPERLIPIDEMIA LDL GOAL <130: ICD-10-CM

## 2018-11-07 DIAGNOSIS — R00.1 BRADYCARDIA: ICD-10-CM

## 2018-11-07 DIAGNOSIS — R55 SYNCOPE, UNSPECIFIED SYNCOPE TYPE: ICD-10-CM

## 2018-11-07 DIAGNOSIS — I10 ESSENTIAL HYPERTENSION WITH GOAL BLOOD PRESSURE LESS THAN 140/90: ICD-10-CM

## 2018-11-07 PROCEDURE — 99214 OFFICE O/P EST MOD 30 MIN: CPT | Performed by: INTERNAL MEDICINE

## 2018-11-07 PROCEDURE — 93000 ELECTROCARDIOGRAM COMPLETE: CPT | Performed by: NURSE PRACTITIONER

## 2018-11-07 NOTE — PROGRESS NOTES
Service Date: 11/07/2018      HISTORY OF PRESENT ILLNESS:    It was my pleasure seeing Mr. Eder Horan, a very pleasant 78-year-old male in follow-up of:   A.  Sinus node dysfunction with chronotropic incompetence.   B.  Paroxysmal atrial fibrillation.  On flecainide 75 mg b.i.d. and apixaban.   C.  Stroke treated with TPA in 06/2017.  The patient had not been under anticoagulation until the day of his stroke.   D.  COPD.   E.  Hypothyroidism.   F.  Hypertension.      Mr. Horan used to see Dr. Franklin.  I met the patient once in 10/2017 and at that time discussed the merits of pacemaker implantation for chronotropic incompetence.  The patient decided not to proceed with this.      Subsequently, the patient saw Dr. Franklin and more recently Amira Mendes NP.  He did have an episode of probable atrial fibrillation last summer lasting several hours.  This was the only occurrence of arrhythmia in years.  His dose of flecainide is 75 mg b.i.d.  He has had no bleeding issues on anticoagulation.  He has had no chest pain, syncope, or near-syncope.       PHYSICAL EXAMINATION:   VITAL SIGNS:  Blood pressure 118/74, pulse 55 and regular.  Weight 77 kilos, height 178 cm.  He is a pleasant elderly male who is accompanied by his spouse.   NECK:  Supple without carotid bruits.   LUNGS:  With decreased breath sounds.  No crackles or wheezes.   CARDIOVASCULAR:  Normal JVP, regular rhythm without gallop or murmur.   ABDOMEN:  Soft, nontender.   EXTREMITIES:  No edema.      DIAGNOSTIC STUDIES:    His 12-lead ECG today showed sinus bradycardia at 50 beats per minute.  Decreased R-wave progression in the precordial leads.      Most recent echocardiogram in 04/2018 showed normal EF of 55%-60%, evidence of grade II diastolic dysfunction, mild 1+ TR.      IMPRESSION:   1.  Sinus node dysfunction with chronotropic incompetence.  I do believe Mr. Horan is symptomatic from sick sinus syndrome.  Pacemaker implantation is reasonable.  I would not  "decrease flecainide further because it would be inviting atrial fibrillation.  His AF episodes are symptomatic and he clearly does not feel well during AF.        I explained how we implant pacemakers and that it is a low-risk procedure with risk of serious complication of less than 2%.  He would be expected to go home the same day.  However, Mr. Horan remains reluctant to proceed.  It is my sense that he does not want to have an invasive procedure unless it becomes \"absolutely necessary.\"  His rhythm issue is currently not a matter of life or death.  We are considering implanting a pacemaker only to improve his quality of life.        I encouraged him to continue on same dose flecainide and Eliquis for his atrial fibrillation.      RECOMMENDATIONS:   A.  A permanent pacemaker, but the patient does not want it at this time.    B.  Follow-up with Amira in 1 year.   C.  I encouraged the patient to call sooner should he have concerns in the interim.      I do appreciate the opportunity to be part of his care.  Time spent 25 minutes, more than 50% of the time was discussion.        EPHRAIM DURHAM MD, FACC           cc:   Richmond Martinez MD   [address]  [address]             D: 2018   T: 2018   MT: HALEY      Name:     AUGUSTINE HORAN   MRN:      [id number]        Account:      [id number]   :      1940           Service Date: 2018      Document: [id number]      "

## 2018-11-07 NOTE — MR AVS SNAPSHOT
After Visit Summary   11/7/2018    Eder Horan    MRN: [id number]           Patient Information     Date Of Birth          1940        Visit Information        Provider Department      11/7/2018 9:45 AM Effie Hawkins MD [facility]   [place]        Today's Diagnoses     Paroxysmal atrial fibrillation (H)        Bradycardia        Essential hypertension with goal blood pressure less than 140/90        Embolism and thrombosis        Syncope, unspecified syncope type        Hyperlipidemia LDL goal <130           Follow-ups after your visit        Additional Services     Follow-Up with Cardiac Advanced Practice Provider                 Your next 10 appointments already scheduled     Nov 20, 2018 10:00 AM CST   [facility]    [address] [place] [id number] [phone]            Apr 11, 2019 10:30 AM CDT   [facility] Visit with Reece Daniels MD   [facility]    [address] [place] [id number] [phone]              Future tests that were ordered for you today     Open Future Orders        Priority Expected Expires Ordered    EKG 12-lead complete w/read - Clinics (to be scheduled) Routine 11/7/2019 11/8/2019 11/7/2018    Follow-Up with Cardiac Advanced Practice Provider Routine 11/7/2019 11/8/2019 11/7/2018            Who to contact     If you have questions or need follow up information about today's clinic visit or your schedule please contact [facility] directly at [phone].  Normal or non-critical lab and imaging results will be communicated to you by MyChart, letter or phone within 4 business days after the clinic has received the results. If you do not hear from us within 7 days, please contact the clinic  "through [employer] or phone. If you have a critical or abnormal lab result, we will notify you by phone as soon as possible.  Submit refill requests through [employer] or call your pharmacy and they will forward the refill request to us. Please allow 3 business days for your refill to be completed.          Additional Information About Your Visit        [employer] Information     [employer] gives you secure access to your electronic health record. If you see a primary care provider, you can also send messages to your care team and make appointments. If you have questions, please call your primary care clinic.  If you do not have a primary care provider, please call [phone] and they will assist you.        Care EveryWhere ID     This is your Care EveryWhere ID. This could be used by other organizations to access your [place] medical records  [id number]        Your Vitals Were     Pulse Height Pulse Oximetry BMI (Body Mass Index)          55 1.778 m (5' 10\") 96% 24.39 kg/m2         Blood Pressure from Last 3 Encounters:   11/07/18 118/74   08/10/18 110/74   05/11/18 128/78    Weight from Last 3 Encounters:   11/07/18 77.1 kg (170 lb)   08/10/18 75.8 kg (167 lb)   05/11/18 77.6 kg (171 lb)              We Performed the Following     EKG 12-lead complete w/read - Clinics     Follow-Up with Electrophysiologist        Primary Care Provider Office Phone # Fax #    Richmond Martinez [phone]       [address] [place] [address]        Equal Access to Services     [facility] Center[facility]UMIKO : Hadii aad ku hadasho Soomaali, waaxda luqadaha, qaybta kaalmada adeegyada, chun chauhan . So [facility] [phone].    ATENCIÓN: Si habla español, tiene a charlton disposición servicios gratuitos de asistencia lingüística. Llame al [phone].    We comply with applicable federal civil rights laws and [place] laws. We do not discriminate on the basis of race, color, national origin, age, disability, sex, sexual " orientation, or gender identity.            Thank you!     Thank you for choosing [facility]  for your care. Our goal is always to provide you with excellent care. Hearing back from our patients is one way we can continue to improve our services. Please take a few minutes to complete the written survey that you may receive in the mail after your visit with us. Thank you!             Your Updated Medication List - Protect others around you: Learn how to safely use, store and throw away your medicines at www.disposemymeds.org.          This list is accurate as of 11/7/18 10:26 AM.  Always use your most recent med list.                   Brand Name Dispense Instructions for use Diagnosis    apixaban ANTICOAGULANT 5 MG tablet    ELIQUIS    180 tablet    TAKE ONE TABLET BY MOUTH TWICE DAILY.    Paroxysmal atrial fibrillation (H)       beclomethasone 40 MCG/ACT Aers IS A DISCONTINUED MEDICATION    QVAR    3 Inhaler    Inhale 2 puffs into the lungs daily    Chronic obstructive pulmonary disease, unspecified COPD type (H)       flecainide acetate 150 MG Tabs     45 tablet    1/2 tablet once daily    Paroxysmal atrial fibrillation (H)       fluocinonide 0.05 % cream    LIDEX    120 g    Apply topically 2 times daily    Asteatosis, [place], SK (seborrheic keratosis), Angioma, Dermal nevus       latanoprost 0.005 % ophthalmic solution    XALATAN     Place 1 drop Into the left eye daily        levothyroxine 125 MCG tablet    SYNTHROID/LEVOTHROID    90 tablet    Take 1 tablet (125 mcg) by mouth daily    Hypothyroidism, unspecified type       losartan 25 MG tablet    COZAAR    90 tablet    Take 1 tablet (25 mg) by mouth daily    Essential hypertension with goal blood pressure less than 140/90       order for DME     1 Device    Equipment being ordered: spacer for inhaler    COPD (chronic obstructive pulmonary disease) (H)       prednisoLONE acetate 1 % ophthalmic susp    PRED FORTE     Place  1-2 drops Into the left eye 3 times daily        PROAIR  (90 Base) MCG/ACT inhaler   Generic drug:  albuterol     54 g    INHALE 2 PUFFS INTO THE LUNGS EVERY 4 HOURS AS NEEDED FOR SHORTNESS OF BREATH / DYSPNEA    COPD (chronic obstructive pulmonary disease) (H)       valACYclovir 1000 mg tablet    VALTREX     500 mg daily

## 2018-11-07 NOTE — LETTER
11/7/2018      Richmond Martinez MD  [address] [facility] [address]      RE: Eder Horan       Dear Colleague,    I had the pleasure of seeing Eder Horan in the [facility].    Service Date: 11/07/2018      HISTORY OF PRESENT ILLNESS:  It was my pleasure seeing Mr. Eder Horan, a very pleasant 78-year-old male in followup of:   A.  Sinus node dysfunction with chronotropic incompetence.   B.  Paroxysmal atrial fibrillation.  On flecainide 75 mg b.i.d. and apixaban.   C.  Stroke treated with TPA in 06/2017.  The patient had not been under anticoagulation until the day of his stroke.   D.  COPD.   E.  Hypothyroidism.   F.  Hypertension.      Mr. Horan used to see Dr. Franklin.  I met the patient once in 10/2017 and at that time discussed the merits of pacemaker implantation for chronotropic incompetence.  The patient decided not to proceed with this.      Subsequently, the patient saw Dr. Franklin and more recently Amira Mendes NP.  He did have an episode of probable atrial fibrillation last summer lasting several hours.  This was the only recurrence of arrhythmia in years.  His dose of flecainide is 75 mg b.i.d.  He has had no issues on anticoagulation.  He has had no chest pain, syncope, or near-syncope.       PHYSICAL EXAMINATION:   VITAL SIGNS:  Blood pressure 118/74, pulse 55 and regular.  Weight 77 kilos, height 178 cm.  He is a pleasant elderly male who is accompanied by his spouse.   NECK:  Supple without carotid bruits.   LUNGS:  With decreased breath sounds.  No crackles or wheezes.   CARDIOVASCULAR:  Normal JVP, regular rhythm without gallop or murmur.   ABDOMEN:  Soft, nontender.   EXTREMITIES:  No edema.      DIAGNOSTIC STUDIES:  His 12-lead ECG today showed sinus bradycardia at 50 beats per minute.  Decreased R-wave progression in the precordial leads.      Most recent echocardiogram in 04/2018 showed normal EF of 55%-60%, evidence of grade II diastolic dysfunction, mild 1+  "TR.      IMPRESSION:   1.  Sinus node dysfunction with chronotropic incompetence.  I do believe Mr. Horan is symptomatic from his sick sinus syndrome.  I do think that pacemaker implantation is reasonable.  I would not decrease flecainide further because it would be inviting atrial fibrillation to come back.  His episodes are symptomatic and he clearly does not like them.      2.  I explained how we perform pacemaker so that it is a low risk procedure and he would be expected to go home the same day.  He is reluctant to proceed.  It is my sense that he does not want to do something unless it is \"absolutely necessary.\"  This is clearly not a matter of life or death.  Would only implant a pacemaker to improve his quality of life.      I encouraged him to continue on same dose flecainide and Eliquis for his atrial fibrillation.      RECOMMENDATIONS:   A.  I have requested followup with Amira in 1 year.   B.  I encouraged the patient to call sooner should he have any issues in the interim.      I do appreciate the opportunity to be part of his care.  Time spent 25 minutes, more than 50% of the time was discussion.      cc:   [place] [address]         EPHRAIM DURHAM MD             D: 2018   T: 2018   MT: HALEY      Name:     AUGUSTINE HORAN   MRN:      [id number]        Account:      [id number]   :      1940           Service Date: 2018      Document: [id number]         Outpatient Encounter Prescriptions as of 2018   Medication Sig Dispense Refill     apixaban ANTICOAGULANT (ELIQUIS) 5 MG tablet TAKE ONE TABLET BY MOUTH TWICE DAILY. 180 tablet 3     beclomethasone (QVAR) 40 MCG/ACT Inhaler Inhale 2 puffs into the lungs daily 3 Inhaler 0     flecainide acetate 150 MG TABS 1/2 tablet once daily 45 tablet 3     fluocinonide (LIDEX) 0.05 % cream Apply topically 2 times daily 120 g 3     latanoprost (XALATAN) 0.005 % " ophthalmic solution Place 1 drop Into the left eye daily       levothyroxine (SYNTHROID/LEVOTHROID) 125 MCG tablet Take 1 tablet (125 mcg) by mouth daily 90 tablet 2     losartan (COZAAR) 25 MG tablet Take 1 tablet (25 mg) by mouth daily 90 tablet 0     ORDER FOR DME Equipment being ordered: spacer for inhaler 1 Device 1     prednisoLONE acetate (PRED FORTE) 1 % ophthalmic susp Place 1-2 drops Into the left eye 3 times daily       valACYclovir (VALTREX) 1000 mg tablet 500 mg daily   1     PROAIR  (90 BASE) MCG/ACT inhaler INHALE 2 PUFFS INTO THE LUNGS EVERY 4 HOURS AS NEEDED FOR SHORTNESS OF BREATH / DYSPNEA 54 g 2     No facility-administered encounter medications on file as of 11/7/2018.        Again, thank you for allowing me to participate in the care of your patient.      Sincerely,    Effie Hawkins MD     [facility]

## 2018-11-07 NOTE — PROGRESS NOTES
HPI and Plan:   See dictation    Orders Placed This Encounter   Procedures     Follow-Up with Cardiac Advanced Practice Provider     EKG 12-lead complete w/read - Clinics (to be scheduled)       No orders of the defined types were placed in this encounter.      There are no discontinued medications.      Encounter Diagnoses   Name Primary?     Paroxysmal atrial fibrillation (H)      Essential hypertension with goal blood pressure less than 140/90      Embolism and thrombosis      Bradycardia      Syncope, unspecified syncope type      Hyperlipidemia LDL goal <130        CURRENT MEDICATIONS:  Current Outpatient Prescriptions   Medication Sig Dispense Refill     apixaban ANTICOAGULANT (ELIQUIS) 5 MG tablet TAKE ONE TABLET BY MOUTH TWICE DAILY. 180 tablet 3     beclomethasone (QVAR) 40 MCG/ACT Inhaler Inhale 2 puffs into the lungs daily 3 Inhaler 0     flecainide acetate 150 MG TABS 1/2 tablet once daily 45 tablet 3     fluocinonide (LIDEX) 0.05 % cream Apply topically 2 times daily 120 g 3     latanoprost (XALATAN) 0.005 % ophthalmic solution Place 1 drop Into the left eye daily       levothyroxine (SYNTHROID/LEVOTHROID) 125 MCG tablet Take 1 tablet (125 mcg) by mouth daily 90 tablet 2     losartan (COZAAR) 25 MG tablet Take 1 tablet (25 mg) by mouth daily 90 tablet 0     ORDER FOR DME Equipment being ordered: spacer for inhaler 1 Device 1     prednisoLONE acetate (PRED FORTE) 1 % ophthalmic susp Place 1-2 drops Into the left eye 3 times daily       valACYclovir (VALTREX) 1000 mg tablet 500 mg daily   1     PROAIR  (90 BASE) MCG/ACT inhaler INHALE 2 PUFFS INTO THE LUNGS EVERY 4 HOURS AS NEEDED FOR SHORTNESS OF BREATH / DYSPNEA 54 g 2       ALLERGIES     Allergies   Allergen Reactions     Bactrim [Sulfa Drugs] Rash       PAST MEDICAL HISTORY:  Past Medical History:   Diagnosis Date     Actinic keratosis 6/09    face     ALLERGIC RHINITIS       Atrial fibrillation (H) 6/08     Back pain     s/p LESI through ortho  Feb 2010     Basal cell carcinoma      Bradycardia      Chronotropic incompetence with sinus node dysfunction (H)      COPD (chronic obstructive pulmonary disease) (H)      DVT      Left calf 2003. Right calf 6/06     Embolic stroke (H) 06/11/2017    superior cerebellar artery occlusion. Hx A fib     Hyperlipidemia LDL goal <130 5/10/2011     Hypertension      HYPOTHYROIDISM      hypothyroidism     Lateral epicondylitis  of elbow 7/05    left     Malignant melanoma (H)      PLANTAR FASCITIS      Prostate cancer (H) 6/09    on Lupron therapy     Squamous cell carcinoma  Sept 2014     right lower lid, s/p MOHS     Syncope      TMJ (temporomandibular joint syndrome) 4/12/2012     Tobacco use disorder        PAST SURGICAL HISTORY:  Past Surgical History:   Procedure Laterality Date     ABDOMEN SURGERY  2004,06,09    Hernias (2) Prostate Removal(2009)     BIOPSY  2009    Prostate     C NONSPECIFIC PROCEDURE  1/07    Left groin exploration and left inguinal herniorrhaphy     C NONSPECIFIC PROCEDURE  5/08    Prostate bx (benign)     C NONSPECIFIC PROCEDURE  8/09    Wide local excision (left side), robotic-assisted laparoscopic prostatectomy and   Left pelvic lymph node dissection     C NONSPECIFIC PROCEDURE  August 2010    Right inguinal herniorrhaphy with mesh     CARDIOVERSION  10-16-08    failed     COLONOSCOPY  2004     EYE SURGERY      2013 lump from lower  left eye lid removed     GENITOURINARY SURGERY  2009    Prostate     HERNIA REPAIR  2004 - 2006       FAMILY HISTORY:  Family History   Problem Relation Age of Onset     C.A.D. Father      heart attack- angina     Coronary Artery Disease Father      Myocardial Infarction Father      Cancer Mother      lung     Lung Cancer Mother      Lung Cancer Sister      Unknown/Adopted Maternal Grandmother      Unknown/Adopted Maternal Grandfather      Unknown/Adopted Paternal Grandmother      Unknown/Adopted Paternal Grandfather      Unknown/Adopted Sister        SOCIAL  HISTORY:  Social History     Social History     Marital status:      Spouse name: N/A     Number of children: 3     Years of education: N/A     Occupational History     [employer]      Retired     Social History Main Topics     Smoking status: Former Smoker     Types: Cigars, Pipe     Quit date: 12/1/2006     Smokeless tobacco: Never Used      Comment:  occasionally cigar     Alcohol use 0.0 oz/week     0 Standard drinks or equivalent per week      Comment: 2-3 drinks a day:  Wine/Beer     Drug use: No     Sexual activity: No     Other Topics Concern     Parent/Sibling W/ Cabg, Mi Or Angioplasty Before 65f 55m? No     Caffeine Concern No     2-3 cups of coffee a day     Sleep Concern No     Stress Concern No     Weight Concern No     Special Diet No     Exercise No     Seat Belt Yes     Social History Narrative       Review of Systems:  Skin:  Negative       Eyes:  Positive for glasses shingles in the left eye  ENT:  Negative      Respiratory:  Positive for dyspnea on exertion     Cardiovascular:  Negative;palpitations;chest pain;edema edema;Positive for;lightheadedness;dizziness    Gastroenterology: Negative      Genitourinary:  Negative nocturia    Musculoskeletal:  Positive for joint pain    Neurologic:  Negative      Psychiatric:  Negative      Heme/Lymph/Imm:  Positive for allergies    Endocrine:  Positive for thyroid disorder      [id number]

## 2018-11-07 NOTE — LETTER
11/7/2018    Richmond Martinez MD  [address] [facility] [address]    RE: Eder Horan       Dear Colleague,    I had the pleasure of seeing Eder Horan in the [facility].    HPI and Plan:   See dictation    Orders Placed This Encounter   Procedures     Follow-Up with Cardiac Advanced Practice Provider     EKG 12-lead complete w/read - Clinics (to be scheduled)       No orders of the defined types were placed in this encounter.      There are no discontinued medications.      Encounter Diagnoses   Name Primary?     Paroxysmal atrial fibrillation (H)      Essential hypertension with goal blood pressure less than 140/90      Embolism and thrombosis      Bradycardia      Syncope, unspecified syncope type      Hyperlipidemia LDL goal <130        CURRENT MEDICATIONS:  Current Outpatient Prescriptions   Medication Sig Dispense Refill     apixaban ANTICOAGULANT (ELIQUIS) 5 MG tablet TAKE ONE TABLET BY MOUTH TWICE DAILY. 180 tablet 3     beclomethasone (QVAR) 40 MCG/ACT Inhaler Inhale 2 puffs into the lungs daily 3 Inhaler 0     flecainide acetate 150 MG TABS 1/2 tablet once daily 45 tablet 3     fluocinonide (LIDEX) 0.05 % cream Apply topically 2 times daily 120 g 3     latanoprost (XALATAN) 0.005 % ophthalmic solution Place 1 drop Into the left eye daily       levothyroxine (SYNTHROID/LEVOTHROID) 125 MCG tablet Take 1 tablet (125 mcg) by mouth daily 90 tablet 2     losartan (COZAAR) 25 MG tablet Take 1 tablet (25 mg) by mouth daily 90 tablet 0     ORDER FOR DME Equipment being ordered: spacer for inhaler 1 Device 1     prednisoLONE acetate (PRED FORTE) 1 % ophthalmic susp Place 1-2 drops Into the left eye 3 times daily       valACYclovir (VALTREX) 1000 mg tablet 500 mg daily   1     PROAIR  (90 BASE) MCG/ACT inhaler INHALE 2 PUFFS INTO THE LUNGS EVERY 4 HOURS AS NEEDED FOR SHORTNESS OF BREATH / DYSPNEA 54 g 2       ALLERGIES     Allergies   Allergen Reactions     Bactrim [Sulfa  Drugs] Rash       PAST MEDICAL HISTORY:  Past Medical History:   Diagnosis Date     Actinic keratosis 6/09    face     ALLERGIC RHINITIS       Atrial fibrillation (H) 6/08     Back pain     s/p LESI through ortho Feb 2010     Basal cell carcinoma      Bradycardia      Chronotropic incompetence with sinus node dysfunction (H)      COPD (chronic obstructive pulmonary disease) (H)      DVT      Left calf 2003. Right calf 6/06     Embolic stroke (H) 06/11/2017    superior cerebellar artery occlusion. Hx A fib     Hyperlipidemia LDL goal <130 5/10/2011     Hypertension      HYPOTHYROIDISM      hypothyroidism     Lateral epicondylitis  of elbow 7/05    left     Malignant melanoma (H)      PLANTAR FASCITIS      Prostate cancer (H) 6/09    on Lupron therapy     Squamous cell carcinoma  Sept 2014     right lower lid, s/p MOHS     Syncope      TMJ (temporomandibular joint syndrome) 4/12/2012     Tobacco use disorder        PAST SURGICAL HISTORY:  Past Surgical History:   Procedure Laterality Date     ABDOMEN SURGERY  2004,06,09    Hernias (2) Prostate Removal(2009)     BIOPSY  2009    Prostate     C NONSPECIFIC PROCEDURE  1/07    Left groin exploration and left inguinal herniorrhaphy     C NONSPECIFIC PROCEDURE  5/08    Prostate bx (benign)     C NONSPECIFIC PROCEDURE  8/09    Wide local excision (left side), robotic-assisted laparoscopic prostatectomy and   Left pelvic lymph node dissection     C NONSPECIFIC PROCEDURE  August 2010    Right inguinal herniorrhaphy with mesh     CARDIOVERSION  10-16-08    failed     COLONOSCOPY  2004     EYE SURGERY      2013 lump from lower  left eye lid removed     GENITOURINARY SURGERY  2009    Prostate     HERNIA REPAIR  2004 - 2006       FAMILY HISTORY:  Family History   Problem Relation Age of Onset     C.A.D. Father      heart attack- angina     Coronary Artery Disease Father      Myocardial Infarction Father      Cancer Mother      lung     Lung Cancer Mother      Lung Cancer Sister       Unknown/Adopted Maternal Grandmother      Unknown/Adopted Maternal Grandfather      Unknown/Adopted Paternal Grandmother      Unknown/Adopted Paternal Grandfather      Unknown/Adopted Sister        SOCIAL HISTORY:  Social History     Social History     Marital status:      Spouse name: N/A     Number of children: 3     Years of education: N/A     Occupational History     [employer]      Retired     Social History Main Topics     Smoking status: Former Smoker     Types: Cigars, Pipe     Quit date: 12/1/2006     Smokeless tobacco: Never Used      Comment:  occasionally cigar     Alcohol use 0.0 oz/week     0 Standard drinks or equivalent per week      Comment: 2-3 drinks a day:  Wine/Beer     Drug use: No     Sexual activity: No     Other Topics Concern     Parent/Sibling W/ Cabg, Mi Or Angioplasty Before 65f 55m? No     Caffeine Concern No     2-3 cups of coffee a day     Sleep Concern No     Stress Concern No     Weight Concern No     Special Diet No     Exercise No     Seat Belt Yes     Social History Narrative       Review of Systems:  Skin:  Negative       Eyes:  Positive for glasses shingles in the left eye  ENT:  Negative      Respiratory:  Positive for dyspnea on exertion     Cardiovascular:  Negative;palpitations;chest pain;edema edema;Positive for;lightheadedness;dizziness    Gastroenterology: Negative      Genitourinary:  Negative nocturia    Musculoskeletal:  Positive for joint pain    Neurologic:  Negative      Psychiatric:  Negative      Heme/Lymph/Imm:  Positive for allergies    Endocrine:  Positive for thyroid disorder      [id number]            Thank you for allowing me to participate in the care of your patient.      Sincerely,     Effie Hawkins MD     [facility]    cc:   Jaya Franklin MD  [address]  [place] [address]

## 2018-11-19 NOTE — PATIENT INSTRUCTIONS
No solid food after midnight  AM of surgery, take Flecainide, Levothyroxine and  Eliquis  with a small sip water. Hold other meds that morning.  Hold Losartan the day of surgery and restart the next day  Take other oral meds when get home later that day  Use inhaler as usual  Labs as ordered  Reduce caffeine intake for Raynauds symptoms   Inform MD if wish to start trial of Azelastine for nasal drainage in the future

## 2018-11-19 NOTE — PROGRESS NOTES
[address] [id number] [phone]  Dept: [phone]    PRE-OP EVALUATION:  Today's date: 2018    Eder Horan (: 1940) presents for pre-operative evaluation assessment as requested by Dr. Thomas.  He requires evaluation and anesthesia risk assessment prior to undergoing surgery/procedure for treatment of Cataract .    Fax number for surgical facility: [phone]  Primary Physician: Richmond Martinez  Type of Anesthesia Anticipated: Block    Patient has a Health Care Directive or Living Will:  YES     Preop Questions 2018   Who is doing your surgery? Dr. JOSEPH Thomas   What are you having done? left eye cataract repair   Date of Surgery/Procedure: 2018   Facility or Hospital where procedure/surgery will be performed: [facility]   1.  Do you have a history of Heart attack, stroke, stent, coronary bypass surgery, or other heart surgery? YES  - Himanshu (A-Fib)   2.  Do you ever have any pain or discomfort in your chest? No   3.  Do you have a history of  Heart Failure? No   4.   Are you troubled by shortness of breath when:  walking on a level surface, or up a slight hill, or at night? YES - up a hill. No SOB with flat surfaces   5.  Do you currently have a cold, bronchitis or other respiratory infection? No   6.  Do you have a cough, shortness of breath, or wheezing? YES -  Has some post nasal drainage that is chronic and clear. Causes clearing of throat occ   7.  Do you sometimes get pains in the calves of your legs when you walk? No   8. Do you or anyone in your family have previous history of blood clots? YES - blood clots in legs years ago   9.  Do you or does anyone in your family have a serious bleeding problem such as prolonged bleeding following surgeries or cuts? UNKNOWN    10. Have you ever had problems with anemia or been told to take iron pills? No   11. Have you had any abnormal blood loss such as  black, tarry or bloody stools? No   12. Have you ever had a blood transfusion? No   13. Have you or any of your relatives ever had problems with anesthesia? No   14. Do you have sleep apnea, excessive snoring or daytime drowsiness? No   15. Do you have any prosthetic heart valves? No   16. Do you have prosthetic joints? No         HPI:     HPI related to upcoming procedure: History of reduced visual acuity left eye with cataract.  Patient presents to undergo surgical correction.   See below for other medical issues           MEDICAL HISTORY:     Patient Active Problem List    Diagnosis Date Noted     Chronotropic incompetence with sinus node dysfunction (H)      Priority: Medium     Bradycardia      Priority: Medium     Stroke (H) 06/11/2017     Priority: Medium     Embolic stroke (H) 06/11/2017     Priority: Medium     superior cerebellar artery occlusion. Hx A fib       Chronic obstructive pulmonary disease, unspecified COPD type (H) 02/09/2017     Priority: Medium     Personal history of tobacco use, presenting hazards to health: 18-20 y/o @ 1ppd; pipe and occas cig  02/19/2016     Priority: Medium     Benign essential hypertension      Priority: Medium     TMJ (temporomandibular joint syndrome) 04/12/2012     Priority: Medium     Advanced directives, counseling/discussion 10/31/2011     Priority: Medium     Advance Care Planning 2/9/2017: ACP Review of Chart / Resources Provided:  Reviewed chart for advance care plan.  Eder Horan has no plan or code status on file however states presence of ACP document. Copy requested. Confirmed code status reflects current choices pending receipt of document/advance care plan review.  Confirmed/documented legally designated decision makers.  Added by Elsa Madrid        Advance Directive Problem List Overview:   Name Relationship Phone    Primary Health Care Agent            Alternative Health Care Agent          Discussed advance care planning with patient;  information given to patient to review. 10/31/2011          Prostate cancer (H)      Priority: Medium     Paroxysmal atrial fibrillation (H)      Priority: Medium     Allergic rhinitis 12/03/2004     Priority: Medium     Problem list name updated by automated process. Provider to review        Past Medical History:   Diagnosis Date     Actinic keratosis 6/09    face     ALLERGIC RHINITIS       Atrial fibrillation (H) 6/08     Back pain     s/p LESI through ortho Feb 2010     Basal cell carcinoma      Bradycardia      Chronotropic incompetence with sinus node dysfunction (H)      COPD (chronic obstructive pulmonary disease) (H)      DVT      Left calf 2003. Right calf 6/06     Embolic stroke (H) 06/11/2017    superior cerebellar artery occlusion. Hx A fib     Hyperlipidemia LDL goal <130 5/10/2011     Hypertension      HYPOTHYROIDISM      hypothyroidism     Lateral epicondylitis  of elbow 7/05    left     Malignant melanoma (H)      PLANTAR FASCITIS      Prostate cancer (H) 6/09    on Lupron therapy     Squamous cell carcinoma  Sept 2014     right lower lid, s/p MOHS     Syncope      TMJ (temporomandibular joint syndrome) 4/12/2012     Tobacco use disorder      Past Surgical History:   Procedure Laterality Date     ABDOMEN SURGERY  2004,06,09    Hernias (2) Prostate Removal(2009)     BIOPSY  2009    Prostate     C NONSPECIFIC PROCEDURE  1/07    Left groin exploration and left inguinal herniorrhaphy     C NONSPECIFIC PROCEDURE  5/08    Prostate bx (benign)     C NONSPECIFIC PROCEDURE  8/09    Wide local excision (left side), robotic-assisted laparoscopic prostatectomy and   Left pelvic lymph node dissection     C NONSPECIFIC PROCEDURE  August 2010    Right inguinal herniorrhaphy with mesh     CARDIOVERSION  10-16-08    failed     COLONOSCOPY  2004     EYE SURGERY      2013 lump from lower  left eye lid removed     GENITOURINARY SURGERY  2009    Prostate     HERNIA REPAIR  2004 - 2006     Current Outpatient  Prescriptions   Medication Sig Dispense Refill     apixaban ANTICOAGULANT (ELIQUIS) 5 MG tablet TAKE ONE TABLET BY MOUTH TWICE DAILY. 180 tablet 3     beclomethasone (QVAR) 40 MCG/ACT Inhaler Inhale 2 puffs into the lungs daily 3 Inhaler 0     flecainide acetate 150 MG TABS 1/2 tablet once daily 45 tablet 3     fluocinonide (LIDEX) 0.05 % cream Apply topically 2 times daily 120 g 3     latanoprost (XALATAN) 0.005 % ophthalmic solution Place 1 drop Into the left eye daily       levothyroxine (SYNTHROID/LEVOTHROID) 125 MCG tablet Take 1 tablet (125 mcg) by mouth daily 90 tablet 2     losartan (COZAAR) 25 MG tablet Take 1 tablet (25 mg) by mouth daily 90 tablet 0     ORDER FOR DME Equipment being ordered: spacer for inhaler 1 Device 1     prednisoLONE acetate (PRED FORTE) 1 % ophthalmic susp Place 1-2 drops Into the left eye 3 times daily       PROAIR  (90 BASE) MCG/ACT inhaler INHALE 2 PUFFS INTO THE LUNGS EVERY 4 HOURS AS NEEDED FOR SHORTNESS OF BREATH / DYSPNEA 54 g 2     valACYclovir (VALTREX) 1000 mg tablet 500 mg daily   1     OTC products: None, except as noted above    Allergies   Allergen Reactions     Bactrim [Sulfa Drugs] Rash      Latex Allergy: NO    Social History   Substance Use Topics     Smoking status: Former Smoker     Types: Cigars, Pipe     Quit date: 12/1/2006     Smokeless tobacco: Never Used      Comment:  occasionally cigar     Alcohol use 0.0 oz/week     0 Standard drinks or equivalent per week      Comment: 2-3 drinks a day:  Wine/Beer     History   Drug Use No       REVIEW OF SYSTEMS:   CONSTITUTIONAL: NEGATIVE for fever, chills, change in weight  INTEGUMENTARY/SKIN: NEGATIVE for worrisome rashes, moles or lesions  EYES:  See HPI  ENT/MOUTH: NEGATIVE for ear, mouth and throat problems. Terrell clear rhinorrhea that is chronic  RESP: NEGATIVE for significant cough or SOB with usual activity. Used Albuterol last 2 weeks ago  CV: NEGATIVE for chest pain,  or peripheral edema. Hx A fib. No  sense of palpitations in a couple months. Mild Raynauds sx in fingers bilaterally. 1 tea in AM and carries a mug of coffee through the day.  History of some chronotropic incompetence  With sinus node dysfunction per cardiology.  Diagnosis made over one year ago.  Patient saw cardiology 2 weeks and continues to decline placement of a pacemaker.  Patient denies any recent presyncopal symptoms  GI: NEGATIVE for nausea, abdominal pain, heartburn, or change in bowel habits  : NEGATIVE for  dysuria, or hematuria. Mild incontinence. Hx prostate with  last Lupron  March 2018. Saw Urology  Earlier this month and PSA 1.9 with plan to recheck 4 mos  MUSCULOSKELETAL: NEGATIVE for significant arthralgias or myalgia that limit activity  NEURO: NEGATIVE for weakness, dizziness or paresthesias  ENDOCRINE: NEGATIVE for temperature intolerance now. On thyroid replacement  HEME: NEGATIVE for bleeding problems with Eliquis  PSYCHIATRIC: NEGATIVE for changes in mood or affect    EXAM:   /64  Pulse 55  Resp 16  Wt 171 lb 11.2 oz (77.9 kg)  SpO2 96%  BMI 24.64 kg/m2  Eye: PERRL, EOMI. Left cataract  HENT: ear canals and TM's normal and nose and mouth without ulcers or lesions.  Nasal mucosa mildly edematous with some clear nasal discharge.  Sinuses nontender  Neck: no adenopathy. Thyroid normal to palpation. No bruits  Pulm: Lungs clear to auscultation   CV: Regular rhythm, bradycardic  GI: Soft, nontender, Normal active bowel sounds, No hepatosplenomegaly or masses palpable  Ext: Peripheral pulses intact. Mild BLE edema. Nontender varicose veins. Fingers currently normal temp to palpation  Neuro: Normal strength and tone, sensory exam grossly normal      DIAGNOSTICS:   EKG: Sinus bradycardia with rate 50.11/7/18 showed no acute ST/T changes.  Poor R wave progression.  Electrical EKG interpretation questioning possible old anterior infarct related to the R wave progression.  However, echo from April 2018 showed ejection  fraction 50-55% with no wall motion abnormalities  Component      Latest Ref Rng & Units 3/7/2018 8/10/2018 11/20/2018   Sodium      136 - 145 mmol/L  138    Potassium      3.5 - 5.1 mmol/L  3.8    Chloride      98 - 107 mmol/L  104    Carbon Dioxide      23 - 29 mmol/L  28    Anion Gap      6 - 17 mmol/L  9.8    Glucose      70 - 105 mg/dL  103    Urea Nitrogen      7 - 30 mg/dL  10    Creatinine      0.70 - 1.30 mg/dL  0.90    GFR Estimate      >60 mL/min/1.7m2  81    GFR Estimate If Black      >60 mL/min/1.7m2  >90    Calcium      8.5 - 10.5 mg/dL  8.4 (L)    WBC      4.0 - 11.0 10e9/L   6.4   RBC Count      4.4 - 5.9 10e12/L   4.18 (L)   Hemoglobin      13.3 - 17.7 g/dL   13.7   Hematocrit      40.0 - 53.0 %   42.2   MCV      78 - 100 fl   101 (H)   MCH      26.5 - 33.0 pg   32.8   MCHC      31.5 - 36.5 g/dL   32.5   RDW      10.0 - 15.0 %   13.2   Platelet Count      150 - 450 10e9/L   212   TSH      0.40 - 4.00 mU/L 0.68         IMPRESSION:   Reason for surgery/procedure:  Left cataract   Diagnosis/reason for consult:  1. Hypothyroidism. - TSH at goal  2.  History of atrial fibrillation, currently in sinus rhythm with flecainide  3.  Chronic bradycardia.  No recent presyncopal symptoms.  Patient declining pacemaker placement  4. Prostate cancer - Pt on intermittent Lupron injections.  Followed by urology See ROS above.   5.  Hypertension - stable  6. COPD - rare use Albuterol  7. Mild Raynauds. Temp hands OK today. Pt will reduce caffeine intake  8.  Vasomotor rhinitis - pt declines treatment  9.  Mild macrocytic anemia - will check B12 level.      The proposed surgical procedure is considered LOW risk.    REVISED CARDIAC RISK INDEX  The patient has the following serious cardiovascular risks for perioperative complications such as (MI, PE, VFib and 3  AV Block):  Cerebrovascular Disease (TIA or CVA). Embolic prior to starting anticoagulation. No residual  INTERPRETATION: 1 risks: Class II (low risk - 0.9%  complication rate)    The patient has the following additional risks for perioperative complications:  No identified additional risks         RECOMMENDATIONS:       Cardiovascular Risk  Performs 4 METs exercise without symptoms (Light housework (dusting, washing dishes) and walking  on level ground at typical walking rate    APPROVAL GIVEN to proceed with proposed procedure, without further diagnostic evaluation     PLAN:  No solid food after midnight  AM of surgery, take Flecainide, Levothyroxine and  Eliquis  with a small sip water. Hold other meds that morning.  Hold Losartan the day of surgery and restart the next day  Take other oral meds when get home later that day  Use inhaler as usual  Labs as ordered  Reduce caffeine intake for Raynauds symptoms   Inform MD if wish to start trial of Azelastine for nasal drainage in the future    Signed Electronically by: Richmond Martinez MD    Copy of this evaluation report is provided to requesting physician.    [facility] Preop Guidelines    Revised Cardiac Risk Index

## 2018-11-20 ENCOUNTER — OFFICE VISIT (OUTPATIENT)
Dept: INTERNAL MEDICINE | Facility: CLINIC | Age: 78
End: 2018-11-20
Payer: COMMERCIAL

## 2018-11-20 VITALS
OXYGEN SATURATION: 96 % | WEIGHT: 171.7 LBS | BODY MASS INDEX: 24.64 KG/M2 | DIASTOLIC BLOOD PRESSURE: 64 MMHG | RESPIRATION RATE: 16 BRPM | HEART RATE: 55 BPM | SYSTOLIC BLOOD PRESSURE: 106 MMHG

## 2018-11-20 DIAGNOSIS — D53.9 MACROCYTIC ANEMIA: ICD-10-CM

## 2018-11-20 DIAGNOSIS — C61 PROSTATE CANCER (H): ICD-10-CM

## 2018-11-20 DIAGNOSIS — J44.9 CHRONIC OBSTRUCTIVE PULMONARY DISEASE, UNSPECIFIED COPD TYPE (H): ICD-10-CM

## 2018-11-20 DIAGNOSIS — Z01.818 PREOP GENERAL PHYSICAL EXAM: Primary | ICD-10-CM

## 2018-11-20 DIAGNOSIS — H26.9 CATARACT OF LEFT EYE, UNSPECIFIED CATARACT TYPE: ICD-10-CM

## 2018-11-20 LAB
ERYTHROCYTE [DISTWIDTH] IN BLOOD BY AUTOMATED COUNT: 13.2 % (ref 10–15)
HCT VFR BLD AUTO: 42.2 % (ref 40–53)
HGB BLD-MCNC: 13.7 G/DL (ref 13.3–17.7)
MCH RBC QN AUTO: 32.8 PG (ref 26.5–33)
MCHC RBC AUTO-ENTMCNC: 32.5 G/DL (ref 31.5–36.5)
MCV RBC AUTO: 101 FL (ref 78–100)
PLATELET # BLD AUTO: 212 10E9/L (ref 150–450)
RBC # BLD AUTO: 4.18 10E12/L (ref 4.4–5.9)
VIT B12 SERPL-MCNC: 136 PG/ML (ref 193–986)
WBC # BLD AUTO: 6.4 10E9/L (ref 4–11)

## 2018-11-20 PROCEDURE — 85027 COMPLETE CBC AUTOMATED: CPT | Performed by: INTERNAL MEDICINE

## 2018-11-20 PROCEDURE — 99215 OFFICE O/P EST HI 40 MIN: CPT | Performed by: INTERNAL MEDICINE

## 2018-11-20 PROCEDURE — 36415 COLL VENOUS BLD VENIPUNCTURE: CPT | Performed by: INTERNAL MEDICINE

## 2018-11-20 PROCEDURE — 82607 VITAMIN B-12: CPT | Performed by: INTERNAL MEDICINE

## 2018-11-20 NOTE — MR AVS SNAPSHOT
After Visit Summary   11/20/2018    Eder Horan    MRN: [id number]           Patient Information     Date Of Birth          1940        Visit Information        Provider Department      11/20/2018 10:00 AM Richmond Martinez MD [facility]        Today's Diagnoses     Preop general physical exam    -  1    Cataract of left eye, unspecified cataract type        Macrocytic anemia          Care Instructions    No solid food after midnight  AM of surgery, take Flecainide, Levothyroxine and  Eliquis  with a small sip water. Hold other meds that morning.  Hold Losartan the day of surgery and restart the next day   Take other oral meds when get home later that day  Use inhaler as usual  Labs as ordered   reduce caffeine intake for Raynauds  Finger symptoms and nasal drainage          Follow-ups after your visit        Your next 10 appointments already scheduled     Apr 11, 2019 10:30 AM CDT   Return Visit with Reece Daniels MD   [facility] ([facility])    [address] [place] [address] [phone]              Who to contact     If you have questions or need follow up information about today's clinic visit or your schedule please contact [facility] directly at [phone].  Normal or non-critical lab and imaging results will be communicated to you by MyChart, letter or phone within 4 business days after the clinic has received the results. If you do not hear from us within 7 days, please contact the clinic through MyChart or phone. If you have a critical or abnormal lab result, we will notify you by phone as soon as possible.  Submit refill requests through [employer] or call your pharmacy and they will forward the refill request to us. Please allow 3 business days for your refill to be completed.          Additional Information About Your Visit        MyChart Information     [employer]  gives you secure access to your electronic health record. If you see a primary care provider, you can also send messages to your care team and make appointments. If you have questions, please call your primary care clinic.  If you do not have a primary care provider, please call [phone] and they will assist you.        Care EveryWhere ID     This is your Care EveryWhere ID. This could be used by other organizations to access your [facility] medical records  [id number]        Your Vitals Were     Pulse Respirations Pulse Oximetry BMI (Body Mass Index)          55 16 96% 24.64 kg/m2         Blood Pressure from Last 3 Encounters:   11/20/18 106/64   11/07/18 118/74   08/10/18 110/74    Weight from Last 3 Encounters:   11/20/18 171 lb 11.2 oz (77.9 kg)   11/07/18 170 lb (77.1 kg)   08/10/18 167 lb (75.8 kg)              We Performed the Following     CBC with platelets     Vitamin B12        Primary Care Provider Office Phone # Fax #    Richmond Martinez [phone]       [address] [place] [phone]        Equal Access to Services     [facility] CenterYUMIKO : Hadii aad ku hadasho Soomaali, waaxda luqadaha, qaybta kaalmada adeegyada, chun chauhan . So [facility] [phone].    ATENCIÓN: Si habla español, tiene a charlton disposición servicios gratuitos de asistencia lingüística. LlHigh[facility] [phone].    We comply with applicable federal civil rights laws and [place] laws. We do not discriminate on the basis of race, color, national origin, age, disability, sex, sexual orientation, or gender identity.            Thank you!     Thank you for choosing [facility]  for your care. Our goal is always to provide you with excellent care. Hearing back from our patients is one way we can continue to improve our services. Please take a few minutes to complete the written survey that you may receive in the mail after your visit with us. Thank you!             Your Updated  Medication List - Protect others around you: Learn how to safely use, store and throw away your medicines at www.disposemymeds.org.          This list is accurate as of 11/20/18 10:48 AM.  Always use your most recent med list.                   Brand Name Dispense Instructions for use Diagnosis    apixaban ANTICOAGULANT 5 MG tablet    ELIQUIS    180 tablet    TAKE ONE TABLET BY MOUTH TWICE DAILY.    Paroxysmal atrial fibrillation (H)       beclomethasone 40 MCG/ACT Aers IS A DISCONTINUED MEDICATION    QVAR    3 Inhaler    Inhale 2 puffs into the lungs daily    Chronic obstructive pulmonary disease, unspecified COPD type (H)       flecainide acetate 150 MG tablet    TAMBOCOR    45 tablet    1/2 tablet once daily    Paroxysmal atrial fibrillation (H)       fluocinonide 0.05 % cream    LIDEX    120 g    Apply topically 2 times daily    Asteatosis, Lentigo, SK (seborrheic keratosis), Angioma, Dermal nevus       latanoprost 0.005 % ophthalmic solution    XALATAN     Place 1 drop Into the left eye daily        levothyroxine 125 MCG tablet    SYNTHROID/LEVOTHROID    90 tablet    Take 1 tablet (125 mcg) by mouth daily    Hypothyroidism, unspecified type       losartan 25 MG tablet    COZAAR    90 tablet    Take 1 tablet (25 mg) by mouth daily    Essential hypertension with goal blood pressure less than 140/90       order for DME     1 Device    Equipment being ordered: spacer for inhaler    COPD (chronic obstructive pulmonary disease) (H)       prednisoLONE acetate 1 % ophthalmic susp    PRED FORTE     Place 1-2 drops Into the left eye 3 times daily        PROAIR  (90 Base) MCG/ACT inhaler   Generic drug:  albuterol     54 g    INHALE 2 PUFFS INTO THE LUNGS EVERY 4 HOURS AS NEEDED FOR SHORTNESS OF BREATH / DYSPNEA    COPD (chronic obstructive pulmonary disease) (H)       valACYclovir 1000 mg tablet    VALTREX     500 mg daily

## 2018-11-26 ENCOUNTER — TELEPHONE (OUTPATIENT)
Dept: INTERNAL MEDICINE | Facility: CLINIC | Age: 78
End: 2018-11-26

## 2018-11-26 DIAGNOSIS — I10 BENIGN ESSENTIAL HYPERTENSION: Primary | ICD-10-CM

## 2018-11-26 DIAGNOSIS — I73.00 RAYNAUD'S DISEASE WITHOUT GANGRENE: ICD-10-CM

## 2018-11-26 RX ORDER — AMLODIPINE BESYLATE 5 MG/1
5 TABLET ORAL DAILY
Qty: 30 TABLET | Refills: 11 | Status: SHIPPED | OUTPATIENT
Start: 2018-11-26 | End: 2019-01-18

## 2018-11-26 NOTE — TELEPHONE ENCOUNTER
Called patient regarding his message. Said at his preop visit he talked to Dr. Martinez about some other issues he was having. Says Dr. Martinez mentioned alternate to losartan that could handle BP and could also possibly help with other issues- swollen ankles, circulation issues, itchiness, dry skin. Something about Raynauds? Also said he was told he could reduce caffeine. Talked about other things and then forgot to address possible switch of losartan to another med. Patient says he/pharmacy will be calling us for refill of losartan next week, although looks like it was last prescribed by cards. Wondering if he should switch medication instead of refilling?

## 2018-11-26 NOTE — TELEPHONE ENCOUNTER
Patient has questions on rather his provider is going to change his medication losartan (COZAAR) 25 MG tablet as they discussed before he is due for his next refill.

## 2018-11-27 NOTE — TELEPHONE ENCOUNTER
Saw patient in clinic, I discussed possible change from losartan to amlodipine to help with her blood pressure control and to potentially help with raynauds symptoms.  Also discussed caffeine reduction.  At that time, the patient did not imply that symptoms were that bothersome that he wished to make a change of medication so I did not do that at that appointment.  The patient wishes to make the change, then patient to stop losartan and start amlodipine 5 mg tablet, 1 tablet daily.  If has any lightheadedness with this dosage, and patient to cut the dose to 1/2 tablet daily patient to see me back in clinic in about a month for review of his blood pressure with the medication change and assessment how the coolness in his hands or feet is doing with the medication change.  Call earlier if side effects with medication change.  Prescription has been faxed to patient's pharmacy

## 2018-11-28 DIAGNOSIS — J44.9 COPD (CHRONIC OBSTRUCTIVE PULMONARY DISEASE) (H): ICD-10-CM

## 2018-11-28 RX ORDER — ALBUTEROL SULFATE 90 UG/1
AEROSOL, METERED RESPIRATORY (INHALATION)
Qty: 17 G | Refills: 11 | Status: SHIPPED | OUTPATIENT
Start: 2018-11-28 | End: 2020-07-20

## 2018-11-28 NOTE — TELEPHONE ENCOUNTER
"Requested Prescriptions   Pending Prescriptions Disp Refills     PROAIR  (90 Base) MCG/ACT inhaler [Pharmacy Med Name: ProAir HFA Inhalation Aerosol Solution 108 (90 Base) MCG/ACT] 17 g 1     Sig: INHALE 2 PUFFS INTO THE LUNGS EVERY 4 HOURS AS NEEDED FOR SHORTNESS OF BREATH / DYSPNEA    Asthma Maintenance Inhalers - Anticholinergics Passed    11/28/2018 10:20 AM       Passed - Patient is age 12 years or older       Passed - Recent (12 mo) or future (30 days) visit within the authorizing provider's specialty    Patient had office visit in the last 12 months or has a visit in the next 30 days with authorizing provider or within the authorizing provider's specialty.  See \"Patient Info\" tab in inbasket, or \"Choose Columns\" in Meds & Orders section of the refill encounter.              Last Written Prescription Date:  9/6/17  Last Fill Quantity: 54,  # refills: 2   Last office visit: 11/20/2018 with prescribing provider:  11/20/18   Future Office Visit:      "

## 2018-11-28 NOTE — TELEPHONE ENCOUNTER
Patient informed of message below.  He states that he will start the Amlodipine once he has finished his Losartan tablets.  At that time he will call back to make follow up appointment.  Patient will also call back if he has any side effects, concerns or questions.

## 2019-01-08 DIAGNOSIS — E03.9 HYPOTHYROIDISM, UNSPECIFIED TYPE: ICD-10-CM

## 2019-01-08 RX ORDER — LEVOTHYROXINE SODIUM 125 UG/1
125 TABLET ORAL DAILY
Qty: 90 TABLET | Refills: 0 | Status: SHIPPED | OUTPATIENT
Start: 2019-01-08 | End: 2019-04-01

## 2019-01-08 NOTE — TELEPHONE ENCOUNTER
"Requested Prescriptions   Pending Prescriptions Disp Refills     levothyroxine (SYNTHROID/LEVOTHROID) 125 MCG tablet [Pharmacy Med Name: Levothyroxine Sodium Oral Tablet 125 MCG]  Last Written Prescription Date:  3/7/18  Last Fill Quantity: 90 TABLET,  # refills: 2   Last office visit: 11/20/2018 with prescribing provider:  KIMMY   Future Office Visit:   Next 5 appointments (look out 90 days)    Jan 18, 2019  9:30 AM CST  Office Visit with Richmond Martinez MD  [facility] [address] [address] [phone]          90 tablet 1     Sig: Take 1 tablet (125 mcg) by mouth daily    Thyroid Protocol Passed - 1/8/2019 11:12 AM       Passed - Patient is 12 years or older       Passed - Recent (12 mo) or future (30 days) visit within the authorizing provider's specialty    Patient had office visit in the last 12 months or has a visit in the next 30 days with authorizing provider or within the authorizing provider's specialty.  See \"Patient Info\" tab in inbasket, or \"Choose Columns\" in Meds & Orders section of the refill encounter.             Passed - Medication is active on med list       Passed - Normal TSH on file in past 12 months    Recent Labs   Lab Test 03/07/18  0952   TSH 0.68                "

## 2019-01-16 PROBLEM — E53.8 B12 DEFICIENCY: Status: ACTIVE | Noted: 2019-01-16

## 2019-01-18 ENCOUNTER — OFFICE VISIT (OUTPATIENT)
Dept: INTERNAL MEDICINE | Facility: CLINIC | Age: 79
End: 2019-01-18
Payer: COMMERCIAL

## 2019-01-18 VITALS
TEMPERATURE: 97.5 F | OXYGEN SATURATION: 98 % | WEIGHT: 172 LBS | DIASTOLIC BLOOD PRESSURE: 78 MMHG | HEIGHT: 70 IN | RESPIRATION RATE: 16 BRPM | SYSTOLIC BLOOD PRESSURE: 118 MMHG | BODY MASS INDEX: 24.62 KG/M2 | HEART RATE: 60 BPM

## 2019-01-18 DIAGNOSIS — J44.9 CHRONIC OBSTRUCTIVE PULMONARY DISEASE, UNSPECIFIED COPD TYPE (H): ICD-10-CM

## 2019-01-18 DIAGNOSIS — E53.8 B12 DEFICIENCY: Primary | ICD-10-CM

## 2019-01-18 DIAGNOSIS — I73.00 RAYNAUD'S DISEASE WITHOUT GANGRENE: ICD-10-CM

## 2019-01-18 DIAGNOSIS — I10 BENIGN ESSENTIAL HYPERTENSION: ICD-10-CM

## 2019-01-18 DIAGNOSIS — C61 PROSTATE CANCER (H): ICD-10-CM

## 2019-01-18 PROCEDURE — 99214 OFFICE O/P EST MOD 30 MIN: CPT | Mod: 25 | Performed by: INTERNAL MEDICINE

## 2019-01-18 PROCEDURE — 96372 THER/PROPH/DIAG INJ SC/IM: CPT | Performed by: INTERNAL MEDICINE

## 2019-01-18 RX ORDER — AMLODIPINE BESYLATE 5 MG/1
5 TABLET ORAL DAILY
Qty: 90 TABLET | Refills: 3 | Status: SHIPPED | OUTPATIENT
Start: 2019-01-18 | End: 2020-01-06

## 2019-01-18 RX ORDER — CYANOCOBALAMIN 1000 UG/ML
1000 INJECTION, SOLUTION INTRAMUSCULAR; SUBCUTANEOUS ONCE
Status: COMPLETED | OUTPATIENT
Start: 2019-01-18 | End: 2019-01-18

## 2019-01-18 RX ORDER — CYANOCOBALAMIN (VITAMIN B-12) 2500 MCG
2500 TABLET, SUBLINGUAL SUBLINGUAL DAILY
Qty: 90 TABLET | Refills: 3 | Status: SHIPPED | OUTPATIENT
Start: 2019-01-18 | End: 2020-01-18

## 2019-01-18 RX ORDER — DIFLUPREDNATE OPHTHALMIC 0.5 MG/ML
1 EMULSION OPHTHALMIC 4 TIMES DAILY
COMMUNITY
End: 2019-07-15

## 2019-01-18 RX ADMIN — CYANOCOBALAMIN 1000 MCG: 1000 INJECTION, SOLUTION INTRAMUSCULAR; SUBCUTANEOUS at 10:15

## 2019-01-18 ASSESSMENT — MIFFLIN-ST. JEOR: SCORE: 1496.44

## 2019-01-18 NOTE — PATIENT INSTRUCTIONS
B12  Injection today   Vitamin B12 2,500mcg SUBLINGUAL tablets over the counter and start 1 tab under your tongue daily. Do not purchase the regular hard tablets as these will not be absorbed properly by your stomach due to a probable enzyme deficiency in the stomach.  Nonfasting B12 level in 3 months  Continue other meds.  Prescriptions refilled.    See me in 6 months for follow-up, earlier as needed  Follow-up with urology the Spring as per their recommendation

## 2019-01-18 NOTE — PROGRESS NOTES
"  SUBJECTIVE:   Eder Horan is a 79 year old male who presents to clinic today for the following health issues:    Chief Complaint   Patient presents with     Hypertension       Hypertension Follow-up      Outpatient blood pressures are not being checked.    Low Salt Diet: no added salt      Amount of exercise or physical activity: None    Problems taking medications regularly: No    Medication side effects: none    Diet: low salt  Pt's past medical history, family history, habits, medications and allergies were reviewed with the patient today.  See snap shot for  HCM status. Most recent lab results reviewed with pt. Problem list and histories reviewed & adjusted, as indicated.  Additional history as below:    Had Losartan stopped and Amlodipine started  ror blood pressure and for Raynauds.Raynauds/ Finger sx better now. Denies chest pain,  abdominal pain, headache, vision changes or side effects with medications. Stable chronic shortness of breath with COPD.   Most recent labs reviewed with the patient.  B12 deficiency noted.  Patient has a balanced diet of fruits and vegetables along with meats during the day so should be getting plenty of B12 through his diet if absorbing properly.  History mild elevated MCV which is improved with the last blood test.  Patient will have 1 glass of wine at night.  Most recent hemoglobin level normal.  Denies current neuropathy symptoms.  Getting intermittent hormone therapy injections from urology for prostate cancer.  Last visit note from November 2018 reviewed and patient will follow-up again with urology for PSA monitoring in March       Additional ROS:   Constitutional, HEENT, Cardiovascular, Pulmonary, GI and , Neuro, MSK and Psych review of systems/symptoms are otherwise negative or unchanged from previous, except as noted above.      OBJECTIVE:  /78   Pulse 60   Temp 97.5  F (36.4  C) (Oral)   Resp 16   Ht 1.778 m (5' 10\")   Wt 78 kg (172 lb)   SpO2 98%   " "BMI 24.68 kg/m     Estimated body mass index is 24.68 kg/m  as calculated from the following:    Height as of this encounter: 1.778 m (5' 10\").    Weight as of this encounter: 78 kg (172 lb).     Pulm: Lungs clear to auscultation with slight prolonged expiratory phase.  No wheezes  CV: Regular rates and rhythm  GI: Soft, nontender, Normal active bowel sounds, No hepatosplenomegaly or masses palpable  Ext: Peripheral pulses intact. No edema.  Temperature of fingertips bilaterally slightly cooler than more proximally in the arms.  Capillary refill okay  Neuro: Normal strength and tone, sensory exam grossly normal to LTS distal BLE. Vibration testing deferred today as unable to locate tuning fork for testing today    Assessment/Plan: (See plan discussion below for further details)  1. Benign essential hypertension  Controlled.  Continue amlodipine  - amLODIPine (NORVASC) 5 MG tablet; Take 1 tablet (5 mg) by mouth daily  Dispense: 90 tablet; Refill: 3    2. Raynaud's disease without gangrene  Raynauds symptoms improved after starting amlodipine.  Patient will minimize caffeine intake  - amLODIPine (NORVASC) 5 MG tablet; Take 1 tablet (5 mg) by mouth daily  Dispense: 90 tablet; Refill: 3    3. Chronic obstructive pulmonary disease, unspecified COPD type (H)  Stable symptoms.  Rare use of albuterol    4. Prostate cancer (H)  Stable.  Patient to follow-up with urology in March for PSA recheck and possible repeat hormone therapy injection    5. B12 deficiency  Patient denies neuropathy symptoms now.  Patient will be given a B12 injection today and then start daily sublingual therapy.  Repeat B12 level in 3 months  - vitamin B-12 (CYANOCOBALAMIN) 2500 MCG sublingual tablet; Take 1 tablet (2,500 mcg) by mouth daily  Dispense: 90 tablet; Refill: 3  - vitamin B-12 (CYANOCOBALAMIN) injection 1,000 mcg; Inject 1 mL (1,000 mcg) into the muscle once  - THER/PROPH/DIAG INJ, SC/IM  - Vitamin B12; Future    Plan discussion:   B12  " Injection today   Vitamin B12 2,500mcg SUBLINGUAL tablets over the counter and start 1 tab under your tongue daily. Do not purchase the regular hard tablets as these will not be absorbed properly by your stomach due to a probable enzyme deficiency in the stomach.  Nonfasting B12 level in 3 months  Continue other meds.  Prescriptions refilled.    See me in 6 months for follow-up, earlier as needed  Follow-up with urology the Spring as per their recommendation         Richmond Martinez MD  Internal Medicine Department  [facility]

## 2019-01-18 NOTE — PROCEDURES
Prior to injection, verified patient identity using patient's name and date of birth.  Due to injection administration, patient instructed to remain in clinic for 15 minutes  afterwards, and to report any adverse reaction to me immediately.    Vitamin B12    Drug Amount Wasted:  None.  Vial/Syringe: Single dose vial  Expiration Date:  07/2020

## 2019-02-19 DIAGNOSIS — E53.8 B12 DEFICIENCY: ICD-10-CM

## 2019-02-19 LAB — VIT B12 SERPL-MCNC: 451 PG/ML (ref 193–986)

## 2019-02-19 PROCEDURE — 36415 COLL VENOUS BLD VENIPUNCTURE: CPT | Performed by: INTERNAL MEDICINE

## 2019-02-19 PROCEDURE — 82607 VITAMIN B-12: CPT | Performed by: INTERNAL MEDICINE

## 2019-03-03 ENCOUNTER — APPOINTMENT (OUTPATIENT)
Dept: GENERAL RADIOLOGY | Facility: CLINIC | Age: 79
End: 2019-03-03
Attending: EMERGENCY MEDICINE
Payer: COMMERCIAL

## 2019-03-03 ENCOUNTER — HOSPITAL ENCOUNTER (EMERGENCY)
Facility: CLINIC | Age: 79
Discharge: HOME | End: 2019-03-03
Attending: EMERGENCY MEDICINE | Admitting: EMERGENCY MEDICINE
Payer: COMMERCIAL

## 2019-03-03 VITALS
RESPIRATION RATE: 16 BRPM | DIASTOLIC BLOOD PRESSURE: 70 MMHG | TEMPERATURE: 97.6 F | OXYGEN SATURATION: 100 % | WEIGHT: 170 LBS | HEART RATE: 52 BPM | HEIGHT: 69 IN | BODY MASS INDEX: 25.18 KG/M2 | SYSTOLIC BLOOD PRESSURE: 116 MMHG

## 2019-03-03 DIAGNOSIS — S52.615A CLOSED NONDISPLACED FRACTURE OF STYLOID PROCESS OF LEFT ULNA, INITIAL ENCOUNTER: ICD-10-CM

## 2019-03-03 DIAGNOSIS — T14.8XXA FRACTURE: ICD-10-CM

## 2019-03-03 DIAGNOSIS — S52.532A CLOSED COLLES' FRACTURE OF LEFT RADIUS, INITIAL ENCOUNTER: ICD-10-CM

## 2019-03-03 PROCEDURE — 40000275 ZZH STATISTIC RCP TIME EA 10 MIN

## 2019-03-03 PROCEDURE — 40000277 XR SURGERY CARM FLUORO LESS THAN 5 MIN W STILLS

## 2019-03-03 PROCEDURE — 25000128 H RX IP 250 OP 636: Performed by: EMERGENCY MEDICINE

## 2019-03-03 PROCEDURE — 73100 X-RAY EXAM OF WRIST: CPT | Mod: LT

## 2019-03-03 PROCEDURE — 73110 X-RAY EXAM OF WRIST: CPT | Mod: LT

## 2019-03-03 PROCEDURE — 99285 EMERGENCY DEPT VISIT HI MDM: CPT | Mod: 25

## 2019-03-03 PROCEDURE — 25605 CLTX DST RDL FX/EPHYS SEP W/: CPT | Mod: LT

## 2019-03-03 PROCEDURE — 99152 MOD SED SAME PHYS/QHP 5/>YRS: CPT

## 2019-03-03 RX ORDER — PROPOFOL 10 MG/ML
100 INJECTION, EMULSION INTRAVENOUS ONCE
Status: COMPLETED | OUTPATIENT
Start: 2019-03-03 | End: 2019-03-03

## 2019-03-03 RX ORDER — HYDROCODONE BITARTRATE AND ACETAMINOPHEN 5; 325 MG/1; MG/1
1 TABLET ORAL EVERY 6 HOURS PRN
Qty: 12 TABLET | Refills: 0 | Status: SHIPPED | OUTPATIENT
Start: 2019-03-03 | End: 2019-07-15

## 2019-03-03 RX ORDER — HYDROCODONE BITARTRATE AND ACETAMINOPHEN 5; 325 MG/1; MG/1
1 TABLET ORAL ONCE
Status: DISCONTINUED | OUTPATIENT
Start: 2019-03-03 | End: 2019-03-03 | Stop reason: HOSPADM

## 2019-03-03 RX ADMIN — PROPOFOL 160 MG: 10 INJECTION, EMULSION INTRAVENOUS at 09:39

## 2019-03-03 ASSESSMENT — MIFFLIN-ST. JEOR: SCORE: 1471.49

## 2019-03-03 ASSESSMENT — ENCOUNTER SYMPTOMS: SHORTNESS OF BREATH: 0

## 2019-03-03 NOTE — ED NOTES
Bed: ST02  Expected date:   Expected time:   Means of arrival:   Comments:  HOLD for reduction ROOM 2

## 2019-03-03 NOTE — DISCHARGE INSTRUCTIONS
Call orthopedics to schedule appointment for follow up and treatment plan.  Tylenol for mild-moderate pain. Norco for severe pain.  Return with uncontrolled pain, numbness or weakness, or ANY OTHER concerns.

## 2019-03-03 NOTE — ED AVS SNAPSHOT
Emergency Department  [address] [place] [address]  Phone:  [phone]  Fax:  [phone]                                    Eder Horan   MRN: [id number]    Department:   Emergency Department   Date of Visit:  3/3/2019           After Visit Summary Signature Page    I have received my discharge instructions, and my questions have been answered. I have discussed any challenges I see with this plan with the nurse or doctor.    ..........................................................................................................................................  Patient/Patient Representative Signature      ..........................................................................................................................................  Patient Representative Print Name and Relationship to Patient    ..................................................               ................................................  Date                                   Time    ..........................................................................................................................................  Reviewed by Signature/Title    ...................................................              ..............................................  Date                                               Time          22EPIC Rev 08/18

## 2019-03-03 NOTE — ED PROVIDER NOTES
History     Chief Complaint:  Fall    The history is provided by the patient.      Eder Horan is a right handed 79 year old male with atrial fibrillation on Eliquis who presents after a fall. The patient slipped and fell in the bathroom this morning about 1.5 hours prior to arrival. While falling backwards, he put his arms out to catch himself, resulting in immediate left wrist pain. The patient did not hit his head or lose consciousness. He had no prodromal symptoms including chest pain or shortness of breath. He denies left elbow pain or other injuries. His pain is mild when resting and he has had no analgesics prior to arrival.     Allergies:  Bactrim     Medications:    Amlodipine (Norvasc) 5 mg tablet  Apixaban anticoagulant (Eliquis) 5 mg tablet  Beclomethasone (Qvar) 40 mcg/act inhaler  Difluprednate (Durezol) 0.05 % ophthalmic emulsion  Flecainide 150 mg tabs  Fluocinonide (Lidex) 0.05 % cream  Levothyroxine 125 mcg tablet  Proair hfa 108 (90 base) mcg/act inhaler  Vitamin b-12 (cyanocobalamin) 2500 mcg sublingual tablet    Past Medical History:    Actinic keratosis    Atrial fibrillation   B12 deficiency   Back pain   Basal cell carcinoma   Bradycardia   Chronotropic incompetence with sinus node dysfunction   COPD (chronic obstructive pulmonary disease)   DVT   Embolic stroke   Hyperlipidemia LDL goal <130   Hypertension   Lateral epicondylitis  of elbow   Malignant melanoma     Prostate cancer   Raynaud's disease without gangrene   Squamous cell carcinoma   Syncope   TMJ (temporomandibular joint syndrome)   Tobacco use disorder  Allergic rhinitis    Past Surgical History:    Hernia surgery  Prostate removal  Left groin exploration  Lump from lower left eye lid removed    Family History:    C.A.D.  Coronary artery disease  Myocardial infarction  Cancer    Social History:  Patient is  and wife is at bedside.  Tobacco Use: Former smoker  Alcohol Use: Yes  PCP: Richmond Martinez     Review of Systems  "  Respiratory: Negative for shortness of breath.    Cardiovascular: Negative for chest pain.   Musculoskeletal: Positive for arthralgias (left wrist).   Skin: Negative for wound.   Neurological: Negative for syncope.   All other systems reviewed and are negative.    Physical Exam     Patient Vitals for the past 24 hrs:   BP Temp Pulse Heart Rate Resp SpO2 Height Weight   03/03/19 0942 116/70 -- 52 53 26 100 % -- --   03/03/19 0941 -- -- -- 53 16 99 % -- --   03/03/19 0932 117/76 -- 50 -- -- -- -- --   03/03/19 0930 -- -- -- 52 9 100 % -- --   03/03/19 0927 -- -- -- 66 24 99 % -- --   03/03/19 0924 -- -- -- 55 18 100 % -- --   03/03/19 0900 -- -- -- -- -- 100 % -- --   03/03/19 0854 113/68 -- (!) 46 (!) 44 21 97 % -- --   03/03/19 0742 (!) 142/98 97.6  F (36.4  C) 50 50 15 98 % 1.753 m (5' 9\") 77.1 kg (170 lb)     Physical Exam  General: Well-developed and well-nourished. Well appearing elderly  man. Cooperative.  Head:  Atraumatic.  Eyes:  Conjunctivae, lids, and sclerae are normal.  ENT:    Normal nose. Moist mucous membranes. Normal posterior oropharynx.  Neck:  Supple. Normal range of motion.  CV:  Bradycardic rate and regular rhythm. Normal heart sounds with no murmurs, rubs, or gallops detected. Left radial pulse 2+.  Resp:  No respiratory distress. Clear to auscultation bilaterally without decreased breath sounds, wheezing, rales, or rhonchi.  GI:  Soft. Non-distended. Non-tender.    MS:  Tenderness and mild deformity to the left wrist without open wound. Soft compartments. No left elbow or shoulder tenderness. No other evidence of trauma.  Skin:  Warm. Non-diaphoretic. No pallor.  Neuro:  Awake. A&Ox3. Normal strength and sensation throughout including left forearm and hand.  Psych: Normal mood and affect. Normal speech.  Vitals reviewed.    Emergency Department Course     Imaging:  Radiographic findings were communicated with the patient who voiced understanding of the findings.  XR wrist left 3 " views:  Acute impacted transverse and intra-articular distal  radial fracture is present with some volar apex angulation. There is  also a fracture through the ulnar styloid process. Per radiology read.  XR surgery AKI L/T 5 min Fluoro w Stills:  Previously noted impacted transverse intra-articular  fracture of the distal left radius is again noted, with alignment of  the fracture fragments improved since the previous exam. Previously  described mildly displaced fracture of the ulnar styloid is not as  well seen on the current exam, but is likely unchanged. Per radiology read.  XR wrist left 2 views:  Interval reduction and plaster splint fixation of distal  radial fracture. Alignment appears near-anatomic. Per radiology read.    Procedures:    Sedation:      PERFORMED BY: Diane Rodriguez MD    Pre-Procedure Assessment done at 0920.    EXPECTED LEVEL:  Deep Sedation    INDICATION:  Sedation is required to allow for fracture reduction    Consent obtained from patient after discussing the risks, benefits and alternatives.    PO INTAKE: Appropriately NPO for procedure    ASA CLASS: Class 1 - HEALTHY PATIENT    MALLAMPATI: Grade 1:  Soft palate, uvula, tonsillar pillars, and posterior pharyngeal wall visible    LUNGS: Lungs Clear with good breath sounds bilaterally.     HEART: Normal heart sounds and rate    History and physical reviewed and no updates needed. I have reviewed the lab findings, diagnostic data, medications, and the plan for sedation. I have determined this patient to be an appropriate candidate for the planned sedation and procedure and have reassessed the patient IMMEDIATELY PRIOR to sedation and procedure.    Sedation Post Procedure Summary:    Prior to the start of the procedure and with procedural staff participation, I verbally confirmed the patient s identity using two indicators, relevant allergies, that the procedure was appropriate and matched the consent or emergent situation, and that the  correct equipment/implants were available. Immediately prior to starting the procedure I conducted the Time Out with the procedural staff and re-confirmed the patient s name, procedure, and site/side. (The Joint Commission universal protocol was followed.)  Yes      SEDATIVES: Propofol 160mg    Vital signs, airway, End Tidal CO2 and pulse oximetry were monitored and remained stable throughout the procedure and sedation was maintained until the procedure was complete.  The patient was monitored by staff until sedation discharge criteria were met.    PATIENT TOLERANCE: Patient tolerated the procedure well with no immediate complications.    TIME OF SEDATION IN MINUTES:  20 minutes from beginning to end of physician one to one monitoring.      Reduction     SITE: Left wrist   PROCEDURE PROVIDER: Diane Rodriguez MD   CONSENT: Risks (including but not limited to: decreased respirations, oxygen perfusion, aspiration, hypotension), benefits, and alternatives were discussed with the patient and consent for procedure was obtained.   MONITORING: Monitoring consisted of heart rate, cardiac monitor, continuous pulse oximetry, continuous capnometry, frequent blood pressure checks, level of consciousness, IV access, constant attendance by RN until patient recovered, constant attendance by MD until patient stable and intubation and emergency airway management equipment available.   REDUCTION COMMENTS: The patient's left wrist was held in traction and manipulated until it appeared to be in anatomic alignment as confirmed by C-ARM. Post reduction X-rays were obtained and showed good reduction.   PATIENT STATUS: Fracture alignment improved post procedure and both sensation and circulation are intact. Vital signs remained stable, airway patent, and O2 saturations remained above 95%. Post-procedure, the patient was alert and responds to verbal stimuli. Patient was monitored during recovery and returned to pre-procedure baseline.       Splint Placement    PLACEMENT: Custom plaster sugartong splint was applied to the left upper extremity by myself and after placement I checked and adjusted the fit to ensure proper positioning. The patient was more comfortable with the splint in place. Sensation and circulation are intact after splint placement.   Interventions:  0939: Propofol 160mg IV    Emergency Department Course:  7:48 AM Nursing notes and vitals reviewed.  I performed an exam of the patient as documented above.     8:30 AM I rechecked and updated the patient. I obtained verbal consent for sedation and reduction. He has declined analgesics.    10:19 AM I rechecked the patient. He was able to ambulate without difficulty and tolerated PO.     10:37 AM Findings and plan explained to the patient. Patient discharged home with instructions regarding supportive care, medications, and reasons to return. The importance of close follow-up was reviewed.     Impression & Plan      Medical Decision Making:  Eder is an 79-year-old man on Eliquis for history of atrial fibrillation who had a fall this morning.  He rermarks that this was a mechanical fall likely related to the fact he did not want to turn on the light and wake up when using the restroom.  He denies syncope prior to the event and did not hit his head or have loss of consciousness after the fall.  He denies prodromal chest pain or shortness of breath and all other concerns aside from pain to the left wrist.  He is concerned that he has a navicular fracture as the pain is similar to when he had this injury many years ago.  He appears quite well on exam with no evidence of trauma aside from the left wrist.  This is mildly tender and deformed without open wounds.  He has excellent strength, sensation, and pulse distally.  He declines the need for any analgesics.  Patient's x-ray reveals impacted and intra-articular distal radius fracture with volar angulation and an ulnar styloid process fracture.   After obtaining written and verbal consent from the patient and discussing risks and benefits he agreed to undergo conscious sedation and closed reduction.  This was performed as above without complications with the use of C ARM.  Post reduction x-ray confirms near anatomic alignment.  Patient was able to ambulate without difficulty and tolerate PO after his sedation and, on final examination, states he is feeling well and ready for discharge.  There is no indication for further workup or treatment without other injuries including no head injury and I believe patient is appropriate for discharge with orthopedic follow-up.  I provided him with a prescription as needed for Norco but the patient does not believe he will feel this and would prefer to use Tylenol instead.  We have discussed return precautions including, but not limited to, those for compartment syndrome.  I answered all the patient and his wife's questions and they verbalized understanding.  Amenable to discharge.    Diagnosis:  1. Closed Colles' fracture of left radius, initial encounter S52.532A    2. Closed nondisplaced fracture of styloid process of left ulna, initial encounter S52.615A        Disposition:  discharged home    Discharge Medications:     Medication List      Started    HYDROcodone-acetaminophen 5-325 MG tablet  Commonly known as:  NORCO  1 tablet, Oral, EVERY 6 HOURS PRN          I, Bradley Aasen, am serving as a scribe on 3/3/2019 at 7:47 AM to personally document services performed by Diane Rodriguez MD based on my observations and the provider's statements to me.          Diane Rodriguez MD  03/05/19 1554

## 2019-03-05 DIAGNOSIS — J44.9 CHRONIC OBSTRUCTIVE PULMONARY DISEASE, UNSPECIFIED COPD TYPE (H): ICD-10-CM

## 2019-03-05 ASSESSMENT — ENCOUNTER SYMPTOMS
ARTHRALGIAS: 1
WOUND: 0

## 2019-03-05 NOTE — TELEPHONE ENCOUNTER
Future Office visit:    Next 5 appointments (look out 90 days)    Apr 11, 2019 10:30 AM CDT  [facility] with [facility]ece Daniels MD  [facility] [address] [address] [phone]           Routing refill request to provider for review/approval because:  Drug not active on patient's medication list

## 2019-03-06 RX ORDER — BECLOMETHASONE DIPROPIONATE HFA 40 UG/1
AEROSOL, METERED RESPIRATORY (INHALATION)
Qty: 3 INHALER | Refills: 3 | Status: SHIPPED | OUTPATIENT
Start: 2019-03-06 | End: 2020-03-09

## 2019-03-07 ENCOUNTER — TRANSFERRED RECORDS (OUTPATIENT)
Dept: HEALTH INFORMATION MANAGEMENT | Facility: CLINIC | Age: 79
End: 2019-03-07

## 2019-03-18 ENCOUNTER — TRANSFERRED RECORDS (OUTPATIENT)
Dept: HEALTH INFORMATION MANAGEMENT | Facility: CLINIC | Age: 79
End: 2019-03-18

## 2019-03-25 ENCOUNTER — TRANSFERRED RECORDS (OUTPATIENT)
Dept: HEALTH INFORMATION MANAGEMENT | Facility: CLINIC | Age: 79
End: 2019-03-25

## 2019-04-01 DIAGNOSIS — E03.9 HYPOTHYROIDISM, UNSPECIFIED TYPE: ICD-10-CM

## 2019-04-01 RX ORDER — LEVOTHYROXINE SODIUM 125 UG/1
125 TABLET ORAL DAILY
Qty: 30 TABLET | Refills: 0 | Status: SHIPPED | OUTPATIENT
Start: 2019-04-01 | End: 2019-05-04

## 2019-04-01 NOTE — LETTER
[facility]  [address] [id number] [phone]            Eder Horan  [address] [address]        April 1, 2019    Dear Eder,    While refilling your prescription today, we noticed that you are due to have labs drawn.  We will refill your prescription for 30 days, but a follow-up appointment must be made before any additional refills can be approved.     Taking care of your health is important to us and we look forward to seeing you in the near future.  Please call us at [phone] or [phone] (or use [employer]) to schedule an appointment.     Please disregard this notice if you have already made an appointment.    Sincerely,        [facility]

## 2019-04-01 NOTE — TELEPHONE ENCOUNTER
"Requested Prescriptions   Pending Prescriptions Disp Refills     levothyroxine (SYNTHROID/LEVOTHROID) 125 MCG tablet [Pharmacy Med Name: Levothyroxine Sodium Oral Tablet 125 MCG] 90 tablet 0     Sig: Take 1 tablet (125 mcg) by mouth daily    Thyroid Protocol Failed - 4/1/2019 10:34 AM       Failed - Normal TSH on file in past 12 months    Recent Labs   Lab Test 03/07/18  0952   TSH 0.68             Passed - Patient is 12 years or older       Passed - Recent (12 mo) or future (30 days) visit within the authorizing provider's specialty    Patient had office visit in the last 12 months or has a visit in the next 30 days with authorizing provider or within the authorizing provider's specialty.  See \"Patient Info\" tab in inbasket, or \"Choose Columns\" in Meds & Orders section of the refill encounter.             Passed - Medication is active on med list        Last Written Prescription Date:  1/8/19  Last Fill Quantity: 90,  # refills: 0   Last office visit: 1/18/2019 with prescribing provider:  1/18/19   Future Office Visit:   Next 5 appointments (look out 90 days)    Apr 11, 2019 10:30 AM CDT  Return Visit with Reece Daniels MD  [facility] ([facility]) [address] [address] [phone]           "

## 2019-04-11 ENCOUNTER — OFFICE VISIT (OUTPATIENT)
Dept: DERMATOLOGY | Facility: CLINIC | Age: 79
End: 2019-04-11
Payer: COMMERCIAL

## 2019-04-11 VITALS — HEART RATE: 60 BPM | OXYGEN SATURATION: 93 % | DIASTOLIC BLOOD PRESSURE: 79 MMHG | SYSTOLIC BLOOD PRESSURE: 118 MMHG

## 2019-04-11 DIAGNOSIS — D18.00 ANGIOMA: ICD-10-CM

## 2019-04-11 DIAGNOSIS — Z85.828 HISTORY OF SKIN CANCER: ICD-10-CM

## 2019-04-11 DIAGNOSIS — I10 BENIGN ESSENTIAL HYPERTENSION: Primary | ICD-10-CM

## 2019-04-11 DIAGNOSIS — E03.9 HYPOTHYROIDISM, UNSPECIFIED TYPE: ICD-10-CM

## 2019-04-11 DIAGNOSIS — E53.8 B12 DEFICIENCY: ICD-10-CM

## 2019-04-11 DIAGNOSIS — L81.4 LENTIGO: ICD-10-CM

## 2019-04-11 DIAGNOSIS — D23.9 DERMAL NEVUS: ICD-10-CM

## 2019-04-11 DIAGNOSIS — L82.1 SEBORRHEIC KERATOSIS: ICD-10-CM

## 2019-04-11 DIAGNOSIS — Z85.820 HISTORY OF MELANOMA: Primary | ICD-10-CM

## 2019-04-11 LAB
TSH SERPL DL<=0.005 MIU/L-ACNC: 1.75 MU/L (ref 0.4–4)
VIT B12 SERPL-MCNC: 764 PG/ML (ref 193–986)

## 2019-04-11 PROCEDURE — 99213 OFFICE O/P EST LOW 20 MIN: CPT | Performed by: DERMATOLOGY

## 2019-04-11 PROCEDURE — 36415 COLL VENOUS BLD VENIPUNCTURE: CPT | Performed by: INTERNAL MEDICINE

## 2019-04-11 PROCEDURE — 82607 VITAMIN B-12: CPT | Performed by: INTERNAL MEDICINE

## 2019-04-11 PROCEDURE — 84443 ASSAY THYROID STIM HORMONE: CPT | Performed by: INTERNAL MEDICINE

## 2019-04-11 NOTE — PROGRESS NOTES
Eder Horan is a 79 year old year old male patient here today for hx of non-melanoma skin cancer and melanoma.  He enies any new or changing skin lesions.  HE denies any new lesions.  Patient reports the following modifying factors none.  Associated symptoms: none.  Patient has no other skin complaints today.  Remainder of the HPI, Meds, PMH, Allergies, FH, and SH was reviewed in chart.      Past Medical History:   Diagnosis Date     Actinic keratosis 6/09    face     ALLERGIC RHINITIS       Atrial fibrillation (H) 6/08     B12 deficiency 1/16/2019     Back pain     s/p LESI through ortho Feb 2010     Basal cell carcinoma      Bradycardia      Chronotropic incompetence with sinus node dysfunction (H)      COPD (chronic obstructive pulmonary disease) (H)      DVT      Left calf 2003. Right calf 6/06     Embolic stroke (H) 06/11/2017    superior cerebellar artery occlusion. Hx A fib     Hyperlipidemia LDL goal <130 5/10/2011     Hypertension      HYPOTHYROIDISM      hypothyroidism     Lateral epicondylitis  of elbow 7/05    left     Malignant melanoma (H)      PLANTAR FASCITIS      Prostate cancer (H) 6/09    on Lupron therapy     Raynaud's disease without gangrene 11/26/2018     Squamous cell carcinoma  Sept 2014     right lower lid, s/p MOHS     Syncope      TMJ (temporomandibular joint syndrome) 4/12/2012     Tobacco use disorder        Past Surgical History:   Procedure Laterality Date     ABDOMEN SURGERY  2004,06,09    Hernias (2) Prostate Removal(2009)     BIOPSY  2009    Prostate     C NONSPECIFIC PROCEDURE  1/07    Left groin exploration and left inguinal herniorrhaphy     C NONSPECIFIC PROCEDURE  5/08    Prostate bx (benign)     C NONSPECIFIC PROCEDURE  8/09    Wide local excision (left side), robotic-assisted laparoscopic prostatectomy and   Left pelvic lymph node dissection     C NONSPECIFIC PROCEDURE  August 2010    Right inguinal herniorrhaphy with mesh     CARDIOVERSION  10-16-08    failed      COLONOSCOPY  2004     EYE SURGERY      2013 lump from lower  left eye lid removed     GENITOURINARY SURGERY  2009    Prostate     HERNIA REPAIR   -         Family History   Problem Relation Age of Onset     C.A.D. Father         heart attack- angina     Coronary Artery Disease Father      Myocardial Infarction Father      Cancer Mother         lung     Lung Cancer Mother      Lung Cancer Sister      Unknown/Adopted Maternal Grandmother      Unknown/Adopted Maternal Grandfather      Unknown/Adopted Paternal Grandmother      Unknown/Adopted Paternal Grandfather      Unknown/Adopted Sister        Social History     Socioeconomic History     Marital status:      Spouse name: Not on file     Number of children: 3     Years of education: Not on file     Highest education level: Not on file   Occupational History     Occupation: [employer]     Employer: [employer]     Employer: RETIRED   Social Needs     Financial resource strain: Not on file     Food insecurity:     Worry: Not on file     Inability: Not on file     Transportation needs:     Medical: Not on file     Non-medical: Not on file   Tobacco Use     Smoking status: Former Smoker     Types: Cigars, Pipe     Last attempt to quit: 2006     Years since quittin.3     Smokeless tobacco: Never Used     Tobacco comment:  occasionally cigar   Substance and Sexual Activity     Alcohol use: Yes     Alcohol/week: 0.0 oz     Comment: 2-3 drinks a day:  Wine/Beer     Drug use: No     Sexual activity: Never   Lifestyle     Physical activity:     Days per week: Not on file     Minutes per session: Not on file     Stress: Not on file   Relationships     Social connections:     Talks on phone: Not on file     Gets together: Not on file     Attends Anabaptism service: Not on file     Active member of club or organization: Not on file     Attends meetings of clubs or organizations: Not on file     Relationship status: Not on file     Intimate partner violence:      Fear of current or ex partner: Not on file     Emotionally abused: Not on file     Physically abused: Not on file     Forced sexual activity: Not on file   Other Topics Concern     Parent/sibling w/ CABG, MI or angioplasty before 65F 55M? No      Service Not Asked     Blood Transfusions Not Asked     Caffeine Concern No     Comment: 2-3 cups of coffee a day     Occupational Exposure Not Asked     Hobby Hazards Not Asked     Sleep Concern No     Stress Concern No     Weight Concern No     Special Diet No     Back Care Not Asked     Exercise No     Bike Helmet Not Asked     Seat Belt Yes     Self-Exams Not Asked   Social History Narrative     Not on file       Outpatient Encounter Medications as of 2019   Medication Sig Dispense Refill     amLODIPine (NORVASC) 5 MG tablet Take 1 tablet (5 mg) by mouth daily 90 tablet 3     apixaban ANTICOAGULANT (ELIQUIS) 5 MG tablet TAKE ONE TABLET BY MOUTH TWICE DAILY. 180 tablet 3     difluprednate (DUREZOL) 0.05 % ophthalmic emulsion 1 drop 4 times daily       flecainide acetate 150 MG TABS 1/2 tablet once daily 45 tablet 3     fluocinonide (LIDEX) 0.05 % cream Apply topically 2 times daily 120 g 3     levothyroxine (SYNTHROID/LEVOTHROID) 125 MCG tablet Take 1 tablet (125 mcg) by mouth daily 30 tablet 0     ORDER FOR DME Equipment being ordered: spacer for inhaler 1 Device 1     PROAIR  (90 Base) MCG/ACT inhaler INHALE 2 PUFFS INTO THE LUNGS EVERY 4 HOURS AS NEEDED FOR SHORTNESS OF BREATH / DYSPNEA 17 g 11     QVAR REDIHALER 40 MCG/ACT inhaler Inhale 2 puffs into the lungs daily 3 Inhaler 3     vitamin B-12 (CYANOCOBALAMIN) 2500 MCG sublingual tablet Take 1 tablet (2,500 mcg) by mouth daily 90 tablet 3     [] HYDROcodone-acetaminophen (NORCO) 5-325 MG tablet Take 1 tablet by mouth every 6 hours as needed for pain 12 tablet 0     No facility-administered encounter medications on file as of 2019.              Review Of Systems  Skin: As above  Eyes:  negative  Ears/Nose/Throat: negative  Respiratory: No shortness of breath, dyspnea on exertion, cough, or hemoptysis  Cardiovascular: negative  Gastrointestinal: negative  Genitourinary: negative  Musculoskeletal: negative  Neurologic: negative  Psychiatric: negative  Hematologic/Lymphatic/Immunologic: negative  Endocrine: negative      O:   NAD, WDWN, Alert & Oriented, Mood & Affect wnl, Vitals stable   Here today alone   /79   Pulse 60   SpO2 93%    General appearance normal   Vitals stable   Alert, oriented and in no acute distress      Following lymph nodes palpated: Occipital, Cervical, Supraclavicular , axilla no lad        Stuck on papules and brown macules on trunk and ext   Red papules on trunk  Flesh colored papules on trunk     The remainder of the full exam was unremarkable; the following areas were examined:  conjunctiva/lids, oral mucosa, neck, peripheral vascular system, abdomen, lymph nodes, digits/nails, eccrine and apocrine glands, scalp/hair, face, neck, chest, abdomen, buttocks, back, RUE, LUE, RLE, LLE       Eyes: Conjunctivae/lids:Normal     ENT: Lips, buccal mucosa, tongue: normal    MSK:Normal    Cardiovascular: peripheral edema none    Pulm: Breathing Normal    Lymph Nodes: No Head and Neck Lymphadenopathy     Neuro/Psych: Orientation:Normal; Mood/Affect:Normal      A/P:  1. Seborrheic keratosis, lentigo, angioma, dermal nevus, hx of non-melanoma skin cancer and melanoma   BENIGN LESIONS DISCUSSED WITH PATIENT:  I discussed the specifics of tumor, prognosis, and genetics of benign lesions.  I explained that treatment of these lesions would be purely cosmetic and not medically neccessary.  I discussed with patient different removal options including excision, cautery and /or laser.      Nature and genetics of benign skin lesions dicussed with patient.  Signs and Symptoms of skin cancer discussed with patient.  ABCDEs of melanoma reviewed with patient.  Patient encouraged to perform  monthly skin exams.  UV precautions reviewed with patient.  Patient to follow up with Primary Care provider regarding elevated blood pressure.  Skin care regimen reviewed with patient: Eliminate harsh soaps, i.e. Dial, zest, irsih spring; Mild soaps such as Cetaphil or Dove sensitive skin, avoid hot or cold showers, aggressive use of emollients including vanicream, cetaphil or cerave discussed with patient.    Risks of non-melanoma skin cancer discussed with patient   Return to clinic 12 months  Eder to follow up with Primary Care provider regarding elevated blood pressure.

## 2019-04-11 NOTE — LETTER
4/11/2019         RE: Eder Horan  [address] [address]        Dear Colleague,    Thank you for referring your patient, Eder Horan, to the [facility]. Please see a copy of my visit note below.    Eder Horan is a 79 year old year old male patient here today for hx of non-melanoma skin cancer and melanoma.  He enies any new or changing skin lesions.  HE denies any new lesions.  Patient reports the following modifying factors none.  Associated symptoms: none.  Patient has no other skin complaints today.  Remainder of the HPI, Meds, PMH, Allergies, FH, and SH was reviewed in chart.      Past Medical History:   Diagnosis Date     Actinic keratosis 6/09    face     ALLERGIC RHINITIS       Atrial fibrillation (H) 6/08     B12 deficiency 1/16/2019     Back pain     s/p LESI through ortho Feb 2010     Basal cell carcinoma      Bradycardia      Chronotropic incompetence with sinus node dysfunction (H)      COPD (chronic obstructive pulmonary disease) (H)      DVT      Left calf 2003. Right calf 6/06     Embolic stroke (H) 06/11/2017    superior cerebellar artery occlusion. Hx A fib     Hyperlipidemia LDL goal <130 5/10/2011     Hypertension      HYPOTHYROIDISM      hypothyroidism     Lateral epicondylitis  of elbow 7/05    left     Malignant melanoma (H)      PLANTAR FASCITIS      Prostate cancer (H) 6/09    on Lupron therapy     Raynaud's disease without gangrene 11/26/2018     Squamous cell carcinoma  Sept 2014     right lower lid, s/p MOHS     Syncope      TMJ (temporomandibular joint syndrome) 4/12/2012     Tobacco use disorder        Past Surgical History:   Procedure Laterality Date     ABDOMEN SURGERY  2004,06,09    Hernias (2) Prostate Removal(2009)     BIOPSY  2009    Prostate     C NONSPECIFIC PROCEDURE  1/07    Left groin exploration and left inguinal herniorrhaphy     C NONSPECIFIC PROCEDURE  5/08    Prostate bx (benign)     C NONSPECIFIC PROCEDURE       Wide local excision (left side), robotic-assisted laparoscopic prostatectomy and   Left pelvic lymph node dissection     C NONSPECIFIC PROCEDURE  2010    Right inguinal herniorrhaphy with mesh     CARDIOVERSION  10-16-08    failed     COLONOSCOPY  2004     EYE SURGERY       lump from lower  left eye lid removed     GENITOURINARY SURGERY  2009    Prostate     HERNIA REPAIR   -         Family History   Problem Relation Age of Onset     C.A.D. Father         heart attack- angina     Coronary Artery Disease Father      Myocardial Infarction Father      Cancer Mother         lung     Lung Cancer Mother      Lung Cancer Sister      Unknown/Adopted Maternal Grandmother      Unknown/Adopted Maternal Grandfather      Unknown/Adopted Paternal Grandmother      Unknown/Adopted Paternal Grandfather      Unknown/Adopted Sister        Social History     Socioeconomic History     Marital status:      Spouse name: Not on file     Number of children: 3     Years of education: Not on file     Highest education level: Not on file   Occupational History     Occupation: [employer]     Employer: [employer]     Employer: RETIRED   Social Needs     Financial resource strain: Not on file     Food insecurity:     Worry: Not on file     Inability: Not on file     Transportation needs:     Medical: Not on file     Non-medical: Not on file   Tobacco Use     Smoking status: Former Smoker     Types: Cigars, Pipe     Last attempt to quit: 2006     Years since quittin.3     Smokeless tobacco: Never Used     Tobacco comment:  occasionally cigar   Substance and Sexual Activity     Alcohol use: Yes     Alcohol/week: 0.0 oz     Comment: 2-3 drinks a day:  Wine/Beer     Drug use: No     Sexual activity: Never   Lifestyle     Physical activity:     Days per week: Not on file     Minutes per session: Not on file     Stress: Not on file   Relationships     Social connections:     Talks on phone: Not on file     Gets  together: Not on file     Attends Advent service: Not on file     Active member of club or organization: Not on file     Attends meetings of clubs or organizations: Not on file     Relationship status: Not on file     Intimate partner violence:     Fear of current or ex partner: Not on file     Emotionally abused: Not on file     Physically abused: Not on file     Forced sexual activity: Not on file   Other Topics Concern     Parent/sibling w/ CABG, MI or angioplasty before 65F 55M? No      Service Not Asked     Blood Transfusions Not Asked     Caffeine Concern No     Comment: 2-3 cups of coffee a day     Occupational Exposure Not Asked     Hobby Hazards Not Asked     Sleep Concern No     Stress Concern No     Weight Concern No     Special Diet No     Back Care Not Asked     Exercise No     Bike Helmet Not Asked     Seat Belt Yes     Self-Exams Not Asked   Social History Narrative     Not on file       Outpatient Encounter Medications as of 4/11/2019   Medication Sig Dispense Refill     amLODIPine (NORVASC) 5 MG tablet Take 1 tablet (5 mg) by mouth daily 90 tablet 3     apixaban ANTICOAGULANT (ELIQUIS) 5 MG tablet TAKE ONE TABLET BY MOUTH TWICE DAILY. 180 tablet 3     difluprednate (DUREZOL) 0.05 % ophthalmic emulsion 1 drop 4 times daily       flecainide acetate 150 MG TABS 1/2 tablet once daily 45 tablet 3     fluocinonide (LIDEX) 0.05 % cream Apply topically 2 times daily 120 g 3     levothyroxine (SYNTHROID/LEVOTHROID) 125 MCG tablet Take 1 tablet (125 mcg) by mouth daily 30 tablet 0     ORDER FOR DME Equipment being ordered: spacer for inhaler 1 Device 1     PROAIR  (90 Base) MCG/ACT inhaler INHALE 2 PUFFS INTO THE LUNGS EVERY 4 HOURS AS NEEDED FOR SHORTNESS OF BREATH / DYSPNEA 17 g 11     QVAR REDIHALER 40 MCG/ACT inhaler Inhale 2 puffs into the lungs daily 3 Inhaler 3     vitamin B-12 (CYANOCOBALAMIN) 2500 MCG sublingual tablet Take 1 tablet (2,500 mcg) by mouth daily 90 tablet 3      [] HYDROcodone-acetaminophen (NORCO) 5-325 MG tablet Take 1 tablet by mouth every 6 hours as needed for pain 12 tablet 0     No facility-administered encounter medications on file as of 2019.              Review Of Systems  Skin: As above  Eyes: negative  Ears/Nose/Throat: negative  Respiratory: No shortness of breath, dyspnea on exertion, cough, or hemoptysis  Cardiovascular: negative  Gastrointestinal: negative  Genitourinary: negative  Musculoskeletal: negative  Neurologic: negative  Psychiatric: negative  Hematologic/Lymphatic/Immunologic: negative  Endocrine: negative      O:   NAD, WDWN, Alert & Oriented, Mood & Affect wnl, Vitals stable   Here today alone   /79   Pulse 60   SpO2 93%    General appearance normal   Vitals stable   Alert, oriented and in no acute distress      Following lymph nodes palpated: Occipital, Cervical, Supraclavicular , axilla no lad        Stuck on papules and brown macules on trunk and ext   Red papules on trunk  Flesh colored papules on trunk     The remainder of the full exam was unremarkable; the following areas were examined:  conjunctiva/lids, oral mucosa, neck, peripheral vascular system, abdomen, lymph nodes, digits/nails, eccrine and apocrine glands, scalp/hair, face, neck, chest, abdomen, buttocks, back, RUE, LUE, RLE, LLE       Eyes: Conjunctivae/lids:Normal     ENT: Lips, buccal mucosa, tongue: normal    MSK:Normal    Cardiovascular: peripheral edema none    Pulm: Breathing Normal    Lymph Nodes: No Head and Neck Lymphadenopathy     Neuro/Psych: Orientation:Normal; Mood/Affect:Normal      A/P:  1. Seborrheic keratosis, lentigo, angioma, dermal nevus, hx of non-melanoma skin cancer and melanoma   BENIGN LESIONS DISCUSSED WITH PATIENT:  I discussed the specifics of tumor, prognosis, and genetics of benign lesions.  I explained that treatment of these lesions would be purely cosmetic and not medically neccessary.  I discussed with patient different removal  options including excision, cautery and /or laser.      Nature and genetics of benign skin lesions dicussed with patient.  Signs and Symptoms of skin cancer discussed with patient.  ABCDEs of melanoma reviewed with patient.  Patient encouraged to perform monthly skin exams.  UV precautions reviewed with patient.  Patient to follow up with Primary Care provider regarding elevated blood pressure.  Skin care regimen reviewed with patient: Eliminate harsh soaps, i.e. Dial, zest, irsih spring; Mild soaps such as Cetaphil or Dove sensitive skin, avoid hot or cold showers, aggressive use of emollients including vanicream, cetaphil or cerave discussed with patient.    Risks of non-melanoma skin cancer discussed with patient   Return to clinic 12 months  Eder to follow up with Primary Care provider regarding elevated blood pressure.      Again, thank you for allowing me to participate in the care of your patient.        Sincerely,        Reece Daniels MD

## 2019-04-15 ENCOUNTER — TRANSFERRED RECORDS (OUTPATIENT)
Dept: HEALTH INFORMATION MANAGEMENT | Facility: CLINIC | Age: 79
End: 2019-04-15

## 2019-05-04 DIAGNOSIS — E03.9 HYPOTHYROIDISM, UNSPECIFIED TYPE: ICD-10-CM

## 2019-05-04 NOTE — TELEPHONE ENCOUNTER
"Requested Prescriptions   Pending Prescriptions Disp Refills     levothyroxine (SYNTHROID/LEVOTHROID) 125 MCG tablet [Pharmacy Med Name: Levothyroxine Sodium Oral Tablet 125 MCG] 30 tablet 0     Sig: Take 1 tablet (125 mcg) by mouth daily  Last Written Prescription Date:  04/01/2019  Last Fill Quantity: 30 tablet,  # refills: 0    Last office visit: 1/18/2019 with prescribing provider:  Richmond Martinez MD        Future Office Visit:           Thyroid Protocol Passed - 5/4/2019  3:52 PM        Passed - Patient is 12 years or older        Passed - Recent (12 mo) or future (30 days) visit within the authorizing provider's specialty     Patient had office visit in the last 12 months or has a visit in the next 30 days with authorizing provider or within the authorizing provider's specialty.  See \"Patient Info\" tab in inbasket, or \"Choose Columns\" in Meds & Orders section of the refill encounter.              Passed - Medication is active on med list        Passed - Normal TSH on file in past 12 months     Recent Labs   Lab Test 04/11/19  0951   TSH 1.75                "

## 2019-05-07 RX ORDER — LEVOTHYROXINE SODIUM 125 UG/1
125 TABLET ORAL DAILY
Qty: 30 TABLET | Refills: 10 | Status: SHIPPED | OUTPATIENT
Start: 2019-05-07 | End: 2019-05-15

## 2019-05-15 ENCOUNTER — TELEPHONE (OUTPATIENT)
Dept: INTERNAL MEDICINE | Facility: CLINIC | Age: 79
End: 2019-05-15

## 2019-05-15 DIAGNOSIS — E03.9 HYPOTHYROIDISM, UNSPECIFIED TYPE: ICD-10-CM

## 2019-05-15 RX ORDER — LEVOTHYROXINE SODIUM 125 UG/1
125 TABLET ORAL DAILY
Qty: 90 TABLET | Refills: 3 | Status: SHIPPED | OUTPATIENT
Start: 2019-05-15 | End: 2020-06-05

## 2019-05-15 NOTE — TELEPHONE ENCOUNTER
Pt told to follow up with Dr. Martinez in mid July as per January's OV notes say.  Pt made an appt for 7/15.     Pt is requesting a 90 day supply of his levothyroxine. His last TSH was done on 4/11 and normal.

## 2019-05-15 NOTE — TELEPHONE ENCOUNTER
Reason for Call:  Other call back    Detailed comments: Pt wants to know when he needs a follow up with Dr Martinez and for what reason.    Phone Number Patient can be reached at: Home number on file [phone] (home)    Best Time: before 3pm    Can we leave a detailed message on this number? YES    Call taken on 5/15/2019 at 1:17 PM by OCTAVIO GIRON

## 2019-05-30 ENCOUNTER — TRANSFERRED RECORDS (OUTPATIENT)
Dept: HEALTH INFORMATION MANAGEMENT | Facility: CLINIC | Age: 79
End: 2019-05-30

## 2019-06-17 DIAGNOSIS — I48.0 PAROXYSMAL ATRIAL FIBRILLATION (H): ICD-10-CM

## 2019-06-17 NOTE — TELEPHONE ENCOUNTER
Requested Prescriptions   Pending Prescriptions Disp Refills     apixaban ANTICOAGULANT (ELIQUIS) 5 MG tablet 180 tablet 3     Sig: TAKE ONE TABLET BY MOUTH TWICE DAILY.       There is no refill protocol information for this order      Last Written Prescription Date:  06/29/18  Last Fill Quantity: 180,  # refills: 3   Last office visit: 1/18/2019 with prescribing provider:  01/18/19   Future Office Visit: 07/15/19  Next 5 appointments (look out 90 days)    Jul 15, 2019 10:00 AM CDT  [facility] Visit with Richmond Martinez MD  [facility] [address] [address] [phone]

## 2019-06-18 NOTE — TELEPHONE ENCOUNTER
Requested Prescriptions   Pending Prescriptions Disp Refills     apixaban ANTICOAGULANT (ELIQUIS) 5 MG tablet 180 tablet 3     Sig: TAKE ONE TABLET BY MOUTH TWICE DAILY.       Direct Oral Anticoagulant Agents Failed - 6/17/2019  9:49 AM        Failed - Patient is 18-79 years of age        Passed - Normal Platelets on file in past 12 months     Recent Labs   Lab Test 11/20/18  1122                  Passed - Medication is active on med list        Passed - Serum creatinine less than or equal to 1.4 on file in past 12 mos     Recent Labs   Lab Test 08/10/18  0920  10/31/11  1550   CR 0.90   < >  --    CREAT  --   --  0.8    < > = values in this interval not displayed.             Passed - Weight is greater than 60 kg for the past year     Wt Readings from Last 3 Encounters:   03/03/19 77.1 kg (170 lb)   01/18/19 78 kg (172 lb)   11/20/18 77.9 kg (171 lb 11.2 oz)             Passed - Recent (6 mo) or future (30 days) visit within the authorizing provider's specialty

## 2019-07-15 ENCOUNTER — OFFICE VISIT (OUTPATIENT)
Dept: INTERNAL MEDICINE | Facility: CLINIC | Age: 79
End: 2019-07-15
Payer: COMMERCIAL

## 2019-07-15 VITALS
HEART RATE: 54 BPM | DIASTOLIC BLOOD PRESSURE: 72 MMHG | RESPIRATION RATE: 16 BRPM | OXYGEN SATURATION: 99 % | WEIGHT: 168 LBS | TEMPERATURE: 97.9 F | BODY MASS INDEX: 24.81 KG/M2 | SYSTOLIC BLOOD PRESSURE: 128 MMHG

## 2019-07-15 DIAGNOSIS — I10 BENIGN ESSENTIAL HYPERTENSION: ICD-10-CM

## 2019-07-15 DIAGNOSIS — R53.83 OTHER FATIGUE: ICD-10-CM

## 2019-07-15 DIAGNOSIS — J30.9 ALLERGIC RHINITIS, UNSPECIFIED SEASONALITY, UNSPECIFIED TRIGGER: ICD-10-CM

## 2019-07-15 DIAGNOSIS — C61 PROSTATE CANCER (H): ICD-10-CM

## 2019-07-15 LAB
ALBUMIN SERPL-MCNC: 3.2 G/DL (ref 3.4–5)
ALP SERPL-CCNC: 103 U/L (ref 40–150)
ALT SERPL W P-5'-P-CCNC: 17 U/L (ref 0–70)
ANION GAP SERPL CALCULATED.3IONS-SCNC: 4 MMOL/L (ref 3–14)
AST SERPL W P-5'-P-CCNC: 12 U/L (ref 0–45)
BILIRUB SERPL-MCNC: 0.4 MG/DL (ref 0.2–1.3)
BUN SERPL-MCNC: 12 MG/DL (ref 7–30)
CALCIUM SERPL-MCNC: 8.7 MG/DL (ref 8.5–10.1)
CHLORIDE SERPL-SCNC: 108 MMOL/L (ref 94–109)
CO2 SERPL-SCNC: 30 MMOL/L (ref 20–32)
CORTIS SERPL-MCNC: 7.4 UG/DL (ref 4–22)
CREAT SERPL-MCNC: 0.7 MG/DL (ref 0.66–1.25)
ERYTHROCYTE [DISTWIDTH] IN BLOOD BY AUTOMATED COUNT: 14 % (ref 10–15)
GFR SERPL CREATININE-BSD FRML MDRD: 89 ML/MIN/{1.73_M2}
GLUCOSE SERPL-MCNC: 84 MG/DL (ref 70–99)
HCT VFR BLD AUTO: 41.9 % (ref 40–53)
HGB BLD-MCNC: 13.9 G/DL (ref 13.3–17.7)
MCH RBC QN AUTO: 32.5 PG (ref 26.5–33)
MCHC RBC AUTO-ENTMCNC: 33.2 G/DL (ref 31.5–36.5)
MCV RBC AUTO: 98 FL (ref 78–100)
PLATELET # BLD AUTO: 222 10E9/L (ref 150–450)
POTASSIUM SERPL-SCNC: 4.2 MMOL/L (ref 3.4–5.3)
PROT SERPL-MCNC: 7 G/DL (ref 6.8–8.8)
RBC # BLD AUTO: 4.28 10E12/L (ref 4.4–5.9)
SODIUM SERPL-SCNC: 142 MMOL/L (ref 133–144)
T4 FREE SERPL-MCNC: 1.39 NG/DL (ref 0.76–1.46)
TSH SERPL DL<=0.005 MIU/L-ACNC: 0.22 MU/L (ref 0.4–4)
WBC # BLD AUTO: 6.2 10E9/L (ref 4–11)

## 2019-07-15 PROCEDURE — 99207 C PAF COMPLETED  NO CHARGE: CPT | Mod: 25 | Performed by: INTERNAL MEDICINE

## 2019-07-15 PROCEDURE — 84443 ASSAY THYROID STIM HORMONE: CPT | Performed by: INTERNAL MEDICINE

## 2019-07-15 PROCEDURE — 82533 TOTAL CORTISOL: CPT | Performed by: INTERNAL MEDICINE

## 2019-07-15 PROCEDURE — 99214 OFFICE O/P EST MOD 30 MIN: CPT | Performed by: INTERNAL MEDICINE

## 2019-07-15 PROCEDURE — 80053 COMPREHEN METABOLIC PANEL: CPT | Performed by: INTERNAL MEDICINE

## 2019-07-15 PROCEDURE — 85027 COMPLETE CBC AUTOMATED: CPT | Performed by: INTERNAL MEDICINE

## 2019-07-15 PROCEDURE — 84439 ASSAY OF FREE THYROXINE: CPT | Performed by: INTERNAL MEDICINE

## 2019-07-15 PROCEDURE — 36415 COLL VENOUS BLD VENIPUNCTURE: CPT | Performed by: INTERNAL MEDICINE

## 2019-07-15 NOTE — PATIENT INSTRUCTIONS
Labs for fatigue. If normal, then likely related to Eligard and/or sleep quality issue. If labs OK, will consider sleep clinic referral also     If stools remain loose, try adding Metamucil or Citrucel 1 heaping tbsp with glass liquid or get 1-2 wafers and take daily as needed to thicken stools  If stools OK, may add back 1 cup of coffee daily to see if helps energy while not affecting Raynaud's   Much  For post nasal drainage, either try OTC  Allegra/Fexofenadine 180mg daily as needed or may use OTC steroid nasal spray (Nasacort 2 spray each nostril daily at bedtime as  needed)

## 2019-07-15 NOTE — PROGRESS NOTES
Subjective     Eder Horan is a 79 year old male who presents to clinic today for the following health issues:    HPI   Chief Complaint   Patient presents with     Hypertension     Follow Up     on B12 Levels       Hypertension Follow-up      Do you check your blood pressure regularly outside of the clinic? No     Are you following a low salt diet? No    Are your blood pressures ever more than 140 on the top number (systolic) OR more   than 90 on the bottom number (diastolic), for example 140/90? No    Amount of exercise or physical activity: Minimal    Problems taking medications regularly: No    Medication side effects: none    Diet: regular (no restrictions)    Pt's past medical history, family history, habits, medications and allergies were reviewed with the patient today.  See snap shot for  HCM status. Most recent lab results reviewed with pt. Problem list and histories reviewed & adjusted, as indicated.  Additional history as below:    Hx cardiac sinus dysfunction. Denies  syncope or palpitations.  Cardiology.  Patient has declined pacemaker placement in the past and continues to do so   Will get tired with doing 45 min of weeding exercise. Not drinking caffeine since treated for raynauds with sx well controlled now along with addition  AMlodipine.  Denies chest pain or shortness of breath with the exercise.  TSH and B12 normal  April 2019. Normal Hgb last Fall   Nocturia every couple hrs. Able to fall back asleep. Hx prostate CA. On Eligard injections now .   Sometimes  fatigued in AM. No naps planned but will occ fall asleep reading paper. No driving issues with fatigue. Some snoring   Had some prior loose stools and BM 2-3x/day but better  now. No blood seen in stools     Additional ROS:   Constitutional, HEENT, Cardiovascular, Pulmonary, GI and , Neuro, MSK and Psych review of systems/symptoms are otherwise negative or unchanged from previous, except as noted above.      OBJECTIVE:  /72   Pulse  "54   Temp 97.9  F (36.6  C) (Oral)   Resp 16   Wt 76.2 kg (168 lb)   SpO2 99%   BMI 24.81 kg/m     Estimated body mass index is 24.81 kg/m  as calculated from the following:    Height as of 3/3/19: 1.753 m (5' 9\").    Weight as of this encounter: 76.2 kg (168 lb).     HEENT: Mild nasal mucosal edema.  Minimal clear nasal discharge.  Sinuses nontender to palpation  Neck: no adenopathy. Thyroid normal to palpation. No bruits  Pulm: Lungs clear to auscultation   CV: Regular rhythm, slightly bradycardic  GI: Soft, nontender, Normal active bowel sounds, No hepatosplenomegaly or masses palpable  Ext: Peripheral pulses intact. Minimal  BLE edema.  Fingers currently warm bilaterally  Neuro: Normal strength and tone, sensory exam grossly normal    Assessment/Plan: (See plan discussion below for further details)  1. Other fatigue  Likely due to Eligard use will rule out metabolic abnormality in addition.  Fatigue will worsen some with exercise but has this at rest also.  Patient not interested in sleep study evaluation to rule out possible sleep apnea component.  Bradycardia can contribute some but patient has symptoms also now with a heart rate of 54 so doubt this is the main contributor  - Comprehensive metabolic panel  - CBC with platelets  - TSH with free T4 reflex  - Cortisol  - T4 free    2. Benign essential hypertension  Controlled    3. Prostate cancer (H)  Managed by urology.  On Eligard now.  Discussed with patient  Walking testosterone with Eligard is helpful for his prostate cancer management but can contribute to some weakness and fatigue    4. Allergic rhinitis, unspecified seasonality, unspecified trigger  Symptoms minimally bothersome.  If worsen, will treat with over-the-counter allergy medication as below      Plan discussion:   Labs for fatigue. If normal, then likely related to Eligard and/or sleep quality issue. If labs OK, will consider sleep clinic referral also     If stools remain loose, try " adding Metamucil or Citrucel 1 heaping tbsp with glass liquid or get 1-2 wafers and take daily as needed to thicken stools  If stools OK, may add back 1 cup of coffee daily to see if helps energy while not affecting Raynaud's  much  For post nasal drainage, either try OTC  Allegra/Fexofenadine 180mg daily as needed or may use OTC steroid nasal spray (Nasacort 2 spray each nostril daily at bedtime as  needed)       Richmond Mratinez MD  Internal Medicine Department  [facility]    (Chart documentation was completed, in part, with [employer] voice-recognition software. Even though reviewed, some grammatical, spelling, and word errors may remain.)

## 2019-07-26 ENCOUNTER — NURSE TRIAGE (OUTPATIENT)
Dept: INTERNAL MEDICINE | Facility: CLINIC | Age: 79
End: 2019-07-26

## 2019-07-26 ENCOUNTER — TRANSFERRED RECORDS (OUTPATIENT)
Dept: HEALTH INFORMATION MANAGEMENT | Facility: CLINIC | Age: 79
End: 2019-07-26

## 2019-07-26 NOTE — TELEPHONE ENCOUNTER
"Fingers reduces sound .     Additional Information    Negative: Ear injury is main concern    Negative: Injury to ear canal from cotton swab (e.g., Q-tip) or doctor's ear exam    Negative: Foreign body struck in the ear (e.g., bug, piece of cotton)    Negative: Sudden onset of hearing loss    Negative: Dizziness is the main symptom    Negative: Pain or discomfort in or around ear is main symptom    Negative: Patient sounds very sick or weak to the triager    Negative: Sudden onset of ear pain or bleeding after long, thin object was inserted into the ear canal (e.g., pencil, Q-tip)    Negative: Ear pain after ear syringing (i.e., squirting liquid into ear canal to remove wax) and severe    Negative: Ear pain after ear syringing (i.e., squirting liquid into ear canal to remove wax) and lasts > 1 hour    Negative: Walking is very unsteady    Negative: Redness or rash of outer ear    Negative: Pus from the ear canal (e.g., yellow or green discharge)    Negative: History of ear drum perforation, tubes or ear surgery    Negative: Complete hearing loss in either ear    Negative: Diabetes    Negative: Patient wants to be seen    Negative: Earwax problem and no improvement using CARE ADVICE    Earwax problem and not willing to try Care Advice    Answer Assessment - Initial Assessment Questions  1. LOCATION: \"Which ear is involved?\"       right  2. SYMPTOMS: \"What are the main symptoms?\" (e.g., fullness, decreased hearing, itching, discomfort)      Hears heartbeat louder in this ear  3. ONSET: \"When did the  Ear sound  start?\"      yesterday  4. PAIN: \"Is there any earache?\" \"How bad is it?\"  (Scale 1-10; or mild, moderate, severe)      0  5. OBJECTS: \"Do you use cotton swabs (Q-tips) in your ear? Have you put anything else in your ear?\"      no  6. EARWAX HISTORY: \"Have you had problems with earwax before?\" If yes, \"What did you do the last time?\"      no    Protocols used: EARWAX-A-OH      "

## 2019-08-05 NOTE — TELEPHONE ENCOUNTER
"    [facility]: [phone]                                              INTERAGENCY TRANSFER FORM - LAB / IMAGING / EKG / EMG RESULTS   2019                   Hospital Admission Date: 2019  ENRIQUE DANIELSON   : 1983  Sex: Male         Attending Provider:  Artemio Bro MD    Allergies:  No Known Allergies    Infection:  None   Service:  EMERGENCY ME    Ht:  1.66 m (5' 5.35\")   Wt:  133.4 kg (294 lb 1.5 oz)   Admission Wt:  133.4 kg (294 lb 1.5 oz)    BMI:  48.41 kg/m 2   BSA:  2.48 m 2            Patient PCP Information     Provider PCP Type    Kody Moise MD General         Lab Results - 3 Days      Troponin I [[id number]]  Resulted: 19 1432, Result status: Final result   Ordering provider:  Artemio Bro MD  19 1348 Resulting lab:  [facility]    Specimen Information    Type Source Collected On   Blood   19 1358          Components    Component Value Reference Range Flag Lab   Troponin I ES <0.030 0.000 - 0.034 ug/L   [facility]            Comprehensive metabolic panel [[id number]] (Abnormal)  Resulted: 19 1427, Result status: Final result   Ordering provider:  Artemio Bro MD  19 1348 Resulting lab:  [facility]    Specimen Information    Type Source Collected On   Blood   19 1358          Components    Component Value Reference Range Flag Lab   Sodium 140 134 - 144 mmol/L   [facility]   Potassium 3.7 3.5 - 5.1 mmol/L   [facility]   Chloride 106 98 - 107 mmol/L   [facility]   Carbon Dioxide 27 21 - 31 mmol/L   [facility]   Anion Gap 7 3 - 14 mmol/L   [facility]   Glucose 128 70 - 105 mg/dL H [facility]   Urea Nitrogen 11 7 - 25 mg/dL   [facility]   Creatinine 0.90 0.70 - 1.30 mg/dL   [facility]   GFR Estimate >90 >60 mL/min/{1.73_m2}   [facility]   GFR Estimate If Black >90 >60 mL/min/{1.73_m2}   [facility]   Calcium 9.6 8.6 - 10.3 mg/dL   [facility]   Bilirubin Total " Left message for pt to call back   0.4 0.3 - 1.0 mg/dL   [facility]   Albumin 4.6 3.5 - 5.7 g/dL   [facility]   Protein Total 7.4 6.4 - 8.9 g/dL   [facility]   Alkaline Phosphatase 49 34 - 104 U/L   [facility]   ALT 55 7 - 52 U/L H [facility]   AST 22 13 - 39 U/L   [facility]            CBC with platelets differential [[id number]]  Resulted: 08/05/19 1405, Result status: Final result   Ordering provider:  Artemio Bro MD  08/05/19 1348 Resulting lab:  [facility]    Specimen Information    Type Source Collected On   Blood   08/05/19 [id number]          Components    Component Value Reference Range Flag Lab   WBC 8.4 4.0 - 11.0 10e9/L   [facility]   RBC Count 4.72 4.4 - 5.9 10e12/L   [facility]   Hemoglobin 14.1 13.3 - 17.7 g/dL   [facility]   Hematocrit 41.1 40.0 - 53.0 %   [facility]   MCV 87 78 - 100 fl   [facility]   MCH 29.9 26.5 - 33.0 pg   [facility]   MCHC 34.3 31.5 - 36.5 g/dL   [facility]   RDW 13.3 10.0 - 15.0 %   [facility]   Platelet Count 287 150 - 450 10e9/L   [facility]   Diff Method Automated Method     [facility]   % Neutrophils 43.5 %   [facility]   % Lymphocytes 40.8 %   [facility]   % Monocytes 7.9 %   [facility]   % Eosinophils 6.8 %   [facility]   % Basophils 0.8 %   [facility]   % Immature Granulocytes 0.2 %   [facility]   Absolute Neutrophil 3.6 1.6 - 8.3 10e9/L   [facility]   Absolute Lymphocytes 3.4 0.8 - 5.3 10e9/L   [facility]   Absolute Monocytes 0.7 0.0 - 1.3 10e9/L   [facility]   Absolute Eosinophils 0.6 0.0 - 0.7 10e9/L   [facility]   Absolute Basophils 0.1 0.0 - 0.2 10e9/L   [facility]   Abs Immature Granulocytes 0.0 0 - 0.4 10e9/L   [facility]            Testing Performed By     Lab - Abbreviation Name Director Address Valid Date Range    1743 - [facility] Unknown [address]  [facility] [address] 08/07/15 0948 - Present            Unresulted Labs (24h ago, onward)    None      Encounter-Level Documents:    There are no encounter-level documents.      Order-Level Documents:    There are no order-level documents.

## 2019-08-26 DIAGNOSIS — I48.0 PAROXYSMAL ATRIAL FIBRILLATION (H): ICD-10-CM

## 2019-08-26 RX ORDER — FLECAINIDE ACETATE 150 MG/1
TABLET ORAL
Qty: 45 TABLET | Refills: 1 | Status: SHIPPED | OUTPATIENT
Start: 2019-08-26 | End: 2020-02-24

## 2019-10-02 ENCOUNTER — OFFICE VISIT (OUTPATIENT)
Dept: CARDIOLOGY | Facility: CLINIC | Age: 79
End: 2019-10-02
Payer: COMMERCIAL

## 2019-10-02 VITALS
HEIGHT: 70 IN | DIASTOLIC BLOOD PRESSURE: 78 MMHG | SYSTOLIC BLOOD PRESSURE: 132 MMHG | BODY MASS INDEX: 23.34 KG/M2 | HEART RATE: 52 BPM | WEIGHT: 163 LBS

## 2019-10-02 DIAGNOSIS — R00.1 BRADYCARDIA: ICD-10-CM

## 2019-10-02 DIAGNOSIS — I48.0 PAROXYSMAL ATRIAL FIBRILLATION (H): Primary | ICD-10-CM

## 2019-10-02 PROCEDURE — 93000 ELECTROCARDIOGRAM COMPLETE: CPT | Performed by: NURSE PRACTITIONER

## 2019-10-02 PROCEDURE — 99214 OFFICE O/P EST MOD 30 MIN: CPT | Performed by: NURSE PRACTITIONER

## 2019-10-02 ASSESSMENT — MIFFLIN-ST. JEOR: SCORE: 1455.61

## 2019-10-02 NOTE — LETTER
10/2/2019    Richmond Martinez MD  [address] [facility] [address]    RE: Eder Horan       Dear Colleague,    I had the pleasure of seeing Eder Horan in the [facility].    HPI and Plan: #[id number]  See dictation    Orders Placed This Encounter   Procedures     EKG 12-lead complete w/read - Clinics (performed today)       Orders Placed This Encounter   Medications     PREDNISOLONE ACETATE OP     Sig: Apply to eye daily as needed       There are no discontinued medications.      Encounter Diagnosis   Name Primary?     Paroxysmal atrial fibrillation (H) Yes       CURRENT MEDICATIONS:  Current Outpatient Medications   Medication Sig Dispense Refill     amLODIPine (NORVASC) 5 MG tablet Take 1 tablet (5 mg) by mouth daily 90 tablet 3     apixaban ANTICOAGULANT (ELIQUIS) 5 MG tablet TAKE ONE TABLET BY MOUTH TWICE DAILY. 180 tablet 2     flecainide (TAMBOCOR) 150 MG tablet 1/2 tablet once daily 45 tablet 1     levothyroxine (SYNTHROID/LEVOTHROID) 125 MCG tablet Take 1 tablet (125 mcg) by mouth daily 90 tablet 3     PREDNISOLONE ACETATE OP Apply to eye daily as needed       PROAIR  (90 Base) MCG/ACT inhaler INHALE 2 PUFFS INTO THE LUNGS EVERY 4 HOURS AS NEEDED FOR SHORTNESS OF BREATH / DYSPNEA 17 g 11     QVAR REDIHALER 40 MCG/ACT inhaler Inhale 2 puffs into the lungs daily 3 Inhaler 3     vitamin B-12 (CYANOCOBALAMIN) 2500 MCG sublingual tablet Take 1 tablet (2,500 mcg) by mouth daily 90 tablet 3     fluocinonide (LIDEX) 0.05 % cream Apply topically 2 times daily 120 g 3     ORDER FOR DME Equipment being ordered: spacer for inhaler 1 Device 1       ALLERGIES     Allergies   Allergen Reactions     Bactrim [Sulfa Drugs] Rash       PAST MEDICAL HISTORY:  Past Medical History:   Diagnosis Date     Actinic keratosis 6/09    face     ALLERGIC RHINITIS       Atrial fibrillation (H) 6/08     B12 deficiency 1/16/2019     Back pain     s/p LESI through ortho Feb 2010     Basal cell carcinoma       Bradycardia      Chronotropic incompetence with sinus node dysfunction (H)      COPD (chronic obstructive pulmonary disease) (H)      DVT      Left calf 2003. Right calf 6/06     Embolic stroke (H) 06/11/2017    superior cerebellar artery occlusion. Hx A fib     Hyperlipidemia LDL goal <130 5/10/2011     Hypertension      HYPOTHYROIDISM      hypothyroidism     Lateral epicondylitis  of elbow 7/05    left     Malignant melanoma (H)      PLANTAR FASCITIS      Prostate cancer (H) 6/09    on Lupron therapy     Raynaud's disease without gangrene 11/26/2018     Squamous cell carcinoma  Sept 2014     right lower lid, s/p MOHS     Syncope      TMJ (temporomandibular joint syndrome) 4/12/2012     Tobacco use disorder        PAST SURGICAL HISTORY:  Past Surgical History:   Procedure Laterality Date     ABDOMEN SURGERY  2004,06,09    Hernias (2) Prostate Removal(2009)     BIOPSY  2009    Prostate     C NONSPECIFIC PROCEDURE  1/07    Left groin exploration and left inguinal herniorrhaphy     C NONSPECIFIC PROCEDURE  5/08    Prostate bx (benign)     C NONSPECIFIC PROCEDURE  8/09    Wide local excision (left side), robotic-assisted laparoscopic prostatectomy and   Left pelvic lymph node dissection     C NONSPECIFIC PROCEDURE  August 2010    Right inguinal herniorrhaphy with mesh     CARDIOVERSION  10-16-08    failed     COLONOSCOPY  2004     EYE SURGERY      2013 lump from lower  left eye lid removed     GENITOURINARY SURGERY  2009    Prostate     HERNIA REPAIR  2004 - 2006       FAMILY HISTORY:  Family History   Problem Relation Age of Onset     C.A.D. Father         heart attack- angina     Coronary Artery Disease Father      Myocardial Infarction Father      Cancer Mother         lung     Lung Cancer Mother      Lung Cancer Sister      Unknown/Adopted Maternal Grandmother      Unknown/Adopted Maternal Grandfather      Unknown/Adopted Paternal Grandmother      Unknown/Adopted Paternal Grandfather      Unknown/Adopted Sister         SOCIAL HISTORY:  Social History     Socioeconomic History     Marital status:      Spouse name: None     Number of children: 3     Years of education: None     Highest education level: None   Occupational History     Occupation: [employer]     Employer: [employer]     Employer: RETIRED   Social Needs     Financial resource strain: None     Food insecurity:     Worry: None     Inability: None     Transportation needs:     Medical: None     Non-medical: None   Tobacco Use     Smoking status: Former Smoker     Types: Cigars, Pipe     Last attempt to quit: 2006     Years since quittin.8     Smokeless tobacco: Never Used     Tobacco comment:  occasionally cigar   Substance and Sexual Activity     Alcohol use: Yes     Alcohol/week: 0.0 standard drinks     Comment: 2-3 drinks a day:  Wine/Beer     Drug use: No     Sexual activity: Never   Lifestyle     Physical activity:     Days per week: None     Minutes per session: None     Stress: None   Relationships     Social connections:     Talks on phone: None     Gets together: None     Attends Yarsani service: None     Active member of club or organization: None     Attends meetings of clubs or organizations: None     Relationship status: None     Intimate partner violence:     Fear of current or ex partner: None     Emotionally abused: None     Physically abused: None     Forced sexual activity: None   Other Topics Concern     Parent/sibling w/ CABG, MI or angioplasty before 65F 55M? No      Service Not Asked     Blood Transfusions Not Asked     Caffeine Concern No     Comment: 2-3 cups of coffee a day     Occupational Exposure Not Asked     Hobby Hazards Not Asked     Sleep Concern No     Stress Concern No     Weight Concern No     Special Diet No     Back Care Not Asked     Exercise No     Bike Helmet Not Asked     Seat Belt Yes     Self-Exams Not Asked   Social History Narrative     None       Review of Systems:  Skin:  Negative       Eyes:   "Positive for glasses shingles in the left eye  ENT:  Negative      Respiratory:  Positive for dyspnea on exertion;cough;mucoid expectorant pt has cold at this time (10/2/19)   Cardiovascular:  Negative;palpitations;chest pain;edema edema;Positive for;lightheadedness    Gastroenterology: Negative      Genitourinary:  Negative nocturia    Musculoskeletal:  Positive for joint pain    Neurologic:  Negative      Psychiatric:  Negative      Heme/Lymph/Imm:  Positive for allergies    Endocrine:  Positive for thyroid disorder      Physical Exam:  Vitals: /78   Pulse 52   Ht 1.778 m (5' 10\")   Wt 73.9 kg (163 lb)   BMI 23.39 kg/m       Constitutional:  cooperative, alert and oriented, well developed, well nourished, in no acute distress        Skin:  warm and dry to the touch, no apparent skin lesions or masses noted          Head:  normocephalic, no masses or lesions        Eyes:  pupils equal and round;conjunctivae and lids unremarkable        Lymph:      ENT:  no pallor or cyanosis, dentition good        Neck:  carotid pulses are full and equal bilaterally, JVP normal, no carotid bruit        Respiratory:  normal breath sounds, clear to auscultation, normal A-P diameter, normal symmetry, normal respiratory excursion, no use of accessory muscles prolonged expiration       Cardiac: regular rhythm;normal S1 and S2 bradycardic S4   systolic murmur;grade 1        not assessed this visit                                        GI:  abdomen soft        Extremities and Muscular Skeletal:  no deformities, clubbing, cyanosis, erythema observed;no edema              Neurological:  no gross motor deficits        Psych:  Alert and Oriented x 3;affect appropriate, oriented to time, person and place        CC  No referring provider defined for this encounter.                Thank you for allowing me to participate in the care of your patient.      Sincerely,     Amira Mendes, MIKE, APRN [facility] " [facility]    cc:   No referring provider defined for this encounter.

## 2019-10-02 NOTE — LETTER
10/2/2019      Richmond Martinez MD  [address] [facility] [address]      RE: Eder Horan       Dear Colleague,    I had the pleasure of seeing Eder Horan in the [facility].    Service Date: 10/02/2019      HISTORY OF PRESENT ILLNESS:  Mr. Horan is a delightful 79-year-old gentleman well known to me here at the clinic.  He has a past medical history that includes:   1.  Sinus node dysfunction with chronotropic incompetence.   2.  Paroxysmal AFib on flecainide 75 mg b.i.d.   3.  Stroke, treated with tPA 06/2017.  The patient had not been on anticoagulation until the day of his stroke.  He is now on apixaban 5 mg b.i.d.   4.  COPD.   5.  Hypothyroidism, on levothyroxine.   6.  Hypertension.      At today's visit, Eder is doing fine.  He is now being seen by Dr. Hawkins after Dr. Franklin's shelter.  We have had discussions with Eder to implant a device for his chronotropic incompetence, which he has been reluctant to do.  He states that he is able to still do the things he enjoys doing; however, instead of having endurance that would last all day, now he last 4-5 hours working out in the garage.      Currently, denies chest pain, orthopnea, PND, syncope or peripheral edema.  He does get some dyspnea with exertion.  Blood pressure is stable at today's visit.      A 12-lead EKG shows sinus bradycardia at 47 beats per minute with a stable QRS at 92 milliseconds.  All other review of systems and past medical history are noted below.      ASSESSMENT AND PLAN:  Mr. Horan is a delightful 79-year-old gentleman here today for followup.   1.  Sinus node dysfunction with chronotropic incompetence.  Again, I do agree with Dr. Hawkins, I do believe he would benefit from a dual-chamber pacemaker.  I discussed this with him today.  He states that at the age of 79, he does not want to place that burden on the tax payers.  I have encouraged him to consider this in the future.  I explained that he will  not live longer; however, it will improve his quality of life.   2.  Paroxysmal AFib.  He is stable on flecainide and Eliquis therapy.   3.  COPD, managed by his primary on inhaler therapy.      We will see him in 1 year or sooner should he have any questions or concerns.  No medication changes were warranted at today's visit.         AMIRA MENDES NP             D: 10/02/2019   T: 10/02/2019   MT: NICOLAS      Name:     AUGUSTINE MYRICK   MRN:      [id number]        Account:      [id number]   :      1940           Service Date: 10/02/2019      Document: [id number]         Outpatient Encounter Medications as of 10/2/2019   Medication Sig Dispense Refill     amLODIPine (NORVASC) 5 MG tablet Take 1 tablet (5 mg) by mouth daily 90 tablet 3     apixaban ANTICOAGULANT (ELIQUIS) 5 MG tablet TAKE ONE TABLET BY MOUTH TWICE DAILY. 180 tablet 2     flecainide (TAMBOCOR) 150 MG tablet 1/2 tablet once daily 45 tablet 1     levothyroxine (SYNTHROID/LEVOTHROID) 125 MCG tablet Take 1 tablet (125 mcg) by mouth daily 90 tablet 3     PREDNISOLONE ACETATE OP Apply to eye daily as needed       PROAIR  (90 Base) MCG/ACT inhaler INHALE 2 PUFFS INTO THE LUNGS EVERY 4 HOURS AS NEEDED FOR SHORTNESS OF BREATH / DYSPNEA 17 g 11     QVAR REDIHALER 40 MCG/ACT inhaler Inhale 2 puffs into the lungs daily 3 Inhaler 3     vitamin B-12 (CYANOCOBALAMIN) 2500 MCG sublingual tablet Take 1 tablet (2,500 mcg) by mouth daily 90 tablet 3     fluocinonide (LIDEX) 0.05 % cream Apply topically 2 times daily 120 g 3     ORDER FOR DME Equipment being ordered: spacer for inhaler 1 Device 1     No facility-administered encounter medications on file as of 10/2/2019.        Again, thank you for allowing me to participate in the care of your patient.      Sincerely,    Amira Mendes NP, APRN [facility]

## 2019-10-02 NOTE — PATIENT INSTRUCTIONS
Call my nurse with any questions or concerns:  [phone]  *If you have concerns after hours, please call [phone], option 2 to speak with on call Cardiologist.    1. Medication changes from today:    None  If your fatigue increases please call and we can talk about pacemaker

## 2019-10-02 NOTE — PROGRESS NOTES
HPI and Plan: #[id number]  See dictation    Orders Placed This Encounter   Procedures     EKG 12-lead complete w/read - Clinics (performed today)       Orders Placed This Encounter   Medications     PREDNISOLONE ACETATE OP     Sig: Apply to eye daily as needed       There are no discontinued medications.      Encounter Diagnosis   Name Primary?     Paroxysmal atrial fibrillation (H) Yes       CURRENT MEDICATIONS:  Current Outpatient Medications   Medication Sig Dispense Refill     amLODIPine (NORVASC) 5 MG tablet Take 1 tablet (5 mg) by mouth daily 90 tablet 3     apixaban ANTICOAGULANT (ELIQUIS) 5 MG tablet TAKE ONE TABLET BY MOUTH TWICE DAILY. 180 tablet 2     flecainide (TAMBOCOR) 150 MG tablet 1/2 tablet once daily 45 tablet 1     levothyroxine (SYNTHROID/LEVOTHROID) 125 MCG tablet Take 1 tablet (125 mcg) by mouth daily 90 tablet 3     PREDNISOLONE ACETATE OP Apply to eye daily as needed       PROAIR  (90 Base) MCG/ACT inhaler INHALE 2 PUFFS INTO THE LUNGS EVERY 4 HOURS AS NEEDED FOR SHORTNESS OF BREATH / DYSPNEA 17 g 11     QVAR REDIHALER 40 MCG/ACT inhaler Inhale 2 puffs into the lungs daily 3 Inhaler 3     vitamin B-12 (CYANOCOBALAMIN) 2500 MCG sublingual tablet Take 1 tablet (2,500 mcg) by mouth daily 90 tablet 3     fluocinonide (LIDEX) 0.05 % cream Apply topically 2 times daily 120 g 3     ORDER FOR DME Equipment being ordered: spacer for inhaler 1 Device 1       ALLERGIES     Allergies   Allergen Reactions     Bactrim [Sulfa Drugs] Rash       PAST MEDICAL HISTORY:  Past Medical History:   Diagnosis Date     Actinic keratosis 6/09    face     ALLERGIC RHINITIS       Atrial fibrillation (H) 6/08     B12 deficiency 1/16/2019     Back pain     s/p LESI through ortho Feb 2010     Basal cell carcinoma      Bradycardia      Chronotropic incompetence with sinus node dysfunction (H)      COPD (chronic obstructive pulmonary disease) (H)      DVT      Left calf 2003. Right calf 6/06     Embolic stroke (H)  06/11/2017    superior cerebellar artery occlusion. Hx A fib     Hyperlipidemia LDL goal <130 5/10/2011     Hypertension      HYPOTHYROIDISM      hypothyroidism     Lateral epicondylitis  of elbow 7/05    left     Malignant melanoma (H)      PLANTAR FASCITIS      Prostate cancer (H) 6/09    on Lupron therapy     Raynaud's disease without gangrene 11/26/2018     Squamous cell carcinoma  Sept 2014     right lower lid, s/p MOHS     Syncope      TMJ (temporomandibular joint syndrome) 4/12/2012     Tobacco use disorder        PAST SURGICAL HISTORY:  Past Surgical History:   Procedure Laterality Date     ABDOMEN SURGERY  2004,06,09    Hernias (2) Prostate Removal(2009)     BIOPSY  2009    Prostate     C NONSPECIFIC PROCEDURE  1/07    Left groin exploration and left inguinal herniorrhaphy     C NONSPECIFIC PROCEDURE  5/08    Prostate bx (benign)     C NONSPECIFIC PROCEDURE  8/09    Wide local excision (left side), robotic-assisted laparoscopic prostatectomy and   Left pelvic lymph node dissection     C NONSPECIFIC PROCEDURE  August 2010    Right inguinal herniorrhaphy with mesh     CARDIOVERSION  10-16-08    failed     COLONOSCOPY  2004     EYE SURGERY      2013 lump from lower  left eye lid removed     GENITOURINARY SURGERY  2009    Prostate     HERNIA REPAIR  2004 - 2006       FAMILY HISTORY:  Family History   Problem Relation Age of Onset     C.A.D. Father         heart attack- angina     Coronary Artery Disease Father      Myocardial Infarction Father      Cancer Mother         lung     Lung Cancer Mother      Lung Cancer Sister      Unknown/Adopted Maternal Grandmother      Unknown/Adopted Maternal Grandfather      Unknown/Adopted Paternal Grandmother      Unknown/Adopted Paternal Grandfather      Unknown/Adopted Sister        SOCIAL HISTORY:  Social History     Socioeconomic History     Marital status:      Spouse name: None     Number of children: 3     Years of education: None     Highest education level:  None   Occupational History     Occupation: Jared     Employer: [employer]     Employer: RETIRED   Social Needs     Financial resource strain: None     Food insecurity:     Worry: None     Inability: None     Transportation needs:     Medical: None     Non-medical: None   Tobacco Use     Smoking status: Former Smoker     Types: Cigars, Pipe     Last attempt to quit: 2006     Years since quittin.8     Smokeless tobacco: Never Used     Tobacco comment:  occasionally cigar   Substance and Sexual Activity     Alcohol use: Yes     Alcohol/week: 0.0 standard drinks     Comment: 2-3 drinks a day:  Wine/Beer     Drug use: No     Sexual activity: Never   Lifestyle     Physical activity:     Days per week: None     Minutes per session: None     Stress: None   Relationships     Social connections:     Talks on phone: None     Gets together: None     Attends Yarsani service: None     Active member of club or organization: None     Attends meetings of clubs or organizations: None     Relationship status: None     Intimate partner violence:     Fear of current or ex partner: None     Emotionally abused: None     Physically abused: None     Forced sexual activity: None   Other Topics Concern     Parent/sibling w/ CABG, MI or angioplasty before 65F 55M? No      Service Not Asked     Blood Transfusions Not Asked     Caffeine Concern No     Comment: 2-3 cups of coffee a day     Occupational Exposure Not Asked     Hobby Hazards Not Asked     Sleep Concern No     Stress Concern No     Weight Concern No     Special Diet No     Back Care Not Asked     Exercise No     Bike Helmet Not Asked     Seat Belt Yes     Self-Exams Not Asked   Social History Narrative     None       Review of Systems:  Skin:  Negative       Eyes:  Positive for glasses shingles in the left eye  ENT:  Negative      Respiratory:  Positive for dyspnea on exertion;cough;mucoid expectorant pt has cold at this time (10/2/19)   Cardiovascular:   "Negative;palpitations;chest pain;edema edema;Positive for;lightheadedness    Gastroenterology: Negative      Genitourinary:  Negative nocturia    Musculoskeletal:  Positive for joint pain    Neurologic:  Negative      Psychiatric:  Negative      Heme/Lymph/Imm:  Positive for allergies    Endocrine:  Positive for thyroid disorder      Physical Exam:  Vitals: /78   Pulse 52   Ht 1.778 m (5' 10\")   Wt 73.9 kg (163 lb)   BMI 23.39 kg/m      Constitutional:  cooperative, alert and oriented, well developed, well nourished, in no acute distress        Skin:  warm and dry to the touch, no apparent skin lesions or masses noted          Head:  normocephalic, no masses or lesions        Eyes:  pupils equal and round;conjunctivae and lids unremarkable        Lymph:      ENT:  no pallor or cyanosis, dentition good        Neck:  carotid pulses are full and equal bilaterally, JVP normal, no carotid bruit        Respiratory:  normal breath sounds, clear to auscultation, normal A-P diameter, normal symmetry, normal respiratory excursion, no use of accessory muscles prolonged expiration       Cardiac: regular rhythm;normal S1 and S2 bradycardic S4   systolic murmur;grade 1        not assessed this visit                                        GI:  abdomen soft        Extremities and Muscular Skeletal:  no deformities, clubbing, cyanosis, erythema observed;no edema              Neurological:  no gross motor deficits        Psych:  Alert and Oriented x 3;affect appropriate, oriented to time, person and place        CC  No referring provider defined for this encounter.              "

## 2019-10-02 NOTE — PROGRESS NOTES
Service Date: 10/02/2019      HISTORY OF PRESENT ILLNESS:  Mr. Horan is a delightful 79-year-old gentleman well known to me here at the clinic.  He has a past medical history that includes:   1.  Sinus node dysfunction with chronotropic incompetence.   2.  Paroxysmal AFib on flecainide 75 mg b.i.d.   3.  Stroke, treated with tPA 06/2017.  The patient had not been on anticoagulation until the day of his stroke.  He is now on apixaban 5 mg b.i.d.   4.  COPD.   5.  Hypothyroidism, on levothyroxine.   6.  Hypertension.      At today's visit, Eder is doing fine.  He is now being seen by Dr. Hawkins after Dr. Franklin's [facility].  We have had discussions with Eder to implant a device for his chronotropic incompetence, which he has been reluctant to do.  He states that he is able to still do the things he enjoys doing; however, instead of having endurance that would last all day, now he last 4-5 hours working out in the garage.      Currently, denies chest pain, orthopnea, PND, syncope or peripheral edema.  He does get some dyspnea with exertion.  Blood pressure is stable at today's visit.      A 12-lead EKG shows sinus bradycardia at 47 beats per minute with a stable QRS at 92 milliseconds.  All other review of systems and past medical history are noted below.      ASSESSMENT AND PLAN:  Mr. Horan is a delightful 79-year-old gentleman here today for followup.   1.  Sinus node dysfunction with chronotropic incompetence.  Again, I do agree with Dr. Hawkins, I do believe he would benefit from a dual-chamber pacemaker.  I discussed this with him today.  He states that at the age of 79, he does not want to place that burden on the tax payers.  I have encouraged him to consider this in the future.  I explained that he will not live longer; however, it will improve his quality of life.   2.  Paroxysmal AFib.  He is stable on flecainide and Eliquis therapy.   3.  COPD, managed by his primary on inhaler therapy.      We will see him in  1 year or sooner should he have any questions or concerns.  No medication changes were warranted at today's visit.         YUMIKO MURILLO NP             D: 10/02/2019   T: 10/02/2019   MT: NICOLAS      Name:     AUGUSTINE MYRICK   MRN:      [id number]        Account:      [id number]   :      1940           Service Date: 10/02/2019      Document: [id number]

## 2019-11-06 ENCOUNTER — HEALTH MAINTENANCE LETTER (OUTPATIENT)
Age: 79
End: 2019-11-06

## 2020-01-04 DIAGNOSIS — I10 BENIGN ESSENTIAL HYPERTENSION: ICD-10-CM

## 2020-01-04 DIAGNOSIS — I73.00 RAYNAUD'S DISEASE WITHOUT GANGRENE: ICD-10-CM

## 2020-01-05 NOTE — TELEPHONE ENCOUNTER
"Requested Prescriptions   Pending Prescriptions Disp Refills     amLODIPine (NORVASC) 5 MG tablet [Pharmacy Med Name: amLODIPine Besylate Oral Tablet 5 MG] 90 tablet 2     Sig: Take 1 tablet (5 mg) by mouth daily   Last Written Prescription Date:  1/18/2019  Last Fill Quantity: 90,  # refills: 3   Last Office Visit: 7/15/2019   Future Office Visit:         Calcium Channel Blockers Protocol  Passed - 1/4/2020  1:52 PM        Passed - Blood pressure under 140/90 in past 12 months     BP Readings from Last 3 Encounters:   10/02/19 132/78   07/15/19 128/72   04/11/19 118/79                 Passed - Recent (12 mo) or future (30 days) visit within the authorizing provider's specialty     Patient has had an office visit with the authorizing provider or a provider within the authorizing providers department within the previous 12 mos or has a future within next 30 days. See \"Patient Info\" tab in inbasket, or \"Choose Columns\" in Meds & Orders section of the refill encounter.              Passed - Medication is active on med list        Passed - Patient is age 18 or older        Passed - Normal serum creatinine on file in past 12 months     Recent Labs   Lab Test 07/15/19  1100  10/31/11  1550   CR 0.70   < >  --    CREAT  --   --  0.8    < > = values in this interval not displayed.               "

## 2020-01-06 RX ORDER — AMLODIPINE BESYLATE 5 MG/1
5 TABLET ORAL DAILY
Qty: 90 TABLET | Refills: 1 | Status: SHIPPED | OUTPATIENT
Start: 2020-01-06 | End: 2020-07-20

## 2020-01-22 ENCOUNTER — TRANSFERRED RECORDS (OUTPATIENT)
Dept: HEALTH INFORMATION MANAGEMENT | Facility: CLINIC | Age: 80
End: 2020-01-22

## 2020-02-16 ENCOUNTER — HEALTH MAINTENANCE LETTER (OUTPATIENT)
Age: 80
End: 2020-02-16

## 2020-02-24 DIAGNOSIS — I48.0 PAROXYSMAL ATRIAL FIBRILLATION (H): ICD-10-CM

## 2020-02-24 RX ORDER — FLECAINIDE ACETATE 150 MG/1
TABLET ORAL
Qty: 90 TABLET | Refills: 2 | Status: SHIPPED | OUTPATIENT
Start: 2020-02-24 | End: 2021-05-28

## 2020-03-09 ENCOUNTER — TELEPHONE (OUTPATIENT)
Dept: INTERNAL MEDICINE | Facility: CLINIC | Age: 80
End: 2020-03-09

## 2020-03-09 DIAGNOSIS — J44.9 CHRONIC OBSTRUCTIVE PULMONARY DISEASE, UNSPECIFIED COPD TYPE (H): ICD-10-CM

## 2020-03-09 RX ORDER — BECLOMETHASONE DIPROPIONATE HFA 40 UG/1
AEROSOL, METERED RESPIRATORY (INHALATION)
Qty: 31.8 G | Refills: 1 | Status: SHIPPED | OUTPATIENT
Start: 2020-03-09 | End: 2020-07-20

## 2020-03-09 NOTE — TELEPHONE ENCOUNTER
".  Requested Prescriptions   Pending Prescriptions Disp Refills     QVAR REDIHALER 40 MCG/ACT inhaler [Pharmacy Med Name: Qvar RediHaler Inhalation Aerosol Breath Activated 40 MCG/ACT] 31.8 g 2     Sig: Inhale 2 puffs into the lungs daily       Inhaled Steroids Protocol Passed - 3/9/2020 10:06 AM        Passed - Patient is age 12 or older        Passed - Recent (12 mo) or future (30 days) visit within the authorizing provider's specialty     Patient has had an office visit with the authorizing provider or a provider within the authorizing providers department within the previous 12 mos or has a future within next 30 days. See \"Patient Info\" tab in inbasket, or \"Choose Columns\" in Meds & Orders section of the refill encounter.              Passed - Medication is active on med list             "

## 2020-03-09 NOTE — TELEPHONE ENCOUNTER
Reason for Call:  Medication or medication refill:    Do you use a [facility]?  Name of the pharmacy and phone number for the current request:  [facility] tele# [phone]    Name of the medication requested: Qvar Redihaler 40 mcg/ACT inhaler ;Pt requesting 90 day quantity, pt does not want any less, due to cost    Other request: new     Can we leave a detailed message on this number?     Phone number patient can be reached at: Other phone number:  See above tele #    Best Time: soon    Call taken on 3/9/2020 at 1:08 PM by Natalee Pollock

## 2020-03-15 PROBLEM — E03.9 HYPOTHYROIDISM, UNSPECIFIED TYPE: Status: ACTIVE | Noted: 2020-03-15

## 2020-03-16 DIAGNOSIS — I48.0 PAROXYSMAL ATRIAL FIBRILLATION (H): ICD-10-CM

## 2020-05-22 ENCOUNTER — VIRTUAL VISIT (OUTPATIENT)
Dept: FAMILY MEDICINE | Facility: OTHER | Age: 80
End: 2020-05-22

## 2020-05-22 NOTE — PROGRESS NOTES
"Date: 2020 15:21:27  Clinician: Nita Trejo  Clinician NPI: [id number]  Patient: Eder Horan  Patient : 1940  Patient Address: [address] [place] [address]  Patient Phone: [phone]  Visit Protocol: URI  Patient Summary:  Eder is a 80 year old ( : 1940 ) male who initiated a Visit for COVID-19 (Coronavirus) evaluation and screening. When asked the question \"Please sign me up to receive news, health information and promotions. \", Eder responded \"No\".    Eder states his symptoms started 1-2 days ago.   His symptoms consist of wheezing, a cough, rhinitis, and malaise. He is experiencing mild difficulty breathing with activities but can speak normally in full sentences.   Symptom details     Nasal secretions: The color of his mucus is clear.    Cough: Eder coughs a few times an hour and his cough is not more bothersome at night. Phlegm comes into his throat when he coughs. He believes his cough is caused by post-nasal drip. The color of the phlegm is white.     Wheezing: Eedr has been diagnosed with asthma. The wheezing does not interfere with his normal daily activities.     Eder denies having nausea, teeth pain, ageusia, diarrhea, facial pain or pressure, chills, sore throat, fever, nasal congestion, vomiting, ear pain, headache, myalgias, and anosmia. He also denies having recent facial or sinus surgery in the past 60 days.   Precipitating events  He has not recently been exposed to someone with influenza. Eder has been in close contact with the following high risk individuals: adults 65 or older.   Pertinent COVID-19 (Coronavirus) information  In the past 14 days, Eder has not worked in a congregate living setting.   He does not work or volunteer as healthcare worker or a  and does not work or volunteer in a healthcare facility.   Eder also has not lived in a congregate living setting in the past 14 days. He does not live with a healthcare worker.   Eder has " not had a close contact with a laboratory-confirmed COVID-19 patient within 14 days of symptom onset.   Pertinent medical history  Eder has taken an antibiotic medication in the past month. Antibiotic details as reported by the patient (free text): valACYclovir   Eder does not need a return to work/school note.   Weight: 170 lbs   Eder smokes or uses smokeless tobacco.   Weight: 170 lbs    MEDICATIONS: prednisolone acetate-nepafenac ophthalmic (eye), Norvasc oral, flecainide oral, Qvar RediHaler inhalation, Eliquis oral, levothyroxine oral, ALLERGIES: Zyrtec-D  Clinician Response:  Dear Eder,   Dear Eder  Your symptoms show that you may have coronavirus (COVID-19). This illness can cause fever, cough and trouble breathing. Many people get a mild case and get better on their own. Some people can get very sick.  What should I do?  We would like to test you for this virus. This will be a curbside test done outside the clinic.  Please call [phone] to schedule your visit. Explain that you were referred by [facility] to have a COVID-19 test. Be ready to share your [facility] visit ID number.  Starting now:  Stay at least 6 feet away from others. (If someone will drive you to your test, stay in the backseat, as far away from the  as you can.)   Don't go to work, school or anywhere else. When it's time for your test, go straight to the testing site. Don't make any stops on the way there or back.   Wash your hands and face often. Use soap and water.   Cover your mouth and nose with a mask, tissue or washcloth.   Don't touch anyone. No hugging, kissing or handshakes.  While at home   Stay home and away from others (self-isolate) until:  You've had no fever---and no medicine that reduces fever---for 3 full days (72 hours). And...  Your other symptoms have gotten better. For example, your cough or breathing has improved. And...  At least 10 days have passed since your symptoms started.  During this time:  Stay in your own  "room (and use your own bathroom), if you can.  Don't go to work, school or anywhere else.  Stay away from others in your home. No hugging, kissing or shaking hands.  Don't let anyone visit.  Cover your mouth and nose with a mask, tissue or washcloth to avoid spreading germs.  Clean \"high touch\" surfaces often (doorknobs, counters, handles, etc.). Use a household cleaning spray or wipes.  Wash your hands and face often. Use soap and water.  How can I take care of myself?  1. Get lots of rest. Drink extra fluids (unless your doctor has told you not to).  2. Take Tylenol (acetaminophen) for fever or pain. If you have liver or kidney problems, ask your family doctor if it's okay to take Tylenol.  Adults can take either:   650 mg (two 325 mg pills) every 4 to 6 hours, or...  1,000 mg (two 500 mg pills) every 8 hours as needed.   Note: Don't take more than 3,000 mg in one day.   Acetaminophen is found in many medicines (both prescribed and over-the-counter medicines). Read all labels to be sure you don't take too much.   For children, check the Tylenol bottle for the right dose. The dose is based on the child's age or weight.  3. If you have other health problems (like cancer, heart failure, an organ transplant or severe kidney disease): Call your specialty clinic if you don't feel better in the next 2 days.  4. Know when to call 911: If your breathing is so bad that it keeps you from doing normal activities, call 911 or go to the emergency room. Tell them that you've been staying home and may have COVID-19.  5. Sign up for [employer]. We know it's scary to hear that you might have COVID-19. We want to track your symptoms to make sure you're okay over the next 2 weeks. Please look for an email from [employer]---this is a free, online program that we'll use to keep in touch. To sign up, follow the link in the email. Learn more at [email or web address].  6. The following will serve as your written order for " this Covid Test ordered by me for the indication of suspected Covid [Z20.828]: The test will be ordered in [employer], our electronic health record after you are scheduled and will show as ordered and authorized by Alfonzo Ruiz MD   Order: Covid-19 (Coronavirus) PCR for SYMPTOMATIC testing from [facility]  Where can I get more information?  To learn more about COVID-19 and how to care for yourself at home, please visit the CDC website at https://www.cdc.gov/coronavirus/2019-ncov/about/steps-when-sick.html.  For more about your care at [facility], please visit [email or web address]  If you'd like to be part of a COVID-19 clinical trial (research study) at the [facility], go to [email or web address] for details.    Diagnosis: Cough  Diagnosis ICD: R05

## 2020-05-27 DIAGNOSIS — Z20.822 SUSPECTED COVID-19 VIRUS INFECTION: Primary | ICD-10-CM

## 2020-05-27 LAB
SARS-COV-2 RNA SPEC QL NAA+PROBE: NOT DETECTED
SPECIMEN SOURCE: NORMAL

## 2020-05-27 PROCEDURE — 99207 ZZC NO BILLABLE SERVICE THIS VISIT: CPT

## 2020-05-27 PROCEDURE — 87635 SARS-COV-2 COVID-19 AMP PRB: CPT | Mod: 90 | Performed by: FAMILY MEDICINE

## 2020-05-27 PROCEDURE — 99000 SPECIMEN HANDLING OFFICE-LAB: CPT | Performed by: FAMILY MEDICINE

## 2020-06-01 ENCOUNTER — TRANSFERRED RECORDS (OUTPATIENT)
Dept: HEALTH INFORMATION MANAGEMENT | Facility: CLINIC | Age: 80
End: 2020-06-01

## 2020-06-05 DIAGNOSIS — E03.9 HYPOTHYROIDISM, UNSPECIFIED TYPE: ICD-10-CM

## 2020-06-05 DIAGNOSIS — J44.9 CHRONIC OBSTRUCTIVE PULMONARY DISEASE, UNSPECIFIED COPD TYPE (H): Primary | ICD-10-CM

## 2020-06-05 DIAGNOSIS — I10 BENIGN ESSENTIAL HYPERTENSION: ICD-10-CM

## 2020-06-05 RX ORDER — LEVOTHYROXINE SODIUM 125 UG/1
125 TABLET ORAL DAILY
Qty: 90 TABLET | Refills: 0 | Status: SHIPPED | OUTPATIENT
Start: 2020-06-05 | End: 2020-07-20

## 2020-06-05 NOTE — TELEPHONE ENCOUNTER
Routing refill request to provider for review/approval because:  Failed protocol    Normal TSH on file in past 12 months

## 2020-06-05 NOTE — TELEPHONE ENCOUNTER
Pt last seen July 2019. Pt to have fasting abs done sometime in June and see me in July for follow-up. Ed refilled for 90 days. Will address more longterm refill  Of med after seen back in clinic

## 2020-06-08 ENCOUNTER — TELEPHONE (OUTPATIENT)
Dept: INTERNAL MEDICINE | Facility: CLINIC | Age: 80
End: 2020-06-08

## 2020-06-10 NOTE — TELEPHONE ENCOUNTER
Pt is currently scheduled to see me 7/13/20 along with his wife the same morning. Based on what we just learned from administration yesterday about scheduling in July and August, unsure at this time if I will be working in clinic that week or if at home for virtual appts. I would recommend scheduling pt and his wife both (wife is Obdulia Horan)  for a fasting lab appt sometime in the week before the current schedule appt with me so that I can review results of the labs with them whether it be with a face to face appt or a potential virtual appt. Future labs ordered

## 2020-07-08 DIAGNOSIS — I10 BENIGN ESSENTIAL HYPERTENSION: ICD-10-CM

## 2020-07-08 DIAGNOSIS — J44.9 CHRONIC OBSTRUCTIVE PULMONARY DISEASE, UNSPECIFIED COPD TYPE (H): ICD-10-CM

## 2020-07-08 DIAGNOSIS — E03.9 HYPOTHYROIDISM, UNSPECIFIED TYPE: ICD-10-CM

## 2020-07-08 LAB
ALBUMIN SERPL-MCNC: 3.1 G/DL (ref 3.4–5)
ALP SERPL-CCNC: 94 U/L (ref 40–150)
ALT SERPL W P-5'-P-CCNC: 15 U/L (ref 0–70)
ANION GAP SERPL CALCULATED.3IONS-SCNC: 3 MMOL/L (ref 3–14)
AST SERPL W P-5'-P-CCNC: 9 U/L (ref 0–45)
BILIRUB SERPL-MCNC: 0.4 MG/DL (ref 0.2–1.3)
BUN SERPL-MCNC: 10 MG/DL (ref 7–30)
CALCIUM SERPL-MCNC: 8.6 MG/DL (ref 8.5–10.1)
CHLORIDE SERPL-SCNC: 107 MMOL/L (ref 94–109)
CO2 SERPL-SCNC: 28 MMOL/L (ref 20–32)
CREAT SERPL-MCNC: 0.77 MG/DL (ref 0.66–1.25)
ERYTHROCYTE [DISTWIDTH] IN BLOOD BY AUTOMATED COUNT: 12.7 % (ref 10–15)
GFR SERPL CREATININE-BSD FRML MDRD: 85 ML/MIN/{1.73_M2}
GLUCOSE SERPL-MCNC: 98 MG/DL (ref 70–99)
HCT VFR BLD AUTO: 45 % (ref 40–53)
HGB BLD-MCNC: 15 G/DL (ref 13.3–17.7)
MCH RBC QN AUTO: 34.2 PG (ref 26.5–33)
MCHC RBC AUTO-ENTMCNC: 33.3 G/DL (ref 31.5–36.5)
MCV RBC AUTO: 103 FL (ref 78–100)
PLATELET # BLD AUTO: 208 10E9/L (ref 150–450)
POTASSIUM SERPL-SCNC: 4.3 MMOL/L (ref 3.4–5.3)
PROT SERPL-MCNC: 7.3 G/DL (ref 6.8–8.8)
RBC # BLD AUTO: 4.38 10E12/L (ref 4.4–5.9)
SODIUM SERPL-SCNC: 138 MMOL/L (ref 133–144)
TSH SERPL DL<=0.005 MIU/L-ACNC: 1.79 MU/L (ref 0.4–4)
WBC # BLD AUTO: 5.1 10E9/L (ref 4–11)

## 2020-07-08 PROCEDURE — 84443 ASSAY THYROID STIM HORMONE: CPT | Performed by: INTERNAL MEDICINE

## 2020-07-08 PROCEDURE — 36415 COLL VENOUS BLD VENIPUNCTURE: CPT | Performed by: INTERNAL MEDICINE

## 2020-07-08 PROCEDURE — 80053 COMPREHEN METABOLIC PANEL: CPT | Performed by: INTERNAL MEDICINE

## 2020-07-08 PROCEDURE — 85027 COMPLETE CBC AUTOMATED: CPT | Performed by: INTERNAL MEDICINE

## 2020-07-20 ENCOUNTER — OFFICE VISIT (OUTPATIENT)
Dept: INTERNAL MEDICINE | Facility: CLINIC | Age: 80
End: 2020-07-20
Payer: COMMERCIAL

## 2020-07-20 VITALS
BODY MASS INDEX: 24.2 KG/M2 | HEIGHT: 70 IN | OXYGEN SATURATION: 96 % | DIASTOLIC BLOOD PRESSURE: 78 MMHG | WEIGHT: 169 LBS | SYSTOLIC BLOOD PRESSURE: 128 MMHG | HEART RATE: 63 BPM | RESPIRATION RATE: 16 BRPM | TEMPERATURE: 97.5 F

## 2020-07-20 DIAGNOSIS — R60.9 EDEMA, UNSPECIFIED TYPE: ICD-10-CM

## 2020-07-20 DIAGNOSIS — E53.8 B12 DEFICIENCY: ICD-10-CM

## 2020-07-20 DIAGNOSIS — J44.9 CHRONIC OBSTRUCTIVE PULMONARY DISEASE, UNSPECIFIED COPD TYPE (H): ICD-10-CM

## 2020-07-20 DIAGNOSIS — Z00.00 MEDICARE ANNUAL WELLNESS VISIT, SUBSEQUENT: Primary | ICD-10-CM

## 2020-07-20 DIAGNOSIS — C61 PROSTATE CANCER (H): ICD-10-CM

## 2020-07-20 DIAGNOSIS — E03.9 HYPOTHYROIDISM, UNSPECIFIED TYPE: ICD-10-CM

## 2020-07-20 DIAGNOSIS — I10 BENIGN ESSENTIAL HYPERTENSION: ICD-10-CM

## 2020-07-20 DIAGNOSIS — I48.0 PAROXYSMAL ATRIAL FIBRILLATION (H): ICD-10-CM

## 2020-07-20 DIAGNOSIS — I73.00 RAYNAUD'S DISEASE WITHOUT GANGRENE: ICD-10-CM

## 2020-07-20 PROCEDURE — 99214 OFFICE O/P EST MOD 30 MIN: CPT | Mod: 25 | Performed by: INTERNAL MEDICINE

## 2020-07-20 PROCEDURE — 99397 PER PM REEVAL EST PAT 65+ YR: CPT | Performed by: INTERNAL MEDICINE

## 2020-07-20 RX ORDER — BECLOMETHASONE DIPROPIONATE HFA 40 UG/1
2 AEROSOL, METERED RESPIRATORY (INHALATION) DAILY
Qty: 31.8 G | Refills: 3 | Status: SHIPPED | OUTPATIENT
Start: 2020-07-20 | End: 2021-08-16

## 2020-07-20 RX ORDER — ALBUTEROL SULFATE 90 UG/1
AEROSOL, METERED RESPIRATORY (INHALATION)
Qty: 17 G | Refills: 11 | Status: SHIPPED | OUTPATIENT
Start: 2020-07-20 | End: 2021-08-16

## 2020-07-20 RX ORDER — LEVOTHYROXINE SODIUM 125 UG/1
125 TABLET ORAL DAILY
Qty: 90 TABLET | Refills: 3 | Status: SHIPPED | OUTPATIENT
Start: 2020-07-20 | End: 2021-08-16

## 2020-07-20 RX ORDER — AMLODIPINE BESYLATE 5 MG/1
5 TABLET ORAL DAILY
Qty: 90 TABLET | Refills: 3 | Status: SHIPPED | OUTPATIENT
Start: 2020-07-20 | End: 2021-07-01

## 2020-07-20 ASSESSMENT — ACTIVITIES OF DAILY LIVING (ADL): CURRENT_FUNCTION: NO ASSISTANCE NEEDED

## 2020-07-20 ASSESSMENT — MIFFLIN-ST. JEOR: SCORE: 1477.83

## 2020-07-20 NOTE — PROGRESS NOTES
"SUBJECTIVE:   Eder Horan is a 80 year old male who presents for Preventive Visit and issues as below  Are you in the first 12 months of your Medicare coverage?  No    Healthy Habits:     In general, how would you rate your overall health?  Good    Frequency of exercise:  None    Duration of exercise:  Other    Do you usually eat at least 4 servings of fruit and vegetables a day, include whole grains    & fiber and avoid regularly eating high fat or \"junk\" foods?  Yes    Taking medications regularly:  Yes    Barriers to taking medications:  Not applicable    Medication side effects:  Not applicable    Ability to successfully perform activities of daily living:  No assistance needed    Home Safety:  No safety concerns identified    Hearing Impairment:  No hearing concerns    In the past 6 months, have you been bothered by leaking of urine? Yes    In general, how would you rate your overall mental or emotional health?  Good      PHQ-2 Total Score: 0    Additional concerns today:  Yes (Swelling in Legs, Memory, (possible) Hernia )    Do you feel safe in your environment? Yes    Have you ever done Advance Care Planning? (For example, a Health Directive, POLST, or a discussion with a medical provider or your loved ones about your wishes): Yes, advance care planning is on file.      Fall risk  Fallen 2 or more times in the past year?: No  Any fall with injury in the past year?: No    Cognitive Screening   1) Repeat 3 items (Leader, Season, Table)    2) Clock draw: NORMAL  3) 3 item recall: Recalls 1 object   Results: NORMAL clock, 1 items recalled: COGNITIVE IMPAIRMENT LESS LIKELY  Six Item Cognitive Impairment Test   (6CIT):      What year is it?                               Correct - 0 points    What month is it?                               Correct - 0 points      Give the patient an address to remember with five components:   Eder Child ( first and last name - 2 components)   [address]  (number and name of " street - 2 components)   [place] ( city - 1 component)      About what time is it (within the hour)? Correct - 0 points    Count backwards from 20 to 1:   Correct - 0 points    Say the months of the year in reverse: Correct - 0 points    Repeat the address phrase:   Correct - 0 points    Total 6CIT Score:      0/28    Interpretation: The 6CIT uses an inverse score and questions are weighted to produce a total out of 28. Scores of 0-7 are considered normal and 8 or more significant.    Advantages The test has high sensitivity without compromising specificity even in mild dementia. It is easy to translate linguistically and culturally.  Disadvantages The main disadvantage is in the scoring and weighting of the test, which is initially confusing, however computer models have simplified this greatly.    Probability Statistics: At the 7/8 cut off: Overall figures sensitivity 90% specificity 100%, in mild dementia sensitivity = 78% , specificity = 100%    Copyright 2000 [facility], [facility]. Courtesy of Dr. Jared Thomas      Mini-CogTM Copyright S Gio. Licensed by the author for use in [facility]; reprinted with permission ([email or web address]). All rights reserved.      Do you have sleep apnea, excessive snoring or daytime drowsiness?: no    Reviewed and updated as needed this visit by clinical staff  Allergies         Reviewed and updated as needed this visit by Provider        Social History     Tobacco Use     Smoking status: Former Smoker     Types: Cigars, Pipe     Last attempt to quit: 2006     Years since quittin.6     Smokeless tobacco: Never Used     Tobacco comment:  occasionally cigar   Substance Use Topics     Alcohol use: Yes     Alcohol/week: 0.0 standard drinks     Comment: 2-3 drinks a day:  Wine/Beer     If you drink alcohol do you typically have >3 drinks per day or >7 drinks per week? Yes      Alcohol Use 2015   Prescreen: >3 drinks/day or >7  drinks/week? The patient does not drink >3 drinks per day nor >7 drinks per week.     AUDIT - Alcohol Use Disorders Identification Test - Reproduced from the World Health Organization Audit 2001 (Second Edition) 7/20/2020   1.  How often do you have a drink containing alcohol? 2 to 3 times a week   2.  How many drinks containing alcohol do you have on a typical day when you are drinking? 1 or 2   3.  How often do you have five or more drinks on one occasion? Never   4.  How often during the last year have you found that you were not able to stop drinking once you had started? Never   5.  How often during the last year have you failed to do what was normally expected of you because of drinking? Never   6.  How often during the last year have you needed a first drink in the morning to get yourself going after a heavy drinking session? Never   7.  How often during the last year have you had a feeling of guilt or remorse after drinking? Never   8.  How often during the last year have you been unable to remember what happened the night before because of your drinking? Never   9.  Have you or someone else been injured because of your drinking? No   10. Has a relative, friend, doctor or other health care worker been concerned about your drinking or suggested you cut down? No   TOTAL SCORE 3           Current providers sharing in care for this patient include:   Patient Care Team:  Richmond Martinez MD as PCP - General  Richmond Martinez MD as Assigned PCP    The following health maintenance items are reviewed in Epic and correct as of today:  Health Maintenance   Topic Date Due     ZOSTER IMMUNIZATION (2 of 3) 07/24/2009     MEDICARE ANNUAL WELLNESS VISIT  08/24/2016     ADVANCE CARE PLANNING  10/31/2016     COLORECTAL CANCER SCREENING  02/22/2017     PSA  01/22/2018     FALL RISK ASSESSMENT  11/20/2019     PHQ-2  01/01/2020     INFLUENZA VACCINE (1) 09/01/2020     TSH W/FREE T4 REFLEX  07/08/2021     DTAP/TDAP/TD IMMUNIZATION (3  - Td) 08/24/2025     SPIROMETRY  Completed     COPD ACTION PLAN  Completed     PNEUMOCOCCAL IMMUNIZATION 65+ LOW/MEDIUM RISK  Completed     IPV IMMUNIZATION  Aged Out     MENINGITIS IMMUNIZATION  Aged Out     HEPATITIS B IMMUNIZATION  Aged Out     Labs reviewed in EPIC      Review of Systems  CONSTITUTIONAL: NEGATIVE for fever, chills. Mild weight gain  INTEGUMENTARY/SKIN: NEGATIVE for worrisome rashes, moles or lesions  EYES: NEGATIVE for vision changes.  Has glasses. Eye exam today. No irritation now. Had viral irritation last winter  ENT/MOUTH: NEGATIVE for ear, mouth and throat problems. Has some nasal clear  rhinorrhea in AM only. Not bothersome  RESP:  POSITIVE for COPD. WIth mowing lawn, will have some SOB.  Better is uses Albuterol prior. NO acute worsening. OK at rest. On QVAR with prn Albuterol. Declines  Desire to change inhaler therapy at this time. Denies snoring complaints from wife  CV: NEGATIVE for chest pain, palpitations.  Mild peripheral edema. Hx A fib.  Denies orthopnea or PND  GI: NEGATIVE for nausea, abdominal pain, heartburn, or change in bowel habits  : NEGATIVE for  dysuria, or hematuria. Has some frequency. No caffeine. Occ nocturia. Hx prostate CA  And being managed by Urology.  Rare mild ache in right groin region.  Denies swelling.  Wonders if he has a hernia.  No trauma.  MUSCULOSKELETAL: NEGATIVE for significant arthralgias or myalgia that stops activity, Mild stiffness  NEURO: NEGATIVE for weakness, dizziness or paresthesias.  Feels memory is slightly worsened.  Rarely forgetting names.  Not affecting ADLs or driving. Previous B12 deficiency that had resolved and last labs normal. Will recheck 1 year  ENDOCRINE: NEGATIVE for  . On thyroid replacement. Rare hot flash with prostate CA therapy  injections  HEME: NEGATIVE for bleeding problems  PSYCHIATRIC: NEGATIVE for changes in mood or affect    OBJECTIVE:   /78   Pulse 63   Temp 97.5  F (36.4  C) (Temporal)   Resp 16   Ht  "1.778 m (5' 10\")   Wt 76.7 kg (169 lb)   SpO2 96%   BMI 24.25 kg/m   Estimated body mass index is 23.39 kg/m  as calculated from the following:    Height as of 10/2/19: 1.778 m (5' 10\").    Weight as of 10/2/19: 73.9 kg (163 lb).  Physical Exam  General appearance -  alert, no distress  Skin - No rashes or lesions.  Head - normocephalic, atraumatic  Eyes - DENITA, EOMI, fundi exam with nondilated pupils negative.  Ears - External ears normal. Canals clear. TM's normal.  Nose/Sinuses - Nares normal. Septum midline. Mucosa normal. No drainage or sinus tenderness.  Oropharynx - No erythema, no adenopathy, no exudates.  Neck - Supple without adenopathy or thyromegaly. No bruits.  Lungs - Clear to auscultation without wheezes/rhonchi.  Slight prolonged expiratory phase.  Speaking easily.  No accessory muscle use  Heart - Regular rate and rhythm without murmurs, clicks, or gallops.  Nodes - No supraclavicular, axillary, or inguinal adenopathy palpable.  Abdomen - Abdomen soft, non-tender. BS normal. No masses or hepatosplenomegaly palpable. No bruits.  Extremities -No cyanosis, clubbing. 1 plus BLE edema.    Musculoskeletal - Spine ROM normal. Muscular strength intact.   Peripheral pulses - radial=4/4, femoral=4/4, posterior tibial=4/4, dorsalis pedis=4/4,  Neuro - Gait normal. Reflexes normal and symmetric. Sensation grossly WNL.  Genital - Normal-appearing male external genitalia. No scrotal masses or inguinal hernia palpable.   Rectal - deferred. Done by Urology    ASSESSMENT / PLAN:   1. Medicare annual wellness visit, subsequent  See below for healthcare discussion.  Otherwise up-to-date.  Future labs as ordered.  Memory testing normal.  Patient encouraged to increase walking exercise.  Patient had concern about possible hernia but not found on exam.  Patient will do stretching exercises for the groin as needed  - Comprehensive metabolic panel; Future  - Lipid panel reflex to direct LDL Fasting; Future    2. " Chronic obstructive pulmonary disease, unspecified COPD type (H)  Mild shortness of breath patient states not to the point where he wishes to change inhaler therapy.  Continue current meds for now.  If worsens, patient to inform physician and will change to Symbicort/Dulera/Advair versus Anoro/Stiolto  - albuterol (PROAIR HFA) 108 (90 Base) MCG/ACT inhaler; INHALE 2 PUFFS INTO THE LUNGS EVERY 4 HOURS AS NEEDED FOR SHORTNESS OF BREATH / DYSPNEA  Dispense: 17 g; Refill: 11  - beclomethasone HFA (QVAR REDIHALER) 40 MCG/ACT inhaler; Inhale 2 puffs into the lungs daily  Dispense: 31.8 g; Refill: 3    3. Prostate cancer (H)  On hormonal therapy per urology.  PSA monitoring per urology    4. Paroxysmal atrial fibrillation (H)  Intermittent.  No recent symptoms with current antiarrhythmic therapy with flecainide per cardiology.  Continue anticoagulationReviewed preventive health counseling, as reflected in patient instructions- apixaban ANTICOAGULANT (ELIQUIS ANTICOAGULANT) 5 MG tablet; TAKE ONE TABLET BY MOUTH TWICE DAILY.  Dispense: 180 tablet; Refill: 3    5. Benign essential hypertension  Controlled.  Continue current medication  - amLODIPine (NORVASC) 5 MG tablet; Take 1 tablet (5 mg) by mouth daily  Dispense: 90 tablet; Refill: 3    6. Raynaud's disease without gangrene  Controlled.  Fingertips warm today without skin changes  - amLODIPine (NORVASC) 5 MG tablet; Take 1 tablet (5 mg) by mouth daily  Dispense: 90 tablet; Refill: 3    7. Hypothyroidism, unspecified type  Controlled.  Continue current medication.  Repeat lab 1 year  - levothyroxine (SYNTHROID/LEVOTHROID) 125 MCG tablet; Take 1 tablet (125 mcg) by mouth daily  Dispense: 90 tablet; Refill: 3  - TSH with free T4 reflex; Future    8. B12 deficiency  History of previous deficiency.  Most recent labs from 1 year ago normal.  Recent MCV elevated.  Lab recheck as ordered  - Vitamin B12; Future    9. Edema, unspecified type  Very mild.  Consistent with amlodipine  "use and COPD history.  No orthopnea or PND.  Counseled regarding maintaining low-salt diet.  Walking exercise.  If becomes symptomatic, patient to inform physician      COUNSELING:  Reviewed preventive health counseling, as reflected in patient instructions    Estimated body mass index is 23.39 kg/m  as calculated from the following:    Height as of 10/2/19: 1.778 m (5' 10\").    Weight as of 10/2/19: 73.9 kg (163 lb).         reports that he quit smoking about 13 years ago. His smoking use included cigars and pipe. He has never used smokeless tobacco.      Appropriate preventive services were discussed with this patient, including applicable screening as appropriate for cardiovascular disease, diabetes, osteopenia/osteoporosis, and glaucoma.  As appropriate for age/gender, discussed screening for colorectal cancer, prostate cancer, breast cancer, and cervical cancer. Checklist reviewing preventive services available has been given to the patient.    Reviewed patients plan of care and provided an AVS. The Basic Care Plan (routine screening as documented in Health Maintenance) for Eder meets the Care Plan requirement. This Care Plan has been established and reviewed with the Patient.    Counseling Resources:  ATP IV Guidelines  Pooled Cohorts Equation Calculator  Breast Cancer Risk Calculator  FRAX Risk Assessment  ICSI Preventive Guidelines  Dietary Guidelines for Americans, 2010  USDA's MyPlate  ASA Prophylaxis  Lung CA Screening    PLAN:   Continue current meds  Prescriptions refilled.    Call  [phone] or use [employer] to schedule a future lab appointment  fasting in 1 year.   For fasting labs, please refrain from eating for 8 hours or more.   Drink 2 glasses of water before your lab appointment. It is fine to take your  oral medications on the morning of the lab test as usual  Check with insurance or speak with your pharmacist re: Shingrix vaccine coverage for shingles prevention.  This is a 2 shot series done " 2-6 months apart  I would recommend you receive an influenza (flu) vaccine  this Fall (September or October)  If breathing worsens and needing Albuterol more often, then let me know and will change QVAR to a combo steroid/dilating medication instead (Dulera, Advair or Symbicort)  Maintain low sodium intake with goal less than 2000 mg/day.  If leg swelling worsens, inform clinic  Pt was informed regarding extra E&M billing for management of new or established medical issues not related to today's wellness visit.      Richmond Martinez MD  [facility]

## 2020-07-20 NOTE — PATIENT INSTRUCTIONS
Continue current meds  Prescriptions refilled.    Call  [phone] or use [employer] to schedule a future lab appointment  fasting in 1 year.   For fasting labs, please refrain from eating for 8 hours or more.   Drink 2 glasses of water before your lab appointment. It is fine to take your  oral medications on the morning of the lab test as usual  Check with insurance or speak with your pharmacist re: Shingrix vaccine coverage for shingles prevention.  This is a 2 shot series done 2-6 months apart  I would recommend you receive an influenza (flu) vaccine  this Fall (September or October)  If breathing worsens and needing Albuterol more often, then let me know and will change QVAR to a combo steroid/dilating medication instead (Dulera, Advair or Symbicort)  Maintain low sodium intake with goal less than 2000 mg/day.  If leg swelling worsens, inform clinic  Pt was informed regarding extra E&M billing for management of new or established medical issues not related to today's wellness visit

## 2020-07-25 PROBLEM — R60.9 EDEMA, UNSPECIFIED TYPE: Status: ACTIVE | Noted: 2020-07-25

## 2020-09-23 ENCOUNTER — OFFICE VISIT (OUTPATIENT)
Dept: DERMATOLOGY | Facility: CLINIC | Age: 80
End: 2020-09-23
Payer: COMMERCIAL

## 2020-09-23 VITALS — DIASTOLIC BLOOD PRESSURE: 90 MMHG | HEART RATE: 56 BPM | SYSTOLIC BLOOD PRESSURE: 138 MMHG

## 2020-09-23 DIAGNOSIS — Z85.820 HISTORY OF MELANOMA: ICD-10-CM

## 2020-09-23 DIAGNOSIS — D22.9 MULTIPLE BENIGN NEVI: ICD-10-CM

## 2020-09-23 DIAGNOSIS — L81.4 LENTIGINES: ICD-10-CM

## 2020-09-23 DIAGNOSIS — D48.5 NEOPLASM OF UNCERTAIN BEHAVIOR OF SKIN: Primary | ICD-10-CM

## 2020-09-23 DIAGNOSIS — D18.01 CHERRY ANGIOMA: ICD-10-CM

## 2020-09-23 DIAGNOSIS — L82.1 SEBORRHEIC KERATOSES: ICD-10-CM

## 2020-09-23 DIAGNOSIS — Z85.828 HISTORY OF NONMELANOMA SKIN CANCER: ICD-10-CM

## 2020-09-23 PROCEDURE — 11103 TANGNTL BX SKIN EA SEP/ADDL: CPT | Performed by: PHYSICIAN ASSISTANT

## 2020-09-23 PROCEDURE — 88305 TISSUE EXAM BY PATHOLOGIST: CPT | Mod: TC | Performed by: PHYSICIAN ASSISTANT

## 2020-09-23 PROCEDURE — 11102 TANGNTL BX SKIN SINGLE LES: CPT | Performed by: PHYSICIAN ASSISTANT

## 2020-09-23 PROCEDURE — 99214 OFFICE O/P EST MOD 30 MIN: CPT | Mod: 25 | Performed by: PHYSICIAN ASSISTANT

## 2020-09-23 NOTE — LETTER
9/23/2020         RE: Eder Horan  [address] [facility] [id number]        Dear Colleague,    Thank you for referring your patient, Eder Horan, to the [facility]. Please see a copy of my visit note below.    HPI:  Eder Horan is a 80 year old male patient here today for non healing spot on nose and left shoulder .  Patient states this has been present for a while.  Patient reports the following symptoms: crusting, not healing .  Patient reports the following previous treatments: none.  Patient reports the following modifying factors: none.  Associated symptoms: none.  Patient has no other skin complaints today.  Remainder of the HPI, Meds, PMH, Allergies, FH, and SH was reviewed in chart.    Pertinent Hx:   Hx of MM and NMSC  Past Medical History:   Diagnosis Date     Actinic keratosis 6/09    face     ALLERGIC RHINITIS       Atrial fibrillation (H) 6/08     B12 deficiency 1/16/2019     Back pain     s/p LESI through ortho Feb 2010     Basal cell carcinoma      Bradycardia      Chronotropic incompetence with sinus node dysfunction (H)      COPD (chronic obstructive pulmonary disease) (H)      DVT      Left calf 2003. Right calf 6/06     Embolic stroke (H) 06/11/2017    superior cerebellar artery occlusion. Hx A fib     Hyperlipidemia LDL goal <130 5/10/2011     Hypertension      HYPOTHYROIDISM      hypothyroidism     Lateral epicondylitis  of elbow 7/05    left     Malignant melanoma (H)      PLANTAR FASCITIS      Prostate cancer (H) 6/09    on Lupron therapy     Raynaud's disease without gangrene 11/26/2018     Squamous cell carcinoma  Sept 2014     right lower lid, s/p MOHS     Syncope      TMJ (temporomandibular joint syndrome) 4/12/2012     Tobacco use disorder        Past Surgical History:   Procedure Laterality Date     ABDOMEN SURGERY  2004,06,09    Hernias (2) Prostate Removal(2009)     BIOPSY  2009    Prostate     C NONSPECIFIC PROCEDURE  1/07    Left groin  exploration and left inguinal herniorrhaphy     C NONSPECIFIC PROCEDURE      Prostate bx (benign)     C NONSPECIFIC PROCEDURE      Wide local excision (left side), robotic-assisted laparoscopic prostatectomy and   Left pelvic lymph node dissection     C NONSPECIFIC PROCEDURE  2010    Right inguinal herniorrhaphy with mesh     CARDIOVERSION  10-16-08    failed     COLONOSCOPY  2004     EYE SURGERY       lump from lower  left eye lid removed     GENITOURINARY SURGERY  2009    Prostate     HERNIA REPAIR   -         Family History   Problem Relation Age of Onset     C.A.D. Father         heart attack- angina     Coronary Artery Disease Father      Myocardial Infarction Father      Cancer Mother         lung     Lung Cancer Mother      Lung Cancer Sister      Unknown/Adopted Maternal Grandmother      Unknown/Adopted Maternal Grandfather      Unknown/Adopted Paternal Grandmother      Unknown/Adopted Paternal Grandfather      Unknown/Adopted Sister        Social History     Socioeconomic History     Marital status:      Spouse name: Not on file     Number of children: 3     Years of education: Not on file     Highest education level: Not on file   Occupational History     Occupation: [employer]     Employer: [employer]     Employer: RETIRED   Social Needs     Financial resource strain: Not on file     Food insecurity     Worry: Not on file     Inability: Not on file     Transportation needs     Medical: Not on file     Non-medical: Not on file   Tobacco Use     Smoking status: Former Smoker     Types: Cigars, Pipe     Last attempt to quit: 2006     Years since quittin.8     Smokeless tobacco: Never Used     Tobacco comment:  occasionally cigar   Substance and Sexual Activity     Alcohol use: Yes     Alcohol/week: 0.0 standard drinks     Comment: 2-3 drinks a day:  Wine/Beer     Drug use: No     Sexual activity: Never   Lifestyle     Physical activity     Days per week: Not on file      Minutes per session: Not on file     Stress: Not on file   Relationships     Social connections     Talks on phone: Not on file     Gets together: Not on file     Attends Zoroastrian service: Not on file     Active member of club or organization: Not on file     Attends meetings of clubs or organizations: Not on file     Relationship status: Not on file     Intimate partner violence     Fear of current or ex partner: Not on file     Emotionally abused: Not on file     Physically abused: Not on file     Forced sexual activity: Not on file   Other Topics Concern     Parent/sibling w/ CABG, MI or angioplasty before 65F 55M? No      Service Not Asked     Blood Transfusions Not Asked     Caffeine Concern No     Comment: 2-3 cups of coffee a day     Occupational Exposure Not Asked     Hobby Hazards Not Asked     Sleep Concern No     Stress Concern No     Weight Concern No     Special Diet No     Back Care Not Asked     Exercise No     Bike Helmet Not Asked     Seat Belt Yes     Self-Exams Not Asked   Social History Narrative     Not on file       Outpatient Encounter Medications as of 9/23/2020   Medication Sig Dispense Refill     albuterol (PROAIR HFA) 108 (90 Base) MCG/ACT inhaler INHALE 2 PUFFS INTO THE LUNGS EVERY 4 HOURS AS NEEDED FOR SHORTNESS OF BREATH / DYSPNEA 17 g 11     amLODIPine (NORVASC) 5 MG tablet Take 1 tablet (5 mg) by mouth daily 90 tablet 3     apixaban ANTICOAGULANT (ELIQUIS ANTICOAGULANT) 5 MG tablet TAKE ONE TABLET BY MOUTH TWICE DAILY. 180 tablet 3     beclomethasone HFA (QVAR REDIHALER) 40 MCG/ACT inhaler Inhale 2 puffs into the lungs daily 31.8 g 3     flecainide (TAMBOCOR) 150 MG tablet 1/2 tablet once daily 90 tablet 2     fluocinonide (LIDEX) 0.05 % cream Apply topically 2 times daily 120 g 3     levothyroxine (SYNTHROID/LEVOTHROID) 125 MCG tablet Take 1 tablet (125 mcg) by mouth daily 90 tablet 3     PREDNISOLONE ACETATE OP Apply to eye daily as needed       No facility-administered  encounter medications on file as of 9/23/2020.        Review Of Systems:  Skin: spots  Eyes: negative  Ears/Nose/Throat: negative  Respiratory: No shortness of breath, dyspnea on exertion, cough, or hemoptysis  Cardiovascular: negative  Gastrointestinal: negative  Genitourinary: negative  Musculoskeletal: negative  Neurologic: negative  Psychiatric: negative  Hematologic/Lymphatic/Immunologic: negative  Endocrine: negative      Objective:     BP (!) 138/90   Pulse 56   Eyes: Conjunctivae/lids: Normal   ENT: Lips:  Normal  MSK: Normal  Cardiovascular: Peripheral edema none  Pulm: Breathing Normal  Neuro/Psych: Orientation: A/O x 3. Normal; Mood/Affect: Normal, NAD, WDWN  Pt accompanied by: self  Following areas examined: Scalp, face, eyelids, lips, neck, chest, abdomen, back, and R&L upper and lower extremities. Pt defers exam of buttock, hips, groin and genitals.   Edwards skin type:i  Nodes: axillary, inguinal, popliteal, clavicular, cervical, submental, submandibular nlad   Findings:  Red smooth well-defined macules on trunk and extremities.  Brown, stuck-on scaly appearing papules on trunk and extremities.  Well circumscribed macules with symmetric color distribution on trunk and extremities.  Tan WD smooth macules on face, neck, trunk, and extremities.  Dark brown macula on right postauricular 0.1cm  Pink scaly macule on left superior shoulder 0.6cm  Crusted macule on nasal tip 0.5cm    Assessment and Plan:     1) Cherry angiomas, Seborrheic keratoses, Benign nevi, Lentigines     I discussed the specifics of tumor, prognosis, and genetics of benign lesions.  I explained that treatment of these lesions would be purely cosmetic and not medically neccessary.  I discussed with patient different removal options including excision, cryotherapy, cautery and /or laser.  Lesion may recur and/or may not completely resolve. May need additional treatment.     2) Neoplasm of uncertain behavior left superior shoulder  0.6cm  AK vs SCC vs dermatitis vs psoriasis  TANGENTIAL BIOPSY:  After consent, anesthesia with LEC and prep, tangential biopsy performed.  No complications and routine wound care.  May grow back and will get a scar. Based on lesion type may need to completely remove lesion. Patient will be notified in 7-10 days of results. Wound care directions given.    3) Neoplasm of uncertain behavior right postauricular 0.1cm  Lentigo vs atypical nevus vs MIS  TANGENTIAL BIOPSY:  After consent, anesthesia with LEC and prep, tangential biopsy performed.  No complications and routine wound care.  May grow back and will get a scar. Based on lesion type may need to completely remove lesion. Patient will be notified in 7-10 days of results. Wound care directions given.    4) Neoplasm of uncertain behavior on nasal tip 0.5cm  SCC vs BCC  TANGENTIAL BIOPSY:  After consent, anesthesia with LEC and prep, tangential biopsy performed.  No complications and routine wound care.  May grow back and will get a scar. Based on lesion type may need to completely remove lesion. Patient will be notified in 7-10 days of results. Wound care directions given.    5) NMSC and MM  No evidence of recurrence  Signs and Symptoms of non-melanoma skin cancer and ABCDEs of melanoma reviewed with patient. Patient encouraged to perform monthly self skin exams and educated on how to perform them. UV precautions reviewed with patient. Patient was asked about new or changing moles/lesions on body.   Wear a sunscreen with at least SPF 30 on your face, ears, neck and V of the chest daily. Wear sunscreen on other areas of the body if those areas are exposed to the sun throughout the day. Sunscreens can contain physical and/or chemical blockers. Physical blockers are less likely to clog pores, these include zinc oxide and titanium dioxide. Reapply every two hour and after swimming. Sunscreen examples include Neutrogena, CeraVe, Blue Lizard, Elta MD and many  others.    Proper skin care from [facility]:    -Eliminate harsh soaps as they strip the natural oils from the skin, often resulting in dry itchy skin ( i.e. Dial, Zest, [place])  -Use mild soaps such as Cetaphil or Dove Sensitive Skin in the shower. You do not need to use soap on arms, legs, and trunk every time you shower unless visibly soiled.   -Avoid hot or cold showers.  -After showering, lightly dry off and apply moisturizing within 2-3 minutes. This will help trap moisture in the skin.   -Aggressive use of a moisturizer at least 1-2 times a day to the entire body (including -Vanicream, Cetaphil, Aquaphor or Cerave) and moisturize hands after every washing.  -We recommend using moisturizers that come in a tub that needs to be scooped out, not a pump. This has more of an oil base. It will hold moisture in your skin much better than a water base moisturizer. The above recommended are non-pore clogging.               Follow up in yearly FBE      Again, thank you for allowing me to participate in the care of your patient.        Sincerely,        Elsa Guardado PA-C

## 2020-09-23 NOTE — PROGRESS NOTES
HPI:  Eder Horan is a 80 year old male patient here today for non healing spot on nose and left shoulder .  Patient states this has been present for a while.  Patient reports the following symptoms: crusting, not healing .  Patient reports the following previous treatments: none.  Patient reports the following modifying factors: none.  Associated symptoms: none.  Patient has no other skin complaints today.  Remainder of the HPI, Meds, PMH, Allergies, FH, and SH was reviewed in chart.    Pertinent Hx:   Hx of MM and NMSC  Past Medical History:   Diagnosis Date     Actinic keratosis 6/09    face     ALLERGIC RHINITIS       Atrial fibrillation (H) 6/08     B12 deficiency 1/16/2019     Back pain     s/p LESI through ortho Feb 2010     Basal cell carcinoma      Bradycardia      Chronotropic incompetence with sinus node dysfunction (H)      COPD (chronic obstructive pulmonary disease) (H)      DVT      Left calf 2003. Right calf 6/06     Embolic stroke (H) 06/11/2017    superior cerebellar artery occlusion. Hx A fib     Hyperlipidemia LDL goal <130 5/10/2011     Hypertension      HYPOTHYROIDISM      hypothyroidism     Lateral epicondylitis  of elbow 7/05    left     Malignant melanoma (H)      PLANTAR FASCITIS      Prostate cancer (H) 6/09    on Lupron therapy     Raynaud's disease without gangrene 11/26/2018     Squamous cell carcinoma  Sept 2014     right lower lid, s/p MOHS     Syncope      TMJ (temporomandibular joint syndrome) 4/12/2012     Tobacco use disorder        Past Surgical History:   Procedure Laterality Date     ABDOMEN SURGERY  2004,06,09    Hernias (2) Prostate Removal(2009)     BIOPSY  2009    Prostate     C NONSPECIFIC PROCEDURE  1/07    Left groin exploration and left inguinal herniorrhaphy     C NONSPECIFIC PROCEDURE  5/08    Prostate bx (benign)     C NONSPECIFIC PROCEDURE  8/09    Wide local excision (left side), robotic-assisted laparoscopic prostatectomy and   Left pelvic lymph node dissection      C NONSPECIFIC PROCEDURE  2010    Right inguinal herniorrhaphy with mesh     CARDIOVERSION  10-16-08    failed     COLONOSCOPY  2004     EYE SURGERY       lump from lower  left eye lid removed     GENITOURINARY SURGERY      Prostate     HERNIA REPAIR   -         Family History   Problem Relation Age of Onset     C.A.D. Father         heart attack- angina     Coronary Artery Disease Father      Myocardial Infarction Father      Cancer Mother         lung     Lung Cancer Mother      Lung Cancer Sister      Unknown/Adopted Maternal Grandmother      Unknown/Adopted Maternal Grandfather      Unknown/Adopted Paternal Grandmother      Unknown/Adopted Paternal Grandfather      Unknown/Adopted Sister        Social History     Socioeconomic History     Marital status:      Spouse name: Not on file     Number of children: 3     Years of education: Not on file     Highest education level: Not on file   Occupational History     Occupation: [employer]     Employer: [employer]     Employer: RETIRED   Social Needs     Financial resource strain: Not on file     Food insecurity     Worry: Not on file     Inability: Not on file     Transportation needs     Medical: Not on file     Non-medical: Not on file   Tobacco Use     Smoking status: Former Smoker     Types: Cigars, Pipe     Last attempt to quit: 2006     Years since quittin.8     Smokeless tobacco: Never Used     Tobacco comment:  occasionally cigar   Substance and Sexual Activity     Alcohol use: Yes     Alcohol/week: 0.0 standard drinks     Comment: 2-3 drinks a day:  Wine/Beer     Drug use: No     Sexual activity: Never   Lifestyle     Physical activity     Days per week: Not on file     Minutes per session: Not on file     Stress: Not on file   Relationships     Social connections     Talks on phone: Not on file     Gets together: Not on file     Attends [employer] service: Not on file     Active member of club or organization: Not on  file     Attends meetings of clubs or organizations: Not on file     Relationship status: Not on file     Intimate partner violence     Fear of current or ex partner: Not on file     Emotionally abused: Not on file     Physically abused: Not on file     Forced sexual activity: Not on file   Other Topics Concern     Parent/sibling w/ CABG, MI or angioplasty before 65F 55M? No      Service Not Asked     Blood Transfusions Not Asked     Caffeine Concern No     Comment: 2-3 cups of coffee a day     Occupational Exposure Not Asked     Hobby Hazards Not Asked     Sleep Concern No     Stress Concern No     Weight Concern No     Special Diet No     Back Care Not Asked     Exercise No     Bike Helmet Not Asked     Seat Belt Yes     Self-Exams Not Asked   Social History Narrative     Not on file       Outpatient Encounter Medications as of 9/23/2020   Medication Sig Dispense Refill     albuterol (PROAIR HFA) 108 (90 Base) MCG/ACT inhaler INHALE 2 PUFFS INTO THE LUNGS EVERY 4 HOURS AS NEEDED FOR SHORTNESS OF BREATH / DYSPNEA 17 g 11     amLODIPine (NORVASC) 5 MG tablet Take 1 tablet (5 mg) by mouth daily 90 tablet 3     apixaban ANTICOAGULANT (ELIQUIS ANTICOAGULANT) 5 MG tablet TAKE ONE TABLET BY MOUTH TWICE DAILY. 180 tablet 3     beclomethasone HFA (QVAR REDIHALER) 40 MCG/ACT inhaler Inhale 2 puffs into the lungs daily 31.8 g 3     flecainide (TAMBOCOR) 150 MG tablet 1/2 tablet once daily 90 tablet 2     fluocinonide (LIDEX) 0.05 % cream Apply topically 2 times daily 120 g 3     levothyroxine (SYNTHROID/LEVOTHROID) 125 MCG tablet Take 1 tablet (125 mcg) by mouth daily 90 tablet 3     PREDNISOLONE ACETATE OP Apply to eye daily as needed       No facility-administered encounter medications on file as of 9/23/2020.        Review Of Systems:  Skin: spots  Eyes: negative  Ears/Nose/Throat: negative  Respiratory: No shortness of breath, dyspnea on exertion, cough, or hemoptysis  Cardiovascular: negative  Gastrointestinal:  negative  Genitourinary: negative  Musculoskeletal: negative  Neurologic: negative  Psychiatric: negative  Hematologic/Lymphatic/Immunologic: negative  Endocrine: negative      Objective:     BP (!) 138/90   Pulse 56   Eyes: Conjunctivae/lids: Normal   ENT: Lips:  Normal  MSK: Normal  Cardiovascular: Peripheral edema none  Pulm: Breathing Normal  Neuro/Psych: Orientation: A/O x 3. Normal; Mood/Affect: Normal, NAD, WDWN  Pt accompanied by: self  Following areas examined: Scalp, face, eyelids, lips, neck, chest, abdomen, back, and R&L upper and lower extremities. Pt defers exam of buttock, hips, groin and genitals.   Edwards skin type:i  Nodes: axillary, inguinal, popliteal, clavicular, cervical, submental, submandibular nlad   Findings:  Red smooth well-defined macules on trunk and extremities.  Brown, stuck-on scaly appearing papules on trunk and extremities.  Well circumscribed macules with symmetric color distribution on trunk and extremities.  Tan WD smooth macules on face, neck, trunk, and extremities.  Dark brown macula on right postauricular 0.1cm  Pink scaly macule on left superior shoulder 0.6cm  Crusted macule on nasal tip 0.5cm    Assessment and Plan:     1) Cherry angiomas, Seborrheic keratoses, Benign nevi, Lentigines     I discussed the specifics of tumor, prognosis, and genetics of benign lesions.  I explained that treatment of these lesions would be purely cosmetic and not medically neccessary.  I discussed with patient different removal options including excision, cryotherapy, cautery and /or laser.  Lesion may recur and/or may not completely resolve. May need additional treatment.     2) Neoplasm of uncertain behavior left superior shoulder 0.6cm  AK vs SCC vs dermatitis vs psoriasis  TANGENTIAL BIOPSY:  After consent, anesthesia with LEC and prep, tangential biopsy performed.  No complications and routine wound care.  May grow back and will get a scar. Based on lesion type may need to  completely remove lesion. Patient will be notified in 7-10 days of results. Wound care directions given.    3) Neoplasm of uncertain behavior right postauricular 0.1cm  Lentigo vs atypical nevus vs MIS  TANGENTIAL BIOPSY:  After consent, anesthesia with LEC and prep, tangential biopsy performed.  No complications and routine wound care.  May grow back and will get a scar. Based on lesion type may need to completely remove lesion. Patient will be notified in 7-10 days of results. Wound care directions given.    4) Neoplasm of uncertain behavior on nasal tip 0.5cm  SCC vs BCC  TANGENTIAL BIOPSY:  After consent, anesthesia with LEC and prep, tangential biopsy performed.  No complications and routine wound care.  May grow back and will get a scar. Based on lesion type may need to completely remove lesion. Patient will be notified in 7-10 days of results. Wound care directions given.    5) NMSC and MM  No evidence of recurrence  Signs and Symptoms of non-melanoma skin cancer and ABCDEs of melanoma reviewed with patient. Patient encouraged to perform monthly self skin exams and educated on how to perform them. UV precautions reviewed with patient. Patient was asked about new or changing moles/lesions on body.   Wear a sunscreen with at least SPF 30 on your face, ears, neck and V of the chest daily. Wear sunscreen on other areas of the body if those areas are exposed to the sun throughout the day. Sunscreens can contain physical and/or chemical blockers. Physical blockers are less likely to clog pores, these include zinc oxide and titanium dioxide. Reapply every two hour and after swimming. Sunscreen examples include Neutrogena, CeraVe, Blue Lizard, Elta MD and many others.    Proper skin care from [facility]:    -Eliminate harsh soaps as they strip the natural oils from the skin, often resulting in dry itchy skin ( i.e. Dial, Zest, [place])  -Use mild soaps such as Cetaphil or Dove Sensitive Skin in the  shower. You do not need to use soap on arms, legs, and trunk every time you shower unless visibly soiled.   -Avoid hot or cold showers.  -After showering, lightly dry off and apply moisturizing within 2-3 minutes. This will help trap moisture in the skin.   -Aggressive use of a moisturizer at least 1-2 times a day to the entire body (including -Vanicream, Cetaphil, Aquaphor or Cerave) and moisturize hands after every washing.  -We recommend using moisturizers that come in a tub that needs to be scooped out, not a pump. This has more of an oil base. It will hold moisture in your skin much better than a water base moisturizer. The above recommended are non-pore clogging.               Follow up in yearly FBE

## 2020-09-23 NOTE — PATIENT INSTRUCTIONS
Proper skin care from [facility]:    -Eliminate harsh soaps as they strip the natural oils from the skin, often resulting in dry itchy skin ( i.e. Dial, Zest, Sommer Spring)  -Use mild soaps such as Cetaphil or Dove Sensitive Skin in the shower. You do not need to use soap on arms, legs, and trunk every time you shower unless visibly soiled.   -Avoid hot or cold showers.  -After showering, lightly dry off and apply moisturizing within 2-3 minutes. This will help trap moisture in the skin.   -Aggressive use of a moisturizer at least 1-2 times a day to the entire body (including -Vanicream, Cetaphil, Aquaphor or Cerave) and moisturize hands after every washing.  -We recommend using moisturizers that come in a tub that needs to be scooped out, not a pump. This has more of an oil base. It will hold moisture in your skin much better than a water base moisturizer. The above recommended are non-pore clogging.      Wear a sunscreen with at least SPF 30 on your face, ears, neck and V of the chest daily. Wear sunscreen on other areas of the body if those areas are exposed to the sun throughout the day. Sunscreens can contain physical and/or chemical blockers. Physical blockers are less likely to clog pores, these include zinc oxide and titanium dioxide. Reapply every two hour and after swimming. Sunscreen examples include Neutrogena, CeraVe, Blue Lizard, Elta MD and many others.    UV radiation  UVA radiation remains constant throughout the day and throughout the year. It is a longer wavelength than UVB and therefore penetrates deeper into the skin leading to immediate and delayed tanning, photoaging, and skin cancer. 70-80% of UVA and UVB radiation occurs between the hours of 10am-2pm.  UVB radiation  UVB radiation causes the most harmful effects and is more significant during the summer months. However, snow and ice can reflect UVB radiation leading to skin damage during the winter months as well. UVB radiation is  responsible for tanning, burning, inflammation, delayed erythema (pinkness), pigmentation (brown spots), and skin cancer.     I recommend self monthly full body exams and yearly full body exams with a dermatology provider. If you develop a new or changing lesion please follow up for examination. Most skin cancers are pink and scaly or pink and pearly. However, we do see blue/brown/black skin cancers.  Consider the ABCDEs of melanoma when giving yourself your monthly full body exam ( don't forget the groin, buttocks, feet, toes, etc). A-asymmetry, B-borders, C-color, D-diameter, E-elevation or evolving. If you see any of these changes please follow up in clinic. If you cannot see your back I recommend purchasing a hand held mirror to use with a larger wall mirror.          WOUND CARE INSTRUCTIONS  FOR CRYOSURGERY        This area treated with liquid nitrogen will form a blister. You do not need to bandage the area until after the blister forms and breaks (which may be a few days).  When the blister breaks, begin daily dressing changes as follows:    1) Clean and dry the area with tap water using clean Q-tip or sterile gauze pad.    2) Apply Polysporin ointment or Bacitracin ointment over entire wound.  Do NOT use Neosporin ointment.    3) Cover the wound with a band-aid or sterile non-stick gauze pad and micropore paper tape.      REPEAT THESE INSTRUCTIONS AT LEAST ONCE A DAY UNTIL THE WOUND HAS COMPLETELY HEALED.        It is an old wives tale that a wound heals better when it is exposed to air and allowed to dry out. The wound will heal faster with a better cosmetic result if it is kept moist with ointment and covered with a bandage.  Do not let the wound dry out.      Supplies Needed:     *Cotton tipped applicators (Q-tips)   *Polysporin ointment or Bacitracin ointment (NOT NEOSPORIN)   *Band-aids, or non stick gauze pads and micropore paper tape    PATIENT INFORMATION    During the healing process you will notice  a number of changes. All wounds develop a small halo of redness surrounding the wound.  This means healing is occurring. Severe itching with extensive redness usually indicates sensitivity to the ointment or bandage tape used to dress the wound.  You should call our office if this develops.      Swelling and/or discoloration around your surgical site is common, particularly when performed around the eye.    All wounds normally drain.  The larger the wound the more drainage there will be.  After 7-10 days, you will notice the wound beginning to shrink and new skin will begin to grow.  The wound is healed when you can see skin has formed over the entire area.  A healed wound has a healthy, shiny look to the surface and is red to dark pink in color to normalize.  Wounds may take approximately 4-6 weeks to heal.  Larger wounds may take 6-8 weeks.  After the wound is healed you may discontinue dressing changes.    You may experience a sensation of tightness as your wound heals. This is normal and will gradually subside.    Your healed wound may be sensitive to temperature changes. This sensitivity improves with time, but if you re having a lot of discomfort, try to avoid temperature extremes.    Patients frequently experience itching after their wound appears to have healed because of the continue healing under the skin.  Plain Vaseline will help relieve the itching.                      Wound Care Instructions     FOR SUPERFICIAL WOUNDS     [facility] [phone]    [facility] [phone]          AFTER 24 HOURS YOU SHOULD REMOVE THE BANDAGE AND BEGIN DAILY DRESSING CHANGES AS FOLLOWS:     1) Remove Dressing.     2) Clean and dry the area with tap water using a Q-tip or sterile gauze pad.     3) Apply Vaseline, Aquaphor, Polysporin ointment or Bacitracin ointment over entire wound.  Do NOT use Neosporin ointment.     4) Cover the wound with a band-aid, or a sterile non-stick gauze pad and  micropore paper tape      REPEAT THESE INSTRUCTIONS AT LEAST ONCE A DAY UNTIL THE WOUND HAS COMPLETELY HEALED.    It is an old wives tale that a wound heals better when it is exposed to air and allowed to dry out. The wound will heal faster with a better cosmetic result if it is kept moist with ointment and covered with a bandage.    **Do not let the wound dry out.**      Supplies Needed:      *Cotton tipped applicators (Q-tips)    *Polysporin Ointment or Bacitracin Ointment (NOT NEOSPORIN)    *Band-aids or non-stick gauze pads and micropore paper tape.      PATIENT INFORMATION:    During the healing process you will notice a number of changes. All wounds develop a small halo of redness surrounding the wound.  This means healing is occurring. Severe itching with extensive redness usually indicates sensitivity to the ointment or bandage tape used to dress the wound.  You should call our office if this develops.      Swelling  and/or discoloration around your surgical site is common, particularly when performed around the eye.    All wounds normally drain.  The larger the wound the more drainage there will be.  After 7-10 days, you will notice the wound beginning to shrink and new skin will begin to grow.  The wound is healed when you can see skin has formed over the entire area.  A healed wound has a healthy, shiny look to the surface and is red to dark pink in color to normalize.  Wounds may take approximately 4-6 weeks to heal.  Larger wounds may take 6-8 weeks.  After the wound is healed you may discontinue dressing changes.    You may experience a sensation of tightness as your wound heals. This is normal and will gradually subside.    Your healed wound may be sensitive to temperature changes. This sensitivity improves with time, but if you re having a lot of discomfort, try to avoid temperature extremes.    Patients frequently experience itching after their wound appears to have healed because of the continue  healing under the skin.  Plain Vaseline will help relieve the itching.        POSSIBLE COMPLICATIONS    BLEEDIN. Leave the bandage in place.  2. Use tightly rolled up gauze or a cloth to apply direct pressure over the bandage for 30  minutes.  3. Reapply pressure for an additional 30 minutes if necessary  4. Use additional gauze and tape to maintain pressure once the bleeding has stopped.

## 2020-09-28 LAB — COPATH REPORT: NORMAL

## 2020-09-29 ENCOUNTER — TELEPHONE (OUTPATIENT)
Dept: DERMATOLOGY | Facility: CLINIC | Age: 80
End: 2020-09-29

## 2020-09-29 NOTE — TELEPHONE ENCOUNTER
----- Message from Elsa Guardado PA-C sent at 9/28/2020  1:58 PM CDT -----  A. Skin, right postauricular, shave:   - Blue nevus - (nml mole that looks blue)  This is a benign lesion that does not need any additional treatment.     B. Skin, nasal tip, shave:   - Basal cell carcinoma, nodular type, extending to the lateral and deep   margins - (see description)   ----mohs    C. Skin, left superior shoulder, shave:   - Squamous cell carcinoma, at least in situ - (see comment and   description)   --.mohs

## 2020-09-29 NOTE — LETTER
[facility]  [address]  [address]  [phone]    9/30/2020       Eder Horan  [address] [place] [id number]      Dear Eder:    You are scheduled for Mohs Surgery on: 10/22/20 @9:00am.    Please check in at [facility], [address].     You don't need to arrive more than 5-10 minutes prior to your appointment time.     Be sure to eat a good breakfast and bathe and wash your hair prior to surgery.     If you are taking any anti-coagulants that are prescribed by your Doctor (such as Coumadin/Warfarin, Plavix, Aspirin, Ibuprofen), please continue taking them.     However, if you are taking anti-coagulants over the counter without a Doctor's order for a medical condition, please discontinue them 10 days prior to surgery.     Please wear loose comfortable clothing as it could possibly be 4-6 hours until your surgery is completed depending upon how many layers of tissue need to be removed.      Thank you,    VALENTIN Daniels MD

## 2020-09-30 NOTE — TELEPHONE ENCOUNTER
Called and spoke to patient.    Educated patient on biopsy results- blue nevus (benign) + BCC (mohs) + SCIS (mohs).    Educated patient on BCC, SCIS, mohs, schedule mohs x1 (per Dr. Daniels), and letter/packet sent.    Patient voiced understanding.    Lefty RN-BSN-N  [facility]  [phone]

## 2020-10-22 ENCOUNTER — OFFICE VISIT (OUTPATIENT)
Dept: DERMATOLOGY | Facility: CLINIC | Age: 80
End: 2020-10-22
Payer: COMMERCIAL

## 2020-10-22 VITALS — SYSTOLIC BLOOD PRESSURE: 105 MMHG | DIASTOLIC BLOOD PRESSURE: 60 MMHG | HEART RATE: 58 BPM | OXYGEN SATURATION: 97 %

## 2020-10-22 DIAGNOSIS — D04.62 SQUAMOUS CELL CARCINOMA IN SITU (SCCIS) OF SKIN OF LEFT SHOULDER: ICD-10-CM

## 2020-10-22 DIAGNOSIS — C44.311 BASAL CELL CARCINOMA (BCC) OF DORSUM OF NOSE: Primary | ICD-10-CM

## 2020-10-22 PROCEDURE — 17312 MOHS ADDL STAGE: CPT | Performed by: DERMATOLOGY

## 2020-10-22 PROCEDURE — 88331 PATH CONSLTJ SURG 1 BLK 1SPC: CPT | Mod: 59 | Performed by: DERMATOLOGY

## 2020-10-22 PROCEDURE — 99207 PR NO CHARGE LOS: CPT | Performed by: DERMATOLOGY

## 2020-10-22 PROCEDURE — 11602 EXC TR-EXT MAL+MARG 1.1-2 CM: CPT | Mod: 59 | Performed by: DERMATOLOGY

## 2020-10-22 PROCEDURE — 17311 MOHS 1 STAGE H/N/HF/G: CPT | Mod: 51 | Performed by: DERMATOLOGY

## 2020-10-22 PROCEDURE — 14061 TIS TRNFR E/N/E/L10.1-30SQCM: CPT | Performed by: DERMATOLOGY

## 2020-10-22 NOTE — LETTER
10/22/2020         RE: Eder Horan  [address] [facility] [address]        Dear Colleague,    Thank you for referring your patient, Eder Horan, to the [facility]. Please see a copy of my visit note below.    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  Patient has no other skin complaints today.  Remainder of the HPI, Meds, PMH, Allergies, FH, and SH was reviewed in chart.      Past Medical History:   Diagnosis Date     Actinic keratosis 6/09    face     ALLERGIC RHINITIS       Atrial fibrillation (H) 6/08     B12 deficiency 1/16/2019     Back pain     s/p LESI through ortho Feb 2010     Basal cell carcinoma      Bradycardia      Chronotropic incompetence with sinus node dysfunction (H)      COPD (chronic obstructive pulmonary disease) (H)      DVT      Left calf 2003. Right calf 6/06     Embolic stroke (H) 06/11/2017    superior cerebellar artery occlusion. Hx A fib     Hyperlipidemia LDL goal <130 5/10/2011     Hypertension      HYPOTHYROIDISM      hypothyroidism     Lateral epicondylitis  of elbow 7/05    left     Malignant melanoma (H)      PLANTAR FASCITIS      Prostate cancer (H) 6/09    on Lupron therapy     Raynaud's disease without gangrene 11/26/2018     Squamous cell carcinoma  Sept 2014     right lower lid, s/p MOHS     Syncope      TMJ (temporomandibular joint syndrome) 4/12/2012     Tobacco use disorder        Past Surgical History:   Procedure Laterality Date     ABDOMEN SURGERY  2004,06,09    Hernias (2) Prostate Removal(2009)     BIOPSY  2009    Prostate     CARDIOVERSION  10-16-08    failed     COLONOSCOPY  2004     EYE SURGERY      2013 lump from lower  left eye lid removed     GENITOURINARY SURGERY  2009    Prostate     HERNIA REPAIR    -      [facility] NONSPECIFIC PROCEDURE      Left groin exploration and left inguinal herniorrhaphy     [facility] NONSPECIFIC PROCEDURE      Prostate bx (benign)     [facility] NONSPECIFIC PROCEDURE      Wide local excision (left side), robotic-assisted laparoscopic prostatectomy and   Left pelvic lymph node dissection     [facility] NONSPECIFIC PROCEDURE  2010    Right inguinal herniorrhaphy with mesh        Family History   Problem Relation Age of Onset     C.A.D. Father         heart attack- angina     Coronary Artery Disease Father      Myocardial Infarction Father      Cancer Mother         lung     Lung Cancer Mother      Lung Cancer Sister      Unknown/Adopted Maternal Grandmother      Unknown/Adopted Maternal Grandfather      Unknown/Adopted Paternal Grandmother      Unknown/Adopted Paternal Grandfather      Unknown/Adopted Sister        Social History     Socioeconomic History     Marital status:      Spouse name: Not on file     Number of children: 3     Years of education: Not on file     Highest education level: Not on file   Occupational History     Occupation: "[employer]     Employer: [employer]     Employer: RETIRED   Social Needs     Financial resource strain: Not on file     Food insecurity     Worry: Not on file     Inability: Not on file     Transportation needs     Medical: Not on file     Non-medical: Not on file   Tobacco Use     Smoking status: Former Smoker     Types: Cigars, Pipe     Quit date: 2006     Years since quittin.9     Smokeless tobacco: Never Used     Tobacco comment:  occasionally cigar   Substance and Sexual Activity     Alcohol use: Yes     Alcohol/week: 0.0 standard drinks     Comment: 2-3 drinks a day:  Wine/Beer     Drug use: No     Sexual activity: Never   Lifestyle     Physical activity     Days per week: Not on file     Minutes per session: Not on file     Stress: Not on file   Relationships     Social connections     Talks on phone: Not on file     Gets  together: Not on file     Attends Restorationist service: Not on file     Active member of club or organization: Not on file     Attends meetings of clubs or organizations: Not on file     Relationship status: Not on file     Intimate partner violence     Fear of current or ex partner: Not on file     Emotionally abused: Not on file     Physically abused: Not on file     Forced sexual activity: Not on file   Other Topics Concern     Parent/sibling w/ CABG, MI or angioplasty before 65F 55M? No      Service Not Asked     Blood Transfusions Not Asked     Caffeine Concern No     Comment: 2-3 cups of coffee a day     Occupational Exposure Not Asked     Hobby Hazards Not Asked     Sleep Concern No     Stress Concern No     Weight Concern No     Special Diet No     Back Care Not Asked     Exercise No     Bike Helmet Not Asked     Seat Belt Yes     Self-Exams Not Asked   Social History Narrative     Not on file       Outpatient Encounter Medications as of 10/22/2020   Medication Sig Dispense Refill     albuterol (PROAIR HFA) 108 (90 Base) MCG/ACT inhaler INHALE 2 PUFFS INTO THE LUNGS EVERY 4 HOURS AS NEEDED FOR SHORTNESS OF BREATH / DYSPNEA 17 g 11     amLODIPine (NORVASC) 5 MG tablet Take 1 tablet (5 mg) by mouth daily 90 tablet 3     apixaban ANTICOAGULANT (ELIQUIS ANTICOAGULANT) 5 MG tablet TAKE ONE TABLET BY MOUTH TWICE DAILY. 180 tablet 3     beclomethasone HFA (QVAR REDIHALER) 40 MCG/ACT inhaler Inhale 2 puffs into the lungs daily 31.8 g 3     flecainide (TAMBOCOR) 150 MG tablet 1/2 tablet once daily 90 tablet 2     fluocinonide (LIDEX) 0.05 % cream Apply topically 2 times daily 120 g 3     levothyroxine (SYNTHROID/LEVOTHROID) 125 MCG tablet Take 1 tablet (125 mcg) by mouth daily 90 tablet 3     PREDNISOLONE ACETATE OP Apply to eye daily as needed       No facility-administered encounter medications on file as of 10/22/2020.              Review Of Systems  Skin: As above  Eyes: negative  Ears/Nose/Throat:  negative  Respiratory: No shortness of breath, dyspnea on exertion, cough, or hemoptysis  Cardiovascular: negative  Gastrointestinal: negative  Genitourinary: negative  Musculoskeletal: negative  Neurologic: negative  Psychiatric: negative  Hematologic/Lymphatic/Immunologic: negative  Endocrine: negative      O:   NAD, WDWN, Alert & Oriented, Mood & Affect wnl, Vitals stable   Here today alone   Pulse (P) 60   SpO2 97%    General appearance normal   Vitals stable   Alert, oriented and in no acute distress     L shoulder 8mm scaly papule   Nasal tip 5mm pink pearly papule   Eyes: Conjunctivae/lids:Normal     ENT: Lips, buccal mucosa, tongue: normal    MSK:Normal    Cardiovascular: peripheral edema none    Pulm: Breathing Normal    Neuro/Psych: Orientation:Alert and Orientedx3 ; Mood/Affect:normal       MICRO:   L shouldeR:Unremarkable epidermis with parallel bundles of cellular collagen within the superficial dermis.  No concerning areas for malignancy.     A/P:  1. 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 The incisions were made through the skin and down to and including the superficial dermis.  The lesion was removed en bloc and submitted for frozen section pathologic review. Clear margins obtained (1.5cm).    REPAIR SECOND INTENT: We discussed the options for wound management in full with the patient including risks/benefits/possible outcomes. Decision made to allow the wound to heal by second intention. EBL minimal; complications none; wound care routine.  The patient was discharged in good condition and will return in one month or prn for wound evaluation.     2. Nasal tip basal cell carcinoma   MOHS:    Location    The rationale for Mohs surgery was discussed with the patient and consent was obtained.  The risks and benefits as well as alternatives to therapy were discussed, in detail.  Specifically, the risks of infection, scarring, bleeding, prolonged wound healing, incomplete removal, allergy to anesthesia, nerve injury and recurrence were addressed.  Indication for Mohs was Location. Prior to the procedure, the treatment site was clearly identified and, if available, confirmed with previous photos and confirmed by the patient   All components of the Universal Protocol/PAUSE rule were completed.  The Mohs surgeon operated in two distinct and integrated capacities as the surgeon and pathologist.      The area was prepped with Betasept.  A rim of normal appearing skin was marked circumferentially around the lesion.  The area was infiltrated with local anesthesia.  The tumor was first debulked to remove all clinically apparent tumor.  An incision following the standard Mohs approach was done and the specimen was oriented,mapped and placed in 2 block(s).  Each specimen was then chromacoded and processed in the Mohs laboratory using standard Mohs technique and submitted for frozen section histology.  Frozen section analysis showed  residual tumor but CLEAR MARGINS.      The tumor was excised using standard Mohs technique in 2 stages(s).  CLEAR MARGINS OBTAINED and Final defect size was 1.3 x 1.1 cm.     We discussed the options for wound management in full with the patient including risks/benefits/ possible outcomes.      REPAIR WITH BUROW'S FLAP: Because of the Because of the size and full thickness nature of the defect and Because of the proximity to the nasal tip and ala, an advancement flap was planned. After LEC anesthesia and prep, the Burow's triangles were excised. One Burow's triangle was displaced laterally onto columella  to hide incisions within skin relaxation lines. The advancement flap was raised by  dissection in the deep subcutaneous plane. The remaining wound edges were undermined and hemostasis was obtained. The flap was advanced into the defect with care to avoid distortion and was sutured into place in a layered fashion using Vicryl and Fast Absorbing sutures. Postoperative size was 4.5 x 3 cm.  EBL minimal; complications none; wound care routine.  The patient was discharged in good condition and will return in one week for wound evaluation.    BENIGN LESIONS DISCUSSED WITH PATIENT:  I discussed the specifics of tumor, prognosis, and genetics of benign lesions.  I explained that treatment of these lesions would be purely cosmetic and not medically neccessary.  I discussed with patient different removal options including excision, cautery and /or laser.      Nature and genetics of benign skin lesions dicussed with patient.  Signs and Symptoms of skin cancer discussed with patient.  Patient encouraged to perform monthly skin exams.  UV precautions reviewed with patient.  Skin care regimen reviewed with patient: Eliminate harsh soaps, i.e. Dial, zest, irsih spring; Mild soaps such as Cetaphil or Dove sensitive skin, avoid hot or cold showers, aggressive use of emollients including vanicream, cetaphil or cerave discussed with patient.    Risks of non-melanoma skin cancer discussed with patient   Return to clinic 3 months        Again, thank you for allowing me to participate in the care of your patient.        Sincerely,        Reece Daniels MD

## 2020-10-22 NOTE — PROGRESS NOTES
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  Patient has no other skin complaints today.  Remainder of the HPI, Meds, PMH, Allergies, FH, and SH was reviewed in chart.      Past Medical History:   Diagnosis Date     Actinic keratosis 6/09    face     ALLERGIC RHINITIS       Atrial fibrillation (H) 6/08     B12 deficiency 1/16/2019     Back pain     s/p LESI through ortho Feb 2010     Basal cell carcinoma      Bradycardia      Chronotropic incompetence with sinus node dysfunction (H)      COPD (chronic obstructive pulmonary disease) (H)      DVT      Left calf 2003. Right calf 6/06     Embolic stroke (H) 06/11/2017    superior cerebellar artery occlusion. Hx A fib     Hyperlipidemia LDL goal <130 5/10/2011     Hypertension      HYPOTHYROIDISM      hypothyroidism     Lateral epicondylitis  of elbow 7/05    left     Malignant melanoma (H)      PLANTAR FASCITIS      Prostate cancer (H) 6/09    on Lupron therapy     Raynaud's disease without gangrene 11/26/2018     Squamous cell carcinoma  Sept 2014     right lower lid, s/p MOHS     Syncope      TMJ (temporomandibular joint syndrome) 4/12/2012     Tobacco use disorder        Past Surgical History:   Procedure Laterality Date     ABDOMEN SURGERY  2004,06,09    Hernias (2) Prostate Removal(2009)     BIOPSY  2009    Prostate     CARDIOVERSION  10-16-08    failed     COLONOSCOPY  2004     EYE SURGERY      2013 lump from lower  left eye lid removed     GENITOURINARY SURGERY  2009    Prostate     HERNIA REPAIR  2004 - 2006     [facility] NONSPECIFIC PROCEDURE  1/07    Left groin exploration and left inguinal herniorrhaphy     [facility] NONSPECIFIC PROCEDURE  5/08    Prostate bx (benign)     [facility] NONSPECIFIC PROCEDURE  8/09    Wide local excision (left side), robotic-assisted laparoscopic prostatectomy and   Left pelvic lymph node dissection     [facility] NONSPECIFIC PROCEDURE  August 2010     Right inguinal herniorrhaphy with mesh        Family History   Problem Relation Age of Onset     C.A.D. Father         heart attack- angina     Coronary Artery Disease Father      Myocardial Infarction Father      Cancer Mother         lung     Lung Cancer Mother      Lung Cancer Sister      Unknown/Adopted Maternal Grandmother      Unknown/Adopted Maternal Grandfather      Unknown/Adopted Paternal Grandmother      Unknown/Adopted Paternal Grandfather      Unknown/Adopted Sister        Social History     Socioeconomic History     Marital status:      Spouse name: Not on file     Number of children: 3     Years of education: Not on file     Highest education level: Not on file   Occupational History     Occupation: [employer]     Employer: [employer]     Employer: RETIRED   Social Needs     Financial resource strain: Not on file     Food insecurity     Worry: Not on file     Inability: Not on file     Transportation needs     Medical: Not on file     Non-medical: Not on file   Tobacco Use     Smoking status: Former Smoker     Types: Cigars, Pipe     Quit date: 2006     Years since quittin.9     Smokeless tobacco: Never Used     Tobacco comment:  occasionally cigar   Substance and Sexual Activity     Alcohol use: Yes     Alcohol/week: 0.0 standard drinks     Comment: 2-3 drinks a day:  Wine/Beer     Drug use: No     Sexual activity: Never   Lifestyle     Physical activity     Days per week: Not on file     Minutes per session: Not on file     Stress: Not on file   Relationships     Social connections     Talks on phone: Not on file     Gets together: Not on file     Attends Shinto service: Not on file     Active member of club or organization: Not on file     Attends meetings of clubs or organizations: Not on file     Relationship status: Not on file     Intimate partner violence     Fear of current or ex partner: Not on file     Emotionally abused: Not on file     Physically abused: Not on file      Forced sexual activity: Not on file   Other Topics Concern     Parent/sibling w/ CABG, MI or angioplasty before 65F 55M? No      Service Not Asked     Blood Transfusions Not Asked     Caffeine Concern No     Comment: 2-3 cups of coffee a day     Occupational Exposure Not Asked     Hobby Hazards Not Asked     Sleep Concern No     Stress Concern No     Weight Concern No     Special Diet No     Back Care Not Asked     Exercise No     Bike Helmet Not Asked     Seat Belt Yes     Self-Exams Not Asked   Social History Narrative     Not on file       Outpatient Encounter Medications as of 10/22/2020   Medication Sig Dispense Refill     albuterol (PROAIR HFA) 108 (90 Base) MCG/ACT inhaler INHALE 2 PUFFS INTO THE LUNGS EVERY 4 HOURS AS NEEDED FOR SHORTNESS OF BREATH / DYSPNEA 17 g 11     amLODIPine (NORVASC) 5 MG tablet Take 1 tablet (5 mg) by mouth daily 90 tablet 3     apixaban ANTICOAGULANT (ELIQUIS ANTICOAGULANT) 5 MG tablet TAKE ONE TABLET BY MOUTH TWICE DAILY. 180 tablet 3     beclomethasone HFA (QVAR REDIHALER) 40 MCG/ACT inhaler Inhale 2 puffs into the lungs daily 31.8 g 3     flecainide (TAMBOCOR) 150 MG tablet 1/2 tablet once daily 90 tablet 2     fluocinonide (LIDEX) 0.05 % cream Apply topically 2 times daily 120 g 3     levothyroxine (SYNTHROID/LEVOTHROID) 125 MCG tablet Take 1 tablet (125 mcg) by mouth daily 90 tablet 3     PREDNISOLONE ACETATE OP Apply to eye daily as needed       No facility-administered encounter medications on file as of 10/22/2020.              Review Of Systems  Skin: As above  Eyes: negative  Ears/Nose/Throat: negative  Respiratory: No shortness of breath, dyspnea on exertion, cough, or hemoptysis  Cardiovascular: negative  Gastrointestinal: negative  Genitourinary: negative  Musculoskeletal: negative  Neurologic: negative  Psychiatric: negative  Hematologic/Lymphatic/Immunologic: negative  Endocrine: negative      O:   NAD, WDWN, Alert & Oriented, Mood & Affect wnl, Vitals  stable   Here today alone   Pulse (P) 60   SpO2 97%    General appearance normal   Vitals stable   Alert, oriented and in no acute distress     L shoulder 8mm scaly papule   Nasal tip 5mm pink pearly papule   Eyes: Conjunctivae/lids:Normal     ENT: Lips, buccal mucosa, tongue: normal    MSK:Normal    Cardiovascular: peripheral edema none    Pulm: Breathing Normal    Neuro/Psych: Orientation:Alert and Orientedx3 ; Mood/Affect:normal       MICRO:   L shouldeR:Unremarkable epidermis with parallel bundles of cellular collagen within the superficial dermis.  No concerning areas for malignancy.     A/P:  1. 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 The incisions were made through the skin and down to and including the superficial dermis.  The lesion was removed en bloc and submitted for frozen section pathologic review. Clear margins obtained (1.5cm).    REPAIR SECOND INTENT: We discussed the options for wound management in full with the patient including risks/benefits/possible outcomes. Decision made to allow the wound to heal by second intention. EBL minimal; complications none; wound care routine.  The patient was discharged in good condition and will return in one month or prn for wound evaluation.     2. Nasal tip basal cell carcinoma   MOHS:   Location    The rationale for Mohs surgery was discussed with the patient and consent was obtained.  The risks and benefits as well as alternatives to therapy were discussed, in detail.  Specifically, the risks of infection, scarring, bleeding, prolonged wound healing, incomplete removal, allergy to anesthesia, nerve injury and recurrence were addressed.  Indication for Mohs was  Location. Prior to the procedure, the treatment site was clearly identified and, if available, confirmed with previous photos and confirmed by the patient   All components of the Universal Protocol/PAUSE rule were completed.  The Mohs surgeon operated in two distinct and integrated capacities as the surgeon and pathologist.      The area was prepped with Betasept.  A rim of normal appearing skin was marked circumferentially around the lesion.  The area was infiltrated with local anesthesia.  The tumor was first debulked to remove all clinically apparent tumor.  An incision following the standard Mohs approach was done and the specimen was oriented,mapped and placed in 2 block(s).  Each specimen was then chromacoded and processed in the Mohs laboratory using standard Mohs technique and submitted for frozen section histology.  Frozen section analysis showed  residual tumor but CLEAR MARGINS.      The tumor was excised using standard Mohs technique in 2 stages(s).  CLEAR MARGINS OBTAINED and Final defect size was 1.3 x 1.1 cm.     We discussed the options for wound management in full with the patient including risks/benefits/ possible outcomes.      REPAIR WITH BUROW'S FLAP: Because of the Because of the size and full thickness nature of the defect and Because of the proximity to the nasal tip and ala, an advancement flap was planned. After LEC anesthesia and prep, the Burow's triangles were excised. One Burow's triangle was displaced laterally onto columella  to hide incisions within skin relaxation lines. The advancement flap was raised by dissection in the deep subcutaneous plane. The remaining wound edges were undermined and hemostasis was obtained. The flap was advanced into the defect with care to avoid distortion and was sutured into place in a layered fashion using Vicryl and Fast Absorbing sutures. Postoperative size was 4.5 x 3 cm.  EBL minimal; complications none; wound care routine.  The patient was  discharged in good condition and will return in one week for wound evaluation.    BENIGN LESIONS DISCUSSED WITH PATIENT:  I discussed the specifics of tumor, prognosis, and genetics of benign lesions.  I explained that treatment of these lesions would be purely cosmetic and not medically neccessary.  I discussed with patient different removal options including excision, cautery and /or laser.      Nature and genetics of benign skin lesions dicussed with patient.  Signs and Symptoms of skin cancer discussed with patient.  Patient encouraged to perform monthly skin exams.  UV precautions reviewed with patient.  Skin care regimen reviewed with patient: Eliminate harsh soaps, i.e. Dial, zest, irsih spring; Mild soaps such as Cetaphil or Dove sensitive skin, avoid hot or cold showers, aggressive use of emollients including vanicream, cetaphil or cerave discussed with patient.    Risks of non-melanoma skin cancer discussed with patient   Return to clinic 3 months

## 2020-10-22 NOTE — PATIENT INSTRUCTIONS
Sutured Wound Care NOSE    [facility]: [phone]    [facility]: [phone]          ? No strenuous activity for 48 hours. Resume moderate activity in 48 hours. No heavy exercising until you are seen for follow up in one week.     ? Take Tylenol as needed for discomfort.                         ? Do not drink alcoholic beverages for 48 hours.     ? Keep the pressure bandage in place for 24 hours. If the bandage becomes blood tinged or loose, reinforce it with gauze and tape.        (Refer to the reverse side of this page for management of bleeding).    ? Remove pressure bandage in 24 hours     ? Leave the flat bandage in place until your follow up appointment.    ? Keep the bandage dry. Wash around it carefully.    ? If the tape becomes soiled or starts to come off, reinforce it with additional paper tape.    ? Do not smoke for 3 weeks; smoking is detrimental to wound healing.    ? It is normal to have swelling and bruising around the surgical site. The bruising will fade in approximately 10-14 days. Elevate the area to reduce swelling.    ? Numbness, itchiness and sensitivity to temperature changes can occur after surgery and may take up to 18 months to normalize.      POSSIBLE COMPLICATIONS    BLEEDIN. Leave the bandage in place.  2. Use tightly rolled up gauze or a cloth to apply direct pressure over the bandage for 20   minutes.  3. Reapply pressure for an additional 20 minutes if necessary  4. Call the office or go to the nearest emergency room if pressure fails to stop the bleeding.  5. Use additional gauze and tape to maintain pressure once the bleeding has stopped.        PAIN:    1. Post operative pain should slowly get better, never worse.  2. A severe increase in pain may indicate a problem. Call the office if this occurs.    In case of emergency phone:Dr Daniels [phone]              Wound Care Instructions SHOULDER     FOR SUPERFICIAL WOUNDS     [facility]  [place] [phone]    [facility] [phone]                       AFTER 24 HOURS YOU SHOULD REMOVE THE BANDAGE AND BEGIN DAILY DRESSING CHANGES AS FOLLOWS:     1) Remove Dressing.     2) Clean and dry the area with tap water using a Q-tip or sterile gauze pad.     3) Apply Vaseline, Aquaphor, Polysporin ointment or Bacitracin ointment over entire wound.  Do NOT use Neosporin ointment.     4) Cover the wound with a band-aid, or a sterile non-stick gauze pad and micropore paper tape      REPEAT THESE INSTRUCTIONS AT LEAST ONCE A DAY UNTIL THE WOUND HAS COMPLETELY HEALED.    It is an old wives tale that a wound heals better when it is exposed to air and allowed to dry out. The wound will heal faster with a better cosmetic result if it is kept moist with ointment and covered with a bandage.    **Do not let the wound dry out.**      Supplies Needed:      *Cotton tipped applicators (Q-tips)    *Polysporin Ointment or Bacitracin Ointment (NOT NEOSPORIN)    *Band-aids or non-stick gauze pads and micropore paper tape.      PATIENT INFORMATION:    During the healing process you will notice a number of changes. All wounds develop a small halo of redness surrounding the wound.  This means healing is occurring. Severe itching with extensive redness usually indicates sensitivity to the ointment or bandage tape used to dress the wound.  You should call our office if this develops.      Swelling  and/or discoloration around your surgical site is common, particularly when performed around the eye.    All wounds normally drain.  The larger the wound the more drainage there will be.  After 7-10 days, you will notice the wound beginning to shrink and new skin will begin to grow.  The wound is healed when you can see skin has formed over the entire area.  A healed wound has a healthy, shiny look to the surface and is red to dark pink in color to normalize.  Wounds may take approximately 4-6 weeks to heal.  Larger  wounds may take 6-8 weeks.  After the wound is healed you may discontinue dressing changes.    You may experience a sensation of tightness as your wound heals. This is normal and will gradually subside.    Your healed wound may be sensitive to temperature changes. This sensitivity improves with time, but if you re having a lot of discomfort, try to avoid temperature extremes.    Patients frequently experience itching after their wound appears to have healed because of the continue healing under the skin.  Plain Vaseline will help relieve the itching.        POSSIBLE COMPLICATIONS    BLEEDIN. Leave the bandage in place.  7. Use tightly rolled up gauze or a cloth to apply direct pressure over the bandage for 30  minutes.  8. Reapply pressure for an additional 30 minutes if necessary  9. Use additional gauze and tape to maintain pressure once the bleeding has stopped.

## 2020-10-28 ENCOUNTER — ALLIED HEALTH/NURSE VISIT (OUTPATIENT)
Dept: DERMATOLOGY | Facility: CLINIC | Age: 80
End: 2020-10-28
Payer: COMMERCIAL

## 2020-10-28 DIAGNOSIS — Z48.01 ENCOUNTER FOR CHANGE OR REMOVAL OF SURGICAL WOUND DRESSING: Primary | ICD-10-CM

## 2020-10-28 PROCEDURE — 99207 PR NO CHARGE NURSE ONLY: CPT

## 2020-10-28 NOTE — PATIENT INSTRUCTIONS

## 2020-10-28 NOTE — NURSING NOTE
Pt returned to clinic for post surgery 1 week follow up bandage change. Pt has no complaints, denies pain. Bandage removed from nasal tip, area cleansed with normal saline. Site is healing and wound edges approximating well. Reapplied new steri strips and paper tape.    Advised to watch for signs/sx of infection; spreading redness, drainage, odor, fever. Call or report promptly to clinic. Pt given written instructions and informed to rtc as needed. Patient verbalized understanding.     SERGEY Olea-BSN-PHN  [facility]  [phone]

## 2021-01-28 ENCOUNTER — OFFICE VISIT (OUTPATIENT)
Dept: DERMATOLOGY | Facility: CLINIC | Age: 81
End: 2021-01-28
Payer: COMMERCIAL

## 2021-01-28 VITALS — DIASTOLIC BLOOD PRESSURE: 87 MMHG | OXYGEN SATURATION: 98 % | HEART RATE: 57 BPM | SYSTOLIC BLOOD PRESSURE: 131 MMHG

## 2021-01-28 DIAGNOSIS — Z85.828 HISTORY OF SKIN CANCER: ICD-10-CM

## 2021-01-28 DIAGNOSIS — I87.2 VENOUS STASIS DERMATITIS, UNSPECIFIED LATERALITY: Primary | ICD-10-CM

## 2021-01-28 PROCEDURE — 99213 OFFICE O/P EST LOW 20 MIN: CPT | Performed by: DERMATOLOGY

## 2021-01-28 RX ORDER — FLUOCINONIDE 0.5 MG/G
CREAM TOPICAL 2 TIMES DAILY
Qty: 120 G | Refills: 3 | Status: SHIPPED | OUTPATIENT
Start: 2021-01-28 | End: 2022-09-14

## 2021-01-28 NOTE — PROGRESS NOTES
Eder Horan is an extremely pleasant 81 year old year old male patient here today for check nose, well healed no issues. Today he notes itching on leg.  He notes hxof swelling in legs.  He is not wearing compression hose.   .   Patient states this has been present for a while.  Patient reports the following symptoms:  itching.  Patient reports the following previous treatments none.  These treatments did not work.  Patient reports the following modifying factors none.  Associated symptoms: none.  Patient has no other skin complaints today.  Remainder of the HPI, Meds, PMH, Allergies, FH, and SH was reviewed in chart.      Past Medical History:   Diagnosis Date     Actinic keratosis 6/09    face     ALLERGIC RHINITIS       Atrial fibrillation (H) 6/08     B12 deficiency 1/16/2019     Back pain     s/p LESI through ortho Feb 2010     Basal cell carcinoma      Bradycardia      Chronotropic incompetence with sinus node dysfunction (H)      COPD (chronic obstructive pulmonary disease) (H)      DVT      Left calf 2003. Right calf 6/06     Embolic stroke (H) 06/11/2017    superior cerebellar artery occlusion. Hx A fib     Hyperlipidemia LDL goal <130 5/10/2011     Hypertension      HYPOTHYROIDISM      hypothyroidism     Lateral epicondylitis  of elbow 7/05    left     Malignant melanoma (H)      PLANTAR FASCITIS      Prostate cancer (H) 6/09    on Lupron therapy     Raynaud's disease without gangrene 11/26/2018     Squamous cell carcinoma  Sept 2014     right lower lid, s/p MOHS     Syncope      TMJ (temporomandibular joint syndrome) 4/12/2012     Tobacco use disorder        Past Surgical History:   Procedure Laterality Date     ABDOMEN SURGERY  2004,06,09    Hernias (2) Prostate Removal(2009)     BIOPSY  2009    Prostate     CARDIOVERSION  10-16-08    failed     COLONOSCOPY  2004     EYE SURGERY      2013 lump from lower  left eye lid removed     GENITOURINARY SURGERY  2009    Prostate     HERNIA REPAIR  2004 - 2006      [facility] NONSPECIFIC PROCEDURE      Left groin exploration and left inguinal herniorrhaphy     [facility] NONSPECIFIC PROCEDURE      Prostate bx (benign)     [facility] NONSPECIFIC PROCEDURE      Wide local excision (left side), robotic-assisted laparoscopic prostatectomy and   Left pelvic lymph node dissection     [facility] NONSPECIFIC PROCEDURE  2010    Right inguinal herniorrhaphy with mesh        Family History   Problem Relation Age of Onset     C.A.D. Father         heart attack- angina     Coronary Artery Disease Father      Myocardial Infarction Father      Cancer Mother         lung     Lung Cancer Mother      Lung Cancer Sister      Unknown/Adopted Maternal Grandmother      Unknown/Adopted Maternal Grandfather      Unknown/Adopted Paternal Grandmother      Unknown/Adopted Paternal Grandfather      Unknown/Adopted Sister        Social History     Socioeconomic History     Marital status:      Spouse name: Not on file     Number of children: 3     Years of education: Not on file     Highest education level: Not on file   Occupational History     Occupation: [employer]     Employer: [employer]     Employer: RETIRED   Social Needs     Financial resource strain: Not on file     Food insecurity     Worry: Not on file     Inability: Not on file     Transportation needs     Medical: Not on file     Non-medical: Not on file   Tobacco Use     Smoking status: Former Smoker     Types: Cigars, Pipe     Quit date: 2006     Years since quittin.1     Smokeless tobacco: Never Used     Tobacco comment:  occasionally cigar   Substance and Sexual Activity     Alcohol use: Yes     Alcohol/week: 0.0 standard drinks     Comment: 2-3 drinks a day:  Wine/Beer     Drug use: No     Sexual activity: Never   Lifestyle     Physical activity     Days per week: Not on file     Minutes per session: Not on file     Stress: Not on file   Relationships     Social connections     Talks on phone: Not on file     Gets together: Not on  file     Attends Gnosticist service: Not on file     Active member of club or organization: Not on file     Attends meetings of clubs or organizations: Not on file     Relationship status: Not on file     Intimate partner violence     Fear of current or ex partner: Not on file     Emotionally abused: Not on file     Physically abused: Not on file     Forced sexual activity: Not on file   Other Topics Concern     Parent/sibling w/ CABG, MI or angioplasty before 65F 55M? No      Service Not Asked     Blood Transfusions Not Asked     Caffeine Concern No     Comment: 2-3 cups of coffee a day     Occupational Exposure Not Asked     Hobby Hazards Not Asked     Sleep Concern No     Stress Concern No     Weight Concern No     Special Diet No     Back Care Not Asked     Exercise No     Bike Helmet Not Asked     Seat Belt Yes     Self-Exams Not Asked   Social History Narrative     Not on file       Outpatient Encounter Medications as of 1/28/2021   Medication Sig Dispense Refill     albuterol (PROAIR HFA) 108 (90 Base) MCG/ACT inhaler INHALE 2 PUFFS INTO THE LUNGS EVERY 4 HOURS AS NEEDED FOR SHORTNESS OF BREATH / DYSPNEA 17 g 11     amLODIPine (NORVASC) 5 MG tablet Take 1 tablet (5 mg) by mouth daily 90 tablet 3     apixaban ANTICOAGULANT (ELIQUIS ANTICOAGULANT) 5 MG tablet TAKE ONE TABLET BY MOUTH TWICE DAILY. 180 tablet 3     beclomethasone HFA (QVAR REDIHALER) 40 MCG/ACT inhaler Inhale 2 puffs into the lungs daily 31.8 g 3     flecainide (TAMBOCOR) 150 MG tablet 1/2 tablet once daily 90 tablet 2     fluocinonide (LIDEX) 0.05 % cream Apply topically 2 times daily 120 g 3     levothyroxine (SYNTHROID/LEVOTHROID) 125 MCG tablet Take 1 tablet (125 mcg) by mouth daily 90 tablet 3     PREDNISOLONE ACETATE OP Apply to eye daily as needed       No facility-administered encounter medications on file as of 1/28/2021.              Review Of Systems  Skin: As above  Eyes: negative  Ears/Nose/Throat: negative  Respiratory: No  shortness of breath, dyspnea on exertion, cough, or hemoptysis  Cardiovascular: negative  Gastrointestinal: negative  Genitourinary: negative  Musculoskeletal: negative  Neurologic: negative  Psychiatric: negative  Hematologic/Lymphatic/Immunologic: negative  Endocrine: negative      O:   NAD, WDWN, Alert & Oriented, Mood & Affect wnl, Vitals stable   Here today alone   /87   Pulse 57   SpO2 98%    General appearance normal   Vitals stable   Alert, oriented and in no acute distress     Nose well healed  L shoulder well h ealed   Legs with edema and nummualr eczematous plaques      The remainder of expanded problem focused exam was normal; the following areas were examined:  conjunctiva/lids, face, neck, lips, chest, digits/nails, RUE, LUE.      Eyes: Conjunctivae/lids:Normal     ENT: Lips, buccal mucosa, tongue: normal    MSK:Normal    Cardiovascular: peripheral edema none    Pulm: Breathing Normal    Neuro/Psych: Orientation:Alert and Orientedx3 ; Mood/Affect:normal       A/P:  1. Hx of non-melanoma skin cancer, no evidence of recurrence well healed  2. Stasis and nummular derm  Compression hose discussed with patient   He declines  To reduce swelling in the leg   Don't stand for long periods.   Take regular walks.   Elevate your feet when sitting: if your legs are swollen they need to be above your hips to drain effectively.   Elevate the foot of your bed overnight.   Once the dermatitis is under control, wear special graduated compression stockings long term.    To treat the dermatitis   Dry up oozing patches with dilute vinegar on gauze as compresses.   Topical lidexcream daily  Return to clinic 12 weeks  Use vanicream daily  Try not to scratch: it keeps the dermatitis going.   Protect your skin from injury: this can result in infection or ulceration.  Vinegar is 5% acetic acid. Make a 1% solution by adding   cup of vinegar (white or brown) to 1 pint of water.     It was a pleasure speaking to Eder CAI  Marline today.  Previous clinic  notes and pertinent laboratory tests were reviewed prior to Eder Horan's visit.  Nature and genetics of benign skin lesions dicussed with patient.  Signs and Symptoms of skin cancer discussed with patient.  Patient encouraged to perform monthly skin exams.  UV precautions reviewed with patient.  Skin care regimen reviewed with patient: Eliminate harsh soaps, i.e. Dial, zest, irsih spring; Mild soaps such as Cetaphil or Dove sensitive skin, avoid hot or cold showers, aggressive use of emollients including vanicream, cetaphil or cerave discussed with patient.    Risks of non-melanoma skin cancer discussed with patient   Return to clinic 3 months

## 2021-01-28 NOTE — LETTER
1/28/2021         RE: Eder Horan  [address] [facility] [id number]        Dear Colleague,    Thank you for referring your patient, Eder Horan, to the [facility]. Please see a copy of my visit note below.    Eder Horan is an extremely pleasant 81 year old year old male patient here today for check nose, well healed no issues. Today he notes itching on leg.  He notes hxof swelling in legs.  He is not wearing compression hose.   .   Patient states this has been present for a while.  Patient reports the following symptoms:  itching.  Patient reports the following previous treatments none.  These treatments did not work.  Patient reports the following modifying factors none.  Associated symptoms: none.  Patient has no other skin complaints today.  Remainder of the HPI, Meds, PMH, Allergies, FH, and SH was reviewed in chart.      Past Medical History:   Diagnosis Date     Actinic keratosis 6/09    face     ALLERGIC RHINITIS       Atrial fibrillation (H) 6/08     B12 deficiency 1/16/2019     Back pain     s/p LESI through ortho Feb 2010     Basal cell carcinoma      Bradycardia      Chronotropic incompetence with sinus node dysfunction (H)      COPD (chronic obstructive pulmonary disease) (H)      DVT      Left calf 2003. Right calf 6/06     Embolic stroke (H) 06/11/2017    superior cerebellar artery occlusion. Hx A fib     Hyperlipidemia LDL goal <130 5/10/2011     Hypertension      HYPOTHYROIDISM      hypothyroidism     Lateral epicondylitis  of elbow 7/05    left     Malignant melanoma (H)      PLANTAR FASCITIS      Prostate cancer (H) 6/09    on Lupron therapy     Raynaud's disease without gangrene 11/26/2018     Squamous cell carcinoma  Sept 2014     right lower lid, s/p MOHS     Syncope      TMJ (temporomandibular joint syndrome) 4/12/2012     Tobacco use disorder        Past Surgical History:   Procedure Laterality Date     ABDOMEN SURGERY  2004,06,09     Hernias (2) Prostate Removal()     BIOPSY  2009    Prostate     CARDIOVERSION  10-16-08    failed     COLONOSCOPY  2004     EYE SURGERY       lump from lower  left eye lid removed     GENITOURINARY SURGERY      Prostate     HERNIA REPAIR   -      [facility] NONSPECIFIC PROCEDURE      Left groin exploration and left inguinal herniorrhaphy     [facility] NONSPECIFIC PROCEDURE      Prostate bx (benign)     [facility] NONSPECIFIC PROCEDURE      Wide local excision (left side), robotic-assisted laparoscopic prostatectomy and   Left pelvic lymph node dissection     [facility] NONSPECIFIC PROCEDURE  2010    Right inguinal herniorrhaphy with mesh        Family History   Problem Relation Age of Onset     C.A.D. Father         heart attack- angina     Coronary Artery Disease Father      Myocardial Infarction Father      Cancer Mother         lung     Lung Cancer Mother      Lung Cancer Sister      Unknown/Adopted Maternal Grandmother      Unknown/Adopted Maternal Grandfather      Unknown/Adopted Paternal Grandmother      Unknown/Adopted Paternal Grandfather      Unknown/Adopted Sister        Social History     Socioeconomic History     Marital status:      Spouse name: Not on file     Number of children: 3     Years of education: Not on file     Highest education level: Not on file   Occupational History     Occupation: [employer]     Employer: [employer]     Employer: RETIRED   Social Needs     Financial resource strain: Not on file     Food insecurity     Worry: Not on file     Inability: Not on file     Transportation needs     Medical: Not on file     Non-medical: Not on file   Tobacco Use     Smoking status: Former Smoker     Types: Cigars, Pipe     Quit date: 2006     Years since quittin.1     Smokeless tobacco: Never Used     Tobacco comment:  occasionally cigar   Substance and Sexual Activity     Alcohol use: Yes     Alcohol/week: 0.0 standard drinks     Comment: 2-3 drinks a day:  Wine/Beer      Drug use: No     Sexual activity: Never   Lifestyle     Physical activity     Days per week: Not on file     Minutes per session: Not on file     Stress: Not on file   Relationships     Social connections     Talks on phone: Not on file     Gets together: Not on file     Attends Bahai service: Not on file     Active member of club or organization: Not on file     Attends meetings of clubs or organizations: Not on file     Relationship status: Not on file     Intimate partner violence     Fear of current or ex partner: Not on file     Emotionally abused: Not on file     Physically abused: Not on file     Forced sexual activity: Not on file   Other Topics Concern     Parent/sibling w/ CABG, MI or angioplasty before 65F 55M? No      Service Not Asked     Blood Transfusions Not Asked     Caffeine Concern No     Comment: 2-3 cups of coffee a day     Occupational Exposure Not Asked     Hobby Hazards Not Asked     Sleep Concern No     Stress Concern No     Weight Concern No     Special Diet No     Back Care Not Asked     Exercise No     Bike Helmet Not Asked     Seat Belt Yes     Self-Exams Not Asked   Social History Narrative     Not on file       Outpatient Encounter Medications as of 1/28/2021   Medication Sig Dispense Refill     albuterol (PROAIR HFA) 108 (90 Base) MCG/ACT inhaler INHALE 2 PUFFS INTO THE LUNGS EVERY 4 HOURS AS NEEDED FOR SHORTNESS OF BREATH / DYSPNEA 17 g 11     amLODIPine (NORVASC) 5 MG tablet Take 1 tablet (5 mg) by mouth daily 90 tablet 3     apixaban ANTICOAGULANT (ELIQUIS ANTICOAGULANT) 5 MG tablet TAKE ONE TABLET BY MOUTH TWICE DAILY. 180 tablet 3     beclomethasone HFA (QVAR REDIHALER) 40 MCG/ACT inhaler Inhale 2 puffs into the lungs daily 31.8 g 3     flecainide (TAMBOCOR) 150 MG tablet 1/2 tablet once daily 90 tablet 2     fluocinonide (LIDEX) 0.05 % cream Apply topically 2 times daily 120 g 3     levothyroxine (SYNTHROID/LEVOTHROID) 125 MCG tablet Take 1 tablet (125 mcg) by mouth  daily 90 tablet 3     PREDNISOLONE ACETATE OP Apply to eye daily as needed       No facility-administered encounter medications on file as of 1/28/2021.              Review Of Systems  Skin: As above  Eyes: negative  Ears/Nose/Throat: negative  Respiratory: No shortness of breath, dyspnea on exertion, cough, or hemoptysis  Cardiovascular: negative  Gastrointestinal: negative  Genitourinary: negative  Musculoskeletal: negative  Neurologic: negative  Psychiatric: negative  Hematologic/Lymphatic/Immunologic: negative  Endocrine: negative      O:   NAD, WDWN, Alert & Oriented, Mood & Affect wnl, Vitals stable   Here today alone   /87   Pulse 57   SpO2 98%    General appearance normal   Vitals stable   Alert, oriented and in no acute distress     Nose well healed  L shoulder well h ealed   Legs with edema and nummualr eczematous plaques      The remainder of expanded problem focused exam was normal; the following areas were examined:  conjunctiva/lids, face, neck, lips, chest, digits/nails, RUE, LUE.      Eyes: Conjunctivae/lids:Normal     ENT: Lips, buccal mucosa, tongue: normal    MSK:Normal    Cardiovascular: peripheral edema none    Pulm: Breathing Normal    Neuro/Psych: Orientation:Alert and Orientedx3 ; Mood/Affect:normal       A/P:  1. Hx of non-melanoma skin cancer, no evidence of recurrence well healed  2. Stasis and nummular derm  Compression hose discussed with patient   He declines  To reduce swelling in the leg   Don't stand for long periods.   Take regular walks.   Elevate your feet when sitting: if your legs are swollen they need to be above your hips to drain effectively.   Elevate the foot of your bed overnight.   Once the dermatitis is under control, wear special graduated compression stockings long term.    To treat the dermatitis   Dry up oozing patches with dilute vinegar on gauze as compresses.   Topical lidexcream daily  Return to clinic 12 weeks  Use vanicream daily  Try not to scratch: it  keeps the dermatitis going.   Protect your skin from injury: this can result in infection or ulceration.  Vinegar is 5% acetic acid. Make a 1% solution by adding   cup of vinegar (white or brown) to 1 pint of water.     It was a pleasure speaking to Eder Horan today.  Previous clinic  notes and pertinent laboratory tests were reviewed prior to Eder Horan's visit.  Nature and genetics of benign skin lesions dicussed with patient.  Signs and Symptoms of skin cancer discussed with patient.  Patient encouraged to perform monthly skin exams.  UV precautions reviewed with patient.  Skin care regimen reviewed with patient: Eliminate harsh soaps, i.e. Dial, zest, irsih spring; Mild soaps such as Cetaphil or Dove sensitive skin, avoid hot or cold showers, aggressive use of emollients including vanicream, cetaphil or cerave discussed with patient.    Risks of non-melanoma skin cancer discussed with patient   Return to clinic 3 months        Again, thank you for allowing me to participate in the care of your patient.        Sincerely,        Reece Daniels MD

## 2021-02-28 ENCOUNTER — IMMUNIZATION (OUTPATIENT)
Dept: NURSING | Facility: CLINIC | Age: 81
End: 2021-02-28
Payer: COMMERCIAL

## 2021-02-28 PROCEDURE — 0011A PR COVID VAC MODERNA 100 MCG/0.5 ML IM: CPT

## 2021-02-28 PROCEDURE — 91301 PR COVID VAC MODERNA 100 MCG/0.5 ML IM: CPT

## 2021-03-28 ENCOUNTER — IMMUNIZATION (OUTPATIENT)
Dept: NURSING | Facility: CLINIC | Age: 81
End: 2021-03-28
Attending: INTERNAL MEDICINE
Payer: COMMERCIAL

## 2021-03-28 PROCEDURE — 91301 PR COVID VAC MODERNA 100 MCG/0.5 ML IM: CPT

## 2021-03-28 PROCEDURE — 0012A PR COVID VAC MODERNA 100 MCG/0.5 ML IM: CPT

## 2021-05-28 DIAGNOSIS — I48.0 PAROXYSMAL ATRIAL FIBRILLATION (H): ICD-10-CM

## 2021-05-28 RX ORDER — FLECAINIDE ACETATE 150 MG/1
TABLET ORAL
Qty: 45 TABLET | Refills: 0 | Status: SHIPPED | OUTPATIENT
Start: 2021-05-28 | End: 2021-07-30 | Stop reason: ALTCHOICE

## 2021-06-17 ENCOUNTER — OFFICE VISIT (OUTPATIENT)
Dept: CARDIOLOGY | Facility: CLINIC | Age: 81
End: 2021-06-17
Payer: COMMERCIAL

## 2021-06-17 VITALS
BODY MASS INDEX: 25.2 KG/M2 | WEIGHT: 170.1 LBS | SYSTOLIC BLOOD PRESSURE: 138 MMHG | HEIGHT: 69 IN | HEART RATE: 51 BPM | DIASTOLIC BLOOD PRESSURE: 82 MMHG

## 2021-06-17 DIAGNOSIS — R00.1 BRADYCARDIA: ICD-10-CM

## 2021-06-17 DIAGNOSIS — I48.0 PAROXYSMAL ATRIAL FIBRILLATION (H): Primary | ICD-10-CM

## 2021-06-17 PROCEDURE — 93000 ELECTROCARDIOGRAM COMPLETE: CPT | Performed by: INTERNAL MEDICINE

## 2021-06-17 PROCEDURE — 99214 OFFICE O/P EST MOD 30 MIN: CPT | Performed by: INTERNAL MEDICINE

## 2021-06-17 RX ORDER — CYANOCOBALAMIN (VITAMIN B-12) 2500 MCG
2500 TABLET, SUBLINGUAL SUBLINGUAL DAILY
COMMUNITY
End: 2021-08-16

## 2021-06-17 ASSESSMENT — MIFFLIN-ST. JEOR: SCORE: 1461.95

## 2021-06-17 NOTE — PROGRESS NOTES
HPI and Plan:   See dictation [id number]    Orders Placed This Encounter   Procedures     Follow-Up with Cardiac Advanced Practice Provider     EKG 12-lead complete w/read - Clinics (performed today)     EKG 12-lead complete w/read - Clinics (to be scheduled)     Leadless EKG Monitor 8 to 14 Days       Orders Placed This Encounter   Medications     vitamin B-12 (CYANOCOBALAMIN) 2500 MCG sublingual tablet     Sig: Take 2,500 mcg by mouth daily     valACYclovir HCl (VALTREX PO)       There are no discontinued medications.      Encounter Diagnosis   Name Primary?     Paroxysmal atrial fibrillation (H) Yes       CURRENT MEDICATIONS:  Current Outpatient Medications   Medication Sig Dispense Refill     albuterol (PROAIR HFA) 108 (90 Base) MCG/ACT inhaler INHALE 2 PUFFS INTO THE LUNGS EVERY 4 HOURS AS NEEDED FOR SHORTNESS OF BREATH / DYSPNEA 17 g 11     amLODIPine (NORVASC) 5 MG tablet Take 1 tablet (5 mg) by mouth daily 90 tablet 3     apixaban ANTICOAGULANT (ELIQUIS ANTICOAGULANT) 5 MG tablet TAKE ONE TABLET BY MOUTH TWICE DAILY. 180 tablet 3     beclomethasone HFA (QVAR REDIHALER) 40 MCG/ACT inhaler Inhale 2 puffs into the lungs daily 31.8 g 3     flecainide (TAMBOCOR) 150 MG tablet 1/2 tablet once daily 45 tablet 0     fluocinonide (LIDEX) 0.05 % cream Apply topically 2 times daily 120 g 3     fluocinonide (LIDEX) 0.05 % external cream Apply topically 2 times daily 120 g 3     levothyroxine (SYNTHROID/LEVOTHROID) 125 MCG tablet Take 1 tablet (125 mcg) by mouth daily 90 tablet 3     PREDNISOLONE ACETATE OP Apply to eye daily as needed       valACYclovir HCl (VALTREX PO)        vitamin B-12 (CYANOCOBALAMIN) 2500 MCG sublingual tablet Take 2,500 mcg by mouth daily         ALLERGIES     Allergies   Allergen Reactions     Bactrim [Sulfa Drugs] Rash       PAST MEDICAL HISTORY:  Past Medical History:   Diagnosis Date     Actinic keratosis 6/09    face     ALLERGIC RHINITIS       Atrial fibrillation (H) 6/08     B12 deficiency  1/16/2019     Back pain     s/p LESI through ortho Feb 2010     Basal cell carcinoma      Bradycardia      Chronotropic incompetence with sinus node dysfunction (H)      COPD (chronic obstructive pulmonary disease) (H)      DVT      Left calf 2003. Right calf 6/06     Embolic stroke (H) 06/11/2017    superior cerebellar artery occlusion. Hx A fib     Hyperlipidemia LDL goal <130 5/10/2011     Hypertension      HYPOTHYROIDISM      hypothyroidism     Lateral epicondylitis  of elbow 7/05    left     Malignant melanoma (H)      PLANTAR FASCITIS      Prostate cancer (H) 6/09    on Lupron therapy     Raynaud's disease without gangrene 11/26/2018     Squamous cell carcinoma  Sept 2014     right lower lid, s/p MOHS     Syncope      TMJ (temporomandibular joint syndrome) 4/12/2012     Tobacco use disorder        PAST SURGICAL HISTORY:  Past Surgical History:   Procedure Laterality Date     ABDOMEN SURGERY  2004,06,09    Hernias (2) Prostate Removal(2009)     BIOPSY  2009    Prostate     CARDIOVERSION  10-16-08    failed     COLONOSCOPY  2004     EYE SURGERY      2013 lump from lower  left eye lid removed     GENITOURINARY SURGERY  2009    Prostate     HERNIA REPAIR  2004 - 2006     [facility] NONSPECIFIC PROCEDURE  1/07    Left groin exploration and left inguinal herniorrhaphy     [facility] NONSPECIFIC PROCEDURE  5/08    Prostate bx (benign)     [facility] NONSPECIFIC PROCEDURE  8/09    Wide local excision (left side), robotic-assisted laparoscopic prostatectomy and   Left pelvic lymph node dissection     [facility] NONSPECIFIC PROCEDURE  August 2010    Right inguinal herniorrhaphy with mesh       FAMILY HISTORY:  Family History   Problem Relation Age of Onset     C.A.D. Father         heart attack- angina     Coronary Artery Disease Father      Myocardial Infarction Father      Cancer Mother         lung     Lung Cancer Mother      Lung Cancer Sister      Unknown/Adopted Maternal Grandmother      Unknown/Adopted Maternal Grandfather       Unknown/Adopted Paternal Grandmother      Unknown/Adopted Paternal Grandfather      Unknown/Adopted Sister        SOCIAL HISTORY:  Social History     Socioeconomic History     Marital status:      Spouse name: None     Number of children: 3     Years of education: None     Highest education level: None   Occupational History     Occupation: [employer]     Employer: [employer]     Employer: RETIRED   Social Needs     Financial resource strain: None     Food insecurity     Worry: None     Inability: None     Transportation needs     Medical: None     Non-medical: None   Tobacco Use     Smoking status: Former Smoker     Types: Cigars, Pipe     Quit date: 2006     Years since quittin.5     Smokeless tobacco: Never Used     Tobacco comment:  occasionally cigar   Substance and Sexual Activity     Alcohol use: Yes     Alcohol/week: 0.0 standard drinks     Comment: 2-3 drinks a day:  Wine/Beer     Drug use: No     Sexual activity: Never   Lifestyle     Physical activity     Days per week: None     Minutes per session: None     Stress: None   Relationships     Social connections     Talks on phone: None     Gets together: None     Attends Confucianist service: None     Active member of club or organization: None     Attends meetings of clubs or organizations: None     Relationship status: None     Intimate partner violence     Fear of current or ex partner: None     Emotionally abused: None     Physically abused: None     Forced sexual activity: None   Other Topics Concern     Parent/sibling w/ CABG, MI or angioplasty before 65F 55M? No      Service Not Asked     Blood Transfusions Not Asked     Caffeine Concern No     Comment: 2-3 cups of coffee a day     Occupational Exposure Not Asked     Hobby Hazards Not Asked     Sleep Concern No     Stress Concern No     Weight Concern No     Special Diet No     Back Care Not Asked     Exercise No     Bike Helmet Not Asked     Seat Belt Yes     Self-Exams Not Asked    Social History Narrative     None       Review of Systems:  Skin:  Negative       Eyes:  Positive for glasses shingles in the left eye  ENT:  Negative      Respiratory:  Positive for dyspnea on exertion;cough;mucoid expectorant     Cardiovascular:  Negative;palpitations;chest pain;edema edema;Positive for;lightheadedness;palpitations 2 events of palpatations with dizziness  Gastroenterology: Negative      Genitourinary:  Negative nocturia    Musculoskeletal:  Positive for joint pain    Neurologic:  Negative      Psychiatric:  Negative      Heme/Lymph/Imm:  Positive for allergies    Endocrine:  Positive for thyroid disorder

## 2021-06-17 NOTE — LETTER
6/17/2021    Richmond Martinez MD  [address] [facility] [address]    RE: Eder Horan       Dear Colleague,    I had the pleasure of seeing Eder Horan in the [facility].    HPI and Plan:   See dictation [id number]    Orders Placed This Encounter   Procedures     Follow-Up with Cardiac Advanced Practice Provider     EKG 12-lead complete w/read - Clinics (performed today)     EKG 12-lead complete w/read - Clinics (to be scheduled)     Leadless EKG Monitor 8 to 14 Days       Orders Placed This Encounter   Medications     vitamin B-12 (CYANOCOBALAMIN) 2500 MCG sublingual tablet     Sig: Take 2,500 mcg by mouth daily     valACYclovir HCl (VALTREX PO)       There are no discontinued medications.      Encounter Diagnosis   Name Primary?     Paroxysmal atrial fibrillation (H) Yes       CURRENT MEDICATIONS:  Current Outpatient Medications   Medication Sig Dispense Refill     albuterol (PROAIR HFA) 108 (90 Base) MCG/ACT inhaler INHALE 2 PUFFS INTO THE LUNGS EVERY 4 HOURS AS NEEDED FOR SHORTNESS OF BREATH / DYSPNEA 17 g 11     amLODIPine (NORVASC) 5 MG tablet Take 1 tablet (5 mg) by mouth daily 90 tablet 3     apixaban ANTICOAGULANT (ELIQUIS ANTICOAGULANT) 5 MG tablet TAKE ONE TABLET BY MOUTH TWICE DAILY. 180 tablet 3     beclomethasone HFA (QVAR REDIHALER) 40 MCG/ACT inhaler Inhale 2 puffs into the lungs daily 31.8 g 3     flecainide (TAMBOCOR) 150 MG tablet 1/2 tablet once daily 45 tablet 0     fluocinonide (LIDEX) 0.05 % cream Apply topically 2 times daily 120 g 3     fluocinonide (LIDEX) 0.05 % external cream Apply topically 2 times daily 120 g 3     levothyroxine (SYNTHROID/LEVOTHROID) 125 MCG tablet Take 1 tablet (125 mcg) by mouth daily 90 tablet 3     PREDNISOLONE ACETATE OP Apply to eye daily as needed       valACYclovir HCl (VALTREX PO)        vitamin B-12 (CYANOCOBALAMIN) 2500 MCG sublingual tablet Take 2,500 mcg by mouth daily         ALLERGIES     Allergies    Allergen Reactions     Bactrim [Sulfa Drugs] Rash       PAST MEDICAL HISTORY:  Past Medical History:   Diagnosis Date     Actinic keratosis 6/09    face     ALLERGIC RHINITIS       Atrial fibrillation (H) 6/08     B12 deficiency 1/16/2019     Back pain     s/p LESI through ortho Feb 2010     Basal cell carcinoma      Bradycardia      Chronotropic incompetence with sinus node dysfunction (H)      COPD (chronic obstructive pulmonary disease) (H)      DVT      Left calf 2003. Right calf 6/06     Embolic stroke (H) 06/11/2017    superior cerebellar artery occlusion. Hx A fib     Hyperlipidemia LDL goal <130 5/10/2011     Hypertension      HYPOTHYROIDISM      hypothyroidism     Lateral epicondylitis  of elbow 7/05    left     Malignant melanoma (H)      PLANTAR FASCITIS      Prostate cancer (H) 6/09    on Lupron therapy     Raynaud's disease without gangrene 11/26/2018     Squamous cell carcinoma  Sept 2014     right lower lid, s/p MOHS     Syncope      TMJ (temporomandibular joint syndrome) 4/12/2012     Tobacco use disorder        PAST SURGICAL HISTORY:  Past Surgical History:   Procedure Laterality Date     ABDOMEN SURGERY  2004,06,09    Hernias (2) Prostate Removal(2009)     BIOPSY  2009    Prostate     CARDIOVERSION  10-16-08    failed     COLONOSCOPY  2004     EYE SURGERY      2013 lump from lower  left eye lid removed     GENITOURINARY SURGERY  2009    Prostate     HERNIA REPAIR  2004 - 2006     [facility] NONSPECIFIC PROCEDURE  1/07    Left groin exploration and left inguinal herniorrhaphy     [facility] NONSPECIFIC PROCEDURE  5/08    Prostate bx (benign)     [facility] NONSPECIFIC PROCEDURE  8/09    Wide local excision (left side), robotic-assisted laparoscopic prostatectomy and   Left pelvic lymph node dissection     [facility] NONSPECIFIC PROCEDURE  August 2010    Right inguinal herniorrhaphy with mesh       FAMILY HISTORY:  Family History   Problem Relation Age of Onset     C.A.D. Father         heart attack- angina     Coronary  Artery Disease Father      Myocardial Infarction Father      Cancer Mother         lung     Lung Cancer Mother      Lung Cancer Sister      Unknown/Adopted Maternal Grandmother      Unknown/Adopted Maternal Grandfather      Unknown/Adopted Paternal Grandmother      Unknown/Adopted Paternal Grandfather      Unknown/Adopted Sister        SOCIAL HISTORY:  Social History     Socioeconomic History     Marital status:      Spouse name: None     Number of children: 3     Years of education: None     Highest education level: None   Occupational History     Occupation: [employer]     Employer: [employer]     Employer: RETIRED   Social Needs     Financial resource strain: None     Food insecurity     Worry: None     Inability: None     Transportation needs     Medical: None     Non-medical: None   Tobacco Use     Smoking status: Former Smoker     Types: Cigars, Pipe     Quit date: 2006     Years since quittin.5     Smokeless tobacco: Never Used     Tobacco comment:  occasionally cigar   Substance and Sexual Activity     Alcohol use: Yes     Alcohol/week: 0.0 standard drinks     Comment: 2-3 drinks a day:  Wine/Beer     Drug use: No     Sexual activity: Never   Lifestyle     Physical activity     Days per week: None     Minutes per session: None     Stress: None   Relationships     Social connections     Talks on phone: None     Gets together: None     Attends Hindu service: None     Active member of club or organization: None     Attends meetings of clubs or organizations: None     Relationship status: None     Intimate partner violence     Fear of current or ex partner: None     Emotionally abused: None     Physically abused: None     Forced sexual activity: None   Other Topics Concern     Parent/sibling w/ CABG, MI or angioplasty before 65F 55M? No      Service Not Asked     Blood Transfusions Not Asked     Caffeine Concern No     Comment: 2-3 cups of coffee a day     Occupational Exposure Not Asked      Hobby Hazards Not Asked     Sleep Concern No     Stress Concern No     Weight Concern No     Special Diet No     Back Care Not Asked     Exercise No     Bike Helmet Not Asked     Seat Belt Yes     Self-Exams Not Asked   Social History Narrative     None       Review of Systems:  Skin:  Negative       Eyes:  Positive for glasses shingles in the left eye  ENT:  Negative      Respiratory:  Positive for dyspnea on exertion;cough;mucoid expectorant     Cardiovascular:  Negative;palpitations;chest pain;edema edema;Positive for;lightheadedness;palpitations 2 events of palpatations with dizziness  Gastroenterology: Negative      Genitourinary:  Negative nocturia    Musculoskeletal:  Positive for joint pain    Neurologic:  Negative      Psychiatric:  Negative      Heme/Lymph/Imm:  Positive for allergies    Endocrine:  Positive for thyroid disorder      Thank you for allowing me to participate in the care of your patient.      Sincerely,     Effie Hawkins MD     [facility]    cc:   No referring provider defined for this encounter.

## 2021-06-17 NOTE — PROGRESS NOTES
Service Date: 06/17/2021    HISTORY OF PRESENT ILLNESS:    I had the pleasure of seeing . Eder Horan, a delightful 81-year-old male, in followup of sinus node dysfunction and atrial fibrillation.    Eder has the following chronic medical/cardiac issues:    A.  Sinus node dysfunction with moderate chronotropic incompetence.  The patient has repeatedly declined pacemaker implantation.  B.  Symptomatic paroxysmal AF.  On flecainide 75 mg daily for years.  [id number] score is 5.  On apixaban.  C.  CVA in 2017.  Treated with TPA.  The patient had not been on anticoagulation until his stroke.  D.  COPD.  E.  Hypothyroidism.  F.  Hypertension.    Eder mention that he had 2 or 3 events when he became quite lightheaded and he had to sit down while standing.  These events lasted a few seconds.  He has not had mariano syncope.    Eder complains that his energy level has progressively gotten worse over time.  He does not have chest pain with exertion, orthopnea or PND.      PHYSICAL EXAMINATION:    VITAL SIGNS:  Blood pressure 138/82, heart rate 51 and irregular (ectopic beats).  Weight 77 kg, height 175 cm.  GENERAL:  He is a very pleasant gentleman in no distress.  HEENT:  Normocephalic.  NECK:  Supple without carotid bruits.  LUNGS:  Mildly decreased breath sounds.  No crackles or wheezes.  HEART:  Regular rhythm with occasional ectopic beats.  EXTREMITIES:  Trace edema.      DIAGNOSTIC STUDIES:    - His 12-lead ECG today showed sinus bradycardia with PACs.  - Most recent echocardiogram was in 2018 showing EF 55%-60% with 1+ TR.  Mild biatrial enlargement.  - Recent laboratory tests:  Sodium 138, potassium 4.3, creatinine 0.77.  TSH 1.79, hematocrit 45%.      IMPRESSION:    1.  Episodes of lightheadedness while standing.  This may be due to orthostasis, though I am concerned about bradycardia as well. These were severe enough that the patient had to promptly sit down.  I will order a cardiac event monitor.  2.  Paroxysmal  atrial fibrillation.  He takes low-dose flecainide 75 mg daily and has not had symptomatic AF in quite some time.  I will not change the dose unless we see more severe bradycardia than usual on his monitor.  If that is the case, we may have to stop flecainide.  3.  Sinus node dysfunction.  I believe Augustine has been symptomatic for years, but he remains reluctant to pursue pacemaker implantation.  4.  Hypertension, under good control.    RECOMMENDATIONS:    A.  14-day leadless ECG monitor.  B.  I have placed an order for him to see Amira in 1 year, but we may have to see him again sooner based on the results of the monitor.    I appreciate the opportunity to be involved in his care.  Total time spent was 30 minutes. That includes time for chart review and documentation.      Effie Hawkins MD, FACC        cc:  Richmond Martinez MD  [facility]  [address]  [address]    D: 2021   T: 2021   MT: ruiz    Name:     AUGUSTINE MYRICK  MRN:      [id number]        Account:      [id number]   :      1940           Service Date: 2021       Document: [id number]

## 2021-06-26 ENCOUNTER — HOSPITAL ENCOUNTER (EMERGENCY)
Facility: CLINIC | Age: 81
Discharge: HOME | End: 2021-06-26
Attending: EMERGENCY MEDICINE | Admitting: EMERGENCY MEDICINE
Payer: COMMERCIAL

## 2021-06-26 VITALS
OXYGEN SATURATION: 97 % | HEART RATE: 106 BPM | SYSTOLIC BLOOD PRESSURE: 120 MMHG | RESPIRATION RATE: 15 BRPM | DIASTOLIC BLOOD PRESSURE: 83 MMHG

## 2021-06-26 DIAGNOSIS — I95.1 ORTHOSTASIS: ICD-10-CM

## 2021-06-26 DIAGNOSIS — R42 LIGHTHEADEDNESS: ICD-10-CM

## 2021-06-26 LAB — INTERPRETATION ECG - MUSE: NORMAL

## 2021-06-26 PROCEDURE — 96360 HYDRATION IV INFUSION INIT: CPT

## 2021-06-26 PROCEDURE — 93005 ELECTROCARDIOGRAM TRACING: CPT

## 2021-06-26 PROCEDURE — 258N000003 HC RX IP 258 OP 636: Performed by: EMERGENCY MEDICINE

## 2021-06-26 PROCEDURE — 99283 EMERGENCY DEPT VISIT LOW MDM: CPT | Mod: 25

## 2021-06-26 RX ADMIN — SODIUM CHLORIDE 500 ML: 9 INJECTION, SOLUTION INTRAVENOUS at 12:54

## 2021-06-26 ASSESSMENT — ENCOUNTER SYMPTOMS
LIGHT-HEADEDNESS: 1
SHORTNESS OF BREATH: 0

## 2021-06-26 NOTE — ED TRIAGE NOTES
Per EMS: Patient was at [employer] this morning and stood up too fast. Became light-headed and sat down. Symptoms resolved while sitting down. Concerned bystanders called 911. Patient has a history of A-fib.    Currently treating left eye for shingles infection.

## 2021-06-26 NOTE — ED PROVIDER NOTES
History   Chief Complaint:  Syncope       HPI   Eder Horan is a 81 year old male, On Eliquis, with a history of heart disease who presents with lightheadedness that started a couple weeks ago. Before, the lightheadedness occurs when he stands up and when he lays down it goes away. Today, he was at [employer] and when he stood up he got lightheaded but when he sat down it continued. During the episodes, he feels like he going to pass out but has not yet. He denies chest pain or shortness of breath. He has a periodic meeting with his cardiologist on Monday to get a Zio patch. His doctor is aware of his episodes.     Review of Systems   Respiratory: Negative for shortness of breath.    Cardiovascular: Negative for chest pain.   Neurological: Positive for light-headedness.   All other systems reviewed and are negative.        Allergies:  Bactrim [Sulfa Drugs]    Medications:  Albuterol  Norvasc  Eliquis  Tambocor  Synthroid  Valtrex    Past Medical History:    Actinic keratosis  Atrial fibrillation  Basal cell carcinoma  Chronotropic incompetence with sinus node dysfunction  COPD  DVT  Stroke  Hyperlipidemia  Hypertension  Hypothyroidism  Malignant melanoma  Prostate cancer  Raynaud's disease  Squamous cell carcinoma  Syncope  TMJ  Edema  Hypothyroidism    Past Surgical History:    Abdomen surgery  Biopsy  Cardioversion  Colonoscopy  Eye surgery  Genitourinary surgery  Hernia repair  Herniorrhaphy    Family History:    Father: CAD, MI  Mother: lung cancer  Sister: lung cancer    Social History:  The patient presents alone.    Physical Exam     Patient Vitals for the past 24 hrs:   BP Pulse Resp SpO2   06/26/21 1415 120/83 106 15 97 %       Physical Exam  Nursing note and vitals reviewed.    Constitutional:  Appears comfortable.    Cardiovascular:  Normal rate, regular rhythm with normal S1 and S2.      Normal heart sounds and peripheral pulses 2+ and equal.       No murmur or tunde.  Pulmonary:  Effort normal and  breath sounds clear to auscultation bilaterally.     GI:    Soft. No distension and no mass. No tenderness.   Musculoskeletal:  Normal range of motion. No extremity deformity.     No edema and no tenderness.    Neurological:   Alert and oriented. No focal weakness.  Gait is normal.     Exhibits good muscle tone. Coordination normal.      GCS eye subscore is 4. GCS verbal subscore is 5.      GCS motor subscore is 6.   Skin:    Skin is warm and dry. No rash noted. No diaphoresis.   Psychiatric:   Behavior is normal. Appropriate mood and affect.     Judgment and thought content normal.     Emergency Department Course   ECG  ECG taken at 1250, ECG read at 1255  Atrial fibrillation  Septal infarct, age undetermined   Atrial fibrillation as compared to prior EKG.  Rate 79 bpm. SD interval * ms. QRS duration 70 ms. QT/QTc 390/447 ms. P-R-T axes * -19 -42.     Emergency Department Course:    Reviewed:  I reviewed nursing notes, vitals, past medical history and care everywhere    Assessments:  1245 I obtained history and examined the patient as noted above.     1358 I rechecked the patient and explained findings.     Interventions:  1254  mL IV    Disposition:  The patient was discharged to home.       Impression & Plan     Medical Decision Making:  Patient comes in with a history of orthostasis.  He got up too quickly at a  today and because he did not have anywhere to lay down he just kept feeling lightheaded.  He did not pass out.  He had no chest pain.  He does have intermittent atrial fibrillation, is on flecainide Eliquis and Norvasc.  He feels fine now once he laid down and was brought in by EMS.  His EKG is unremarkable other than showing A. fib.  He was given a 500 cc bolus of normal saline.  He was not orthostatic here.  He got up and felt fine.  I encouraged him to push fluids and I would like him to keep his appointment coming up with his cardiologist.  He needs to get up slowly and if he ever feels  lightheaded he needs to lay down wherever he is at.  This has been a frequent occurrence for him.  His cardiologist can make further recommendations.    Get up slowly, push fluids and stay hydrated.  If you ever feel this lightheadedness again, no matter where you are, you should really try to lay down until it passes.  If you actually fully pass out, you should be evaluated for that.    Diagnosis:    ICD-10-CM    1. Orthostasis  I95.1    2. Lightheadedness  R42        Discharge Medications:  New Prescriptions    No medications on file       Scribe Disclosure:  I, Reece Criselda, am serving as a scribe at 12:48 PM on 6/26/2021 to document services personally performed by Yuridia Tobin MD based on my observations and the provider's statements to me.            Yuridia Tobin MD  06/26/21 0711

## 2021-06-26 NOTE — ED NOTES
Bed: ED10  Expected date:   Expected time:   Means of arrival:   Comments:  Susan Ochoa Dizzy 81 m

## 2021-06-26 NOTE — DISCHARGE INSTRUCTIONS
Get up slowly, push fluids and stay hydrated.  If you ever feel this lightheadedness again, no matter where you are, you should really try to lay down until it passes.  If you actually fully pass out, you should be evaluated for that.

## 2021-06-28 ENCOUNTER — HOSPITAL ENCOUNTER (OUTPATIENT)
Dept: CARDIOLOGY | Facility: CLINIC | Age: 81
Discharge: HOME | End: 2021-06-28
Attending: INTERNAL MEDICINE | Admitting: INTERNAL MEDICINE
Payer: COMMERCIAL

## 2021-06-28 DIAGNOSIS — I48.0 PAROXYSMAL ATRIAL FIBRILLATION (H): ICD-10-CM

## 2021-06-28 PROCEDURE — 93248 EXT ECG>7D<15D REV&INTERPJ: CPT | Performed by: INTERNAL MEDICINE

## 2021-06-28 PROCEDURE — 93246 EXT ECG>7D<15D RECORDING: CPT

## 2021-07-01 DIAGNOSIS — I10 BENIGN ESSENTIAL HYPERTENSION: ICD-10-CM

## 2021-07-01 DIAGNOSIS — I73.00 RAYNAUD'S DISEASE WITHOUT GANGRENE: ICD-10-CM

## 2021-07-01 RX ORDER — AMLODIPINE BESYLATE 5 MG/1
5 TABLET ORAL DAILY
Qty: 90 TABLET | Refills: 0 | Status: SHIPPED | OUTPATIENT
Start: 2021-07-01 | End: 2021-08-16

## 2021-07-22 ENCOUNTER — OFFICE VISIT (OUTPATIENT)
Dept: DERMATOLOGY | Facility: CLINIC | Age: 81
End: 2021-07-22
Payer: COMMERCIAL

## 2021-07-22 VITALS — SYSTOLIC BLOOD PRESSURE: 105 MMHG | OXYGEN SATURATION: 97 % | HEART RATE: 57 BPM | DIASTOLIC BLOOD PRESSURE: 69 MMHG

## 2021-07-22 DIAGNOSIS — D23.9 DERMAL NEVUS: ICD-10-CM

## 2021-07-22 DIAGNOSIS — Z85.828 HISTORY OF SKIN CANCER: Primary | ICD-10-CM

## 2021-07-22 DIAGNOSIS — L82.1 SEBORRHEIC KERATOSIS: ICD-10-CM

## 2021-07-22 DIAGNOSIS — D22.9 NEVUS: ICD-10-CM

## 2021-07-22 DIAGNOSIS — C44.42 SQUAMOUS CELL CARCINOMA OF NECK: ICD-10-CM

## 2021-07-22 DIAGNOSIS — L81.4 LENTIGO: ICD-10-CM

## 2021-07-22 DIAGNOSIS — Z85.820 HISTORY OF MELANOMA: ICD-10-CM

## 2021-07-22 DIAGNOSIS — D18.01 ANGIOMA OF SKIN: ICD-10-CM

## 2021-07-22 PROCEDURE — 11106 INCAL BX SKN SINGLE LES: CPT | Mod: 59 | Performed by: DERMATOLOGY

## 2021-07-22 PROCEDURE — 17311 MOHS 1 STAGE H/N/HF/G: CPT | Performed by: DERMATOLOGY

## 2021-07-22 PROCEDURE — 99213 OFFICE O/P EST LOW 20 MIN: CPT | Mod: 25 | Performed by: DERMATOLOGY

## 2021-07-22 PROCEDURE — 88331 PATH CONSLTJ SURG 1 BLK 1SPC: CPT | Mod: 59 | Performed by: DERMATOLOGY

## 2021-07-22 PROCEDURE — 13132 CMPLX RPR F/C/C/M/N/AX/G/H/F: CPT | Performed by: DERMATOLOGY

## 2021-07-22 NOTE — PATIENT INSTRUCTIONS
Sutured Wound Care     [facility]: [phone]          ? No strenuous activity for 48 hours. Resume moderate activity in 48 hours. No heavy exercising until you are seen for follow up in one week.     ? Take Tylenol as needed for discomfort.                         ? Do not drink alcoholic beverages for 48 hours.     ? Keep the pressure bandage in place for 24 hours. If the bandage becomes blood tinged or loose, reinforce it with gauze and tape.        (Refer to the reverse side of this page for management of bleeding).    ? Remove pressure bandage in 24 hours     ? Leave the flat bandage in place until your follow up appointment.    ? Keep the bandage dry. Wash around it carefully.    ? If the tape becomes soiled or starts to come off, reinforce it with additional paper tape.    ? Do not smoke for 3 weeks; smoking is detrimental to wound healing.    ? It is normal to have swelling and bruising around the surgical site. The bruising will fade in approximately 10-14 days. Elevate the area to reduce swelling.    ? Numbness, itchiness and sensitivity to temperature changes can occur after surgery and may take up to 18 months to normalize.      POSSIBLE COMPLICATIONS    BLEEDIN. Leave the bandage in place.  2. Use tightly rolled up gauze or a cloth to apply direct pressure over the bandage for 20   minutes.  3. Reapply pressure for an additional 20 minutes if necessary  4. Call the office or go to the nearest emergency room if pressure fails to stop the bleeding.  5. Use additional gauze and tape to maintain pressure once the bleeding has stopped.        PAIN:    1. Post operative pain should slowly get better, never worse.  2. A severe increase in pain may indicate a problem. Call the office if this occurs.    In case of emergency phone:Dr Daniels [phone]

## 2021-07-22 NOTE — LETTER
7/22/2021         RE: Eder Horan  [address] [facility] [address]        Dear Colleague,    Thank you for referring your patient, Eder Horan, to the [facility]. Please see a copy of my visit note below.    Eder Horan is an extremely pleasant 81 year old year old male patient here today for hx of melanoma and non-melanoma skin cancer.  HE denies any new or changing skin lesions.  Patient reports the following symptoms:  none.  Patient reports the following previous treatments none.  These treatments did not work.  Patient reports the following modifying factors none.  Associated symptoms: none.  Patient has no other skin complaints today.  Remainder of the HPI, Meds, PMH, Allergies, FH, and SH was reviewed in chart.      Past Medical History:   Diagnosis Date     Actinic keratosis 6/09    face     ALLERGIC RHINITIS       Atrial fibrillation (H) 6/08     B12 deficiency 1/16/2019     Back pain     s/p LESI through ortho Feb 2010     Basal cell carcinoma      Bradycardia      Chronotropic incompetence with sinus node dysfunction (H)      COPD (chronic obstructive pulmonary disease) (H)      DVT      Left calf 2003. Right calf 6/06     Embolic stroke (H) 06/11/2017    superior cerebellar artery occlusion. Hx A fib     Hyperlipidemia LDL goal <130 5/10/2011     Hypertension      HYPOTHYROIDISM      hypothyroidism     Lateral epicondylitis  of elbow 7/05    left     Malignant melanoma (H)      PLANTAR FASCITIS      Prostate cancer (H) 6/09    on Lupron therapy     Raynaud's disease without gangrene 11/26/2018     Squamous cell carcinoma  Sept 2014     right lower lid, s/p MOHS     Syncope      TMJ (temporomandibular joint syndrome) 4/12/2012     Tobacco use disorder        Past Surgical History:   Procedure Laterality Date     ABDOMEN SURGERY  2004,06,09    Hernias (2) Prostate Removal(2009)     BIOPSY  2009    Prostate     CARDIOVERSION  10-16-08    failed      COLONOSCOPY       EYE SURGERY       lump from lower  left eye lid removed     GENITOURINARY SURGERY      Prostate     HERNIA REPAIR   -      [facility] NONSPECIFIC PROCEDURE      Left groin exploration and left inguinal herniorrhaphy     [facility] NONSPECIFIC PROCEDURE      Prostate bx (benign)     [facility] NONSPECIFIC PROCEDURE      Wide local excision (left side), robotic-assisted laparoscopic prostatectomy and   Left pelvic lymph node dissection     [facility] NONSPECIFIC PROCEDURE  2010    Right inguinal herniorrhaphy with mesh        Family History   Problem Relation Age of Onset     C.A.D. Father         heart attack- angina     Coronary Artery Disease Father      Myocardial Infarction Father      Cancer Mother         lung     Lung Cancer Mother      Lung Cancer Sister      Unknown/Adopted Maternal Grandmother      Unknown/Adopted Maternal Grandfather      Unknown/Adopted Paternal Grandmother      Unknown/Adopted Paternal Grandfather      Unknown/Adopted Sister        Social History     Socioeconomic History     Marital status:      Spouse name: Not on file     Number of children: 3     Years of education: Not on file     Highest education level: Not on file   Occupational History     Occupation: [employer]     Employer: [employer]     Employer: [employer]   Tobacco Use     Smoking status: Former Smoker     Types: Cigars, Pipe     Quit date: 2006     Years since quittin.6     Smokeless tobacco: Never Used     Tobacco comment:  occasionally cigar   Substance and Sexual Activity     Alcohol use: Yes     Alcohol/week: 0.0 standard drinks     Comment: 2-3 drinks a day:  Wine/Beer     Drug use: No     Sexual activity: Never   Other Topics Concern     Parent/sibling w/ CABG, MI or angioplasty before 65F 55M? No      Service Not Asked     Blood Transfusions Not Asked     Caffeine Concern No     Comment: 2-3 cups of coffee a day     Occupational Exposure Not Asked     Hobby Hazards Not  Asked     Sleep Concern No     Stress Concern No     Weight Concern No     Special Diet No     Back Care Not Asked     Exercise No     Bike Helmet Not Asked     Seat Belt Yes     Self-Exams Not Asked   Social History Narrative     Not on file     Social Determinants of Health     Financial Resource Strain:      Difficulty of Paying Living Expenses:    Food Insecurity:      Worried About Running Out of Food in the Last Year:      Ran Out of Food in the Last Year:    Transportation Needs:      Lack of Transportation (Medical):      Lack of Transportation (Non-Medical):    Physical Activity:      Days of Exercise per Week:      Minutes of Exercise per Session:    Stress:      Feeling of Stress :    Social Connections:      Frequency of Communication with Friends and Family:      Frequency of Social Gatherings with Friends and Family:      Attends Church Services:      Active Member of Clubs or Organizations:      Attends Club or Organization Meetings:      Marital Status:    Intimate Partner Violence:      Fear of Current or Ex-Partner:      Emotionally Abused:      Physically Abused:      Sexually Abused:        Outpatient Encounter Medications as of 7/22/2021   Medication Sig Dispense Refill     albuterol (PROAIR HFA) 108 (90 Base) MCG/ACT inhaler INHALE 2 PUFFS INTO THE LUNGS EVERY 4 HOURS AS NEEDED FOR SHORTNESS OF BREATH / DYSPNEA 17 g 11     amLODIPine (NORVASC) 5 MG tablet Take 1 tablet (5 mg) by mouth daily 90 tablet 0     apixaban ANTICOAGULANT (ELIQUIS ANTICOAGULANT) 5 MG tablet TAKE ONE TABLET BY MOUTH TWICE DAILY. 180 tablet 3     beclomethasone HFA (QVAR REDIHALER) 40 MCG/ACT inhaler Inhale 2 puffs into the lungs daily 31.8 g 3     flecainide (TAMBOCOR) 150 MG tablet 1/2 tablet once daily 45 tablet 0     fluocinonide (LIDEX) 0.05 % external cream Apply topically 2 times daily 120 g 3     levothyroxine (SYNTHROID/LEVOTHROID) 125 MCG tablet Take 1 tablet (125 mcg) by mouth daily 90 tablet 3      PREDNISOLONE ACETATE OP Apply to eye daily as needed       valACYclovir HCl (VALTREX PO)        vitamin B-12 (CYANOCOBALAMIN) 2500 MCG sublingual tablet Take 2,500 mcg by mouth daily       fluocinonide (LIDEX) 0.05 % cream Apply topically 2 times daily 120 g 3     No facility-administered encounter medications on file as of 7/22/2021.             O:   NAD, WDWN, Alert & Oriented, Mood & Affect wnl, Vitals stable   Here today alone   /69   Pulse 57   SpO2 97%    General appearance normal   Vitals stable   Alert, oriented and in no acute distress      Following lymph nodes palpated: Occipital, Cervical, Supraclavicular no lad   Pigmented macule son trunk and ext with regular borders and pigment networks      Stuck on papules and brown macules on trunk and ext   Red papules on trunk  Flesh colored papules on trunk     The remainder of the full exam was normal; the following areas were examined:  conjunctiva/lids, oral mucosa, neck, peripheral vascular system, abdomen, lymph nodes, digits/nails, eccrine and apocrine glands, scalp/hair, face, neck, chest, abdomen, buttocks, back, RUE, LUE, RLE, LLE       Eyes: Conjunctivae/lids:Normal     ENT: Lips, buccal mucosa, tongue: normal    MSK:Normal    Cardiovascular: peripheral edema none    Pulm: Breathing Normal    Lymph Nodes: No Head and Neck Lymphadenopathy     Neuro/Psych: Orientation:Alert and Orientedx3 ; Mood/Affect:normal       A/P:  1. Seborrheic keratosis, lentigo, angioma, dermal nevus, nevi, hx of non-melanoma skin cancer, hx of melanoma  It was a pleasure speaking to Eder Horan today.  Previous clinic notes and pertinent laboratory tests were reviewed prior to Eder Horan's visit.  Signs and Symptoms of skin cancer discussed with patient.  Patient encouraged to perform monthly skin exams.  UV precautions reviewed with patient.  Risks of non-melanoma skin cancer discussed with patient   Return to clinic 6 months        Again, thank you for allowing me  to participate in the care of your patient.        Sincerely,        Reece Daniels MD

## 2021-07-22 NOTE — PROGRESS NOTES
Eder Horan is an extremely pleasant 81 year old year old male patient here today for hx of melanoma and non-melanoma skin cancer.  He notes growth on left neck.  Tender.  Patient reports the following symptoms:  Growing and tender. Patient reports the following previous treatments none.  These treatments did not work.  Patient reports the following modifying factors none.  Associated symptoms: none.  Patient has no other skin complaints today.  Remainder of the HPI, Meds, PMH, Allergies, FH, and SH was reviewed in chart.      Past Medical History:   Diagnosis Date     Actinic keratosis 6/09    face     ALLERGIC RHINITIS       Atrial fibrillation (H) 6/08     B12 deficiency 1/16/2019     Back pain     s/p LESI through ortho Feb 2010     Basal cell carcinoma      Bradycardia      Chronotropic incompetence with sinus node dysfunction (H)      COPD (chronic obstructive pulmonary disease) (H)      DVT      Left calf 2003. Right calf 6/06     Embolic stroke (H) 06/11/2017    superior cerebellar artery occlusion. Hx A fib     Hyperlipidemia LDL goal <130 5/10/2011     Hypertension      HYPOTHYROIDISM      hypothyroidism     Lateral epicondylitis  of elbow 7/05    left     Malignant melanoma (H)      PLANTAR FASCITIS      Prostate cancer (H) 6/09    on Lupron therapy     Raynaud's disease without gangrene 11/26/2018     Squamous cell carcinoma  Sept 2014     right lower lid, s/p MOHS     Syncope      TMJ (temporomandibular joint syndrome) 4/12/2012     Tobacco use disorder        Past Surgical History:   Procedure Laterality Date     ABDOMEN SURGERY  2004,06,09    Hernias (2) Prostate Removal(2009)     BIOPSY  2009    Prostate     CARDIOVERSION  10-16-08    failed     COLONOSCOPY  2004     EYE SURGERY      2013 lump from lower  left eye lid removed     GENITOURINARY SURGERY  2009    Prostate     HERNIA REPAIR  2004 - 2006     [facility] NONSPECIFIC PROCEDURE  1/07    Left groin exploration and left inguinal herniorrhaphy     [facility]  NONSPECIFIC PROCEDURE      Prostate bx (benign)     [facility] NONSPECIFIC PROCEDURE      Wide local excision (left side), robotic-assisted laparoscopic prostatectomy and   Left pelvic lymph node dissection     [facility] [place] NONSPECIFIC PROCEDURE  2010    Right inguinal herniorrhaphy with mesh        Family History   Problem Relation Age of Onset     C.A.D. Father         heart attack- angina     Coronary Artery Disease Father      Myocardial Infarction Father      Cancer Mother         lung     Lung Cancer Mother      Lung Cancer Sister      Unknown/Adopted Maternal Grandmother      Unknown/Adopted Maternal Grandfather      Unknown/Adopted Paternal Grandmother      Unknown/Adopted Paternal Grandfather      Unknown/Adopted Sister        Social History     Socioeconomic History     Marital status:      Spouse name: Not on file     Number of children: 3     Years of education: Not on file     Highest education level: Not on file   Occupational History     Occupation: [employer]     Employer: [employer]     Employer: [employer]   Tobacco Use     Smoking status: Former Smoker     Types: Cigars, Pipe     Quit date: 2006     Years since quittin.6     Smokeless tobacco: Never Used     Tobacco comment:  occasionally cigar   Substance and Sexual Activity     Alcohol use: Yes     Alcohol/week: 0.0 standard drinks     Comment: 2-3 drinks a day:  Wine/Beer     Drug use: No     Sexual activity: Never   Other Topics Concern     Parent/sibling w/ CABG, MI or angioplasty before 65F 55M? No      Service Not Asked     Blood Transfusions Not Asked     Caffeine Concern No     Comment: 2-3 cups of coffee a day     Occupational Exposure Not Asked     Hobby Hazards Not Asked     Sleep Concern No     Stress Concern No     Weight Concern No     Special Diet No     Back Care Not Asked     Exercise No     Bike Helmet Not Asked     Seat Belt Yes     Self-Exams Not Asked   Social History Narrative     Not on file     Social  Determinants of Health     Financial Resource Strain:      Difficulty of Paying Living Expenses:    Food Insecurity:      Worried About Running Out of Food in the Last Year:      Ran Out of Food in the Last Year:    Transportation Needs:      Lack of Transportation (Medical):      Lack of Transportation (Non-Medical):    Physical Activity:      Days of Exercise per Week:      Minutes of Exercise per Session:    Stress:      Feeling of Stress :    Social Connections:      Frequency of Communication with Friends and Family:      Frequency of Social Gatherings with Friends and Family:      Attends Congregational Services:      Active Member of Clubs or Organizations:      Attends Club or Organization Meetings:      Marital Status:    Intimate Partner Violence:      Fear of Current or Ex-Partner:      Emotionally Abused:      Physically Abused:      Sexually Abused:        Outpatient Encounter Medications as of 7/22/2021   Medication Sig Dispense Refill     albuterol (PROAIR HFA) 108 (90 Base) MCG/ACT inhaler INHALE 2 PUFFS INTO THE LUNGS EVERY 4 HOURS AS NEEDED FOR SHORTNESS OF BREATH / DYSPNEA 17 g 11     amLODIPine (NORVASC) 5 MG tablet Take 1 tablet (5 mg) by mouth daily 90 tablet 0     apixaban ANTICOAGULANT (ELIQUIS ANTICOAGULANT) 5 MG tablet TAKE ONE TABLET BY MOUTH TWICE DAILY. 180 tablet 3     beclomethasone HFA (QVAR REDIHALER) 40 MCG/ACT inhaler Inhale 2 puffs into the lungs daily 31.8 g 3     flecainide (TAMBOCOR) 150 MG tablet 1/2 tablet once daily 45 tablet 0     fluocinonide (LIDEX) 0.05 % external cream Apply topically 2 times daily 120 g 3     levothyroxine (SYNTHROID/LEVOTHROID) 125 MCG tablet Take 1 tablet (125 mcg) by mouth daily 90 tablet 3     PREDNISOLONE ACETATE OP Apply to eye daily as needed       valACYclovir HCl (VALTREX PO)        vitamin B-12 (CYANOCOBALAMIN) 2500 MCG sublingual tablet Take 2,500 mcg by mouth daily       fluocinonide (LIDEX) 0.05 % cream Apply topically 2 times daily 120 g 3      No facility-administered encounter medications on file as of 7/22/2021.             O:   NAD, WDWN, Alert & Oriented, Mood & Affect wnl, Vitals stable   Here today alone   /69   Pulse 57   SpO2 97%    General appearance normal   Vitals stable   Alert, oriented and in no acute distress      Following lymph nodes palpated: Occipital, Cervical, Supraclavicular no lad   Pigmented macule son trunk and ext with regular borders and pigment networks      Stuck on papules and brown macules on trunk and ext   Red papules on trunk  Flesh colored papules on trunk  L neck 1.3 cm keratotic nodule      The remainder of the full exam was normal; the following areas were examined:  conjunctiva/lids, oral mucosa, neck, peripheral vascular system, abdomen, lymph nodes, digits/nails, eccrine and apocrine glands, scalp/hair, face, neck, chest, abdomen, buttocks, back, RUE, LUE, RLE, LLE       Eyes: Conjunctivae/lids:Normal     ENT: Lips, buccal mucosa, tongue: normal    MSK:Normal    Cardiovascular: peripheral edema none    Pulm: Breathing Normal    Lymph Nodes: No Head and Neck Lymphadenopathy     Neuro/Psych: Orientation:Alert and Orientedx3 ; Mood/Affect:normal MICRO:  MICRO:  L neck :There is a proliferation of irregular nests of abnormal squamous cells arising from the epidermis and invading the dermis. These are well differentiated. The dermis shows a variable superficial perivascular inflammatory infiltrate. 5mm depth     A/P:  1. Seborrheic keratosis, lentigo, angioma, dermal nevus, nevi, hx of non-melanoma skin cancer, hx of melanoma  2. L neck r/o squamous cell carcinoma   Incisional IN HOUSE:  After consent, anesthesia with LEC and prep, incision excision performed and dx above confirmed with frozen section histology.  No complications and routine wound care.  Patient is not on  anticoagulants and risk of bleeding discussed with patient.       I have personally reviewed all specimens and/or slides and used them  with my medical judgement to determine or confirm the final diagnosis.     Patient told result squamous cell carcinoma .      It was a pleasure speaking to Eder AYALA Horan today.  Previous clinic notes and pertinent laboratory tests were reviewed prior to Eder Horan's visit.  Signs and Symptoms of skin cancer discussed with patient.  Patient encouraged to perform monthly skin exams.  UV precautions reviewed with patient.  Risks of non-melanoma skin cancer discussed with patient   Return to clinic 6 months    PROCEDURE NOTE  L neck squamous cell carcinoma   MOHS:   Location    The rationale for Mohs surgery was discussed with the patient and consent was obtained.  The risks and benefits as well as alternatives to therapy were discussed, in detail.  Specifically, the risks of infection, scarring, bleeding, prolonged wound healing, incomplete removal, allergy to anesthesia, nerve injury and recurrence were addressed.  Indication for Mohs was Location. Prior to the procedure, the treatment site was clearly identified and, if available, confirmed with previous photos and confirmed by the patient   All components of the Universal Protocol/PAUSE rule were completed.  The Mohs surgeon operated in two distinct and integrated capacities as the surgeon and pathologist.      The area was prepped with Betasept.  A rim of normal appearing skin was marked circumferentially around the lesion.  The area was infiltrated with local anesthesia.  The tumor was first debulked to remove all clinically apparent tumor.  An incision following the standard Mohs approach was done and the specimen was oriented,mapped and placed in 1 block(s).  Each specimen was then chromacoded and processed in the Mohs laboratory using standard Mohs technique and submitted for frozen section histology.  Frozen section analysis showed no residual tumor but CLEAR MARGINS.      The tumor was excised using standard Mohs technique in 1 stages(s).  CLEAR MARGINS  OBTAINED and Final defect size was 1.9 cm.     We discussed the options for wound management in full with the patient including risks/benefits/ possible outcomes.        REPAIR COMPLEX: Because of the tightness of the surrounding skin and Because of the size and full thickness nature of the defect, expsoure of sternocleidomastoid, a complex closure was planned. After LEC anesthesia and prep, Burow's triangles were excised in the relaxed skin tension lines. The wound edges were widely undermined by dissection in the subcutaneous plane until adequate tissue mobility was obtained. A tension reductyion suture was palced.  Hemostasis was obtained. The wound edges were closed in a layered fashion using Vicryl and Fast Absorbing Plain Gut sutures. Postoperative length was 4.2 cm.   EBL minimal; complications none; wound care routine.  The patient was discharged in good condition and will return in one week for wound evaluation.

## 2021-07-28 ENCOUNTER — ALLIED HEALTH/NURSE VISIT (OUTPATIENT)
Dept: DERMATOLOGY | Facility: CLINIC | Age: 81
End: 2021-07-28
Payer: COMMERCIAL

## 2021-07-28 DIAGNOSIS — Z48.01 ENCOUNTER FOR CHANGE OR REMOVAL OF SURGICAL WOUND DRESSING: Primary | ICD-10-CM

## 2021-07-28 PROCEDURE — 99207 PR NO CHARGE NURSE ONLY: CPT

## 2021-07-28 NOTE — NURSING NOTE
Pt returned to clinic for post surgery 1 week follow up bandage change. Pt has no complaints, denies pain. Bandage removed from left neck, area cleansed with normal saline. Site is healing and wound edges approximating well. Reapplied new steri strips and paper tape.    Advised to watch for signs/sx of infection; spreading redness, drainage, odor, fever. Call or report promptly to clinic. Pt given written instructions and informed to rtc as needed. Patient verbalized understanding.     SERGEY Olea-BSN-PHN  [facility]  [phone]

## 2021-07-28 NOTE — PATIENT INSTRUCTIONS

## 2021-07-30 ENCOUNTER — TELEPHONE (OUTPATIENT)
Dept: CARDIOLOGY | Facility: CLINIC | Age: 81
End: 2021-07-30

## 2021-07-30 DIAGNOSIS — I48.0 PAROXYSMAL ATRIAL FIBRILLATION (H): Primary | ICD-10-CM

## 2021-07-30 RX ORDER — METOPROLOL SUCCINATE 25 MG/1
25 TABLET, EXTENDED RELEASE ORAL DAILY
Qty: 90 TABLET | Refills: 1 | Status: SHIPPED | OUTPATIENT
Start: 2021-07-30 | End: 2022-01-17

## 2021-07-30 NOTE — TELEPHONE ENCOUNTER
7/30/21 Msg recd from Dr Hawkins  This cardiac monitor showed substantial AF burden, 21% (with occasional RVR) despite flecainide 75 mg once daily.  This very low-dose flecainide is no longer working to keep him out of AF.  At the same time, the patient does not appear to have significant symptoms from AF at this point.   He has moderate sinus bradycardia, predominantly in the early a.m. hours, while asleep.     Please stop flecainide and start Toprol-XL 25 mg every morning.     Spoke w pt and explained results and recommendations. Pt requests refill be sent to [facility] in [place]. Instructed to monitor HR 1-2 x/day over the next few weeks and call if HR < 50 or > 100 consistently or if pt feels unwell on new medication.  .Pt voiced understanding and agreement with plan.   [address] pm

## 2021-08-10 ENCOUNTER — TELEPHONE (OUTPATIENT)
Dept: INTERNAL MEDICINE | Facility: CLINIC | Age: 81
End: 2021-08-10

## 2021-08-10 NOTE — TELEPHONE ENCOUNTER
Appointment scheduled    Eileen LEERN BSN  [facility] [place]  [phone]

## 2021-08-10 NOTE — TELEPHONE ENCOUNTER
patient calling to ask If he needs fasting labs prior to appointment- the future orders in chart are .    please advise

## 2021-08-10 NOTE — TELEPHONE ENCOUNTER
Lab orders updated.  Patient has an appointment with me on 8/16/2021.  Please try to schedule patient to have a fasting lab done between now and then so can review results of labs with patient when he is seen by me

## 2021-08-11 ENCOUNTER — OFFICE VISIT (OUTPATIENT)
Dept: URGENT CARE | Facility: URGENT CARE | Age: 81
End: 2021-08-11
Payer: COMMERCIAL

## 2021-08-11 ENCOUNTER — ANCILLARY PROCEDURE (OUTPATIENT)
Dept: GENERAL RADIOLOGY | Facility: CLINIC | Age: 81
End: 2021-08-11
Attending: PHYSICIAN ASSISTANT
Payer: COMMERCIAL

## 2021-08-11 VITALS
BODY MASS INDEX: 24.37 KG/M2 | DIASTOLIC BLOOD PRESSURE: 83 MMHG | OXYGEN SATURATION: 95 % | SYSTOLIC BLOOD PRESSURE: 125 MMHG | TEMPERATURE: 99.4 F | HEART RATE: 78 BPM | WEIGHT: 165 LBS

## 2021-08-11 DIAGNOSIS — R50.9 FEVER AND CHILLS: ICD-10-CM

## 2021-08-11 DIAGNOSIS — R05.9 COUGH: Primary | ICD-10-CM

## 2021-08-11 DIAGNOSIS — R06.2 WHEEZING: ICD-10-CM

## 2021-08-11 DIAGNOSIS — R07.0 THROAT PAIN: ICD-10-CM

## 2021-08-11 DIAGNOSIS — J44.1 COPD EXACERBATION (H): ICD-10-CM

## 2021-08-11 LAB — DEPRECATED S PYO AG THROAT QL EIA: NEGATIVE

## 2021-08-11 PROCEDURE — 87651 STREP A DNA AMP PROBE: CPT | Performed by: PHYSICIAN ASSISTANT

## 2021-08-11 PROCEDURE — 94640 AIRWAY INHALATION TREATMENT: CPT | Performed by: PHYSICIAN ASSISTANT

## 2021-08-11 PROCEDURE — 71046 X-RAY EXAM CHEST 2 VIEWS: CPT | Performed by: RADIOLOGY

## 2021-08-11 PROCEDURE — U0005 INFEC AGEN DETEC AMPLI PROBE: HCPCS | Performed by: PHYSICIAN ASSISTANT

## 2021-08-11 PROCEDURE — 99214 OFFICE O/P EST MOD 30 MIN: CPT | Mod: 25 | Performed by: PHYSICIAN ASSISTANT

## 2021-08-11 PROCEDURE — U0003 INFECTIOUS AGENT DETECTION BY NUCLEIC ACID (DNA OR RNA); SEVERE ACUTE RESPIRATORY SYNDROME CORONAVIRUS 2 (SARS-COV-2) (CORONAVIRUS DISEASE [COVID-19]), AMPLIFIED PROBE TECHNIQUE, MAKING USE OF HIGH THROUGHPUT TECHNOLOGIES AS DESCRIBED BY CMS-2020-01-R: HCPCS | Performed by: PHYSICIAN ASSISTANT

## 2021-08-11 RX ORDER — PREDNISONE 20 MG/1
20 TABLET ORAL DAILY
Qty: 5 TABLET | Refills: 0 | Status: SHIPPED | OUTPATIENT
Start: 2021-08-11 | End: 2021-08-16

## 2021-08-11 RX ORDER — IPRATROPIUM BROMIDE AND ALBUTEROL SULFATE 2.5; .5 MG/3ML; MG/3ML
3 SOLUTION RESPIRATORY (INHALATION) ONCE
Status: COMPLETED | OUTPATIENT
Start: 2021-08-11 | End: 2021-08-11

## 2021-08-11 RX ADMIN — IPRATROPIUM BROMIDE AND ALBUTEROL SULFATE 3 ML: 2.5; .5 SOLUTION RESPIRATORY (INHALATION) at 18:00

## 2021-08-11 NOTE — PROGRESS NOTES
Patient presents with:  Pharyngitis: started yesterday  Cough  Nasal Congestion      (J44.1) COPD exacerbation (H)  Comment:   Plan: amoxicillin-clavulanate (AUGMENTIN) 875-125 MG         tablet, predniSONE (DELTASONE) 20 MG tablet          Follow covid isolation guidelines    (R05) Cough  (primary encounter diagnosis)  Comment:   Plan: XR Chest 2 Views, Respiratory Panel PCR - NP         Swab, amoxicillin-clavulanate (AUGMENTIN)         875-125 MG tablet,       (R07.0) Throat pain  Comment:   Plan: Symptomatic COVID-19 Virus (Coronavirus) by PCR        Nasopharyngeal, Streptococcus A Rapid Scr w         Reflx to PCR - Lab Collect, Group A         Streptococcus PCR Throat Swab, CANCELED: RSV         rapid antigen            (R50.9) Fever and chills  Comment:   Plan: XR Chest 2 Views, amoxicillin-clavulanate         (AUGMENTIN) 875-125 MG tablet, CANCELED: RSV         rapid antigen            (R06.2) Wheezing  Comment:   Plan: Respiratory Panel PCR - NP Swab, ipratropium -         albuterol 0.5 mg/2.5 mg/3 mL (DUONEB) neb         solution 3 mL, predniSONE (DELTASONE) 20 MG         tablet, CANCELED: RSV rapid antigen          SUBJECTIVE:   Eder Horan is a 81 year old male who presents today with fever and throat pain since yesterday.    Cough started yesterday.  [place] at a large [employer] [place] yesterday that was very busy (inside) and had runny nose.    Covid vaccine complete 3/28/21    Past Medical History:   Diagnosis Date     Actinic keratosis 6/09    face     ALLERGIC RHINITIS       Atrial fibrillation (H) 6/08     B12 deficiency 1/16/2019     Back pain     s/p LESI through ortho Feb 2010     Basal cell carcinoma      Bradycardia      Chronotropic incompetence with sinus node dysfunction (H)      COPD (chronic obstructive pulmonary disease) (H)      DVT      Left calf 2003. Right calf 6/06     Embolic stroke (H) 06/11/2017    superior cerebellar artery occlusion. Hx A fib     Hyperlipidemia LDL goal <130 5/10/2011      Hypertension      HYPOTHYROIDISM      hypothyroidism     Lateral epicondylitis  of elbow 7/05    left     Malignant melanoma (H)      PLANTAR FASCITIS      Prostate cancer (H) 6/09    on Lupron therapy     Raynaud's disease without gangrene 11/26/2018     Squamous cell carcinoma  Sept 2014     right lower lid, s/p MOHS     Syncope      TMJ (temporomandibular joint syndrome) 4/12/2012     Tobacco use disorder          Current Outpatient Medications   Medication Sig Dispense Refill     Multiple Vitamins-Iron (DAILY-HAZEL/IRON/BETA-CAROTENE) TABS TAKE 1 TABLET BY MOUTH DAILY. (Patient not taking: Reported on 10/19/2020) 30 tablet 7     Social History     Tobacco Use     Smoking status: Never Smoker     Smokeless tobacco: Never Used   Substance Use Topics     Alcohol use: Not on file     Family History   Problem Relation Age of Onset     Diabetes Mother      Diabetes Father          ROS:    10 point ROS of systems including Constitutional, Eyes, Respiratory, Cardiovascular, Gastroenterology, Genitourinary, Integumentary, Muscularskeletal, Psychiatric ,neurological were all negative except for pertinent positives noted in my HPI       OBJECTIVE:  /83 (BP Location: Right arm, Patient Position: Sitting, Cuff Size: Adult Regular)   Pulse 110   Temp 99.4  F (37.4  C) (Oral)   Wt 74.8 kg (165 lb)   SpO2 92%   BMI 24.37 kg/m      Physical Exam:  GENERAL APPEARANCE: healthy, alert and no distress  EYES: EOMI,  PERRL, conjunctiva clear  HENT: ear canals and TM's normal.  Nose and mouth without ulcers, erythema or lesions  NECK: supple, nontender, no lymphadenopathy  RESP: wheezing throughout which improves but does not clear with neb in clinic  Pulse ox improves from 92% to 96%  CV: regular rates and rhythm, normal S1 S2, no murmur noted  ABDOMEN:  soft, nontender, no HSM or masses and bowel sounds normal  NEURO: Normal strength and tone, sensory exam grossly normal,  normal speech and mentation  SKIN: no  suspicious lesions or rashes

## 2021-08-11 NOTE — PATIENT INSTRUCTIONS
"    (J44.1) COPD exacerbation (H)  Comment:   Plan: amoxicillin-clavulanate (AUGMENTIN) 875-125 MG         tablet, predniSONE (DELTASONE) 20 MG tablet          Follow covid isolation guidelines    (R05) Cough  (primary encounter diagnosis)  Comment:   Plan: XR Chest 2 Views, Respiratory Panel PCR - NP         Swab, amoxicillin-clavulanate (AUGMENTIN)         875-125 MG tablet,       (R07.0) Throat pain  Comment:   Plan: Symptomatic COVID-19 Virus (Coronavirus) by PCR        Nasopharyngeal, Streptococcus A Rapid Scr w         Reflx to PCR - Lab Collect, Group A         Streptococcus PCR Throat Swab, CANCELED: RSV         rapid antigen            (R50.9) Fever and chills  Comment:   Plan: XR Chest 2 Views, amoxicillin-clavulanate         (AUGMENTIN) 875-125 MG tablet, CANCELED: RSV         rapid antigen            (R06.2) Wheezing  Comment:   Plan: Respiratory Panel PCR - NP Swab, ipratropium -         albuterol 0.5 mg/2.5 mg/3 mL (DUONEB) neb         solution 3 mL, predniSONE (DELTASONE) 20 MG         tablet, CANCELED: RSV rapid antigen            Patient Education   After Your COVID-19 (Coronavirus) Test  You have been tested for COVID-19 (coronavirus).   If you'll have surgery in the next few days, we'll let you know ahead of time if you have the virus. Please call your surgeon's office with any questions.  For all other patients: Results are usually available in [place] within 2 to 3 days.   If you do not have a [employer] [facility] account, you'll get a letter in the mail in about 7 to 10 days.   [place] is often the fastest way to get test results. Please sign up if you do not already have a [employer] [facility] account. See the handout Getting COVID-19 Test Results in [place] for help.  What if my test result is positive?  If your test is positive and you have not viewed your result in [place], you'll get a phone call with your result. (A positive test means that you have the virus.)     Follow the tips under \"How do I self-isolate?\" " "below for 10 days (20 days if you have a weak immune system).    You don't need to be retested for COVID-19 before going back to school or work. As long as you're fever-free and feeling better, you can go back to school, work and other activities after waiting the 10 or 20 days.  What if I have questions after I get my results?  If you have questions about your results, please visit our testing website at [email or web address].   After 7 to 10 days, if you have not gotten your results:     Call [phone] ([phone]) and ask to speak with our COVID-19 results team.    If you're being treated at an infusion center: Call your infusion center directly.  What are the symptoms of COVID-19?  Cough, fever and trouble breathing are the most common signs of COVID-19.  Other symptoms can include new headaches, new muscle or body aches, new and unexplained fatigue (feeling very tired), chills, sore throat, congestion (stuffy or runny nose), diarrhea (loose poop), loss of taste or smell, belly pain, and nausea or vomiting (feeling sick to your stomach or throwing up).  You may already have symptoms of COVID-19, or they may show up later.  What should I do if I have symptoms?  If you're having surgery: Call your surgeon's office.  For all other patients: Stay home and away from others (self-isolate) until ...    You've had no fever--and no medicine that reduces fever--for 1 full day (24 hours), AND    Other symptoms have gotten better. For example, your cough or breathing has improved, AND    At least 10 days have passed since your symptoms first started.  How do I self-isolate?    Stay in your own room, even for meals. Use your own bathroom if you can.    Stay away from others in your home. No hugging, kissing or shaking hands. No visitors.    Don't go to work, school or anywhere else.    Clean \"high touch\" surfaces often (doorknobs, counters, handles). Use household cleaning spray or " wipes. You'll find a full list of  on the EPA website: www.epa.gov/pesticide-registration/list-n-disinfectants-use-against-sars-cov-2.    Cover your mouth and nose with a mask or other face covering to avoid spreading germs.    Wash your hands and face often. Use soap and water.    Caregivers in these groups are at risk for severe illness due to COVID-19:  ? People 65 years and older  ? People who live in a nursing home or long-term care facility  ? People with chronic disease (lung, heart, cancer, diabetes, kidney, liver, immunologic)  ? People who have a weakened immune system, including those who:    Are in cancer treatment    Take medicine that weakens the immune system, such as corticosteroids    Had a bone marrow or organ transplant    Have an immune deficiency    Have poorly controlled HIV or AIDS    Are obese (body mass index of 40 or higher)    Smoke regularly    Caregivers should wear gloves while washing dishes, handling laundry and cleaning bedrooms and bathrooms.    Use caution when washing and drying laundry: Don't shake dirty laundry and use the warmest water setting that you can.    For more tips on managing your health at home, go to www.cdc.gov/coronavirus/2019-ncov/downloads/10Things.pdf.  How can I take care of myself at home?  1. Get lots of rest. Drink extra fluids (unless a doctor has told you not to).  2. Take Tylenol (acetaminophen) for fever or pain. If you have liver or kidney problems, ask your family doctor if it's OK to take Tylenol.   Adults can take either:  ? 650 mg (two 325 mg pills) every 4 to 6 hours, or   ? 1,000 mg (two 500 mg pills) every 8 hours as needed.  ? Note: Don't take more than 3,000 mg in one day. Acetaminophen is found in many medicines (both prescribed and over-the-counter medicines). Read all labels to be sure you don't take too much.   For children, check the Tylenol bottle for the right dose. The dose is based on the child's age or weight.  3. If you have  other health problems (like cancer, heart failure, an organ transplant or severe kidney disease): Call your specialty clinic if you don't feel better in the next 2 days.  4. Know when to call 911. Emergency warning signs include:  ? Trouble breathing or shortness of breath  ? Chest pain or pressure that doesn't go away  ? Feeling confused like you haven't felt before, or not being able to wake up  ? Bluish-colored lips or face  5. If your doctor prescribed a blood thinner medicine: Follow their instructions.  Where can I get more information?    [facility] - About COVID-19:   [email or web address]    CDC - If You're Sick: cdc.gov/coronavirus/2019-ncov/about/steps-when-sick.html    CDC - Ending Home Isolation: www.cdc.gov/coronavirus/2019-ncov/hcp/disposition-in-home-patients.html    CDC - Caring for Someone: www.cdc.gov/coronavirus/2019-ncov/if-you-are-sick/care-for-someone.html    [facility] - Interim Guidance for Hospital Discharge to Home: www.[facility].[place]/diseases/coronavirus/hcp/hospdischarge.pdf    [facility] clinical trials (COVID-19 research studies): [email or web address]-clinical-trials    Below are the COVID-19 hotlines at the [place] Department of [facility] ([facility]). Interpreters are available.  ? For health questions: Call 324-954-7133 or [phone] (7 a.m. to 7 p.m.)  ? For questions about schools and childcare: Call 054-975-6647 or 1-520.404.4094 (7 a.m. to 7 p.m.)    For informational purposes only. Not to replace the advice of your health care provider. Clinically reviewed by Infection Prevention and the [facility] COVID-19 Clinical Team. Copyright   2020 [employer]. All rights reserved. [employer] [id number] - Rev 11/11/20.       Patient Education     COPD Flare-Up    You have had a flare-up of your COPD.  COPD (chronic obstructive pulmonary disease) is a common lung disease. It causes your airways to get irritated and narrower. This makes it  harder for you to breathe. Emphysema and chronic bronchitis are both types of COPD. This is a long-term (chronic) condition. This means you always have it. Sometimes it gets worse. When this happens, it's called a flare-up.   Symptoms of COPD  People with COPD may have symptoms most of the time. In a flare-up, your symptoms get worse. These symptoms may mean you are having a flare-up:     Shortness of breath, shallow or rapid breathing, or wheezing that gets worse    Lung infection    Cough that gets worse    More mucus (or sputum), thicker mucus, or mucus of a different color    Tiredness, less energy, or trouble doing your normal activities    Fever    Chest tightness    Your symptoms don t get better even when you use your normal medicines, inhalers, and nebulizer    Trouble talking    You feel confused  Causes of flare-ups  Unfortunately, a flare-up can happen even if you did everything right. And even if you followed your healthcare provider s instructions. Some causes of flare-ups are:     Cold weather    Smoking or secondhand smoke    Use of e-cigarettes or vaping products    Colds, the flu, or respiratory infections    Air pollution    Sudden change in the weather    Dust, vapors, gases, irritating chemicals, or strong fumes    Not taking your medicines as prescribed    Indoor pollution such as burning wood, smoke from home cooking, or heating fuels  Home care  Here are some things you can do at home to treat a flare-up:    Keep calm and try not to panic. This makes it harder to breathe, and keeps you from doing the right things.    Don t smoke or be around others who are smoking. If you smoke, quit. Smoking is the main cause of COPD. Quitting will help you be able to better manage your COPD. Don't use e-cigarettes or vaping products either. Ask your healthcare provider about ways to help you quit smoking.    Try to drink more fluids than normal during a flare-up, unless your healthcare provider has told you  not to because of heart and kidney problems. More fluids can help loosen the mucus.    Eat a healthy, balanced diet. This is important to staying as healthy as possible. So is trying to stay at your ideal weight. Being overweight or underweight can affect your health. Make sure you have a lot of fruits and vegetables every day. And also eat balanced portions of whole grains, lean meats and fish, and low-fat dairy products.    Use your inhalers and nebulizer, if you have one, as you have been told to. When using a metered dose inhaler or nebulizer, it's very important to use the proper techniques. If you have any questions about how to use your device, contact your healthcare provider or refer to the user manual.    If you were given antibiotics, take them until they are used up or your provider tells you to stop. It s important to finish the antibiotics, even though you feel better. This will make sure the infection has cleared.    If you were given a steroid, finish it even if you feel better.    Learn the names of your medicines, as well as how and when to use them. Talk with your provider about other conditions you have and their treatment and how it may affect your COPD.    Oxygen may be prescribed if tests show that your blood contains too little oxygen. Ask your provider about long-term oxygen therapy.    Coping tips for shortness of breath include:  ? Exercise. Try to be as active as possible. This will improve energy levels and strengthen your muscles, so you can do more.  ? Breathing methods. Ask your healthcare provider or nurse show you how to do pursed-lip breathing.  ? Balance rest and activity. Each day, try to balance rest periods with activity. For example, you might start the day with getting dressed and eating breakfast. Then you can relax and read the paper. After that, take a brief walk. And then sit with your feet up for a while.  ? Pulmonary rehab (rehabilitation).  Community-based and  home-based programs work as well as hospital-based programs as long as they are as often and as intense. Standard home-based pulmonary rehab programs help shortness of breath in people with COPD. Supervised, traditional pulmonary rehab remains the best option for people with COPD. These programs help with managing your disease, and also help with breathing methods, exercise, support, and counseling. To find one, ask your provider or call your local hospital. Also talk with your healthcare provider about which rehab or self-management program is best for you.  Preventing a flare-up  Flare-ups happen. But the best way to treat one is to prevent it before it starts. Here are some pointers:     Don t smoke or be around others who are smoking. Avoid using e-cigarettes due to their harmful side effects.    Take your medicines as discussed with your healthcare provider.    Talk with your provider about getting a flu shot every year. Also find out if you need a pneumonia shot.    If there is a weather advisory warning to stay indoors, try to stay inside when possible.    Try to eat healthy, exercise, and get plenty of sleep.    Try to stay away from things that normally set you off. These include dust, chemical fumes, hairsprays, or strong perfumes.  Follow-up care  Follow up with your healthcare provider, or as advised.   If a culture was done, you will be told if your treatment needs to be changed. You can call as directed for the results.   If X-rays were done, you will be told of any new findings that may affect your care.   During each appointment, talk with your healthcare provider about your ability to:     [place] in your normal environment    Correctly use inhaler (or your medicine delivery systems)    [place] with other conditions you have and their treatments and how they may affect your COPD  Call 911  Call 911 if any of these occur:     Wheezing or shortness or breath does not get better with treatment    Chest pain  or chest tightness    Feeling lightheaded or dizzy    You have trouble breathing    You feel confused or it s hard to wake you up    You faint or lose consciousness    You have a rapid heart rate    You have new pain in your chest, arm, shoulder, neck, or upper back  When to seek medical advice  Call your healthcare provider right away if any of these occur:    Fever of 100.4 F (38 C) or higher, or as directed by your healthcare provider    Coughing up lots of dark-colored or bloody mucus (sputum)    You don't start to get better within 24 hours    Swelling of your ankles gets worse    Weakness  Darryl last reviewed this educational content on 4/1/2019 2000-2021 [employer]. All rights reserved. This information is not intended as a substitute for professional medical care. Always follow your healthcare professional's instructions.

## 2021-08-11 NOTE — PROGRESS NOTES
Clinic Administered Medication Documentation    Administrations This Visit     ipratropium - albuterol 0.5 mg/2.5 mg/3 mL (DUONEB) neb solution 3 mL     Admin Date  08/11/2021 Action  Given Dose  3 mL Route  Nebulization Site   Administered By  Keaton Ruiz CMA    Ordering Provider: Sheela Guo PA-C    Patient Supplied?: No                Inhalable/Nebs Medication Documentation    Patient was given Ipratropium-Albuterol Neb. Prior to medication administration, verified patients identity using patient s name and date of birth. Please see MAR and medication order for additional information.     Expiration Date:  10/2021

## 2021-08-12 ENCOUNTER — LAB (OUTPATIENT)
Dept: LAB | Facility: CLINIC | Age: 81
End: 2021-08-12
Payer: COMMERCIAL

## 2021-08-12 DIAGNOSIS — E53.8 B12 DEFICIENCY: ICD-10-CM

## 2021-08-12 DIAGNOSIS — E03.9 HYPOTHYROIDISM, UNSPECIFIED TYPE: ICD-10-CM

## 2021-08-12 DIAGNOSIS — Z00.00 MEDICARE ANNUAL WELLNESS VISIT, SUBSEQUENT: ICD-10-CM

## 2021-08-12 LAB
ALBUMIN SERPL-MCNC: 3.4 G/DL (ref 3.4–5)
ALP SERPL-CCNC: 99 U/L (ref 40–150)
ALT SERPL W P-5'-P-CCNC: 18 U/L (ref 0–70)
ANION GAP SERPL CALCULATED.3IONS-SCNC: 6 MMOL/L (ref 3–14)
AST SERPL W P-5'-P-CCNC: 15 U/L (ref 0–45)
BILIRUB SERPL-MCNC: 0.7 MG/DL (ref 0.2–1.3)
BUN SERPL-MCNC: 13 MG/DL (ref 7–30)
CALCIUM SERPL-MCNC: 8.8 MG/DL (ref 8.5–10.1)
CHLORIDE BLD-SCNC: 105 MMOL/L (ref 94–109)
CHOLEST SERPL-MCNC: 181 MG/DL
CO2 SERPL-SCNC: 26 MMOL/L (ref 20–32)
CREAT SERPL-MCNC: 0.86 MG/DL (ref 0.66–1.25)
FASTING STATUS PATIENT QL REPORTED: YES
GFR SERPL CREATININE-BSD FRML MDRD: 81 ML/MIN/1.73M2
GLUCOSE BLD-MCNC: 93 MG/DL (ref 70–99)
GROUP A STREP BY PCR: NOT DETECTED
HDLC SERPL-MCNC: 96 MG/DL
LDLC SERPL CALC-MCNC: 73 MG/DL
NONHDLC SERPL-MCNC: 85 MG/DL
POTASSIUM BLD-SCNC: 3.8 MMOL/L (ref 3.4–5.3)
PROT SERPL-MCNC: 7.7 G/DL (ref 6.8–8.8)
SARS-COV-2 RNA RESP QL NAA+PROBE: NEGATIVE
SODIUM SERPL-SCNC: 137 MMOL/L (ref 133–144)
TRIGL SERPL-MCNC: 59 MG/DL
TSH SERPL DL<=0.005 MIU/L-ACNC: 0.48 MU/L (ref 0.4–4)
VIT B12 SERPL-MCNC: 1783 PG/ML (ref 193–986)

## 2021-08-12 PROCEDURE — 80061 LIPID PANEL: CPT

## 2021-08-12 PROCEDURE — 36415 COLL VENOUS BLD VENIPUNCTURE: CPT

## 2021-08-12 PROCEDURE — 82607 VITAMIN B-12: CPT

## 2021-08-12 PROCEDURE — 84443 ASSAY THYROID STIM HORMONE: CPT

## 2021-08-12 PROCEDURE — 80053 COMPREHEN METABOLIC PANEL: CPT

## 2021-08-16 ENCOUNTER — OFFICE VISIT (OUTPATIENT)
Dept: INTERNAL MEDICINE | Facility: CLINIC | Age: 81
End: 2021-08-16
Payer: COMMERCIAL

## 2021-08-16 VITALS
RESPIRATION RATE: 16 BRPM | WEIGHT: 164 LBS | OXYGEN SATURATION: 99 % | BODY MASS INDEX: 24.29 KG/M2 | TEMPERATURE: 97.1 F | HEIGHT: 69 IN | HEART RATE: 60 BPM | SYSTOLIC BLOOD PRESSURE: 126 MMHG | DIASTOLIC BLOOD PRESSURE: 70 MMHG

## 2021-08-16 DIAGNOSIS — I48.0 PAROXYSMAL ATRIAL FIBRILLATION (H): ICD-10-CM

## 2021-08-16 DIAGNOSIS — E53.8 B12 DEFICIENCY: ICD-10-CM

## 2021-08-16 DIAGNOSIS — I10 BENIGN ESSENTIAL HYPERTENSION: ICD-10-CM

## 2021-08-16 DIAGNOSIS — I73.00 RAYNAUD'S DISEASE WITHOUT GANGRENE: ICD-10-CM

## 2021-08-16 DIAGNOSIS — J06.9 UPPER RESPIRATORY TRACT INFECTION, UNSPECIFIED TYPE: ICD-10-CM

## 2021-08-16 DIAGNOSIS — E03.9 HYPOTHYROIDISM, UNSPECIFIED TYPE: ICD-10-CM

## 2021-08-16 DIAGNOSIS — Z00.00 MEDICARE ANNUAL WELLNESS VISIT, SUBSEQUENT: Primary | ICD-10-CM

## 2021-08-16 DIAGNOSIS — J44.9 CHRONIC OBSTRUCTIVE PULMONARY DISEASE, UNSPECIFIED COPD TYPE (H): ICD-10-CM

## 2021-08-16 DIAGNOSIS — C61 PROSTATE CANCER (H): ICD-10-CM

## 2021-08-16 PROCEDURE — 99397 PER PM REEVAL EST PAT 65+ YR: CPT | Performed by: INTERNAL MEDICINE

## 2021-08-16 PROCEDURE — 99214 OFFICE O/P EST MOD 30 MIN: CPT | Mod: 25 | Performed by: INTERNAL MEDICINE

## 2021-08-16 RX ORDER — ALBUTEROL SULFATE 90 UG/1
AEROSOL, METERED RESPIRATORY (INHALATION)
Qty: 25.5 G | Refills: 3 | Status: SHIPPED | OUTPATIENT
Start: 2021-08-16 | End: 2021-08-16

## 2021-08-16 RX ORDER — LEVOTHYROXINE SODIUM 125 UG/1
125 TABLET ORAL DAILY
Qty: 90 TABLET | Refills: 3 | Status: SHIPPED | OUTPATIENT
Start: 2021-08-16 | End: 2022-02-07 | Stop reason: DRUGHIGH

## 2021-08-16 RX ORDER — AMLODIPINE BESYLATE 5 MG/1
5 TABLET ORAL DAILY
Qty: 90 TABLET | Refills: 3 | Status: SHIPPED | OUTPATIENT
Start: 2021-08-16 | End: 2022-09-30

## 2021-08-16 RX ORDER — BECLOMETHASONE DIPROPIONATE HFA 40 UG/1
2 AEROSOL, METERED RESPIRATORY (INHALATION) DAILY
Qty: 31.8 G | Refills: 3 | Status: SHIPPED | OUTPATIENT
Start: 2021-08-16 | End: 2022-08-28

## 2021-08-16 RX ORDER — CYANOCOBALAMIN (VITAMIN B-12) 2500 MCG
TABLET, SUBLINGUAL SUBLINGUAL
COMMUNITY
Start: 2021-08-16

## 2021-08-16 RX ORDER — ALBUTEROL SULFATE 90 UG/1
AEROSOL, METERED RESPIRATORY (INHALATION)
Qty: 25.5 G | Refills: 3 | Status: SHIPPED | OUTPATIENT
Start: 2021-08-16 | End: 2022-09-16

## 2021-08-16 ASSESSMENT — ENCOUNTER SYMPTOMS
HEMATURIA: 0
HEARTBURN: 0
DIZZINESS: 0
PALPITATIONS: 0
SHORTNESS OF BREATH: 1
HEADACHES: 0
DYSURIA: 0
COUGH: 1
SORE THROAT: 0
NAUSEA: 0
FREQUENCY: 1
ARTHRALGIAS: 0
ABDOMINAL PAIN: 0
FEVER: 0
EYE PAIN: 0
DIFFICULTY URINATING: 1
PARESTHESIAS: 0
WEAKNESS: 1
MYALGIAS: 0
HEMATOCHEZIA: 0
CONSTIPATION: 1
NERVOUS/ANXIOUS: 0
CHILLS: 0
DIARRHEA: 0
JOINT SWELLING: 0

## 2021-08-16 ASSESSMENT — ACTIVITIES OF DAILY LIVING (ADL): CURRENT_FUNCTION: NO ASSISTANCE NEEDED

## 2021-08-16 ASSESSMENT — MIFFLIN-ST. JEOR: SCORE: 1434.28

## 2021-08-16 NOTE — PATIENT INSTRUCTIONS
Continue current meds  Finish prednisone and Augmentin (antibiotic)  Call  [phone] or use [employer] to schedule a future lab appointment  non-fasting in 6 months to recheck thyroid function.   See me a few days later to review results and follow-up on breathing/COPD or earlier if worsening and find needing albuterol every day longterm     Shingrix vaccine coverage for shingles prevention at pharmacy \[facility]\"" or [facility].  This is a 2 shot series done 2-6 months apart  I would recommend you receive an influenza (flu) vaccine  this Fall (October or early November)  Follow-up with Urology and Cardiology per their previous recommendations  Pt was informed regarding extra E&M billing for management of new or established medical issues not related to today's wellness visit

## 2021-08-16 NOTE — PROGRESS NOTES
"SUBJECTIVE:   Eder Horan is a 81 year old male who presents for Preventive Visit and follow-up of COPD, hypertension, atrial flutter, hypothyroidism      Patient has been advised of split billing requirements and indicates understanding: Yes   Are you in the first 12 months of your Medicare coverage?  No    Healthy Habits:     In general, how would you rate your overall health?  Good    Frequency of exercise:  None    Do you usually eat at least 4 servings of fruit and vegetables a day, include whole grains    & fiber and avoid regularly eating high fat or \"junk\" foods?  Yes    Taking medications regularly:  Yes    Medication side effects:  None    Ability to successfully perform activities of daily living:  No assistance needed    Home Safety:  No safety concerns identified    Hearing Impairment:  Difficulty following a conversation in a noisy restaurant or crowded room, difficult to understand a speaker at a public meeting or J[employer] service and need to ask people to speak up or repeat themselves    In the past 6 months, have you been bothered by leaking of urine? Yes    In general, how would you rate your overall mental or emotional health?  Good      PHQ-2 Total Score: 0    Additional concerns today:  Yes    Do you feel safe in your environment? Yes    Have you ever done Advance Care Planning? (For example, a Health Directive, POLST, or a discussion with a medical provider or your loved ones about your wishes): Yes, advance care planning is on file.       Fall risk  Fallen 2 or more times in the past year?: No  Any fall with injury in the past year?: No    Cognitive Screening   1) Repeat 3 items (Leader, Season, Table)    2) Clock draw: NORMAL  3) 3 item recall: Recalls 2 object   Results: NORMAL clock, 2 items recalled: COGNITIVE IMPAIRMENT LESS LIKELY    Mini-CogTM Copyright NICOLE Powers. Licensed by the author for use in [facility]; reprinted with permission (jacqueline@.[email or web address]). All rights reserved.  "     Do you have sleep apnea, excessive snoring or daytime drowsiness?: no    Reviewed and updated as needed this visit by clinical staff   Allergies               Reviewed and updated as needed this visit by Provider                Social History     Tobacco Use     Smoking status: Former Smoker     Types: Cigars, Pipe     Quit date: 2006     Years since quittin.7     Smokeless tobacco: Never Used     Tobacco comment:  occasionally cigar   Substance Use Topics     Alcohol use: Yes     Alcohol/week: 0.0 standard drinks     Comment: 2-3 drinks a day:  Wine/Beer     If you drink alcohol do you typically have >3 drinks per day or >7 drinks per week? Yes      Alcohol Use 2021   Prescreen: >3 drinks/day or >7 drinks/week? Yes   Prescreen: >3 drinks/day or >7 drinks/week? -   AUDIT SCORE  5     AUDIT - Alcohol Use Disorders Identification Test - Reproduced from the World Health Organization Audit 2001 (Second Edition) 2021   1.  How often do you have a drink containing alcohol? 4 or more times a week   2.  How many drinks containing alcohol do you have on a typical day when you are drinking? 3 or 4   3.  How often do you have five or more drinks on one occasion? Never   4.  How often during the last year have you found that you were not able to stop drinking once you had started? Never   5.  How often during the last year have you failed to do what was normally expected of you because of drinking? Never   6.  How often during the last year have you needed a first drink in the morning to get yourself going after a heavy drinking session? Never   7.  How often during the last year have you had a feeling of guilt or remorse after drinking? Never   8.  How often during the last year have you been unable to remember what happened the night before because of your drinking? Never   9.  Have you or someone else been injured because of your drinking? No   10. Has a relative, friend, doctor or other health care  worker been concerned about your drinking or suggested you cut down? No   TOTAL SCORE 5           Current providers sharing in care for this patient include:   Patient Care Team:  Richmond Martinez MD as PCP - General  Richmond Martinez MD as Assigned PCP  Reece Daniels MD as Assigned Surgical Provider  Effie Hawkins MD as Assigned Heart and Vascular Provider    The following health maintenance items are reviewed in Epic and correct as of today:  Health Maintenance Due   Topic Date Due     ANNUAL REVIEW OF  ORDERS  Never done     ZOSTER IMMUNIZATION (2 of 3) 07/24/2009     FALL RISK ASSESSMENT  07/20/2021     MEDICARE ANNUAL WELLNESS VISIT  07/20/2021     Labs reviewed in EPIC          Review of Systems   Constitutional: Negative for chills and fever.   HENT: Positive for congestion. Negative for ear pain, hearing loss and sore throat.    Eyes: Negative for pain and visual disturbance.   Respiratory: Positive for cough and shortness of breath.    Cardiovascular: Negative for chest pain, palpitations and peripheral edema.   Gastrointestinal: Negative for abdominal pain, diarrhea, heartburn, hematochezia and nausea.   Genitourinary: Positive for difficulty urinating. Negative for discharge, dysuria, genital sores, hematuria and impotence.   Musculoskeletal: Negative for arthralgias, joint swelling and myalgias.   Skin: Negative for rash.   Neurological: Negative for dizziness, headaches and paresthesias.   Psychiatric/Behavioral: Negative for mood changes. The patient is not nervous/anxious.       Sinus discharge yellow/bronwn before. Now clear with Augmentin   Cough improved with prednisone,. Hx COPD. Prior to episode, had occ use of Albuterol with certain activities. On Qvar daily.  Overall SOB much better. CXR negative  Recent change Flecainide to metoprolol. Denies recent sense of palpitations after med change  Slight constipation past couple days only after initial diarrhea with Augmentin.  "  Hx B12 deficiency. Now on B12 2500mcg daily and level too high   Hx prostate cancer and getting intermittent hormone shots for elevated PSA. Last saw Urology 2 mos ago with injection. No formal note or PSA result to review in chart    OBJECTIVE:   /70   Pulse 60   Temp 97.1  F (36.2  C) (Temporal)   Resp 16   Ht 1.753 m (5' 9\")   Wt 74.4 kg (164 lb)   SpO2 99%   BMI 24.22 kg/m   Estimated body mass index is 24.37 kg/m  as calculated from the following:    Height as of 6/17/21: 1.753 m (5' 9\").    Weight as of 8/11/21: 74.8 kg (165 lb).  Physical Exam   General appearance - alert, no distress  Skin - No rashes or lesions. Scar  behind left ear from previous skin cancer excision well healed. Drynbess of sklin arms and legs  Head - normocephalic, atraumatic  Eyes - DENITA, EOMI, fundi exam with nondilated pupils negative.  Ears - External ears normal. Canals clear. TM's normal.  Nose/Sinuses - Nares normal. Septum midline. Mucosa mildly edematous.  No drainage or sinus tenderness.  Oropharynx - No erythema, no adenopathy, no exudates.  Neck - Supple without adenopathy or thyromegaly. No bruits.  Lungs - Clear to auscultation without wheezes posterior. Minimal ant midline rhonchi that clears with cough  Heart - Regular rate and rhythm without murmurs, clicks, or gallops.  Nodes - No supraclavicular, axillary, or inguinal adenopathy palpable.  Abdomen - Abdomen soft, non-tender. BS normal. No masses or hepatosplenomegaly palpable. No bruits.  Extremities -No cyanosis, clubbing or edema.   Mild nontender varicose veins  Musculoskeletal - Spine ROM normal. Muscular strength intact.   Peripheral pulses - radial=4/4, femoral=4/4, posterior tibial=4/4, dorsalis pedis=4/4,  Neuro - Gait normal. Reflexes normal and symmetric. Sensation grossly WNL.  Genital /rectal - deferred to Urology      ASSESSMENT / PLAN:   1. Medicare annual wellness visit, subsequent  Recommended Shingrix vaccination today.  Flu vaccine " later this fall.  Other healthcare maintenance up-to-date    2. Benign essential hypertension  Controlled.  Continue her medication  - amLODIPine (NORVASC) 5 MG tablet; Take 1 tablet (5 mg) by mouth daily  Dispense: 90 tablet; Refill: 3    3. Paroxysmal atrial fibrillation (H)  Intermittent A. fib/flutter.  Continue anticoagulation.  Patient denies sense of palpitations since changed to metoprolol from flecainide  - apixaban ANTICOAGULANT (ELIQUIS ANTICOAGULANT) 5 MG tablet; TAKE ONE TABLET BY MOUTH TWICE DAILY.  Dispense: 180 tablet; Refill: 3    4. Hypothyroidism, unspecified type  TSH now low end of normal.  Continue current medication for now but will recheck thyroid function again in 6 months.  If TSH lower, will decrease levothyroxine dosage  - levothyroxine (SYNTHROID/LEVOTHROID) 125 MCG tablet; Take 1 tablet (125 mcg) by mouth daily  Dispense: 90 tablet; Refill: 3  - TSH with free T4 reflex; Future    5. Chronic obstructive pulmonary disease, unspecified COPD type (H)  Overall controlled besides recent flare with URI.  Continue maintenance steroid inhaler therapy and as needed albuterol.  The patient finding himself needing albuterol more frequently, patient to inform physician and will add LABA/LAMA inhaler  - beclomethasone HFA (QVAR REDIHALER) 40 MCG/ACT inhaler; Inhale 2 puffs into the lungs daily  Dispense: 31.8 g; Refill: 3  - albuterol (PROAIR HFA) 108 (90 Base) MCG/ACT inhaler; INHALE 2 PUFFS INTO THE LUNGS EVERY 4 HOURS AS NEEDED FOR SHORTNESS OF BREATH / DYSPNEA  Dispense: 25.5 g; Refill: 3    6. Prostate cancer (H)  Recent hormone injection done by urology 2 months ago per patient.  Continue management per urology    7. B12 deficiency  B12 level now too high when current sublingual supplementation.  Will decrease B12 tablet to 3 times per week    8. Raynaud's disease without gangrene  Fingers warm today.  Avoiding caffeine.  Continue amlodipine  - amLODIPine (NORVASC) 5 MG tablet; Take 1 tablet (5  "mg) by mouth daily  Dispense: 90 tablet; Refill: 3    9. Upper respiratory tract infection, unspecified type  Much improved.  Chest x-ray was negative.  Sinus drainage now clear in color and minimal coughing.  Patient will complete prednisone and Augmentin therapy      Patient has been advised of split billing requirements and indicates understanding: Yes     COUNSELING:  Reviewed preventive health counseling, as reflected in patient instructions    Estimated body mass index is 24.37 kg/m  as calculated from the following:    Height as of 6/17/21: 1.753 m (5' 9\").    Weight as of 8/11/21: 74.8 kg (165 lb).        He reports that he quit smoking about 14 years ago. His smoking use included cigars and pipe. He has never used smokeless tobacco.      Appropriate preventive services were discussed with this patient, including applicable screening as appropriate for cardiovascular disease, diabetes, osteopenia/osteoporosis, and glaucoma.  As appropriate for age/gender, discussed screening for colorectal cancer, prostate cancer, breast cancer, and cervical cancer. Checklist reviewing preventive services available has been given to the patient.    Reviewed patients plan of care and provided an AVS. The Basic Care Plan (routine screening as documented in Health Maintenance) for Eder meets the Care Plan requirement. This Care Plan has been established and reviewed with the Patient.    Counseling Resources:  ATP IV Guidelines  Pooled Cohorts Equation Calculator  Breast Cancer Risk Calculator  Breast Cancer: Medication to Reduce Risk  FRAX Risk Assessment  ICSI Preventive Guidelines  Dietary Guidelines for Americans, 2010  USDA's MyPlate  ASA Prophylaxis  Lung CA Screening      PLAN:  Continue current meds  Finish prednisone and Augmentin (antibiotic)  Call  [phone] or use [employer] to schedule a future lab appointment  non-fasting in 6 months to recheck thyroid function.   See me a few days later to review results and " follow-up on breathing/COPD or earlier if worsening and find needing albuterol every day longterm     Shingrix vaccine coverage for shingles prevention at pharmacy downstairs or [facility].  This is a 2 shot series done 2-6 months apart  I would recommend you receive an influenza (flu) vaccine  this Fall (October or early November)  Follow-up with Urology and Cardiology per their previous recommendations  Pt was informed regarding extra E&M billing for management of new or established medical issues not related to today's wellness visit        Richmond Martinez MD  [facility]

## 2021-09-19 ENCOUNTER — HEALTH MAINTENANCE LETTER (OUTPATIENT)
Age: 81
End: 2021-09-19

## 2022-01-08 ENCOUNTER — OFFICE VISIT (OUTPATIENT)
Dept: URGENT CARE | Facility: URGENT CARE | Age: 82
End: 2022-01-08
Payer: COMMERCIAL

## 2022-01-08 ENCOUNTER — ANCILLARY PROCEDURE (OUTPATIENT)
Dept: GENERAL RADIOLOGY | Facility: CLINIC | Age: 82
End: 2022-01-08
Attending: INTERNAL MEDICINE
Payer: COMMERCIAL

## 2022-01-08 VITALS
TEMPERATURE: 98.8 F | OXYGEN SATURATION: 95 % | SYSTOLIC BLOOD PRESSURE: 117 MMHG | RESPIRATION RATE: 16 BRPM | BODY MASS INDEX: 24.22 KG/M2 | HEART RATE: 91 BPM | DIASTOLIC BLOOD PRESSURE: 76 MMHG | WEIGHT: 164 LBS

## 2022-01-08 DIAGNOSIS — R05.9 COUGH: ICD-10-CM

## 2022-01-08 DIAGNOSIS — J30.0 VASOMOTOR RHINITIS: Primary | ICD-10-CM

## 2022-01-08 DIAGNOSIS — Z20.822 SUSPECTED COVID-19 VIRUS INFECTION: ICD-10-CM

## 2022-01-08 LAB — SARS-COV-2 RNA RESP QL NAA+PROBE: NEGATIVE

## 2022-01-08 PROCEDURE — U0003 INFECTIOUS AGENT DETECTION BY NUCLEIC ACID (DNA OR RNA); SEVERE ACUTE RESPIRATORY SYNDROME CORONAVIRUS 2 (SARS-COV-2) (CORONAVIRUS DISEASE [COVID-19]), AMPLIFIED PROBE TECHNIQUE, MAKING USE OF HIGH THROUGHPUT TECHNOLOGIES AS DESCRIBED BY CMS-2020-01-R: HCPCS | Performed by: INTERNAL MEDICINE

## 2022-01-08 PROCEDURE — 71046 X-RAY EXAM CHEST 2 VIEWS: CPT | Performed by: RADIOLOGY

## 2022-01-08 PROCEDURE — 99213 OFFICE O/P EST LOW 20 MIN: CPT | Performed by: INTERNAL MEDICINE

## 2022-01-08 PROCEDURE — U0005 INFEC AGEN DETEC AMPLI PROBE: HCPCS | Performed by: INTERNAL MEDICINE

## 2022-01-08 RX ORDER — IPRATROPIUM BROMIDE 42 UG/1
2 SPRAY, METERED NASAL 3 TIMES DAILY
Qty: 15 ML | Refills: 3 | Status: SHIPPED | OUTPATIENT
Start: 2022-01-08 | End: 2022-07-29

## 2022-01-08 RX ORDER — LORATADINE 10 MG/1
10 TABLET ORAL DAILY
Qty: 30 TABLET | Refills: 3 | Status: SHIPPED | OUTPATIENT
Start: 2022-01-08 | End: 2022-07-29

## 2022-01-08 NOTE — PATIENT INSTRUCTIONS
Our primary issue with this cough is the nasal drainage in the back of your throat.  If we can get your nose to stop being so runny, your cough will improve.      Challenge here is the interaction of decongestant medications with your heart meds.  Let's try the non-sedating antihistamine (loratadine) and the ipratropium nasal spray.  I am hopeful that these will help and the upside of these is that they're very safe for your heart.

## 2022-01-08 NOTE — PROGRESS NOTES
Assessment & Plan     Vasomotor rhinitis  Considered broad differential diagnosis of subacute cough in this elderly male with underlying cardiac disease.  The differential includes pulmonary edema, CHF, COPD exacerbation, pneumonia, cough due to post-nasal drainage.  CXR and physical exam fortunately rule out decompensated CHF and COPD exacerbation or pneumonia.  The clinical picture is most consistent with intercurrent viral URI on top of chronic vasomotor rhinitis causing persistent cough due to post-nasal drainage.  Will focus therapeutics on the rhinitis.    - ipratropium (ATROVENT) 0.06 % nasal spray; Spray 2 sprays into both nostrils 3 times daily  - loratadine (CLARITIN) 10 MG tablet; Take 1 tablet (10 mg) by mouth daily    Suspected COVID-19 virus infection  - Symptomatic; Unknown COVID-19 Virus (Coronavirus) by PCR Nose    Cough  - XR Chest 2 Views; Future    Mesfin Bloom MD  [facility]    Subjective     HPI   Complaining of cough and runny nose.  Noting also some chest tightness.  Producing greenish-brown sputum.  He has a history of COPD and states that his level of dyspnea is at baseline.  Exercise capacity is at baseline.  Also has a history of atrial fibrillation. Duration of symptoms is 5 days.  Exposure to respiratory viral illness among family around New Year's.  Denies known COVID exposure.  He is fully vaccinated for COVID-19.  Denies any change in peripheral edema.  Appetite is down a bit.     Review of Systems   Constitutional, HEENT, cardiovascular, pulmonary, gi and gu systems are negative, except as otherwise noted.      Objective    /76   Pulse 91   Temp 98.8  F (37.1  C)   Resp 16   Wt 74.4 kg (164 lb)   SpO2 95%   BMI 24.22 kg/m    Body mass index is 24.22 kg/m .  Physical Exam   GENERAL APPEARANCE: elderly male, alert and interactive, no apparent distress   HENT: ear canals and TM's normal and cobblestoning of the posterior pharynx with  post-nasal drainage, some bilateral erythema of the anterior pillars  NECK: no adenopathy, no asymmetry, masses, or scars and thyroid normal to palpation  RESP: lungs clear to auscultation - no rales, rhonchi or wheezes  CV: normal S1 S2, no S3 or S4, no murmur, click or rub and irregular rhythm    CXR - Reviewed and interpreted by me shows some thoracic kyphosis and flattening of the diaphragms with chronic fibrotic changes but no acute infiltrate or effusion and a stable cardiac silhouette.

## 2022-01-17 DIAGNOSIS — I48.0 PAROXYSMAL ATRIAL FIBRILLATION (H): ICD-10-CM

## 2022-01-17 RX ORDER — METOPROLOL SUCCINATE 25 MG/1
25 TABLET, EXTENDED RELEASE ORAL DAILY
Qty: 90 TABLET | Refills: 1 | Status: SHIPPED | OUTPATIENT
Start: 2022-01-17 | End: 2022-07-11

## 2022-02-01 ENCOUNTER — LAB (OUTPATIENT)
Dept: LAB | Facility: CLINIC | Age: 82
End: 2022-02-01
Payer: COMMERCIAL

## 2022-02-01 DIAGNOSIS — E03.9 HYPOTHYROIDISM, UNSPECIFIED TYPE: ICD-10-CM

## 2022-02-01 LAB
T4 FREE SERPL-MCNC: 1.36 NG/DL (ref 0.76–1.46)
TSH SERPL DL<=0.005 MIU/L-ACNC: 0.35 MU/L (ref 0.4–4)

## 2022-02-01 PROCEDURE — 84443 ASSAY THYROID STIM HORMONE: CPT

## 2022-02-01 PROCEDURE — 84439 ASSAY OF FREE THYROXINE: CPT

## 2022-02-01 PROCEDURE — 36415 COLL VENOUS BLD VENIPUNCTURE: CPT

## 2022-02-07 ENCOUNTER — OFFICE VISIT (OUTPATIENT)
Dept: INTERNAL MEDICINE | Facility: CLINIC | Age: 82
End: 2022-02-07
Payer: COMMERCIAL

## 2022-02-07 VITALS
RESPIRATION RATE: 16 BRPM | SYSTOLIC BLOOD PRESSURE: 120 MMHG | OXYGEN SATURATION: 100 % | BODY MASS INDEX: 25.18 KG/M2 | WEIGHT: 170 LBS | HEART RATE: 64 BPM | HEIGHT: 69 IN | DIASTOLIC BLOOD PRESSURE: 78 MMHG | TEMPERATURE: 98.9 F

## 2022-02-07 DIAGNOSIS — J44.9 CHRONIC OBSTRUCTIVE PULMONARY DISEASE, UNSPECIFIED COPD TYPE (H): ICD-10-CM

## 2022-02-07 DIAGNOSIS — C61 PROSTATE CANCER (H): ICD-10-CM

## 2022-02-07 DIAGNOSIS — R19.5 LOOSE STOOLS: ICD-10-CM

## 2022-02-07 DIAGNOSIS — I48.0 PAROXYSMAL ATRIAL FIBRILLATION (H): ICD-10-CM

## 2022-02-07 DIAGNOSIS — E03.9 HYPOTHYROIDISM, UNSPECIFIED TYPE: Primary | ICD-10-CM

## 2022-02-07 PROCEDURE — 99214 OFFICE O/P EST MOD 30 MIN: CPT | Performed by: INTERNAL MEDICINE

## 2022-02-07 RX ORDER — LEVOTHYROXINE SODIUM 112 UG/1
112 TABLET ORAL DAILY
Qty: 90 TABLET | Refills: 3 | Status: SHIPPED | OUTPATIENT
Start: 2022-02-07 | End: 2023-01-22

## 2022-02-07 ASSESSMENT — MIFFLIN-ST. JEOR: SCORE: 1456.49

## 2022-02-07 NOTE — PROGRESS NOTES
ASSESSMENT:   1. Hypothyroidism, unspecified type  Thyroid function too high.  Will reduce levothyroxine recheck TSH 6 weeks  - levothyroxine (SYNTHROID/LEVOTHROID) 112 MCG tablet; Take 1 tablet (112 mcg) by mouth daily  Dispense: 90 tablet; Refill: 3  - TSH with free T4 reflex; Future    2. Chronic obstructive pulmonary disease, unspecified COPD type (H)  Stable.  Continue inhaler therapy    3. Paroxysmal atrial fibrillation (H)  Currently in sinus rhythm.  Continue Eliquis therapy.    4. Prostate cancer (H)  PSA December 2021 0.59.  Intermittent Lupron therapy.  Continue management per urology     5. Loose stools  Occasional.  May be partially related to current thyroid function actually running a little too high.  Levothyroxine dosage reduction as above.  Patient may also try adding bulking agent with Metamucil wafers daily as needed      PLAN:   Stop levothyroxine 125mcg tabs and start 112mcg tab, 1 tab daily for thyroid function  Call  [phone] or use [employer] to schedule a future lab appointment  non-fasting in 6 weeks for thyroid   Get Metamucil wafers OTC and eat two of them daily to add some bulking and absorbancy to the stools   Minimize caffeine until stools better  Follow-up with urology per their previous instruction regarding prostate cancer management      (Chart documentation was completed, in part, with [employer] voice-recognition software. Even though reviewed, some grammatical, spelling, and word errors may remain.)    Richmond Martinez MD  Internal Medicine Department  [facility]      Christiane Gaines is a 82 year old who presents for the following health issues     HPI     Hypertension Follow-up      Do you check your blood pressure regularly outside of the clinic? No     Are you following a low salt diet? No    Are your blood pressures ever more than 140 on the top number (systolic) OR more   than 90 on the bottom number (diastolic), for example 140/90?  N/A    Hypothyroidism Follow-up      Since last visit, patient describes the following symptoms: dry skin, loose stools and fatigue       Most recent lab results reviewed with pt.      Component      Latest Ref Rng & Units 8/12/2021 2/1/2022   Sodium      133 - 144 mmol/L 137    Potassium      3.4 - 5.3 mmol/L 3.8    Chloride      94 - 109 mmol/L 105    Carbon Dioxide      20 - 32 mmol/L 26    Anion Gap      3 - 14 mmol/L 6    Urea Nitrogen      7 - 30 mg/dL 13    Creatinine      0.66 - 1.25 mg/dL 0.86    Calcium      8.5 - 10.1 mg/dL 8.8    Glucose      70 - 99 mg/dL 93    Alkaline Phosphatase      40 - 150 U/L 99    AST      0 - 45 U/L 15    ALT      0 - 70 U/L 18    Protein Total      6.8 - 8.8 g/dL 7.7    Albumin      3.4 - 5.0 g/dL 3.4    Bilirubin Total      0.2 - 1.3 mg/dL 0.7    GFR Estimate      >60 mL/min/1.73m2 81    Cholesterol      <200 mg/dL 181    Triglycerides      <150 mg/dL 59    HDL Cholesterol      >=40 mg/dL 96    LDL Cholesterol Calculated      <=100 mg/dL 73    Non HDL Cholesterol      <130 mg/dL 85    Patient Fasting > 8hrs?       Yes    TSH      0.40 - 4.00 mU/L 0.48 0.35 (L)   T4 Free      0.76 - 1.46 ng/dL  1.36       Had brief episode of vertigo at [facility] for a few minutes in late December and none since. Hx prior cerebellar stroke.  No palpitation symptoms when episode occurred.  No vision or speech changes.  No extremity weakness at that time.   On Eliquis for A fib.   Denies chest pain symptoms.  History of COPD.  Breathing stable with use of Qvar.  Occasional use of albuterol   Stools occ looser with more mucus. No diarrhea.  1 cup tea/day.  Denies any blood in his stools  Thyroid function mildly high.  See labs above.  Weight up 6 pounds but more carbs in diet since last visit.    Being followed by urology for prostate cancer history.  Intermittent Lupron injections.  Last PSA 12/10/2021 was 0.59      Additional ROS:   Constitutional, HEENT, Cardiovascular, Pulmonary, GI and ,  "Neuro, MSK and Psych review of systems/symptoms are otherwise negative or unchanged from previous, except as noted above.      OBJECTIVE:  /78   Pulse 64   Temp 98.9  F (37.2  C) (Temporal)   Resp 16   Ht 1.753 m (5' 9\")   Wt 77.1 kg (170 lb)   SpO2 100%   BMI 25.10 kg/m     Estimated body mass index is 25.1 kg/m  as calculated from the following:    Height as of this encounter: 1.753 m (5' 9\").    Weight as of this encounter: 77.1 kg (170 lb).     Neck: no adenopathy. Thyroid normal to palpation. No bruits  Pulm: Lungs clear to auscultation with slight prolonged expiratory phase.  No wheezes or rhonchi.  CV: Regular rates and rhythm  GI: Soft, nontender, Normal active bowel sounds, No hepatosplenomegaly or masses palpable  Ext: Peripheral pulses intact.  Mild BLE edema.  Neuro: Normal strength and tone, sensory exam grossly normal.  Normal gait and coordination.              "

## 2022-02-07 NOTE — PATIENT INSTRUCTIONS
Stop levothyroxine 125mcg tabs and start 112mcg tab, 1 tab daily for thyroid function  Call  [phone] or use [email or web address] to schedule a future lab appointment  non-fasting in 6 weeks for thyroid   Get Metamucil wafers OTC and eat two of them daily to add some bulking and absorbancy to the stools   Minimize caffeine until stools better  Follow-up with urology per their previous instruction regarding prostate cancer management

## 2022-03-11 ENCOUNTER — TELEPHONE (OUTPATIENT)
Dept: INTERNAL MEDICINE | Facility: CLINIC | Age: 82
End: 2022-03-11
Payer: COMMERCIAL

## 2022-03-11 NOTE — TELEPHONE ENCOUNTER
Received a call from the patient's pharmacy stating they are getting audited by the insurance company regarding the script for fluocinonide (LIDEX) 0.05 % external cream sent back on 1/28/2021.     Per Vladimir, pharmacist, insurance company needs to know how long a tube should last the patient and where the patient is applying the cream.    Pharmacy is going to fax paperwork over to the [facility] for provider to review and will need the paperwork faxed back over with the information requested above.  Fax Number: [phone]    Sonia Bradley RN

## 2022-03-14 NOTE — TELEPHONE ENCOUNTER
Form completed and faxed back to pharmacy    Eileen LEERN BSN  [facility]  [phone]

## 2022-03-14 NOTE — TELEPHONE ENCOUNTER
He has venous stasis dermatitis, this is a chronic condition, he is to use cream on legs as needed for rash.

## 2022-03-22 ENCOUNTER — LAB (OUTPATIENT)
Dept: LAB | Facility: CLINIC | Age: 82
End: 2022-03-22
Payer: COMMERCIAL

## 2022-03-22 DIAGNOSIS — E03.9 HYPOTHYROIDISM, UNSPECIFIED TYPE: ICD-10-CM

## 2022-03-22 LAB — TSH SERPL DL<=0.005 MIU/L-ACNC: 0.69 MU/L (ref 0.4–4)

## 2022-03-22 PROCEDURE — 84443 ASSAY THYROID STIM HORMONE: CPT

## 2022-03-22 PROCEDURE — 36415 COLL VENOUS BLD VENIPUNCTURE: CPT

## 2022-05-18 DIAGNOSIS — E03.9 HYPOTHYROIDISM, UNSPECIFIED TYPE: ICD-10-CM

## 2022-05-18 DIAGNOSIS — I10 BENIGN ESSENTIAL HYPERTENSION: Primary | ICD-10-CM

## 2022-06-10 ENCOUNTER — OFFICE VISIT (OUTPATIENT)
Dept: CARDIOLOGY | Facility: CLINIC | Age: 82
End: 2022-06-10
Attending: INTERNAL MEDICINE
Payer: COMMERCIAL

## 2022-06-10 VITALS
WEIGHT: 165 LBS | HEART RATE: 79 BPM | DIASTOLIC BLOOD PRESSURE: 84 MMHG | BODY MASS INDEX: 24.37 KG/M2 | SYSTOLIC BLOOD PRESSURE: 118 MMHG

## 2022-06-10 DIAGNOSIS — I49.8 CHRONOTROPIC INCOMPETENCE WITH SINUS NODE DYSFUNCTION: ICD-10-CM

## 2022-06-10 DIAGNOSIS — R00.1 BRADYCARDIA: ICD-10-CM

## 2022-06-10 DIAGNOSIS — I10 BENIGN ESSENTIAL HYPERTENSION: ICD-10-CM

## 2022-06-10 DIAGNOSIS — I48.19 PERSISTENT ATRIAL FIBRILLATION (H): Primary | ICD-10-CM

## 2022-06-10 PROCEDURE — 93000 ELECTROCARDIOGRAM COMPLETE: CPT | Performed by: NURSE PRACTITIONER

## 2022-06-10 PROCEDURE — 99213 OFFICE O/P EST LOW 20 MIN: CPT | Performed by: NURSE PRACTITIONER

## 2022-06-10 NOTE — PROGRESS NOTES
Service Date: 06/10/2022    HISTORY OF PRESENT ILLNESS:  Mr. Horan is a delightful 82-year-old gentleman well known to me here at the [facility].  He is an established patient of Dr. Hawkins.      He has following chronic medical issues:  1.  Sinus node dysfunction with moderate chronotropic incompetence.  We have discussed pacemaker implantation over the last several years, which the patient repeatedly declined.  2.  Symptomatic paroxysmal AFib.  He is on 75 mg daily for his AFib.  His CHADS-VASc score is 5 and on apixaban.  Dr. Hawkins did do a 14-day Zio Patch to make sure that he was not having AFib on such a low dose of flecainide.  This showed that he had a 21% AFib burden occasionally with RVR.  This low-dose flecainide was no longer working for him to keep him out of AFib.  He stopped the flecainide and placed on metoprolol ER 25 mg daily.  3.  CVA in 2017, treated with TPA.  The patient had not been on anticoagulation until his stroke.  4.  COPD.  5.  Hypothyroidism.  6.  Hypertension.    At today's visit, Eder is very tired.  He states that this is probably the biggest concern he has since last year.  He does not know if it is part of aging or because of his heart.  He has also had a few episodes where he became lightheaded when he had to sit.  He has not had any syncope.  He denies chest pain, orthopnea, PND.  He does get exertional shortness of breath.  He feels it is slightly worse, especially when mowing the lawn.  Occasionally, he will take his albuterol before mowing, but states that it helps some, but not as much as he had hoped.    A 12-lead EKG completed today shows AFib at 66 beats per minute.  Blood pressure is stable at 118/84.    Last echocardiogram was in 2018.  This showed an EF of 55%-60%.  Grade 2 diastolic dysfunction noted.  He had mild tricuspid regurgitation and no significant aortic or mitral insufficiency was noted.      All other review of systems and past medical history are noted  below.    ASSESSMENT AND PLAN:  Mr. Horan is a delightful 82-year-old gentleman here today for followup.  1.  Paroxysmal atrial fibrillation, now persistent.  Low-dose flecainide was not holding his rhythm.  Flecainide was stopped last year.  He is on low-dose metoprolol ER.  Augustine is very tired.  He is uncertain if it is related to aging, deconditioning or his persistent AFib.  It very well could be multifactorial.  After a long discussion, he is willing to wear another Zio Patch.  He prefers it is not for 2 weeks, therefore, I placed a Zio Patch for 1 week and I would like to see him to review the results in person.  2.  Sinus node dysfunction.  He has been symptomatic for years.  This could also be the cause for his fatigue.  He states he has been reluctant to do a pacemaker due to his advanced age and does not feel it is fair to the insurance company.  3.  Episodes of lightheadedness could be orthostasis, but also could be secondary to bradycardia.  4.  Hypertension, stable.    I will see him back in a month to review that Zio Patch.  If there are questions or concerns, he will contact us.    Amria Mendes NP        D: 06/10/2022   T: 06/10/2022   MT: JENNIFER    Name:     AUGUSTINE HORAN  MRN:      [id number]        Account:      [id number]   :      1940           Service Date: 06/10/2022       Document: [id number]

## 2022-06-10 NOTE — PROGRESS NOTES
HPI and Plan: #[id number]  See dictation    Today's clinic visit entailed:  Review of the result(s) of each unique test - EKG  Ordering of each unique test  No LOS data to display   Time spent doing chart review, history and exam, documentation and further activities per the note  Provider  Link to MDM Help Grid     The level of medical decision making during this visit was of moderate complexity.      Orders Placed This Encounter   Procedures     Follow-Up with Cardiology ELISHA     EKG 12-lead complete w/read - Clinics (performed today)     Leadless EKG Monitor 3 to 7 Days       No orders of the defined types were placed in this encounter.      There are no discontinued medications.      Encounter Diagnoses   Name Primary?     Persistent atrial fibrillation (H) Yes     Bradycardia        CURRENT MEDICATIONS:  Current Outpatient Medications   Medication Sig Dispense Refill     albuterol (PROAIR HFA) 108 (90 Base) MCG/ACT inhaler INHALE 2 PUFFS INTO THE LUNGS EVERY 4 HOURS AS NEEDED FOR SHORTNESS OF BREATH / DYSPNEA 25.5 g 3     amLODIPine (NORVASC) 5 MG tablet Take 1 tablet (5 mg) by mouth daily 90 tablet 3     apixaban ANTICOAGULANT (ELIQUIS ANTICOAGULANT) 5 MG tablet TAKE ONE TABLET BY MOUTH TWICE DAILY. 180 tablet 3     beclomethasone HFA (QVAR REDIHALER) 40 MCG/ACT inhaler Inhale 2 puffs into the lungs daily 31.8 g 3     fluocinonide (LIDEX) 0.05 % external cream Apply topically 2 times daily 120 g 3     levothyroxine (SYNTHROID/LEVOTHROID) 112 MCG tablet Take 1 tablet (112 mcg) by mouth daily 90 tablet 3     metoprolol succinate ER (TOPROL XL) 25 MG 24 hr tablet Take 1 tablet (25 mg) by mouth daily 90 tablet 1     PREDNISOLONE ACETATE OP Apply to eye daily as needed       valACYclovir HCl (VALTREX PO)        vitamin B-12 (CYANOCOBALAMIN) 2500 MCG sublingual tablet Take 1 tab by mouth 3 times a week (WMF)       ipratropium (ATROVENT) 0.06 % nasal spray Spray 2 sprays into both nostrils 3 times daily (Patient not  taking: Reported on 6/10/2022) 15 mL 3     loratadine (CLARITIN) 10 MG tablet Take 1 tablet (10 mg) by mouth daily (Patient not taking: Reported on 6/10/2022) 30 tablet 3       ALLERGIES     Allergies   Allergen Reactions     Bactrim [Sulfa Drugs] Rash       PAST MEDICAL HISTORY:  Past Medical History:   Diagnosis Date     Actinic keratosis 6/09    face     ALLERGIC RHINITIS       Atrial fibrillation (H) 6/08     B12 deficiency 1/16/2019     Back pain     s/p LESI through ortho Feb 2010     Basal cell carcinoma      Bradycardia      Chronotropic incompetence with sinus node dysfunction (H)      COPD (chronic obstructive pulmonary disease) (H)      DVT      Left calf 2003. Right calf 6/06     Embolic stroke (H) 06/11/2017    superior cerebellar artery occlusion. Hx A fib     Hyperlipidemia LDL goal <130 5/10/2011     Hypertension      HYPOTHYROIDISM      hypothyroidism     Lateral epicondylitis  of elbow 7/05    left     Malignant melanoma (H)      PLANTAR FASCITIS      Prostate cancer (H) 6/09    on Lupron therapy     Raynaud's disease without gangrene 11/26/2018     Squamous cell carcinoma  Sept 2014     right lower lid, s/p MOHS     Syncope      TMJ (temporomandibular joint syndrome) 4/12/2012     Tobacco use disorder        PAST SURGICAL HISTORY:  Past Surgical History:   Procedure Laterality Date     ABDOMEN SURGERY  2004,06,09    Hernias (2) Prostate Removal(2009)     BIOPSY  2009    Prostate     CARDIOVERSION  10-16-08    failed     COLONOSCOPY  2004     EYE SURGERY      2013 lump from lower  left eye lid removed     GENITOURINARY SURGERY  2009    Prostate     HERNIA REPAIR  2004 - 2006     [facility] NONSPECIFIC PROCEDURE  1/07    Left groin exploration and left inguinal herniorrhaphy     [facility] NONSPECIFIC PROCEDURE  5/08    Prostate bx (benign)     [facility] NONSPECIFIC PROCEDURE  8/09    Wide local excision (left side), robotic-assisted laparoscopic prostatectomy and   Left pelvic lymph node dissection     [facility] NONSPECIFIC  PROCEDURE  August 2010    Right inguinal herniorrhaphy with mesh       FAMILY HISTORY:  Family History   Problem Relation Age of Onset     C.A.D. Father         heart attack- angina     Coronary Artery Disease Father      Myocardial Infarction Father      Cancer Mother         lung     Lung Cancer Mother      Lung Cancer Sister      Unknown/Adopted Maternal Grandmother      Unknown/Adopted Maternal Grandfather      Unknown/Adopted Paternal Grandmother      Unknown/Adopted Paternal Grandfather      Unknown/Adopted Sister        SOCIAL HISTORY:  Social History     Socioeconomic History     Marital status:      Number of children: 3   Occupational History     Occupation: [employer]     Employer: [employer]     Employer: RETIRED   Tobacco Use     Smoking status: Current Some Day Smoker     Types: Cigars, Pipe     Last attempt to quit: 12/1/2006     Years since quitting: 15.5     Smokeless tobacco: Never Used     Tobacco comment:  occasionally cigar   Substance and Sexual Activity     Alcohol use: Yes     Alcohol/week: 0.0 standard drinks     Comment: 2-3 drinks a day:  Wine/Beer     Drug use: No     Sexual activity: Never   Other Topics Concern     Parent/sibling w/ CABG, MI or angioplasty before 65F 55M? No     Caffeine Concern No     Comment: 2-3 cups of coffee a day     Sleep Concern No     Stress Concern No     Weight Concern No     Special Diet No     Exercise No     Seat Belt Yes       Review of Systems:  Skin:          Eyes:  Positive for glasses shingles in the left eye  ENT:  Negative      Respiratory:  Positive for dyspnea on exertion;cough;mucoid expectorant     Cardiovascular:    edema;Positive for;lightheadedness;chest pain;heaviness;fatigue 2 episodes of tightness, lasted a few minutes, positional lightheadedness, very fatigued  Gastroenterology: Negative      Genitourinary:  Negative nocturia    Musculoskeletal:  Positive for joint pain    Neurologic:  Negative      Psychiatric:  Negative       Heme/Lymph/Imm:  Positive for allergies    Endocrine:  Positive for thyroid disorder      Physical Exam:  Vitals: /84   Pulse 79     Constitutional:  cooperative, alert and oriented, well developed, well nourished, in no acute distress        Skin:  warm and dry to the touch, no apparent skin lesions or masses noted          Head:  normocephalic, no masses or lesions        Eyes:  pupils equal and round   wears glasses    Lymph:      ENT:  no pallor or cyanosis, dentition good        Neck:  carotid pulses are full and equal bilaterally, JVP normal, no carotid bruit        Respiratory:  normal breath sounds, clear to auscultation, normal A-P diameter, normal symmetry, normal respiratory excursion, no use of accessory muscles prolonged expiration       Cardiac:   bradycardic;irregularly irregular rhythm              not assessed this visit                                        GI:  abdomen soft        Extremities and Muscular Skeletal:  no deformities, clubbing, cyanosis, erythema observed;no edema              Neurological:  no gross motor deficits        Psych:  Alert and Oriented x 3;affect appropriate, oriented to time, person and place        CC  Effie Hawkins MD  [address]  [place] [address]

## 2022-06-10 NOTE — LETTER
6/10/2022    Richmond Martinez MD  [address] [facility] [address]    RE: Eder Horan       Dear Colleague,     [facility].  HPI and Plan: #[id number]  See dictation    Today's clinic visit entailed:  Review of the result(s) of each unique test - EKG  Ordering of each unique test  No LOS data to display   Time spent doing chart review, history and exam, documentation and further activities per the note  Provider  Link to MDM Help Grid     The level of medical decision making during this visit was of moderate complexity.      Orders Placed This Encounter   Procedures     Follow-Up with Cardiology ELISHA     EKG 12-lead complete w/read - Clinics (performed today)     Leadless EKG Monitor 3 to 7 Days       No orders of the defined types were placed in this encounter.      There are no discontinued medications.      Encounter Diagnoses   Name Primary?     Persistent atrial fibrillation (H) Yes     Bradycardia        CURRENT MEDICATIONS:  Current Outpatient Medications   Medication Sig Dispense Refill     albuterol (PROAIR HFA) 108 (90 Base) MCG/ACT inhaler INHALE 2 PUFFS INTO THE LUNGS EVERY 4 HOURS AS NEEDED FOR SHORTNESS OF BREATH / DYSPNEA 25.5 g 3     amLODIPine (NORVASC) 5 MG tablet Take 1 tablet (5 mg) by mouth daily 90 tablet 3     apixaban ANTICOAGULANT (ELIQUIS ANTICOAGULANT) 5 MG tablet TAKE ONE TABLET BY MOUTH TWICE DAILY. 180 tablet 3     beclomethasone HFA (QVAR REDIHALER) 40 MCG/ACT inhaler Inhale 2 puffs into the lungs daily 31.8 g 3     fluocinonide (LIDEX) 0.05 % external cream Apply topically 2 times daily 120 g 3     levothyroxine (SYNTHROID/LEVOTHROID) 112 MCG tablet Take 1 tablet (112 mcg) by mouth daily 90 tablet 3     metoprolol succinate ER (TOPROL XL) 25 MG 24 hr tablet Take 1 tablet (25 mg) by mouth daily 90 tablet 1     PREDNISOLONE ACETATE OP Apply to eye daily as needed       valACYclovir HCl (VALTREX PO)        vitamin B-12  (CYANOCOBALAMIN) 2500 MCG sublingual tablet Take 1 tab by mouth 3 times a week (WMF)       ipratropium (ATROVENT) 0.06 % nasal spray Spray 2 sprays into both nostrils 3 times daily (Patient not taking: Reported on 6/10/2022) 15 mL 3     loratadine (CLARITIN) 10 MG tablet Take 1 tablet (10 mg) by mouth daily (Patient not taking: Reported on 6/10/2022) 30 tablet 3       ALLERGIES     Allergies   Allergen Reactions     Bactrim [Sulfa Drugs] Rash       PAST MEDICAL HISTORY:  Past Medical History:   Diagnosis Date     Actinic keratosis 6/09    face     ALLERGIC RHINITIS       Atrial fibrillation (H) 6/08     B12 deficiency 1/16/2019     Back pain     s/p LESI through ortho Feb 2010     Basal cell carcinoma      Bradycardia      Chronotropic incompetence with sinus node dysfunction (H)      COPD (chronic obstructive pulmonary disease) (H)      DVT      Left calf 2003. Right calf 6/06     Embolic stroke (H) 06/11/2017    superior cerebellar artery occlusion. Hx A fib     Hyperlipidemia LDL goal <130 5/10/2011     Hypertension      HYPOTHYROIDISM      hypothyroidism     Lateral epicondylitis  of elbow 7/05    left     Malignant melanoma (H)      PLANTAR FASCITIS      Prostate cancer (H) 6/09    on Lupron therapy     Raynaud's disease without gangrene 11/26/2018     Squamous cell carcinoma  Sept 2014     right lower lid, s/p MOHS     Syncope      TMJ (temporomandibular joint syndrome) 4/12/2012     Tobacco use disorder        PAST SURGICAL HISTORY:  Past Surgical History:   Procedure Laterality Date     ABDOMEN SURGERY  2004,06,09    Hernias (2) Prostate Removal(2009)     BIOPSY  2009    Prostate     CARDIOVERSION  10-16-08    failed     COLONOSCOPY  2004     EYE SURGERY      2013 lump from lower  left eye lid removed     GENITOURINARY SURGERY  2009    Prostate     HERNIA REPAIR  2004 - 2006     [facility] NONSPECIFIC PROCEDURE  1/07    Left groin exploration and left inguinal herniorrhaphy     [facility] NONSPECIFIC PROCEDURE  5/08     Prostate bx (benign)     [facility] NONSPECIFIC PROCEDURE  8/09    Wide local excision (left side), robotic-assisted laparoscopic prostatectomy and   Left pelvic lymph node dissection     [facility] NONSPECIFIC PROCEDURE  August 2010    Right inguinal herniorrhaphy with mesh       FAMILY HISTORY:  Family History   Problem Relation Age of Onset     C.A.D. Father         heart attack- angina     Coronary Artery Disease Father      Myocardial Infarction Father      Cancer Mother         lung     Lung Cancer Mother      Lung Cancer Sister      Unknown/Adopted Maternal Grandmother      Unknown/Adopted Maternal Grandfather      Unknown/Adopted Paternal Grandmother      Unknown/Adopted Paternal Grandfather      Unknown/Adopted Sister        SOCIAL HISTORY:  Social History     Socioeconomic History     Marital status:      Number of children: 3   Occupational History     Occupation: [employer]     Employer: [employer]     Employer: [employer]   Tobacco Use     Smoking status: Current Some Day Smoker     Types: Cigars, Pipe     Last attempt to quit: 12/1/2006     Years since quitting: 15.5     Smokeless tobacco: Never Used     Tobacco comment:  occasionally cigar   Substance and Sexual Activity     Alcohol use: Yes     Alcohol/week: 0.0 standard drinks     Comment: 2-3 drinks a day:  Wine/Beer     Drug use: No     Sexual activity: Never   Other Topics Concern     Parent/sibling w/ CABG, MI or angioplasty before 65F 55M? No     Caffeine Concern No     Comment: 2-3 cups of coffee a day     Sleep Concern No     Stress Concern No     Weight Concern No     Special Diet No     Exercise No     Seat Belt Yes       Review of Systems:  Skin:          Eyes:  Positive for glasses shingles in the left eye  ENT:  Negative      Respiratory:  Positive for dyspnea on exertion;cough;mucoid expectorant     Cardiovascular:    edema;Positive for;lightheadedness;chest pain;heaviness;fatigue 2 episodes of tightness, lasted a few minutes, positional  lightheadedness, very fatigued  Gastroenterology: Negative      Genitourinary:  Negative nocturia    Musculoskeletal:  Positive for joint pain    Neurologic:  Negative      Psychiatric:  Negative      Heme/Lymph/Imm:  Positive for allergies    Endocrine:  Positive for thyroid disorder      Physical Exam:  Vitals: /84   Pulse 79     Constitutional:  cooperative, alert and oriented, well developed, well nourished, in no acute distress        Skin:  warm and dry to the touch, no apparent skin lesions or masses noted          Head:  normocephalic, no masses or lesions        Eyes:  pupils equal and round   wears glasses    Lymph:      ENT:  no pallor or cyanosis, dentition good        Neck:  carotid pulses are full and equal bilaterally, JVP normal, no carotid bruit        Respiratory:  normal breath sounds, clear to auscultation, normal A-P diameter, normal symmetry, normal respiratory excursion, no use of accessory muscles prolonged expiration       Cardiac:   bradycardic;irregularly irregular rhythm              not assessed this visit                                        GI:  abdomen soft        Extremities and Muscular Skeletal:  no deformities, clubbing, cyanosis, erythema observed;no edema              Neurological:  no gross motor deficits        Psych:  Alert and Oriented x 3;affect appropriate, oriented to time, person and place          Effie Hawkins MD  [address]  [place] [address]                Service Date: 06/10/2022    HISTORY OF PRESENT ILLNESS:  Mr. Horan is a delightful 82-year-old gentleman well known to me here at the [facility].  He is an established patient of Dr. Hawkins.      He has following chronic medical issues:  1.  Sinus node dysfunction with moderate chronotropic incompetence.  We have discussed pacemaker implantation over the last several years, which the patient repeatedly declined.  2.  Symptomatic paroxysmal AFib.  He is on 75 mg daily for his  AFib.  His CHADS-VASc score is 5 and on apixaban.  Dr. Hawkins did do a 14-day Zio Patch to make sure that he was not having AFib on such a low dose of flecainide.  This showed that he had a 21% AFib burden occasionally with RVR.  This low-dose flecainide was no longer working for him to keep him out of AFib.  He stopped the flecainide and placed on metoprolol ER 25 mg daily.  3.  CVA in 2017, treated with TPA.  The patient had not been on anticoagulation until his stroke.  4.  COPD.  5.  Hypothyroidism.  6.  Hypertension.    At today's visit, Eder is very tired.  He states that this is probably the biggest concern he has since last year.  He does not know if it is part of aging or because of his heart.  He has also had a few episodes where he became lightheaded when he had to sit.  He has not had any syncope.  He denies chest pain, orthopnea, PND.  He does get exertional shortness of breath.  He feels it is slightly worse, especially when mowing the lawn.  Occasionally, he will take his albuterol before mowing, but states that it helps some, but not as much as he had hoped.    A 12-lead EKG completed today shows AFib at 66 beats per minute.  Blood pressure is stable at 118/84.    Last echocardiogram was in 2018.  This showed an EF of 55%-60%.  Grade 2 diastolic dysfunction noted.  He had mild tricuspid regurgitation and no significant aortic or mitral insufficiency was noted.      All other review of systems and past medical history are noted below.    ASSESSMENT AND PLAN:  Mr. Horan is a delightful 82-year-old gentleman here today for followup.  1.  Paroxysmal atrial fibrillation, now persistent.  Low-dose flecainide was not holding his rhythm.  Flecainide was stopped last year.  He is on low-dose metoprolol ER.  Eder is very tired.  He is uncertain if it is related to aging, deconditioning or his persistent AFib.  It very well could be multifactorial.  After a long discussion, he is willing to wear another Zio  Patch.  He prefers it is not for 2 weeks, therefore, I placed a Zio Patch for 1 week and I would like to see him to review the results in person.  2.  Sinus node dysfunction.  He has been symptomatic for years.  This could also be the cause for his fatigue.  He states he has been reluctant to do a pacemaker due to his advanced age and does not feel it is fair to the insurance company.  3.  Episodes of lightheadedness could be orthostasis, but also could be secondary to bradycardia.  4.  Hypertension, stable.    I will see him back in a month to review that Zio Patch.  If there are questions or concerns, he will contact us.    Amira Mendes NP        D: 06/10/2022   T: 06/10/2022   MT: JENNIFER    Name:     AUGUSTINE MYRICK  MRN:      [id number]        Account:      [id number]   :      1940           Service Date: 06/10/2022       Document: [id number]      Thank you for allowing me to participate in the care of your patient.      Sincerely,     Amira Mendes NP, APRN [facility]  cc:   Effie Hawkins MD  [address]  [place] [address]

## 2022-06-10 NOTE — PATIENT INSTRUCTIONS
Call my nurse with any questions or concerns:  [phone]  *If you have concerns after hours, please call [phone], option 2 to speak with on call Cardiologist.     Marty and see me next month  Call with any concerns

## 2022-06-21 ENCOUNTER — HOSPITAL ENCOUNTER (OUTPATIENT)
Dept: CARDIOLOGY | Facility: CLINIC | Age: 82
Discharge: HOME | End: 2022-06-21
Attending: NURSE PRACTITIONER | Admitting: NURSE PRACTITIONER
Payer: COMMERCIAL

## 2022-06-21 DIAGNOSIS — I48.19 PERSISTENT ATRIAL FIBRILLATION (H): ICD-10-CM

## 2022-06-21 PROCEDURE — 93244 EXT ECG>48HR<7D REV&INTERPJ: CPT | Performed by: INTERNAL MEDICINE

## 2022-06-21 PROCEDURE — 93242 EXT ECG>48HR<7D RECORDING: CPT

## 2022-07-11 DIAGNOSIS — I48.0 PAROXYSMAL ATRIAL FIBRILLATION (H): ICD-10-CM

## 2022-07-11 RX ORDER — METOPROLOL SUCCINATE 25 MG/1
25 TABLET, EXTENDED RELEASE ORAL DAILY
Qty: 90 TABLET | Refills: 0 | Status: SHIPPED | OUTPATIENT
Start: 2022-07-11 | End: 2022-10-24

## 2022-07-11 NOTE — TELEPHONE ENCOUNTER
Received refill request for:  Metoprolol ER  Last OV was: 6/10/22 w/MIKE Ferrer  Labs/EKG: N/a  F/U scheduled:   Date Time Provider Department Center   7/29/2022 12:30 PM Amira Castro APRN CNP [facility]   9/6/2022  9:30 AM [facility]   New script sent to: [facility]

## 2022-07-12 ENCOUNTER — IMMUNIZATION (OUTPATIENT)
Dept: NURSING | Facility: CLINIC | Age: 82
End: 2022-07-12
Payer: COMMERCIAL

## 2022-07-12 PROCEDURE — 91306 COVID-19,PF,MODERNA (18+ YRS BOOSTER .25ML): CPT

## 2022-07-12 PROCEDURE — 0064A COVID-19,PF,MODERNA (18+ YRS BOOSTER .25ML): CPT

## 2022-07-29 ENCOUNTER — OFFICE VISIT (OUTPATIENT)
Dept: CARDIOLOGY | Facility: CLINIC | Age: 82
End: 2022-07-29
Attending: NURSE PRACTITIONER
Payer: COMMERCIAL

## 2022-07-29 VITALS
WEIGHT: 164.6 LBS | SYSTOLIC BLOOD PRESSURE: 122 MMHG | BODY MASS INDEX: 24.38 KG/M2 | HEART RATE: 66 BPM | OXYGEN SATURATION: 98 % | HEIGHT: 69 IN | DIASTOLIC BLOOD PRESSURE: 77 MMHG

## 2022-07-29 DIAGNOSIS — R53.83 FATIGUE, UNSPECIFIED TYPE: ICD-10-CM

## 2022-07-29 DIAGNOSIS — I49.8 CHRONOTROPIC INCOMPETENCE WITH SINUS NODE DYSFUNCTION: Primary | ICD-10-CM

## 2022-07-29 DIAGNOSIS — I48.19 PERSISTENT ATRIAL FIBRILLATION (H): ICD-10-CM

## 2022-07-29 PROCEDURE — 99213 OFFICE O/P EST LOW 20 MIN: CPT | Performed by: NURSE PRACTITIONER

## 2022-07-29 NOTE — LETTER
7/29/2022    Richmond Martinez MD  [address] [facility] [address]    RE: Eder Horan       Dear Colleague,     I had the pleasure of seeing Eder Horan in the [facility].  HPI and Plan: [id number]  See dictation    Today's clinic visit entailed:  Review of the result(s) of each unique test - ZioPatch  Ordering of each unique test  No LOS data to display   Time spent doing chart review, history and exam, documentation and further activities per the note  Provider  Link to MDM Help Grid     The level of medical decision making during this visit was of moderate complexity.      Orders Placed This Encounter   Procedures     Follow-Up with Cardiology EP     EKG 12-lead complete w/read - Clinics (to be scheduled)     CBC with platelets differential       No orders of the defined types were placed in this encounter.      Medications Discontinued During This Encounter   Medication Reason     ipratropium (ATROVENT) 0.06 % nasal spray      loratadine (CLARITIN) 10 MG tablet          Encounter Diagnoses   Name Primary?     Persistent atrial fibrillation (H)      Bradycardia      Fatigue, unspecified type Yes       CURRENT MEDICATIONS:  Current Outpatient Medications   Medication Sig Dispense Refill     albuterol (PROAIR HFA) 108 (90 Base) MCG/ACT inhaler INHALE 2 PUFFS INTO THE LUNGS EVERY 4 HOURS AS NEEDED FOR SHORTNESS OF BREATH / DYSPNEA 25.5 g 3     amLODIPine (NORVASC) 5 MG tablet Take 1 tablet (5 mg) by mouth daily 90 tablet 3     apixaban ANTICOAGULANT (ELIQUIS ANTICOAGULANT) 5 MG tablet TAKE ONE TABLET BY MOUTH TWICE DAILY. 180 tablet 3     beclomethasone HFA (QVAR REDIHALER) 40 MCG/ACT inhaler Inhale 2 puffs into the lungs daily 31.8 g 3     fluocinonide (LIDEX) 0.05 % external cream Apply topically 2 times daily 120 g 3     levothyroxine (SYNTHROID/LEVOTHROID) 112 MCG tablet Take 1 tablet (112 mcg) by mouth daily 90 tablet 3     metoprolol succinate ER (TOPROL XL) 25 MG 24 hr tablet Take 1 tablet  (25 mg) by mouth daily 90 tablet 0     PREDNISOLONE ACETATE OP Apply to eye daily as needed       valACYclovir HCl (VALTREX PO)        vitamin B-12 (CYANOCOBALAMIN) 2500 MCG sublingual tablet Take 1 tab by mouth 3 times a week (WMF)         ALLERGIES     Allergies   Allergen Reactions     Bactrim [Sulfa Drugs] Rash       PAST MEDICAL HISTORY:  Past Medical History:   Diagnosis Date     Actinic keratosis 6/09    face     ALLERGIC RHINITIS       Atrial fibrillation (H) 6/08     B12 deficiency 1/16/2019     Back pain     s/p LESI through ortho Feb 2010     Basal cell carcinoma      Bradycardia      Chronotropic incompetence with sinus node dysfunction (H)      COPD (chronic obstructive pulmonary disease) (H)      DVT      Left calf 2003. Right calf 6/06     Embolic stroke (H) 06/11/2017    superior cerebellar artery occlusion. Hx A fib     Hyperlipidemia LDL goal <130 5/10/2011     Hypertension      HYPOTHYROIDISM      hypothyroidism     Lateral epicondylitis  of elbow 7/05    left     Malignant melanoma (H)      PLANTAR FASCITIS      Prostate cancer (H) 6/09    on Lupron therapy     Raynaud's disease without gangrene 11/26/2018     Squamous cell carcinoma  Sept 2014     right lower lid, s/p MOHS     Syncope      TMJ (temporomandibular joint syndrome) 4/12/2012     Tobacco use disorder        PAST SURGICAL HISTORY:  Past Surgical History:   Procedure Laterality Date     ABDOMEN SURGERY  2004,06,09    Hernias (2) Prostate Removal(2009)     BIOPSY  2009    Prostate     CARDIOVERSION  10-16-08    failed     COLONOSCOPY  2004     EYE SURGERY      2013 lump from lower  left eye lid removed     GENITOURINARY SURGERY  2009    Prostate     HERNIA REPAIR  2004 - 2006     [facility] NONSPECIFIC PROCEDURE  1/07    Left groin exploration and left inguinal herniorrhaphy     [facility] NONSPECIFIC PROCEDURE  5/08    Prostate bx (benign)     [facility] NONSPECIFIC PROCEDURE  8/09    Wide local excision (left side), robotic-assisted laparoscopic  prostatectomy and   Left pelvic lymph node dissection     [facility] NONSPECIFIC PROCEDURE  August 2010    Right inguinal herniorrhaphy with mesh       FAMILY HISTORY:  Family History   Problem Relation Age of Onset     C.A.D. Father         heart attack- angina     Coronary Artery Disease Father      Myocardial Infarction Father      Cancer Mother         lung     Lung Cancer Mother      Lung Cancer Sister      Unknown/Adopted Maternal Grandmother      Unknown/Adopted Maternal Grandfather      Unknown/Adopted Paternal Grandmother      Unknown/Adopted Paternal Grandfather      Unknown/Adopted Sister        SOCIAL HISTORY:  Social History     Socioeconomic History     Marital status:      Spouse name: None     Number of children: 3     Years of education: None     Highest education level: None   Occupational History     Occupation: [email or web address]     Employer: [employer]     Employer: [employer]   Tobacco Use     Smoking status: Current Some Day Smoker     Types: Cigars     Last attempt to quit: 12/1/2006     Years since quitting: 15.6     Smokeless tobacco: Never Used     Tobacco comment:  occasionally cigar   Substance and Sexual Activity     Alcohol use: Yes     Alcohol/week: 0.0 standard drinks     Comment: 2-3 drinks a day:  Wine/Beer     Drug use: No     Sexual activity: Never   Other Topics Concern     Parent/sibling w/ CABG, MI or angioplasty before 65F 55M? No     Caffeine Concern No     Comment: 2-3 cups of coffee a day     Sleep Concern No     Stress Concern No     Weight Concern No     Special Diet No     Exercise No     Seat Belt Yes       Review of Systems:  Skin:  not assessed       Eyes:  not assessed      ENT:  not assessed      Respiratory:  Positive for shortness of breath;dyspnea on exertion;cough;wheezing all the time walking, stairs, mowing lawn   Cardiovascular:    edema;Positive for;lightheadedness;dizziness;fatigue edema comes and goes, fatigue, dizziness, lightheadedness- sudden movements, changes of  "position  Gastroenterology: not assessed      Genitourinary:  not assessed      Musculoskeletal:  not assessed      Neurologic:  not assessed      Psychiatric:  not assessed      Heme/Lymph/Imm:  not assessed      Endocrine:  Positive for thyroid disorder      Physical Exam:  Vitals: /77 (Cuff Size: Adult Regular)   Pulse 66   Ht 1.753 m (5' 9.02\")   Wt 74.7 kg (164 lb 9.6 oz)   SpO2 98%   BMI 24.30 kg/m      Constitutional:  cooperative, alert and oriented, well developed, well nourished, in no acute distress        Skin:  warm and dry to the touch;no apparent skin lesions or masses noted          Head:  normocephalic, no masses or lesions        Eyes:  pupils equal and round;conjunctivae and lids unremarkable        Lymph:      ENT:  no pallor or cyanosis, dentition good        Neck:  JVP normal        Respiratory:  normal breath sounds, clear to auscultation, normal A-P diameter, normal symmetry, normal respiratory excursion, no use of accessory muscles         Cardiac: no murmurs, gallops or rubs detected irregularly irregular rhythm              not assessed this visit                                        GI:  not assessed this visit        Extremities and Muscular Skeletal:  no deformities, clubbing, cyanosis, erythema observed;no edema              Neurological:  no gross motor deficits        Psych:  Alert and Oriented x 3        CC  MARCELO Myers CNP  [address]  [place] [address]    Service Date: 07/29/2022    ASSESSMENT AND PLAN:  Mr. Horan is a delightful 82-year-old gentleman well known to me here at the [facility].  He is an established patient of Dr. Hawkins.  His past medical history includes:  1.  Sinus node dysfunction with moderate chronotropic incompetence.  There has been discussion of a pacemaker over the years; however, the patient has declined secondary to his age.  He has intermittent lightheadedness and dizziness but feels overall he is stable.  2.  " "Paroxysmal atrial fibrillation.  He was previously on flecainide 75 mg once daily.  He had a 12-lead EKG 06/2021 where he was sinus bradycardia.  Dr. Hawkins did a 14-day Zio Patch, which showed that he was 21% AFib with occasional RVR.  Low-dose flecainide was discontinued and replaced with metoprolol ER 25 mg daily.  He remains on Eliquis at 5 mg twice daily.  3.  History of CVA in 2017, treated with TPA.  The patient had not been on anticoagulation until his stroke.  4.  COPD.  5.  Hypothyroidism, on levothyroxine.  6.  Hypertension.    Eder is here today to review his Zio Patch.  We repeated one on 06/21.  He wore the monitor for 7 days.  The patient had persistent AFib with heart rate ranging from 42 to 141 beats per minute.  His average heart rate was 79 beats per minute.  He had a 3.2 second pause at 6 p.m. in the evening.  The patient states he was awake.  He does not recall having any symptoms.  His slowest heart rate was 42 beats per minute at 2:37 in the afternoon.  I reviewed the Zio Patch with him.  He has not had any episodes of syncope.  He does have chronotropic incompetence that he says he just has no \"get up and go.\" He also has occasional dizziness.  He states sometimes his eyes will not focus and it takes a few minutes for his eyes to focus in.  He is uncertain if that is related to his heart.    Blood pressure is today 122/77, weight is stable at 164 pounds.  He is here today for followup.    ASSESSMENT/PLAN:  1.  Sinus node dysfunction with chronotropic incompetence.  The patient had one 3.2 second pause during waking hours on his last Zio Patch.  The patient feels at the age of 82 he should not get a pacemaker.  However, I did explain that this could progress in the future to a heart block where he would need to get a pacemaker.  He is going to continue to consider this.  He has not had any episodes of syncope or presyncope.  He has had occasional dizziness.  He does have mostly fatigue.  I have " asked that he change his metoprolol to bedtime to see if that helps.    He will remain on apixaban at 5 mg twice daily.  His last creatinine was 0.86 and he is at 82 years of age.  He has no bleeding or bruising issues.    2.  History of hypertension, currently normotensive on amlodipine 5 mg daily, metoprolol ER 25 mg daily.    3.  Hypothyroidism, on levothyroxine.  Last TSH was 0.69.    I have added a CBC to be done at his visit with Dr. Martinez next month.  BMP and TSH have already been ordered.    If there are any concerns, Augustine will contact us.  When he has problems with his eyes focusing, I have asked that he start checking his pulse rate with his oximeter.  He has it sitting by his chair, so this should not be an issue.  I have encouraged him to consider a pacemaker for sinus node dysfunction.  I am reluctant to stop metoprolol because he has had some RVR on Zio Patch in the past.    Thank you for including us in his care.  Please feel free to contact us if you have questions or concerns regarding today's assessment and plan.  He will follow up next year with Dr. Hawkins.    Amira Mendes NP        D: 2022   T: 2022   MT: ruiz    Name:     AUGUSTINE MYRICK  MRN:      [id number]        Account:      [id number]   :      1940           Service Date: 2022       Document: [id number]      Thank you for allowing me to participate in the care of your patient.      Sincerely,     Amira Castro NP, [facility]ARCELO [facility]

## 2022-07-29 NOTE — PROGRESS NOTES
"Service Date: 07/29/2022    ASSESSMENT AND PLAN:  Mr. Horan is a delightful 82-year-old gentleman well known to me here at the [facility].  He is an established patient of Dr. Hawkins.  His past medical history includes:  1.  Sinus node dysfunction with moderate chronotropic incompetence.  There has been discussion of a pacemaker over the years; however, the patient has declined secondary to his age.  He has intermittent lightheadedness and dizziness but feels overall he is stable.  2.  Paroxysmal atrial fibrillation.  He was previously on flecainide 75 mg once daily.  He had a 12-lead EKG 06/2021 where he was sinus bradycardia.  Dr. Hawkins did a 14-day Zio Patch, which showed that he was 21% AFib with occasional RVR.  Low-dose flecainide was discontinued and replaced with metoprolol ER 25 mg daily.  He remains on Eliquis at 5 mg twice daily.  3.  History of CVA in 2017, treated with TPA.  The patient had not been on anticoagulation until his stroke.  4.  COPD.  5.  Hypothyroidism, on levothyroxine.  6.  Hypertension.    Eder is here today to review his Zio Patch.  We repeated one on 06/21.  He wore the monitor for 7 days.  The patient had persistent AFib with heart rate ranging from 42 to 141 beats per minute.  His average heart rate was 79 beats per minute.  He had a 3.2 second pause at 6 p.m. in the evening.  The patient states he was awake.  He does not recall having any symptoms.  His slowest heart rate was 42 beats per minute at 2:37 in the afternoon.  I reviewed the Zio Patch with him.  He has not had any episodes of syncope.  He does have chronotropic incompetence that he says he just has no \"get up and go.\" He also has occasional dizziness.  He states sometimes his eyes will not focus and it takes a few minutes for his eyes to focus in.  He is uncertain if that is related to his heart.    Blood pressure is today 122/77, weight is stable at 164 pounds.  He is here today for followup.    ASSESSMENT/PLAN:  1.  " Sinus node dysfunction with chronotropic incompetence.  The patient had one 3.2 second pause during waking hours on his last Zio Patch.  The patient feels at the age of 82 he should not get a pacemaker.  However, I did explain that this could progress in the future to a heart block where he would need to get a pacemaker.  He is going to continue to consider this.  He has not had any episodes of syncope or presyncope.  He has had occasional dizziness.  He does have mostly fatigue.  I have asked that he change his metoprolol to bedtime to see if that helps.    He will remain on apixaban at 5 mg twice daily.  His last creatinine was 0.86 and he is at 82 years of age.  He has no bleeding or bruising issues.    2.  History of hypertension, currently normotensive on amlodipine 5 mg daily, metoprolol ER 25 mg daily.    3.  Hypothyroidism, on levothyroxine.  Last TSH was 0.69.    I have added a CBC to be done at his visit with Dr. Martinez next month.  BMP and TSH have already been ordered.    If there are any concerns, Augustine will contact us.  When he has problems with his eyes focusing, I have asked that he start checking his pulse rate with his oximeter.  He has it sitting by his chair, so this should not be an issue.    I have encouraged him to consider a pacemaker for sinus node dysfunction.  I am reluctant to stop metoprolol because he has had some RVR on Zio Patch in the past.    Thank you for including us in his care.  Please feel free to contact us if you have questions or concerns regarding today's assessment and plan.  He will follow up next year with Dr. Hawkins.    Amira Castro NP        D: 2022   T: 2022   MT: ruiz    Name:     AUGUSTINE MYRICK  MRN:      [id number]        Account:      [id number]   :      1940           Service Date: 2022       Document: [id number]

## 2022-07-29 NOTE — PROGRESS NOTES
HPI and Plan: [id number]  See dictation    Today's clinic visit entailed:  Review of the result(s) of each unique test - ZioPatch  Ordering of each unique test  No LOS data to display   Time spent doing chart review, history and exam, documentation and further activities per the note  Provider  Link to [facility] Help Grid     The level of medical decision making during this visit was of moderate complexity.      Orders Placed This Encounter   Procedures     Follow-Up with Cardiology EP     EKG 12-lead complete w/read - Clinics (to be scheduled)     CBC with platelets differential       No orders of the defined types were placed in this encounter.      Medications Discontinued During This Encounter   Medication Reason     ipratropium (ATROVENT) 0.06 % nasal spray      loratadine (CLARITIN) 10 MG tablet          Encounter Diagnoses   Name Primary?     Persistent atrial fibrillation (H)      Bradycardia      Fatigue, unspecified type Yes       CURRENT MEDICATIONS:  Current Outpatient Medications   Medication Sig Dispense Refill     albuterol (PROAIR HFA) 108 (90 Base) MCG/ACT inhaler INHALE 2 PUFFS INTO THE LUNGS EVERY 4 HOURS AS NEEDED FOR SHORTNESS OF BREATH / DYSPNEA 25.5 g 3     amLODIPine (NORVASC) 5 MG tablet Take 1 tablet (5 mg) by mouth daily 90 tablet 3     apixaban ANTICOAGULANT (ELIQUIS ANTICOAGULANT) 5 MG tablet TAKE ONE TABLET BY MOUTH TWICE DAILY. 180 tablet 3     beclomethasone HFA (QVAR REDIHALER) 40 MCG/ACT inhaler Inhale 2 puffs into the lungs daily 31.8 g 3     fluocinonide (LIDEX) 0.05 % external cream Apply topically 2 times daily 120 g 3     levothyroxine (SYNTHROID/LEVOTHROID) 112 MCG tablet Take 1 tablet (112 mcg) by mouth daily 90 tablet 3     metoprolol succinate ER (TOPROL XL) 25 MG 24 hr tablet Take 1 tablet (25 mg) by mouth daily 90 tablet 0     PREDNISOLONE ACETATE OP Apply to eye daily as needed       valACYclovir HCl (VALTREX PO)        vitamin B-12 (CYANOCOBALAMIN) 2500 MCG sublingual tablet  Take 1 tab by mouth 3 times a week (WMF)         ALLERGIES     Allergies   Allergen Reactions     Bactrim [Sulfa Drugs] Rash       PAST MEDICAL HISTORY:  Past Medical History:   Diagnosis Date     Actinic keratosis 6/09    face     ALLERGIC RHINITIS       Atrial fibrillation (H) 6/08     B12 deficiency 1/16/2019     Back pain     s/p LESI through ortho Feb 2010     Basal cell carcinoma      Bradycardia      Chronotropic incompetence with sinus node dysfunction (H)      COPD (chronic obstructive pulmonary disease) (H)      DVT      Left calf 2003. Right calf 6/06     Embolic stroke (H) 06/11/2017    superior cerebellar artery occlusion. Hx A fib     Hyperlipidemia LDL goal <130 5/10/2011     Hypertension      HYPOTHYROIDISM      hypothyroidism     Lateral epicondylitis  of elbow 7/05    left     Malignant melanoma (H)      PLANTAR FASCITIS      Prostate cancer (H) 6/09    on Lupron therapy     Raynaud's disease without gangrene 11/26/2018     Squamous cell carcinoma  Sept 2014     right lower lid, s/p MOHS     Syncope      TMJ (temporomandibular joint syndrome) 4/12/2012     Tobacco use disorder        PAST SURGICAL HISTORY:  Past Surgical History:   Procedure Laterality Date     ABDOMEN SURGERY  2004,06,09    Hernias (2) Prostate Removal(2009)     BIOPSY  2009    Prostate     CARDIOVERSION  10-16-08    failed     COLONOSCOPY  2004     EYE SURGERY      2013 lump from lower  left eye lid removed     GENITOURINARY SURGERY  2009    Prostate     HERNIA REPAIR  2004 - 2006     [facility] NONSPECIFIC PROCEDURE  1/07    Left groin exploration and left inguinal herniorrhaphy     [facility] NONSPECIFIC PROCEDURE  5/08    Prostate bx (benign)     [facility] NONSPECIFIC PROCEDURE  8/09    Wide local excision (left side), robotic-assisted laparoscopic prostatectomy and   Left pelvic lymph node dissection     [facility] NONSPECIFIC PROCEDURE  August 2010    Right inguinal herniorrhaphy with mesh       FAMILY HISTORY:  Family History   Problem Relation Age  of Onset     C.A.D. Father         heart attack- angina     Coronary Artery Disease Father      Myocardial Infarction Father      Cancer Mother         lung     Lung Cancer Mother      Lung Cancer Sister      Unknown/Adopted Maternal Grandmother      Unknown/Adopted Maternal Grandfather      Unknown/Adopted Paternal Grandmother      Unknown/Adopted Paternal Grandfather      Unknown/Adopted Sister        SOCIAL HISTORY:  Social History     Socioeconomic History     Marital status:      Spouse name: None     Number of children: 3     Years of education: None     Highest education level: None   Occupational History     Occupation: [employer]     Employer: [employer]     Employer: [employer]   Tobacco Use     Smoking status: Current Some Day Smoker     Types: Cigars     Last attempt to quit: 12/1/2006     Years since quitting: 15.6     Smokeless tobacco: Never Used     Tobacco comment:  occasionally cigar   Substance and Sexual Activity     Alcohol use: Yes     Alcohol/week: 0.0 standard drinks     Comment: 2-3 drinks a day:  Wine/Beer     Drug use: No     Sexual activity: Never   Other Topics Concern     Parent/sibling w/ CABG, MI or angioplasty before 65F 55M? No     Caffeine Concern No     Comment: 2-3 cups of coffee a day     Sleep Concern No     Stress Concern No     Weight Concern No     Special Diet No     Exercise No     Seat Belt Yes       Review of Systems:  Skin:  not assessed       Eyes:  not assessed      ENT:  not assessed      Respiratory:  Positive for shortness of breath;dyspnea on exertion;cough;wheezing all the time walking, stairs, mowing lawn   Cardiovascular:    edema;Positive for;lightheadedness;dizziness;fatigue edema comes and goes, fatigue, dizziness, lightheadedness- sudden movements, changes of position  Gastroenterology: not assessed      Genitourinary:  not assessed      Musculoskeletal:  not assessed      Neurologic:  not assessed      Psychiatric:  not assessed      Heme/Lymph/Imm:  not  "assessed      Endocrine:  Positive for thyroid disorder      Physical Exam:  Vitals: /77 (Cuff Size: Adult Regular)   Pulse 66   Ht 1.753 m (5' 9.02\")   Wt 74.7 kg (164 lb 9.6 oz)   SpO2 98%   BMI 24.30 kg/m      Constitutional:  cooperative, alert and oriented, well developed, well nourished, in no acute distress        Skin:  warm and dry to the touch;no apparent skin lesions or masses noted          Head:  normocephalic, no masses or lesions        Eyes:  pupils equal and round;conjunctivae and lids unremarkable        Lymph:      ENT:  no pallor or cyanosis, dentition good        Neck:  JVP normal        Respiratory:  normal breath sounds, clear to auscultation, normal A-P diameter, normal symmetry, normal respiratory excursion, no use of accessory muscles         Cardiac: no murmurs, gallops or rubs detected irregularly irregular rhythm              not assessed this visit                                        GI:  not assessed this visit        Extremities and Muscular Skeletal:  no deformities, clubbing, cyanosis, erythema observed;no edema              Neurological:  no gross motor deficits        Psych:  Alert and Oriented x 3        CC  Amira Castro, APRN CNP  [address]  [place] [address]              "

## 2022-07-29 NOTE — PATIENT INSTRUCTIONS
Call my nurse with any questions or concerns:  [phone]  *If you have concerns after hours, please call [phone], option 2 to speak with on call Cardiologist.    Take metoprolol at night. Does that help with the fatigue?  You need your annual blood work with  ([facility], [facility]). Mention it to the lab person BEFORE they draw your blood  Consider a pacemaker if your energy level continues to deteriorate or you have more dizziness.  Call with any concerns        Pt c/o R sided abd pain, nausea and diarrhea x 2 days. Denies any other symptoms

## 2022-08-26 DIAGNOSIS — J44.9 CHRONIC OBSTRUCTIVE PULMONARY DISEASE, UNSPECIFIED COPD TYPE (H): ICD-10-CM

## 2022-08-28 RX ORDER — BECLOMETHASONE DIPROPIONATE HFA 40 UG/1
2 AEROSOL, METERED RESPIRATORY (INHALATION) DAILY
Qty: 31.8 G | Refills: 3 | Status: SHIPPED | OUTPATIENT
Start: 2022-08-28 | End: 2023-03-07

## 2022-09-06 ENCOUNTER — LAB (OUTPATIENT)
Dept: LAB | Facility: CLINIC | Age: 82
End: 2022-09-06
Payer: COMMERCIAL

## 2022-09-06 DIAGNOSIS — I10 BENIGN ESSENTIAL HYPERTENSION: ICD-10-CM

## 2022-09-06 DIAGNOSIS — R53.83 FATIGUE, UNSPECIFIED TYPE: ICD-10-CM

## 2022-09-06 DIAGNOSIS — E03.9 HYPOTHYROIDISM, UNSPECIFIED TYPE: ICD-10-CM

## 2022-09-06 LAB
ANION GAP SERPL CALCULATED.3IONS-SCNC: 4 MMOL/L (ref 3–14)
BASOPHILS # BLD AUTO: 0 10E3/UL (ref 0–0.2)
BASOPHILS NFR BLD AUTO: 0 %
BUN SERPL-MCNC: 12 MG/DL (ref 7–30)
CALCIUM SERPL-MCNC: 8.3 MG/DL (ref 8.5–10.1)
CHLORIDE BLD-SCNC: 107 MMOL/L (ref 94–109)
CO2 SERPL-SCNC: 28 MMOL/L (ref 20–32)
CREAT SERPL-MCNC: 0.68 MG/DL (ref 0.66–1.25)
EOSINOPHIL # BLD AUTO: 0.2 10E3/UL (ref 0–0.7)
EOSINOPHIL NFR BLD AUTO: 3 %
ERYTHROCYTE [DISTWIDTH] IN BLOOD BY AUTOMATED COUNT: 13.8 % (ref 10–15)
GFR SERPL CREATININE-BSD FRML MDRD: >90 ML/MIN/1.73M2
GLUCOSE BLD-MCNC: 105 MG/DL (ref 70–99)
HCT VFR BLD AUTO: 45.7 % (ref 40–53)
HGB BLD-MCNC: 15.3 G/DL (ref 13.3–17.7)
LYMPHOCYTES # BLD AUTO: 1.3 10E3/UL (ref 0.8–5.3)
LYMPHOCYTES NFR BLD AUTO: 22 %
MCH RBC QN AUTO: 34.7 PG (ref 26.5–33)
MCHC RBC AUTO-ENTMCNC: 33.5 G/DL (ref 31.5–36.5)
MCV RBC AUTO: 104 FL (ref 78–100)
MONOCYTES # BLD AUTO: 0.8 10E3/UL (ref 0–1.3)
MONOCYTES NFR BLD AUTO: 13 %
NEUTROPHILS # BLD AUTO: 3.8 10E3/UL (ref 1.6–8.3)
NEUTROPHILS NFR BLD AUTO: 62 %
PLATELET # BLD AUTO: 220 10E3/UL (ref 150–450)
POTASSIUM BLD-SCNC: 4 MMOL/L (ref 3.4–5.3)
RBC # BLD AUTO: 4.41 10E6/UL (ref 4.4–5.9)
SODIUM SERPL-SCNC: 139 MMOL/L (ref 133–144)
TSH SERPL DL<=0.005 MIU/L-ACNC: 0.68 MU/L (ref 0.4–4)
WBC # BLD AUTO: 6.1 10E3/UL (ref 4–11)

## 2022-09-06 PROCEDURE — 85025 COMPLETE CBC W/AUTO DIFF WBC: CPT

## 2022-09-06 PROCEDURE — 36415 COLL VENOUS BLD VENIPUNCTURE: CPT

## 2022-09-06 PROCEDURE — 84443 ASSAY THYROID STIM HORMONE: CPT

## 2022-09-06 PROCEDURE — 80048 BASIC METABOLIC PNL TOTAL CA: CPT

## 2022-09-14 DIAGNOSIS — Z85.828 HISTORY OF SKIN CANCER: ICD-10-CM

## 2022-09-14 DIAGNOSIS — J44.9 CHRONIC OBSTRUCTIVE PULMONARY DISEASE, UNSPECIFIED COPD TYPE (H): ICD-10-CM

## 2022-09-14 DIAGNOSIS — I87.2 VENOUS STASIS DERMATITIS, UNSPECIFIED LATERALITY: ICD-10-CM

## 2022-09-14 DIAGNOSIS — I48.0 PAROXYSMAL ATRIAL FIBRILLATION (H): ICD-10-CM

## 2022-09-14 RX ORDER — FLUOCINONIDE 0.5 MG/G
CREAM TOPICAL 2 TIMES DAILY
Qty: 120 G | Refills: 3 | Status: SHIPPED | OUTPATIENT
Start: 2022-09-14 | End: 2023-12-02

## 2022-09-14 NOTE — TELEPHONE ENCOUNTER
Routing to provider for refill as med does not meet [facility] protocol.    Has appointment with Dr. Saeed 10/31.    Edwige FARNSWORTH RN  Dermatology   [phone]

## 2022-09-14 NOTE — TELEPHONE ENCOUNTER
Requested Prescriptions   Pending Prescriptions Disp Refills     fluocinonide (LIDEX) 0.05 % external cream 120 g 3     Sig: Apply topically 2 times daily       There is no refill protocol information for this order        Last Written Prescription Date:  01/28/2021  Last Fill Quantity: 120g,  # refills: 3   Last office visit: 7/22/2021 with prescribing provider:  Reece Daniels    Future Office Visit:   Next 5 appointments (look out 90 days)    Oct 31, 2022 10:00 AM  (Arrive by 9:45 AM)  Return Visit with Hany Saeed MD  [facility] ([facility] ) [address] [address] [phone]         Thank you   Olivia Paul [facility]

## 2022-09-16 RX ORDER — ALBUTEROL SULFATE 90 UG/1
AEROSOL, METERED RESPIRATORY (INHALATION)
Qty: 25.5 G | Refills: 0 | Status: SHIPPED | OUTPATIENT
Start: 2022-09-16 | End: 2023-03-07

## 2022-09-16 NOTE — TELEPHONE ENCOUNTER
Routing refill request to provider for review/approval because:   Patient is 18-79 years of age    Recent (6 mo) or future (30 days) visit within the authorizing provider's specialty       Alonzo Gandhi RN  [facility] Triage Nurse

## 2022-09-17 RX ORDER — APIXABAN 5 MG/1
TABLET, FILM COATED ORAL
Qty: 180 TABLET | Refills: 3 | Status: SHIPPED | OUTPATIENT
Start: 2022-09-17 | End: 2023-09-05

## 2022-09-29 DIAGNOSIS — I73.00 RAYNAUD'S DISEASE WITHOUT GANGRENE: ICD-10-CM

## 2022-09-29 DIAGNOSIS — I10 BENIGN ESSENTIAL HYPERTENSION: ICD-10-CM

## 2022-09-30 RX ORDER — AMLODIPINE BESYLATE 5 MG/1
5 TABLET ORAL DAILY
Qty: 90 TABLET | Refills: 0 | Status: SHIPPED | OUTPATIENT
Start: 2022-09-30 | End: 2022-12-25

## 2022-09-30 NOTE — TELEPHONE ENCOUNTER
Prescription approved per [facility] Refill Protocol.  Sonia Bradley, RN  [facility] Triage Nurse

## 2022-10-15 ENCOUNTER — OFFICE VISIT (OUTPATIENT)
Dept: URGENT CARE | Facility: URGENT CARE | Age: 82
End: 2022-10-15
Payer: COMMERCIAL

## 2022-10-15 ENCOUNTER — ANCILLARY PROCEDURE (OUTPATIENT)
Dept: GENERAL RADIOLOGY | Facility: CLINIC | Age: 82
End: 2022-10-15
Attending: PHYSICIAN ASSISTANT
Payer: COMMERCIAL

## 2022-10-15 VITALS
HEART RATE: 75 BPM | BODY MASS INDEX: 24.21 KG/M2 | TEMPERATURE: 97.4 F | WEIGHT: 164 LBS | RESPIRATION RATE: 20 BRPM | OXYGEN SATURATION: 96 % | SYSTOLIC BLOOD PRESSURE: 150 MMHG | DIASTOLIC BLOOD PRESSURE: 86 MMHG

## 2022-10-15 DIAGNOSIS — J06.9 VIRAL UPPER RESPIRATORY TRACT INFECTION WITH COUGH: Primary | ICD-10-CM

## 2022-10-15 DIAGNOSIS — R05.1 ACUTE COUGH: ICD-10-CM

## 2022-10-15 LAB
FLUAV AG SPEC QL IA: NEGATIVE
FLUBV AG SPEC QL IA: NEGATIVE
SARS-COV-2 RNA RESP QL NAA+PROBE: NEGATIVE

## 2022-10-15 PROCEDURE — U0005 INFEC AGEN DETEC AMPLI PROBE: HCPCS | Performed by: PHYSICIAN ASSISTANT

## 2022-10-15 PROCEDURE — 87804 INFLUENZA ASSAY W/OPTIC: CPT | Performed by: PHYSICIAN ASSISTANT

## 2022-10-15 PROCEDURE — U0003 INFECTIOUS AGENT DETECTION BY NUCLEIC ACID (DNA OR RNA); SEVERE ACUTE RESPIRATORY SYNDROME CORONAVIRUS 2 (SARS-COV-2) (CORONAVIRUS DISEASE [COVID-19]), AMPLIFIED PROBE TECHNIQUE, MAKING USE OF HIGH THROUGHPUT TECHNOLOGIES AS DESCRIBED BY CMS-2020-01-R: HCPCS | Performed by: PHYSICIAN ASSISTANT

## 2022-10-15 PROCEDURE — 99214 OFFICE O/P EST MOD 30 MIN: CPT | Mod: CS | Performed by: PHYSICIAN ASSISTANT

## 2022-10-15 PROCEDURE — 71046 X-RAY EXAM CHEST 2 VIEWS: CPT | Mod: TC | Performed by: RADIOLOGY

## 2022-10-15 RX ORDER — BENZONATATE 100 MG/1
100 CAPSULE ORAL 3 TIMES DAILY PRN
Qty: 30 CAPSULE | Refills: 0 | Status: SHIPPED | OUTPATIENT
Start: 2022-10-15 | End: 2022-10-22

## 2022-10-15 NOTE — PROGRESS NOTES
URGENT CARE VISIT:    SUBJECTIVE:   Eder Horan is a 82 year old male presenting with a chief complaint of runny nose and cough - productive.  Onset was 10 day(s) ago.   He denies the following symptoms: fever, chills, shortness of breath, sore throat, vomiting and diarrhea  Course of illness is same.    Treatment measures tried include albuterol inhaler with good relief of symptoms.  Predisposing factors include HX of COPD.    PMH:   Past Medical History:   Diagnosis Date     Actinic keratosis 6/09    face     ALLERGIC RHINITIS       Atrial fibrillation (H) 6/08     B12 deficiency 1/16/2019     Back pain     s/p LESI through ortho Feb 2010     Basal cell carcinoma      Bradycardia      Chronotropic incompetence with sinus node dysfunction (H)      COPD (chronic obstructive pulmonary disease) (H)      DVT      Left calf 2003. Right calf 6/06     Embolic stroke (H) 06/11/2017    superior cerebellar artery occlusion. Hx A fib     Hyperlipidemia LDL goal <130 5/10/2011     Hypertension      HYPOTHYROIDISM      hypothyroidism     Lateral epicondylitis  of elbow 7/05    left     Malignant melanoma (H)      PLANTAR FASCITIS      Prostate cancer (H) 6/09    on Lupron therapy     Raynaud's disease without gangrene 11/26/2018     Squamous cell carcinoma  Sept 2014     right lower lid, s/p MOHS     Syncope      TMJ (temporomandibular joint syndrome) 4/12/2012     Tobacco use disorder      Allergies: Bactrim [sulfa drugs]   Medications:   Current Outpatient Medications   Medication Sig Dispense Refill     albuterol (PROAIR HFA/PROVENTIL HFA/VENTOLIN HFA) 108 (90 Base) MCG/ACT inhaler INHALE 2 PUFFS INTO THE LUNGS EVERY 4 HOURS AS NEEDED FOR SHORTNESS OF BREATH / DYSPNEA 25.5 g 0     amLODIPine (NORVASC) 5 MG tablet Take 1 tablet (5 mg) by mouth daily 90 tablet 0     benzonatate (TESSALON) 100 MG capsule Take 1 capsule (100 mg) by mouth 3 times daily as needed for cough 30 capsule 0     ELIQUIS ANTICOAGULANT 5 MG tablet TAKE  ONE TABLET BY MOUTH TWICE DAILY. 180 tablet 3     fluocinonide (LIDEX) 0.05 % external cream Apply topically 2 times daily 120 g 3     levothyroxine (SYNTHROID/LEVOTHROID) 112 MCG tablet Take 1 tablet (112 mcg) by mouth daily 90 tablet 3     metoprolol succinate ER (TOPROL XL) 25 MG 24 hr tablet Take 1 tablet (25 mg) by mouth daily 90 tablet 0     PREDNISOLONE ACETATE OP Apply to eye daily as needed       QVAR REDIHALER 40 MCG/ACT inhaler INHALE 2 PUFFS INTO THE LUNGS DAILY 31.8 g 3     valACYclovir HCl (VALTREX PO)        vitamin B-12 (CYANOCOBALAMIN) 2500 MCG sublingual tablet Take 1 tab by mouth 3 times a week (WMF)       Social History:   Social History     Tobacco Use     Smoking status: Some Days     Types: Cigars     Last attempt to quit: 12/1/2006     Years since quitting: 15.8     Smokeless tobacco: Never     Tobacco comments:      occasionally cigar   Substance Use Topics     Alcohol use: Yes     Alcohol/week: 0.0 standard drinks     Comment: 2-3 drinks a day:  Wine/Beer       ROS:  Review of systems negative except as stated above.    OBJECTIVE:  BP (!) 150/86   Pulse 75   Temp 97.4  F (36.3  C)   Resp 20   Wt 74.4 kg (164 lb)   SpO2 96%   BMI 24.21 kg/m    GENERAL APPEARANCE: healthy, alert and no distress  EYES: EOMI,  PERRL, conjunctiva clear  HENT: ear canals and TM's normal.  Nose and mouth without ulcers, erythema or lesions  NECK: supple, nontender, no lymphadenopathy  RESP: lungs clear to auscultation - no rales, rhonchi or wheezes  CV: regular rates and rhythm, normal S1 S2, no murmur noted  SKIN: no suspicious lesions or rashes    Labs:    Results for orders placed or performed in visit on 10/15/22   XR Chest 2 Views     Status: None    Narrative    EXAM: XR CHEST 2 VIEWS  LOCATION: [facility]  DATE/TIME: 10/15/2022 9:57 AM    INDICATION: Acute cough.  COMPARISON: Chest radiograph on 08/20/2022.      Impression    IMPRESSION: Hyperinflated lungs with flattening of both  hemidiaphragms. No focal airspace opacities, pleural effusions, or pneumothorax. Nonenlarged cardiac silhouette. Mildly tortuous thoracic aorta, unchanged.   Results for orders placed or performed in visit on 10/15/22   Influenza A & B Antigen - Clinic Collect     Status: Normal    Specimen: Nasopharyngeal; Swab   Result Value Ref Range    Influenza A antigen Negative Negative    Influenza B antigen Negative Negative    Narrative    Test results must be correlated with clinical data. If necessary, results should be confirmed by a molecular assay or viral culture.       ASSESSMENT:    ICD-10-CM    1. Viral upper respiratory tract infection with cough  J06.9 Influenza A & B Antigen - Clinic Collect     Symptomatic; Unknown COVID-19 Virus (Coronavirus) by PCR Nose     XR Chest 2 Views     benzonatate (TESSALON) 100 MG capsule          PLAN:  30 minutes spent on the date of the encounter doing chart review, review of outside records, review of test results, interpretation of tests, patient visit and documentation   Patient Instructions   Patient was educated on the natural course of condition. Chest x-ray is negative. Take medications as directed. Side effects discussed. Conservative measures discussed including rest, increased fluids, humidifier, and use albuterol inhaler as needed. See your primary care provider if symptoms do not improve in 7 days. Seek emergency care if you develop shortness of breath or fever over 103.    Patient verbalized understanding and is agreeable to plan. The patient was discharged ambulatory and in stable condition.    Marichuy Juarez PA-C ....................  10/15/2022   11:12 AM

## 2022-10-15 NOTE — PATIENT INSTRUCTIONS
Patient was educated on the natural course of condition. Chest x-ray is negative. Take medications as directed. Side effects discussed. Conservative measures discussed including rest, increased fluids, humidifier, and use albuterol inhaler as needed. See your primary care provider if symptoms do not improve in 7 days. Seek emergency care if you develop shortness of breath or fever over 103.

## 2022-10-24 DIAGNOSIS — I48.0 PAROXYSMAL ATRIAL FIBRILLATION (H): ICD-10-CM

## 2022-10-24 RX ORDER — METOPROLOL SUCCINATE 25 MG/1
25 TABLET, EXTENDED RELEASE ORAL DAILY
Qty: 90 TABLET | Refills: 3 | Status: SHIPPED | OUTPATIENT
Start: 2022-10-24 | End: 2023-10-24

## 2022-10-31 ENCOUNTER — OFFICE VISIT (OUTPATIENT)
Dept: DERMATOLOGY | Facility: CLINIC | Age: 82
End: 2022-10-31
Payer: COMMERCIAL

## 2022-10-31 DIAGNOSIS — L57.8 ACTINIC SKIN DAMAGE: ICD-10-CM

## 2022-10-31 DIAGNOSIS — L82.0 INFLAMED SEBORRHEIC KERATOSIS: Primary | ICD-10-CM

## 2022-10-31 DIAGNOSIS — L57.0 ACTINIC KERATOSIS: ICD-10-CM

## 2022-10-31 DIAGNOSIS — D48.5 NEOPLASM OF UNCERTAIN BEHAVIOR OF SKIN: ICD-10-CM

## 2022-10-31 PROCEDURE — 17110 DESTRUCTION B9 LES UP TO 14: CPT | Performed by: STUDENT IN AN ORGANIZED HEALTH CARE EDUCATION/TRAINING PROGRAM

## 2022-10-31 PROCEDURE — 17000 DESTRUCT PREMALG LESION: CPT | Mod: XS | Performed by: STUDENT IN AN ORGANIZED HEALTH CARE EDUCATION/TRAINING PROGRAM

## 2022-10-31 PROCEDURE — 17003 DESTRUCT PREMALG LES 2-14: CPT | Mod: XS | Performed by: STUDENT IN AN ORGANIZED HEALTH CARE EDUCATION/TRAINING PROGRAM

## 2022-10-31 PROCEDURE — 88305 TISSUE EXAM BY PATHOLOGIST: CPT | Performed by: DERMATOLOGY

## 2022-10-31 PROCEDURE — 11102 TANGNTL BX SKIN SINGLE LES: CPT | Mod: XS | Performed by: STUDENT IN AN ORGANIZED HEALTH CARE EDUCATION/TRAINING PROGRAM

## 2022-10-31 PROCEDURE — 99213 OFFICE O/P EST LOW 20 MIN: CPT | Mod: 25 | Performed by: STUDENT IN AN ORGANIZED HEALTH CARE EDUCATION/TRAINING PROGRAM

## 2022-10-31 ASSESSMENT — PAIN SCALES - GENERAL: PAINLEVEL: NO PAIN (0)

## 2022-10-31 NOTE — PATIENT INSTRUCTIONS
Wound Care After a Biopsy    What is a skin biopsy?  A skin biopsy allows the doctor to examine a very small piece of tissue under the microscope to determine the diagnosis and the best treatment for the skin condition. A local anesthetic (numbing medicine)  is injected with a very small needle into the skin area to be tested. A small piece of skin is taken from the area. Sometimes a suture (stitch) is used.     What are the risks of a skin biopsy?  I will experience scar, bleeding, swelling, pain, crusting and redness. I may experience incomplete removal or recurrence. Risks of this procedure are excessive bleeding, bruising, infection, nerve damage, numbness, thick (hypertrophic or keloidal) scar and non-diagnostic biopsy.    How should I care for my wound for the first 24 hours?  Keep the wound dry and covered for 24 hours  If it bleeds, hold direct pressure on the area for 15 minutes. If bleeding does not stop then go to the emergency room  Avoid strenuous exercise the first 1-2 days or as your doctor instructs you    How should I care for the wound after 24 hours?  After 24 hours, remove the bandage  You may bathe or shower as normal  If you had a scalp biopsy, you can shampoo as usual and can use shower water to clean the biopsy site daily  Clean the wound twice a day with gentle soap and water  Do not scrub, be gentle  Apply white petroleum/Vaseline after cleaning the wound with a cotton swab or a clean finger, and keep the site covered with a Bandaid /bandage. Bandages are not necessary with a scalp biopsy  If you are unable to cover the site with a Bandaid /bandage, re-apply ointment 2-3 times a day to keep the site moist. Moisture will help with healing  Avoid strenuous activity for first 1-2 days  Avoid lakes, rivers, pools, and oceans until the stitches are removed or the site is healed    How do I clean my wound?  Wash hands thoroughly with soap or use hand  before all wound care  Clean the  wound with gentle soap and water  Apply white petroleum/Vaseline  to wound after it is clean  Replace the Bandaid /bandage to keep the wound covered for the first few days or as instructed by your doctor  If you had a scalp biopsy, warm shower water to the area on a daily basis should suffice    What should I use to clean my wound?   Cotton-tipped applicators (Qtips )  White petroleum jelly (Vaseline ). Use a clean new container and use Q-tips to apply.  Bandaids   as needed  Gentle soap     How should I care for my wound long term?  Do not get your wound dirty  Keep up with wound care for one week or until the area is healed.  A small scab will form and fall off by itself when the area is completely healed. The area will be red and will become pink in color as it heals. Sun protection is very important for how your scar will turn out. Sunscreen with an SPF 30 or greater is recommended once the area is healed.    You should have some soreness but it should be mild and slowly go away over several days. Talk to your doctor about using tylenol for pain,    When should I call my doctor?  If you have increased:   Pain or swelling  Pus or drainage (clear or slightly yellow drainage is ok)  Temperature over 100F  Spreading redness or warmth around wound    When will I hear about my results?  The biopsy results can take 2 weeks to come back.  Your results will automatically release to [facility] before your provider has even reviewed them.  The clinic will call you with the results, send you a [facility] message, or have you schedule a follow-up clinic or phone time to discuss the results.  Contact our clinics if you do not hear from us in 2 weeks.    Who should I call with questions?  [facility]: [phone]  [facility]: [phone]  For urgent needs outside of business hours call the [facility] at [phone] and ask for the dermatology resident on call  CRYOTHERAPY    What is it?  Use of a very cold liquid, such as liquid nitrogen, to freeze and destroy abnormal skin cells that need to be removed    What should I expect?  Tenderness and redness  A small blister that might grow and fill with dark purple blood.  More than one treatment may be needed if the lesions do not go away.    How do I care for the treated area?  Gently wash the area with your hands when bathing.  Use a thin layer of VaselineÆ to help with healing.   The area should heal within 7-10 days.  Do not use an antibiotic or NeosporinÆ ointment.   You may take acetaminophen (TylenolÆ) for pain.     Call your Doctor if you have:  Severe pain  Signs of infection (warmth, redness, cloudy yellow drainage, and or a bad smell)  Questions or concerns

## 2022-10-31 NOTE — LETTER
10/31/2022         RE: Eder Horan  [address] [facility] [address]        Dear Colleague,    Thank you for referring your patient, Eder Horan, to the [facility]. Please see a copy of my visit note below.    [facility] Dermatology Note    Encounter Date: Oct 31, 2022    Dermatology Problem List:  09/23/20 nBCC nasal tip   SCCIS L superior shoulder     Major PMHx  -   ______________________________________    Impression/Plan:  Eder was seen today for skin check.    Diagnoses and all orders for this visit:    Inflamed seborrheic keratosis  -     DESTRUCT BENIGN LESION, UP TO 14  - ln2 x4 isk on forearms    Neoplasm of uncertain behavior of skin  -     Dermatological Path Order and Indications; Standing  -     SHAVE 1 [[id number]]  - L cheek     Actinic keratosis  -     DESTRUCT PREMALIGNANT LESION, 2-14  -     DESTRUCT PREMALIGNANT LESION, FIRST  - ln2 x10 on face and hands     Actinic skin damage  - benign    Other orders  -     Dermatological Path Order and Indications        Cryotherapy procedure note: After verbal consent and discussion of risks and benefits including but no limited to dyspigmentation/scar, blister, and pain, 10 aks on face and b/l dorsal hands, additionally 4 isks on back and forearms was(were) treated with 1-2mm freeze border for 2 cycles with liquid nitrogen. Post cryotherapy instructions were provided.    and   - SHAVE BIOPSY PROCEDURE NOTE: After written informed consent was obtained, a time out was taken to identify the patient and the correct site for biopsy. The lesion on the L cheek was cleansed with a 70% isopropyl alcohol wipe, and then injected with 2cc of lidocaine 1% with epinephrine 1:100,000. Once anesthesia was ensured, the visible surface of the lesion was biopsied using a [place] blade in standard technique. Hemostasis was obtained with pressure and aluminum chloride 20% solution. The specimen was placed  in a labeled formalin container and sent to pathology for sectioning and analysis. The wound was dressed with white petrolatum and an adhesive bandage. The patient tolerated the procedure well. Post-procedure instructions and recommendations were provided both verbally and in writing.    Follow-up in 1 year .       Staff Involved:  Staff Only    Hany Saeed MD   of Dermatology  Department of Dermatology  [facility] [employer]      CC:   Chief Complaint   Patient presents with     Skin Check       History of Present Illness:  Mr. Eder Horan is a 82 year old male who presents as a return patient.    Spots on hands and face sometimes itch     Labs:      Physical exam:  Vitals: There were no vitals taken for this visit.  GEN: well developed, well-nourished, in no acute distress, in a pleasant mood.     SKIN: Edwards phototype 2  - Full skin, which includes the head/face, both arms, chest, back, abdomen,both legs, genitalia and/or groin buttocks, digits and/or nails, was examined.  - Stuck on brown papules on trunk and extremities   - gritty pink papules on face and hands  - indurated scaly papule on L cheek   - No other lesions of concern on areas examined.     Past Medical History:   Past Medical History:   Diagnosis Date     Actinic keratosis 6/09    face     ALLERGIC RHINITIS       Atrial fibrillation (H) 6/08     B12 deficiency 1/16/2019     Back pain     s/p LESI through ortho Feb 2010     Basal cell carcinoma      Bradycardia      Chronotropic incompetence with sinus node dysfunction (H)      COPD (chronic obstructive pulmonary disease) (H)      DVT      Left calf 2003. Right calf 6/06     Embolic stroke (H) 06/11/2017    superior cerebellar artery occlusion. Hx A fib     Hyperlipidemia LDL goal <130 5/10/2011     Hypertension      HYPOTHYROIDISM      hypothyroidism     Lateral epicondylitis  of elbow 7/05    left     Malignant melanoma (H)      PLANTAR FASCITIS       Prostate cancer (H) 6/09    on Lupron therapy     Raynaud's disease without gangrene 11/26/2018     Squamous cell carcinoma  Sept 2014     right lower lid, s/p MOHS     Syncope      TMJ (temporomandibular joint syndrome) 4/12/2012     Tobacco use disorder      Past Surgical History:   Procedure Laterality Date     ABDOMEN SURGERY  2004,06,09    Hernias (2) Prostate Removal(2009)     BIOPSY  2009    Prostate     CARDIOVERSION  10-16-08    failed     COLONOSCOPY  2004     EYE SURGERY      2013 lump from lower  left eye lid removed     GENITOURINARY SURGERY  2009    Prostate     HERNIA REPAIR  2004 - 2006     [facility] NONSPECIFIC PROCEDURE  1/07    Left groin exploration and left inguinal herniorrhaphy     [facility] NONSPECIFIC PROCEDURE  5/08    Prostate bx (benign)     [facility] NONSPECIFIC PROCEDURE  8/09    Wide local excision (left side), robotic-assisted laparoscopic prostatectomy and   Left pelvic lymph node dissection     [facility] NONSPECIFIC PROCEDURE  August 2010    Right inguinal herniorrhaphy with mesh       Social History:   reports that he has been smoking cigars. He has never used smokeless tobacco. He reports current alcohol use. He reports that he does not use drugs.    Family History:  Family History   Problem Relation Age of Onset     C.A.D. Father         heart attack- angina     Coronary Artery Disease Father      Myocardial Infarction Father      Cancer Mother         lung     Lung Cancer Mother      Lung Cancer Sister      Unknown/Adopted Maternal Grandmother      Unknown/Adopted Maternal Grandfather      Unknown/Adopted Paternal Grandmother      Unknown/Adopted Paternal Grandfather      Unknown/Adopted Sister        Medications:  Current Outpatient Medications   Medication Sig Dispense Refill     albuterol (PROAIR HFA/PROVENTIL HFA/VENTOLIN HFA) 108 (90 Base) MCG/ACT inhaler INHALE 2 PUFFS INTO THE LUNGS EVERY 4 HOURS AS NEEDED FOR SHORTNESS OF BREATH / DYSPNEA 25.5 g 0     amLODIPine (NORVASC) 5 MG tablet Take 1  tablet (5 mg) by mouth daily 90 tablet 0     ELIQUIS ANTICOAGULANT 5 MG tablet TAKE ONE TABLET BY MOUTH TWICE DAILY. 180 tablet 3     fluocinonide (LIDEX) 0.05 % external cream Apply topically 2 times daily 120 g 3     levothyroxine (SYNTHROID/LEVOTHROID) 112 MCG tablet Take 1 tablet (112 mcg) by mouth daily 90 tablet 3     metoprolol succinate ER (TOPROL XL) 25 MG 24 hr tablet Take 1 tablet (25 mg) by mouth daily 90 tablet 3     PREDNISOLONE ACETATE OP Apply to eye daily as needed       QVAR REDIHALER 40 MCG/ACT inhaler INHALE 2 PUFFS INTO THE LUNGS DAILY 31.8 g 3     valACYclovir HCl (VALTREX PO)        vitamin B-12 (CYANOCOBALAMIN) 2500 MCG sublingual tablet Take 1 tab by mouth 3 times a week (WMF)       Allergies   Allergen Reactions     Bactrim [Sulfa Drugs] Rash                   Again, thank you for allowing me to participate in the care of your patient.        Sincerely,        Hany Saeed MD

## 2022-10-31 NOTE — PROGRESS NOTES
[facility] Dermatology Note    Encounter Date: Oct 31, 2022    Dermatology Problem List:  09/23/20 nBCC nasal tip   SCCIS L superior shoulder     Major PMHx  -   ______________________________________    Impression/Plan:  Eder was seen today for skin check.    Diagnoses and all orders for this visit:    Inflamed seborrheic keratosis  -     DESTRUCT BENIGN LESION, UP TO 14  - ln2 x4 isk on forearms    Neoplasm of uncertain behavior of skin  -     Dermatological Path Order and Indications; Standing  -     SHAVE 1 [[id number]]  - L cheek     Actinic keratosis  -     DESTRUCT PREMALIGNANT LESION, 2-14  -     DESTRUCT PREMALIGNANT LESION, FIRST  - ln2 x10 on face and hands     Actinic skin damage  - benign    Other orders  -     Dermatological Path Order and Indications        Cryotherapy procedure note: After verbal consent and discussion of risks and benefits including but no limited to dyspigmentation/scar, blister, and pain, 10 aks on face and b/l dorsal hands, additionally 4 isks on back and forearms was(were) treated with 1-2mm freeze border for 2 cycles with liquid nitrogen. Post cryotherapy instructions were provided.    and   - SHAVE BIOPSY PROCEDURE NOTE: After written informed consent was obtained, a time out was taken to identify the patient and the correct site for biopsy. The lesion on the L cheek was cleansed with a 70% isopropyl alcohol wipe, and then injected with 2cc of lidocaine 1% with epinephrine 1:100,000. Once anesthesia was ensured, the visible surface of the lesion was biopsied using a [place] blade in standard technique. Hemostasis was obtained with pressure and aluminum chloride 20% solution. The specimen was placed in a labeled formalin container and sent to pathology for sectioning and analysis. The wound was dressed with white petrolatum and an adhesive bandage. The patient tolerated the procedure well. Post-procedure instructions and recommendations were provided both  verbally and in writing.    Follow-up in 1 year .       Staff Involved:  Staff Only    Hany Saeed MD   of Dermatology  Department of Dermatology  [facility] [employer] [facility]      CC:   Chief Complaint   Patient presents with     Skin Check       History of Present Illness:  Mr. Eder Horan is a 82 year old male who presents as a return patient.    Spots on hands and face sometimes itch     Labs:      Physical exam:  Vitals: There were no vitals taken for this visit.  GEN: well developed, well-nourished, in no acute distress, in a pleasant mood.     SKIN: Edwards phototype 2  - Full skin, which includes the head/face, both arms, chest, back, abdomen,both legs, genitalia and/or groin buttocks, digits and/or nails, was examined.  - Stuck on brown papules on trunk and extremities   - gritty pink papules on face and hands  - indurated scaly papule on L cheek   - No other lesions of concern on areas examined.     Past Medical History:   Past Medical History:   Diagnosis Date     Actinic keratosis 6/09    face     ALLERGIC RHINITIS       Atrial fibrillation (H) 6/08     B12 deficiency 1/16/2019     Back pain     s/p LESI through ortho Feb 2010     Basal cell carcinoma      Bradycardia      Chronotropic incompetence with sinus node dysfunction (H)      COPD (chronic obstructive pulmonary disease) (H)      DVT      Left calf 2003. Right calf 6/06     Embolic stroke (H) 06/11/2017    superior cerebellar artery occlusion. Hx A fib     Hyperlipidemia LDL goal <130 5/10/2011     Hypertension      HYPOTHYROIDISM      hypothyroidism     Lateral epicondylitis  of elbow 7/05    left     Malignant melanoma (H)      PLANTAR FASCITIS      Prostate cancer (H) 6/09    on Lupron therapy     Raynaud's disease without gangrene 11/26/2018     Squamous cell carcinoma  Sept 2014     right lower lid, s/p MOHS     Syncope      TMJ (temporomandibular joint syndrome) 4/12/2012     Tobacco use disorder       Past Surgical History:   Procedure Laterality Date     ABDOMEN SURGERY  2004,06,09    Hernias (2) Prostate Removal(2009)     BIOPSY  2009    Prostate     CARDIOVERSION  10-16-08    failed     COLONOSCOPY  2004     EYE SURGERY      2013 lump from lower  left eye lid removed     GENITOURINARY SURGERY  2009    Prostate     HERNIA REPAIR  2004 - 2006     [facility] NONSPECIFIC PROCEDURE  1/07    Left groin exploration and left inguinal herniorrhaphy     [facility] NONSPECIFIC PROCEDURE  5/08    Prostate bx (benign)     [facility] NONSPECIFIC PROCEDURE  8/09    Wide local excision (left side), robotic-assisted laparoscopic prostatectomy and   Left pelvic lymph node dissection     [facility] NONSPECIFIC PROCEDURE  August 2010    Right inguinal herniorrhaphy with mesh       Social History:   reports that he has been smoking cigars. He has never used smokeless tobacco. He reports current alcohol use. He reports that he does not use drugs.    Family History:  Family History   Problem Relation Age of Onset     C.A.D. Father         heart attack- angina     Coronary Artery Disease Father      Myocardial Infarction Father      Cancer Mother         lung     Lung Cancer Mother      Lung Cancer Sister      Unknown/Adopted Maternal Grandmother      Unknown/Adopted Maternal Grandfather      Unknown/Adopted Paternal Grandmother      Unknown/Adopted Paternal Grandfather      Unknown/Adopted Sister        Medications:  Current Outpatient Medications   Medication Sig Dispense Refill     albuterol (PROAIR HFA/PROVENTIL HFA/VENTOLIN HFA) 108 (90 Base) MCG/ACT inhaler INHALE 2 PUFFS INTO THE LUNGS EVERY 4 HOURS AS NEEDED FOR SHORTNESS OF BREATH / DYSPNEA 25.5 g 0     amLODIPine (NORVASC) 5 MG tablet Take 1 tablet (5 mg) by mouth daily 90 tablet 0     ELIQUIS ANTICOAGULANT 5 MG tablet TAKE ONE TABLET BY MOUTH TWICE DAILY. 180 tablet 3     fluocinonide (LIDEX) 0.05 % external cream Apply topically 2 times daily 120 g 3     levothyroxine (SYNTHROID/LEVOTHROID) 112  MCG tablet Take 1 tablet (112 mcg) by mouth daily 90 tablet 3     metoprolol succinate ER (TOPROL XL) 25 MG 24 hr tablet Take 1 tablet (25 mg) by mouth daily 90 tablet 3     PREDNISOLONE ACETATE OP Apply to eye daily as needed       QVAR REDIHALER 40 MCG/ACT inhaler INHALE 2 PUFFS INTO THE LUNGS DAILY 31.8 g 3     valACYclovir HCl (VALTREX PO)        vitamin B-12 (CYANOCOBALAMIN) 2500 MCG sublingual tablet Take 1 tab by mouth 3 times a week (WMF)       Allergies   Allergen Reactions     Bactrim [Sulfa Drugs] Rash

## 2022-11-02 ENCOUNTER — TELEPHONE (OUTPATIENT)
Dept: DERMATOLOGY | Facility: CLINIC | Age: 82
End: 2022-11-02

## 2022-11-02 LAB
PATH REPORT.COMMENTS IMP SPEC: ABNORMAL
PATH REPORT.COMMENTS IMP SPEC: ABNORMAL
PATH REPORT.COMMENTS IMP SPEC: YES
PATH REPORT.FINAL DX SPEC: ABNORMAL
PATH REPORT.GROSS SPEC: ABNORMAL
PATH REPORT.MICROSCOPIC SPEC OTHER STN: ABNORMAL
PATH REPORT.RELEVANT HX SPEC: ABNORMAL

## 2022-11-02 NOTE — TELEPHONE ENCOUNTER
Called and spoke with patient and gave results. Questions answered. [place] [facility] [place]. Referral placed.    Edwige FARNSWORTH RN  Dermatology   [phone]

## 2022-11-02 NOTE — TELEPHONE ENCOUNTER
----- Message from Hany Saeed MD sent at 11/2/2022  1:17 PM CDT -----  Please call pt and let him know we will schedule him for mohs. I will place an order for referral to the      A(1). 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

## 2022-11-05 NOTE — TELEPHONE ENCOUNTER
FUTURE VISIT INFORMATION      FUTURE VISIT INFORMATION:    Date: 12.19.22    Time: 8:30    Location: [facility]  REFERRAL INFORMATION:    Referring provider:  Darlin    Referring providers clinic:  Derm    Reason for visit/diagnosis  SCC left cheek return pt    RECORDS REQUESTED FROM:       Clinic name Comments Records Status Imaging Status   Derm 10.31.22  Path # [id number] [facility]

## 2022-11-21 ENCOUNTER — HEALTH MAINTENANCE LETTER (OUTPATIENT)
Age: 82
End: 2022-11-21

## 2022-12-19 ENCOUNTER — OFFICE VISIT (OUTPATIENT)
Dept: DERMATOLOGY | Facility: CLINIC | Age: 82
End: 2022-12-19
Payer: COMMERCIAL

## 2022-12-19 ENCOUNTER — PRE VISIT (OUTPATIENT)
Dept: DERMATOLOGY | Facility: CLINIC | Age: 82
End: 2022-12-19

## 2022-12-19 VITALS — SYSTOLIC BLOOD PRESSURE: 126 MMHG | DIASTOLIC BLOOD PRESSURE: 85 MMHG | HEART RATE: 58 BPM

## 2022-12-19 DIAGNOSIS — C44.329 SQUAMOUS CELL CARCINOMA OF SKIN OF LEFT CHEEK: Primary | ICD-10-CM

## 2022-12-19 PROCEDURE — 13132 CMPLX RPR F/C/C/M/N/AX/G/H/F: CPT | Mod: GC | Performed by: DERMATOLOGY

## 2022-12-19 PROCEDURE — 17311 MOHS 1 STAGE H/N/HF/G: CPT | Mod: GC | Performed by: DERMATOLOGY

## 2022-12-19 PROCEDURE — 17312 MOHS ADDL STAGE: CPT | Mod: GC | Performed by: DERMATOLOGY

## 2022-12-19 ASSESSMENT — PAIN SCALES - GENERAL: PAINLEVEL: NO PAIN (0)

## 2022-12-19 NOTE — NURSING NOTE
Chief Complaint   Patient presents with     Derm Problem     Patient is here today for mohs on left cheek.      Jazmín BOWDEN CMA

## 2022-12-19 NOTE — PROGRESS NOTES
[facility] Mohs Surgery Procedure Note    Case #: 1  Date of Service:  Dec 19, 2022  Surgery: Mohs micrographic surgery (MMS)  Staff surgeon: Scottie Saeed MD  Fellow surgeon: Paul Paul MD  Resident surgeon: Marion Chen MD  Nurse: Jazmín Rachel CMA    Tumor Type: SCC  Location: Left cheek   Derm-Path Accession #: [id number]    Mohs Accession #:   Pre-Op Size: 0.6 cm x 0.6 cm  Final Defect Size: 1.2 cm x 2.0 cm  Number of Mohs stages: 2  Level of Defect: Fat  Local anesthetic: 6 mL 1% lidocaine with epinephrine  Repair Type: Complex linear  Repair Size: 3.4 cm  Suture Material: 4-0 Monocryl; 5-0 fast absorbing gut    Procedure:    Stage I  We discussed the principles of treatment and most likely complications including scarring, bleeding, infection, swelling, pain, crusting, nerve damage, large wound,  incomplete excision, wound dehiscence,  nerve damage, recurrence, and a second procedure may be recommended to obtain the best cosmetic or functional result.    Informed consent was obtained and the patient underwent the procedure as follows:  The patient was placed supine on the operating table.  The cancer was identified, outlined with a marker, and verified by the patient.  The entire surgical field was prepped with chlorhexidine.  The surgical site was anesthetized using lidocaine with epinephrine.    The area of clinically apparent tumor was debulked. The layer of tissue was then surgically excised using a #15 blade and was then transferred onto a specimen sheet maintaining the orientation of the specimen. Hemostasis was obtained using electrocoagulation. The wound site was then covered with a dressing while the tissue samples were processed for examination.    The excised tissue was transported to the Mohs histology laboratory maintaining the tissue orientation.  The tissue specimen was relaxed so that the entire surgical margin was in a a single horizontal plane for sectioning and  inked for precise mapping.  A precise reference map was drawn to reflect the sectioning of the specimen, colored inking of the margins, and orientation on the patient.  The tissue was processed using horizontal sectioning of the base and continuous peripheral margins.  The histopathologic sections were reviewed in conjunction with the reference map.    Total blocks: 1  Total slides: 2    There were no cancer cells visualized on examination, therefore Mohs surgery was complete.    Residual tumor was identified and indicated in red on the reference map, identifying the location where further tissue excision was necessary. Therefore, an additional stage of Mohs Micrographic surgery was deemed necessary.    Stage II  The patient was returned to the operating room, and the area prepped in the usual manner. The residual tumor was excised using the reference map as a guide. The specimen was transfered to a labeled specimen sheet maintaining the orientation of the specimen. Hemostasis was obtained and the wound site was covered with a dressing while the tissue was processed for examination.     The excised tissue was transported to the Mohs histology laboratory maintaining orientation. The specimen margins were inked for precise mapping and a reference map was prepared for the is additional stage to maintain precise orientation as described above. The tissue was processed using horizontal sectioning of the base and continuous peripheral margins. The histopathologic sections were reviewed in conjunction with the reference map.     Total blocks: 1  Total slides: 2    There were no cancer cells visualized on examination, therefore Mohs surgery was complete.    REPAIR:     Due to one or more of the following factors, complex linear closure was required/indicated: Extensive undermining (equal to or greater than the maximum perpendicular width of the defect), exposure of underlying structure (bone, cartilage, tendon, named  neurovascular), free margin involvement (helical rim, vermillion border, nostril rim), and/or placement of retention sutures.    After the excision of the tumor, the area was extensively and carefully undermined using tissue scissors. Hemostasis was obtained with spot electrocautery and ligation of vessels where necessary. Initial deep plication sutures of 4-0 Monocryl sutures were placed in the deep, subcutaneous, and fascial planes to appose the lateral margins. The subcutaneous and dermal layers were then closed with 4-0 Monocryl sutures. The epidermis was then carefully approximated along the length of the wound using 5-0 fast absorbing gut running sutures.     Estimated blood loss was less than 10 ml for all surgical sites. A sterile pressure dressing was applied and wound care instructions, with a written handout, were given. The patient was discharged from the [facility] alert and ambulatory.    Scottie Saeed MD staffed the patient and was present for the key portions of the above procedure.     Dr. Paul Paul (Mohs micrographic surgery fellow) performed the Mohs micrographic surgery and reconstruction under the direct supervision of Scottie aSeed MD, who was present for the entire micrographic surgery and key portions of the reconstruction, and always immediately available.    Paul Paul MD  Dermatology, PGY-5  Mohs surgery fellow    Scribe Disclosure:  INatividad, am serving as a scribe to document services personally performed by Scottie Saeed MD based on data collection and the provider's statements to me.       Attending attestation:  I was present for key elements of the procedure and immediately available for all other portions of the procedure.  I have reviewed the note and edited it as necessary.    Scottie Saeed M.D.  Professor  Director of Dermatologic Surgery  Department of Dermatology  [facility]    Dermatology Surgery Clinic  [facility]  [facility] [address] [address]

## 2022-12-19 NOTE — PATIENT INSTRUCTIONS

## 2022-12-19 NOTE — LETTER
12/19/2022       RE: Eder Horan  [address] [facility] [address]     Dear Colleague,    Thank you for referring your patient, Eder Horan, to the [facility] DERMATOLOGIC SURGERY CLINIC [place] [facility]. Please see a copy of my visit note below.    [facility] Mohs Surgery Procedure Note    Case #: 1  Date of Service:  Dec 19, 2022  Surgery: Mohs micrographic surgery (MMS)  Staff surgeon: Scottie Saeed MD  Fellow surgeon: Paul Paul MD  Resident surgeon: Marion Chen MD  Nurse: Jazmín Rachel CMA    Tumor Type: SCC  Location: Left cheek   Derm-Path Accession #: [id number]    Mohs Accession #:   Pre-Op Size: 0.6 cm x 0.6 cm  Final Defect Size: 1.2 cm x 2.0 cm  Number of Mohs stages: 2  Level of Defect: Fat  Local anesthetic: 6 mL 1% lidocaine with epinephrine  Repair Type: Complex linear  Repair Size: 3.4 cm  Suture Material: 4-0 Monocryl; 5-0 fast absorbing gut    Procedure:    Stage I  We discussed the principles of treatment and most likely complications including scarring, bleeding, infection, swelling, pain, crusting, nerve damage, large wound,  incomplete excision, wound dehiscence,  nerve damage, recurrence, and a second procedure may be recommended to obtain the best cosmetic or functional result.    Informed consent was obtained and the patient underwent the procedure as follows:  The patient was placed supine on the operating table.  The cancer was identified, outlined with a marker, and verified by the patient.  The entire surgical field was prepped with chlorhexidine.  The surgical site was anesthetized using lidocaine with epinephrine.    The area of clinically apparent tumor was debulked. The layer of tissue was then surgically excised using a #15 blade and was then transferred onto a specimen sheet maintaining the orientation of the specimen. Hemostasis was obtained using electrocoagulation.  The wound site was then covered with a dressing while the tissue samples were processed for examination.    The excised tissue was transported to the Mohs histology laboratory maintaining the tissue orientation.  The tissue specimen was relaxed so that the entire surgical margin was in a a single horizontal plane for sectioning and inked for precise mapping.  A precise reference map was drawn to reflect the sectioning of the specimen, colored inking of the margins, and orientation on the patient.  The tissue was processed using horizontal sectioning of the base and continuous peripheral margins.  The histopathologic sections were reviewed in conjunction with the reference map.    Total blocks: 1  Total slides: 2    There were no cancer cells visualized on examination, therefore Mohs surgery was complete.    Residual tumor was identified and indicated in red on the reference map, identifying the location where further tissue excision was necessary. Therefore, an additional stage of Mohs Micrographic surgery was deemed necessary.    Stage II  The patient was returned to the operating room, and the area prepped in the usual manner. The residual tumor was excised using the reference map as a guide. The specimen was transfered to a labeled specimen sheet maintaining the orientation of the specimen. Hemostasis was obtained and the wound site was covered with a dressing while the tissue was processed for examination.     The excised tissue was transported to the Mohs histology laboratory maintaining orientation. The specimen margins were inked for precise mapping and a reference map was prepared for the is additional stage to maintain precise orientation as described above. The tissue was processed using horizontal sectioning of the base and continuous peripheral margins. The histopathologic sections were reviewed in conjunction with the reference map.     Total blocks: 1  Total slides: 2    There were no cancer cells  visualized on examination, therefore Mohs surgery was complete.    REPAIR:     Due to one or more of the following factors, complex linear closure was required/indicated: Extensive undermining (equal to or greater than the maximum perpendicular width of the defect), exposure of underlying structure (bone, cartilage, tendon, named neurovascular), free margin involvement (helical rim, vermillion border, nostril rim), and/or placement of retention sutures.    After the excision of the tumor, the area was extensively and carefully undermined using tissue scissors. Hemostasis was obtained with spot electrocautery and ligation of vessels where necessary. Initial deep plication sutures of 4-0 Monocryl sutures were placed in the deep, subcutaneous, and fascial planes to appose the lateral margins. The subcutaneous and dermal layers were then closed with 4-0 Monocryl sutures. The epidermis was then carefully approximated along the length of the wound using 5-0 fast absorbing gut running sutures.     Estimated blood loss was less than 10 ml for all surgical sites. A sterile pressure dressing was applied and wound care instructions, with a written handout, were given. The patient was discharged from the [facility]ologic Surgery Center alert and ambulatory.    Scottie Saeed MD staffed the patient and was present for the key portions of the above procedure.     Dr. Paul Paul (Mohs micrographic surgery fellow) performed the Mohs micrographic surgery and reconstruction under the direct supervision of Scottie Saeed MD, who was present for the entire micrographic surgery and key portions of the reconstruction, and always immediately available.    Paul Paul MD  Dermatology, PGY-5  Mohs surgery fellow    Scribe Disclosure:  Natividad SANFORD, am serving as a scribe to document services personally performed by Scottie Saeed MD based on data collection and the provider's statements to me.       Attending attestation:  I was present  for key elements of the procedure and immediately available for all other portions of the procedure.  I have reviewed the note and edited it as necessary.    Scottie Saeed M.D.  Professor  Director of Dermatologic Surgery  Department of Dermatology  [facility]    Dermatology Surgery Clinic  [facility] [place] [phone]

## 2022-12-24 DIAGNOSIS — I10 BENIGN ESSENTIAL HYPERTENSION: ICD-10-CM

## 2022-12-24 DIAGNOSIS — I73.00 RAYNAUD'S DISEASE WITHOUT GANGRENE: ICD-10-CM

## 2022-12-24 NOTE — LETTER
[facility]  [address] [id number] [phone]            Eder Horan  [address] [place] [id number]        December 28, 2022    Dear Eder,    While refilling your prescription today, we noticed that you are due for an appointment with your provider.  We will refill your prescription for 30 days, but a follow-up appointment must be made before any additional refills can be approved.     Taking care of your health is important to us and we look forward to seeing you in the near future.  Please call us at [phone] or [phone] (or use Buggl) to schedule an appointment.     Please disregard this notice if you have already made an appointment.    Sincerely,        [facility]

## 2022-12-25 RX ORDER — AMLODIPINE BESYLATE 5 MG/1
5 TABLET ORAL DAILY
Qty: 90 TABLET | Refills: 0 | Status: SHIPPED | OUTPATIENT
Start: 2022-12-25 | End: 2023-03-07

## 2022-12-26 NOTE — TELEPHONE ENCOUNTER
Patient last seen February 2022.  Medication refilled for 90 days.  Call patient and assist in scheduling a follow-up appointment with me in clinic in the next 3 months

## 2023-01-21 DIAGNOSIS — E03.9 HYPOTHYROIDISM, UNSPECIFIED TYPE: ICD-10-CM

## 2023-01-22 RX ORDER — LEVOTHYROXINE SODIUM 112 UG/1
TABLET ORAL
Qty: 90 TABLET | Refills: 3 | Status: SHIPPED | OUTPATIENT
Start: 2023-01-22 | End: 2024-01-14

## 2023-01-30 ENCOUNTER — NURSE TRIAGE (OUTPATIENT)
Dept: NURSING | Facility: CLINIC | Age: 83
End: 2023-01-30
Payer: COMMERCIAL

## 2023-01-30 NOTE — TELEPHONE ENCOUNTER
Nurse Triage    Is this a 2nd Level Triage? NO    Situation: Patient calling with concerns for Covid.  Consent: not needed    Background: Patient reports that on 1/29/2023 he woke up with a sore throat, muscle aches and a cough. He took a Covid test at home which was positive. His symptoms started on 1/29. He has a history of COPD.     Assessment: Patient reports that he is very achy, having sore muscles. He has a history of COPD, so he reports it's not uncommon to have some dyspnea, but he report his breathing is normal for him. He denies feeling to weak to stand, confusion, discoloration of his face or lips or headache. He states his oxygen saturations are 95% per personal oximeter. Patient has a productive cough. Symptoms are stable.    Protocol Recommended Disposition:   Call PCP Now    Recommendation: Advised patient to wait for call back from the clinic regarding antiviral treatment. Care advice given. Reviewed concerning symptoms and when to call back.     Mary Alice Pettit RN [facility] 1/30/2023 3:16 AM    How can I protect others?    These guidelines are for isolating before returning to work, school or .       If you DO have symptoms:  o Stay home and away from others  - For at least 5 days after your symptoms started, AND   - You are fever free for 24 hours (with no medicine that reduces fever), AND  - Your other symptoms are better.  o Wear a mask for 10 full days any time you are around others.    If you DON'T have symptoms:  o Stay at home and away from others for at least 5 days after your positive test.  o Wear a mask for 10 full days any time you are around others.    Would you like me to review some of that information with you now?  Yes    How can I take care of myself?      Get lots of rest. Drink extra fluids (unless a doctor has told you not to).      Take Tylenol (acetaminophen) for fever or pain. If you have liver or kidney problems, ask your family doctor if it's okay to  take Tylenol.     Take either:     650 mg (two 325 mg pills) every 4 to 6 hours, or     1,000 mg (two 500 mg pills) every 8 hours as needed.     Note: Do not take more than 3,000 mg in one day. Acetaminophen is found in many medicines (both prescribed and over-the-counter medicines). Read all labels to be sure you don't take too much.    For children, check the Tylenol bottle for the right dose (based on their age or weight).      If you have other health problems (like cancer, heart failure, an organ transplant or severe kidney disease): Call your specialty clinic if you don't feel better in the next 2 days.      Know when to call 911: Emergency warning signs include:    Trouble breathing or shortness of breath    Pain or pressure in the chest that doesn't go away    Feeling confused like you haven't felt before, or not being able to wake up    Bluish-colored lips or face      Reason for Disposition    [1] HIGH RISK for severe COVID complications (e.g., weak immune system, age > 64 years, obesity with BMI > 25, pregnant, chronic lung disease or other chronic medical condition) AND [2] COVID symptoms (e.g., cough, fever)  (Exceptions: Already seen by PCP and no new or worsening symptoms.)    Additional Information    Negative: SEVERE difficulty breathing (e.g., struggling for each breath, speaks in single words)    Negative: Difficult to awaken or acting confused (e.g., disoriented, slurred speech)    Negative: Bluish (or gray) lips or face now    Negative: Shock suspected (e.g., cold/pale/clammy skin, too weak to stand, low BP, rapid pulse)    Negative: Sounds like a life-threatening emergency to the triager    Negative: [1] Diagnosed or suspected COVID-19 AND [2] symptoms lasting 3 or more weeks    Negative: [1] COVID-19 exposure AND [2] no symptoms    Negative: COVID-19 vaccine reaction suspected (e.g., fever, headache, muscle aches) occurring 1 to 3 days after getting vaccine    Negative: COVID-19 vaccine,  questions about    Negative: [1] Lives with someone known to have influenza (flu test positive) AND [2] flu-like symptoms (e.g., cough, runny nose, sore throat, SOB; with or without fever)    Negative: [1] Adult with possible COVID-19 symptoms AND [2] triager concerned about severity of symptoms or other causes    Negative: COVID-19 and breastfeeding, questions about    Negative: SEVERE or constant chest pain or pressure  (Exception: Mild central chest pain, present only when coughing.)    Negative: MODERATE difficulty breathing (e.g., speaks in phrases, SOB even at rest, pulse 100-120)    Negative: [1] Headache AND [2] stiff neck (can't touch chin to chest)    Negative: Oxygen level (e.g., pulse oximetry) 90 percent or lower    Negative: Chest pain or pressure    Negative: Patient sounds very sick or weak to the triager    Negative: MILD difficulty breathing (e.g., minimal/no SOB at rest, SOB with walking, pulse <100)    Negative: Fever > 103 F (39.4 C)    Negative: [1] Fever > 101 F (38.3 C) AND [2] age > 60 years    Negative: [1] Fever > 100.0 F (37.8 C) AND [2] bedridden (e.g., nursing home patient, CVA, chronic illness, recovering from surgery)    Protocols used: CORONAVIRUS (COVID-19) DIAGNOSED OR [id number]

## 2023-01-30 NOTE — TELEPHONE ENCOUNTER
COVID Positive/Requesting COVID treatment    Patient is positive for COVID and requesting treatment options.    Date of positive COVID test (PCR or at home)? 1/29/2023  Current COVID symptoms: cough, fatigue, muscle or body aches and congestion or runny nose  Date COVID symptoms began: 1/29/2023    Message should be routed to clinic RN pool. Best phone number to use for call back: [phone]    Okay to leave a detailed message on voicemail.

## 2023-01-31 ENCOUNTER — NURSE TRIAGE (OUTPATIENT)
Dept: INTERNAL MEDICINE | Facility: CLINIC | Age: 83
End: 2023-01-31

## 2023-01-31 ENCOUNTER — VIRTUAL VISIT (OUTPATIENT)
Dept: URGENT CARE | Facility: CLINIC | Age: 83
End: 2023-01-31
Payer: COMMERCIAL

## 2023-01-31 ENCOUNTER — TELEPHONE (OUTPATIENT)
Dept: INTERNAL MEDICINE | Facility: CLINIC | Age: 83
End: 2023-01-31

## 2023-01-31 DIAGNOSIS — J44.9 CHRONIC OBSTRUCTIVE PULMONARY DISEASE, UNSPECIFIED COPD TYPE (H): ICD-10-CM

## 2023-01-31 DIAGNOSIS — I49.8 CHRONOTROPIC INCOMPETENCE WITH SINUS NODE DYSFUNCTION: ICD-10-CM

## 2023-01-31 DIAGNOSIS — U07.1 INFECTION DUE TO 2019 NOVEL CORONAVIRUS: ICD-10-CM

## 2023-01-31 DIAGNOSIS — I63.449 CEREBROVASCULAR ACCIDENT (CVA) DUE TO EMBOLISM OF CEREBELLAR ARTERY, UNSPECIFIED BLOOD VESSEL LATERALITY (H): ICD-10-CM

## 2023-01-31 DIAGNOSIS — U07.1 INFECTION DUE TO 2019 NOVEL CORONAVIRUS: Primary | ICD-10-CM

## 2023-01-31 DIAGNOSIS — I48.0 PAROXYSMAL ATRIAL FIBRILLATION (H): ICD-10-CM

## 2023-01-31 PROCEDURE — 99213 OFFICE O/P EST LOW 20 MIN: CPT | Mod: CS

## 2023-01-31 NOTE — TELEPHONE ENCOUNTER
Received a call from the patient following up on getting this medication as he also reached out to [facility] and they informed the patient that this medication is not available.    Called and spoke to the pharmacist at [facility]. Pharmacist confirmed they have this medication in stock. Will send script to the new pharmacy.    Triage RN called and updated the patient. Patient expressed understanding and had no additional questions at this time.     Sonia Bradley, RN

## 2023-01-31 NOTE — TELEPHONE ENCOUNTER
Received a call from [facility] stating they received the script for Legevrio but they do not have this medication in stock and they are not sure what pharmacy continues to stock this medication. Vladimir, Pharmacist, states they do have the Paxlovid in stock.     Will route to the provider for review. Script for Paxlovid?    Please route back to P OX Triage IM if any follow-up is needed.     Sonia Bradley RN

## 2023-01-31 NOTE — TELEPHONE ENCOUNTER
"Pt reports he has not heard from anyone about Paxlovid. Pt reports his symptoms are \"the same\".     Advised pt on symptoms home care per Care Advice. Advised pt he should be hearing from clinic tomorrow. Call back if no response by early afternoon.    Pt verbalizes understanding and agrees to plan.   "

## 2023-01-31 NOTE — TELEPHONE ENCOUNTER
Per note, pt having reported only mild SOB with ambulation and baseline mild SOB with COPD. Therefore appears stable to wait until schedule appt today at 3:00 with virtual  provider for eval and treat

## 2023-01-31 NOTE — PROGRESS NOTES
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  Stopping this for Paxlovid is too risky    Chronic obstructive pulmonary disease, unspecified COPD type (H)  Continue albuterol as needed    Paroxysmal atrial fibrillation (H)  Continue Eliquis.  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   COVID-19 positive patient.  Encounter for consideration of medication intervention. Patient does qualify for a prescription. Full discussion with patient including medication options, risks and benefits. Potential drug interactions reviewed with patient.     Treatment Planned: Molnupiravir, Paxlovid contraindicated with eloquis and does not qualify for remdesivir  Temporary change to home medications: none    Estimated body mass index is 24.21 kg/m  as calculated from the following:    Height as of 7/29/22: 1.753 m (5' 9.02\").    Weight as of 10/15/22: 74.4 kg (164 lb).  GFR Estimate   Date Value Ref Range Status   09/06/2022 >90 >60 mL/min/1.73m2 Final     Comment:     Effective December 21, 2021 eGFRcr in adults is calculated using the 2021 CKD-EPI creatinine equation which includes age and gender (Freddie et al., NEJM, DOI: 10.1056/JWYTtb7615781)   07/08/2020 85 >60 mL/min/[1.73_m2] Final     Comment:     Non  GFR Calc  Starting 12/18/2018, serum creatinine based estimated GFR (eGFR) will be   calculated using the Chronic Kidney Disease Epidemiology Collaboration   (CKD-EPI) equation.       Lab Results   Component Value Date    [id number] Negative 10/15/2022       Norma Mckeon PA-C  [facility]    Subjective   Eder Horan is a 83 year old who presents for the following health issues    HPI    Eder presents via video after testing positive for COVID.  Symptoms first began on Sunday morning, 2 days ago.  He presents with report that this is improved.  He since developed body aches, productive cough and has been fatigued.  He does not feel short of breath.    Review of Systems   ROS negative except as stated above.      Objective    Physical Exam   GENERAL: Healthy, alert and no distress  EYES: Eyes grossly normal to inspection.  No discharge or erythema, or obvious scleral/conjunctival abnormalities.  HENT: Normal cephalic/atraumatic.  External ears, nose and mouth without ulcers or lesions.  No nasal drainage visible.  RESP: No audible cough wheeze or visible cyanosis.  No visible retractions or increased work of breathing.    SKIN: Visible skin clear. No significant rash, abnormal pigmentation or lesions.  NEURO: Cranial nerves grossly intact.  Mentation and speech appropriate for age.  PSYCH: Mentation appears normal, affect normal/bright, judgement and insight intact, normal speech and appearance well-groomed.    Type of service:  Video Visit  Video Start Time: 3:35PM  Video End Time: 3:45PM  Originating Location (pt. Location): Home  Distant Location (provider location):  [facility]- offsite at [facility]  Platform used for Video Visit: Lokesh

## 2023-01-31 NOTE — TELEPHONE ENCOUNTER
Pt has tested positive for Covid and was triaged to discuss with PCP and callback by nurse within 1 hour due to MILD difficulty breathing (SOB with walking). Pt interested in taking paxlovid but also taking Norvasc so was scheduled Virtual Urgent care provider visit to discuss symptoms and paxlovid treatment. No openings with Dr. Martinez today. Will route to PCP for review, further recommendations?    RN COVID TREATMENT VISIT  01/31/23    Eder Horan  83 year old  Current weight? 165 lbs    Has the patient been seen by a primary care provider at an [facility] or [facility] within the past two years? Yes.   Have you been in close proximity to/do you have a known exposure to a person with a confirmed case of influenza? No.     Date of positive COVID test (PCR or at home)?  1/29/23    Current COVID symptoms: cough, shortness of breath or difficulty breathing, fatigue and sore throat    Date COVID symptoms began: 1/29/23    Do you have any of the following conditions that place you at risk of being very sick from COVID-19? 65 years or older, chronic lung disease and heart conditions    Is patient eligible to continue? Yes, established patient, 12 years or older weighing at least 88.2 lbs, who has COVID symptoms that started in the past 5 days and is at risk for being very sick from COVID-19.       Have you received monoclonal antibodies or oral antiviral medications since testing positive to COVID-19? No    Are you currently hospitalized for COVID-19? No    Do you have a history of hepatitis? No    Are you currently pregnant or nursing? No    Do you have a clinically significant hypersensitivity to nirmatrelvir, ritonavir, or molnupiravir? No    Do you have any history of severe renal impairment (eGFR < 30mL/min)? No    Do you have any history of hepatic impairment or abnormalities (e.g. hepatic panel, ALT, AST, ALK Phos, bilirubin)? No    Have you had a coronary stent placed in the previous 6  "months? No     Is patient eligible to continue?   Yes, patient meets all eligibility requirements for the RN COVID treatment (as denoted by all no responses above).     Medications from List 1 of the standing order (on medications that exclude the use of Paxlovid) that patient is taking: NONE. Is patient taking [place] Wort? No  Is patient taking [place]'s Wort or any meds from List 1? No.   Medications from List 2 of the standing order (on meds that provider needs to adjust) that patient is taking: amlodipine (Norvasc), explained a provider visit is necessary to discuss medication adjustments while taking Paxlovid. Is patient on any of the meds from List 2? Yes. Patient will be transferred to a  at the end of this call.   Zulma Bright RN        1. COVID-19 DIAGNOSIS: \"Who made your COVID-19 diagnosis?\" \"Was it confirmed by a positive lab test or self-test?\" If not diagnosed by a doctor (or NP/PA), ask \"Are there lots of cases (community spread) where you live?\" Note: See public health department website, if unsure.      1/29/23  2. COVID-19 EXPOSURE: \"Was there any known exposure to COVID before the symptoms began?\" CDC Definition of close contact: within 6 feet (2 meters) for a total of 15 minutes or more over a 24-hour period.       no  3. ONSET: \"When did the COVID-19 symptoms start?\"       1/29/23  4. WORST SYMPTOM: \"What is your worst symptom?\" (e.g., cough, fever, shortness of breath, muscle aches)      Sore throat muscle aches cough  5. COUGH: \"Do you have a cough?\" If Yes, ask: \"How bad is the cough?\"        Improving, not as frequent, productive; clear/brown  6. FEVER: \"Do you have a fever?\" If Yes, ask: \"What is your temperature, how was it measured, and when did it start?\"      denies  7. RESPIRATORY STATUS: \"Describe your breathing?\" (e.g., shortness of breath, wheezing, unable to speak)       Short of breath with ambulation worse than baseline  8. BETTER-SAME-WORSE: \"Are you getting " "better, staying the same or getting worse compared to yesterday?\"  If getting worse, ask, \"In what way?\"      About same as yesterday, more tired,   9. HIGH RISK DISEASE: \"Do you have any chronic medical problems?\" (e.g., asthma, heart or lung disease, weak immune system, obesity, etc.)      Lung disease, cardiac, cancer  10. VACCINE: \"Have you had the COVID-19 vaccine?\" If Yes, ask: \"Which one, how many shots, when did you get it?\"        moderna x 3  11. BOOSTER: \"Have you received your COVID-19 booster?\" If Yes, ask: \"Which one and when did you get it?\"        moderna x 1  12. PREGNANCY: \"Is there any chance you are pregnant?\" \"When was your last menstrual period?\"          13. OTHER SYMPTOMS: \"Do you have any other symptoms?\"  (e.g., chills, fatigue, headache, loss of smell or taste, muscle pain, sore throat)        Loss of appetite, fatigue  14. O2 SATURATION MONITOR:  \"Do you use an oxygen saturation monitor (pulse oximeter) at home?\" If Yes, ask \"What is your reading (oxygen level) today?\" \"What is your usual oxygen saturation reading?\" (e.g., 95%)        94% on RA    Reason for Disposition    MILD difficulty breathing (e.g., minimal/no SOB at rest, SOB with walking, pulse <100)    Additional Information    Negative: SEVERE difficulty breathing (e.g., struggling for each breath, speaks in single words)    Negative: Difficult to awaken or acting confused (e.g., disoriented, slurred speech)    Negative: Bluish (or gray) lips or face now    Negative: Shock suspected (e.g., cold/pale/clammy skin, too weak to stand, low BP, rapid pulse)    Negative: Sounds like a life-threatening emergency to the triager    Negative: [1] Diagnosed or suspected COVID-19 AND [2] symptoms lasting 3 or more weeks    Negative: [1] COVID-19 exposure AND [2] no symptoms    Negative: COVID-19 vaccine reaction suspected (e.g., fever, headache, muscle aches) occurring 1 to 3 days after getting vaccine    Negative: COVID-19 vaccine, " questions about    Negative: [1] Lives with someone known to have influenza (flu test positive) AND [2] flu-like symptoms (e.g., cough, runny nose, sore throat, SOB; with or without fever)    Negative: [1] Adult with possible COVID-19 symptoms AND [2] triager concerned about severity of symptoms or other causes    Negative: COVID-19 and breastfeeding, questions about    Negative: SEVERE or constant chest pain or pressure  (Exception: Mild central chest pain, present only when coughing.)    Negative: MODERATE difficulty breathing (e.g., speaks in phrases, SOB even at rest, pulse 100-120)    Negative: Headache and stiff neck (can't touch chin to chest)    Negative: Oxygen level (e.g., pulse oximetry) 90 percent or lower    Negative: Chest pain or pressure  (Exception: MILD central chest pain, present only when coughing)    Negative: Patient sounds very sick or weak to the triager    Protocols used: CORONAVIRUS (COVID-19) DIAGNOSED OR [id number]

## 2023-01-31 NOTE — TELEPHONE ENCOUNTER
Patient Contact    Attempt # 1    Was call answered?  No.  Left message on voicemail with information to call me back.      Pt will be triaged and covid 19 rn protocol.

## 2023-01-31 NOTE — PATIENT INSTRUCTIONS
Symptoms began January 29  Stay home and away from others through February 3  You may be around others wearing a well fitting mask on January 4- January 8  Your isolation restrictions are over on February 9 as long as your symptoms are improving and you have been fever free for 24 hours, even if you still test positive for COVID.  However, if you test negative twice, at least 48 hours apart between days 6-10, you can stop wearing your mask earlier    Visit the CDC websites for more information and most up to date guidelines:  www.cdc.gov/coronavirus/2019-ncov/your-health/isolation.html  www.cdc.gov/coronavirus/2019-ncov/hcp/duration-isolation.html    Check O2 levels. If your number stays at 90 or below at rest, go to the emergency room.

## 2023-02-10 ENCOUNTER — TELEPHONE (OUTPATIENT)
Dept: INTERNAL MEDICINE | Facility: CLINIC | Age: 83
End: 2023-02-10
Payer: COMMERCIAL

## 2023-02-10 NOTE — TELEPHONE ENCOUNTER
Patient called in as he developed covid on 1/29 and is still testing positive today and does not know if he can stop quarantining informed that  Day 10 was 2/8 and since he has not had a fever and is feeling well he can stop quarantining and that people can test positive for up to 3 months and we do not recommend retesting unless symptoms return    Alonzo Gandhi RN

## 2023-03-07 ENCOUNTER — OFFICE VISIT (OUTPATIENT)
Dept: INTERNAL MEDICINE | Facility: CLINIC | Age: 83
End: 2023-03-07
Payer: COMMERCIAL

## 2023-03-07 VITALS
WEIGHT: 163.5 LBS | SYSTOLIC BLOOD PRESSURE: 100 MMHG | DIASTOLIC BLOOD PRESSURE: 70 MMHG | TEMPERATURE: 97.6 F | HEIGHT: 69 IN | HEART RATE: 71 BPM | OXYGEN SATURATION: 98 % | BODY MASS INDEX: 24.22 KG/M2

## 2023-03-07 DIAGNOSIS — Z00.00 MEDICARE ANNUAL WELLNESS VISIT, SUBSEQUENT: Primary | ICD-10-CM

## 2023-03-07 DIAGNOSIS — J44.9 CHRONIC OBSTRUCTIVE PULMONARY DISEASE, UNSPECIFIED COPD TYPE (H): ICD-10-CM

## 2023-03-07 DIAGNOSIS — I10 BENIGN ESSENTIAL HYPERTENSION: ICD-10-CM

## 2023-03-07 DIAGNOSIS — I73.00 RAYNAUD'S DISEASE WITHOUT GANGRENE: ICD-10-CM

## 2023-03-07 DIAGNOSIS — E03.9 HYPOTHYROIDISM, UNSPECIFIED TYPE: ICD-10-CM

## 2023-03-07 DIAGNOSIS — I48.0 PAROXYSMAL ATRIAL FIBRILLATION (H): ICD-10-CM

## 2023-03-07 DIAGNOSIS — C61 PROSTATE CANCER (H): ICD-10-CM

## 2023-03-07 PROBLEM — B02.9 SHINGLES: Status: ACTIVE | Noted: 2017-10-24

## 2023-03-07 LAB
ALBUMIN SERPL BCG-MCNC: 4.4 G/DL (ref 3.5–5.2)
ALP SERPL-CCNC: 218 U/L (ref 40–129)
ALT SERPL W P-5'-P-CCNC: 62 U/L (ref 10–50)
ANION GAP SERPL CALCULATED.3IONS-SCNC: 13 MMOL/L (ref 7–15)
AST SERPL W P-5'-P-CCNC: 81 U/L (ref 10–50)
BILIRUB SERPL-MCNC: 0.8 MG/DL
BUN SERPL-MCNC: 10.3 MG/DL (ref 8–23)
CALCIUM SERPL-MCNC: 9.5 MG/DL (ref 8.8–10.2)
CHLORIDE SERPL-SCNC: 98 MMOL/L (ref 98–107)
CREAT SERPL-MCNC: 0.41 MG/DL (ref 0.67–1.17)
DEPRECATED HCO3 PLAS-SCNC: 26 MMOL/L (ref 22–29)
GFR SERPL CREATININE-BSD FRML MDRD: >90 ML/MIN/1.73M2
GLUCOSE SERPL-MCNC: 135 MG/DL (ref 70–99)
POTASSIUM SERPL-SCNC: 3.6 MMOL/L (ref 3.4–5.3)
PROT SERPL-MCNC: 6.7 G/DL (ref 6.4–8.3)
SODIUM SERPL-SCNC: 137 MMOL/L (ref 136–145)
TSH SERPL DL<=0.005 MIU/L-ACNC: 1.18 UIU/ML (ref 0.3–4.2)

## 2023-03-07 PROCEDURE — 80053 COMPREHEN METABOLIC PANEL: CPT | Performed by: INTERNAL MEDICINE

## 2023-03-07 PROCEDURE — G0439 PPPS, SUBSEQ VISIT: HCPCS | Performed by: INTERNAL MEDICINE

## 2023-03-07 PROCEDURE — 84443 ASSAY THYROID STIM HORMONE: CPT | Performed by: INTERNAL MEDICINE

## 2023-03-07 PROCEDURE — 36415 COLL VENOUS BLD VENIPUNCTURE: CPT | Performed by: INTERNAL MEDICINE

## 2023-03-07 PROCEDURE — 99214 OFFICE O/P EST MOD 30 MIN: CPT | Mod: 25 | Performed by: INTERNAL MEDICINE

## 2023-03-07 RX ORDER — ALBUTEROL SULFATE 90 UG/1
2 AEROSOL, METERED RESPIRATORY (INHALATION) EVERY 4 HOURS PRN
Qty: 18 G | Refills: 11 | Status: SHIPPED | OUTPATIENT
Start: 2023-03-07

## 2023-03-07 RX ORDER — AMLODIPINE BESYLATE 5 MG/1
5 TABLET ORAL DAILY
Qty: 90 TABLET | Refills: 3 | Status: SHIPPED | OUTPATIENT
Start: 2023-03-07 | End: 2023-06-23

## 2023-03-07 ASSESSMENT — ENCOUNTER SYMPTOMS
HEMATURIA: 0
NERVOUS/ANXIOUS: 0
PALPITATIONS: 0
DIZZINESS: 1
HEADACHES: 0
DYSURIA: 0
CHILLS: 0
NAUSEA: 0
CONSTIPATION: 0
HEMATOCHEZIA: 0
HEARTBURN: 0
SHORTNESS OF BREATH: 1
SORE THROAT: 0
FREQUENCY: 1
DIARRHEA: 0
ARTHRALGIAS: 0
COUGH: 0
JOINT SWELLING: 0
WEAKNESS: 1
ABDOMINAL PAIN: 0
FEVER: 0
MYALGIAS: 0
PARESTHESIAS: 0

## 2023-03-07 ASSESSMENT — ACTIVITIES OF DAILY LIVING (ADL): CURRENT_FUNCTION: NO ASSISTANCE NEEDED

## 2023-03-07 NOTE — PROGRESS NOTES
"SUBJECTIVE:   Eder is a 83 year old who presents for Preventive Visit and follow-up atrial fibrillation, COPD, hypothyroidism, hypertension, prostate cancer  Patient has been advised of split billing requirements and indicates understanding: Yes     Are you in the first 12 months of your Medicare coverage?  No    Healthy Habits:     In general, how would you rate your overall health?  Good    Frequency of exercise:  None    Do you usually eat at least 4 servings of fruit and vegetables a day, include whole grains    & fiber and avoid regularly eating high fat or \"junk\" foods?  No    Taking medications regularly:  Yes    Barriers to taking medications:  None    Medication side effects:  None    Ability to successfully perform activities of daily living:  No assistance needed    Home Safety:  No safety concerns identified    Hearing Impairment:  Difficulty following a conversation in a noisy restaurant or crowded room and difficulty understanding soft or whispered speech    In the past 6 months, have you been bothered by leaking of urine? Yes    In general, how would you rate your overall mental or emotional health?  Good      PHQ-2 Total Score: 0    Additional concerns today:  No    Have you ever done Advance Care Planning? (For example, a Health Directive, POLST, or a discussion with a medical provider or your loved ones about your wishes): Yes, advance care planning is on file.       Fall risk  Fallen 2 or more times in the past year?: No  Any fall with injury in the past year?: No    Cognitive Screening   1) Repeat 3 items (Leader, Season, Table)    2) Clock draw: NORMAL  3) 3 item recall: Recalls 3 objects  Results: 3 items recalled: COGNITIVE IMPAIRMENT LESS LIKELY    Mini-CogTM Copyright NICOLE Powers. Licensed by the author for use in Health system; reprinted with permission (jacqueline@.[facility]). All rights reserved.      Do you have sleep apnea, excessive snoring or daytime drowsiness?: no    Reviewed and " updated as needed this visit by clinical staff                  Reviewed and updated as needed this visit by Provider                 Social History     Tobacco Use     Smoking status: Some Days     Types: Cigars     Last attempt to quit: 2006     Years since quittin.2     Smokeless tobacco: Never     Tobacco comments:      occasionally cigar   Substance Use Topics     Alcohol use: Yes     Alcohol/week: 0.0 standard drinks     Comment: 2-3 drinks a day:  Wine/Beer       Alcohol Use 2021   Prescreen: >3 drinks/day or >7 drinks/week? Yes   AUDIT SCORE  5     AUDIT - Alcohol Use Disorders Identification Test - Reproduced from the World Health Organization Audit 2001 (Second Edition) 3/7/2023   1.  How often do you have a drink containing alcohol? 4 or more times a week   2.  How many drinks containing alcohol do you have on a typical day when you are drinking? 1 or 2   3.  How often do you have five or more drinks on one occasion? Never   4.  How often during the last year have you found that you were not able to stop drinking once you had started? -   5.  How often during the last year have you failed to do what was normally expected of you because of drinking? -   6.  How often during the last year have you needed a first drink in the morning to get yourself going after a heavy drinking session? -   7.  How often during the last year have you had a feeling of guilt or remorse after drinking? -   8.  How often during the last year have you been unable to remember what happened the night before because of your drinking? -   9.  Have you or someone else been injured because of your drinking? No   10. Has a relative, friend, doctor or other health care worker been concerned about your drinking or suggested you cut down? No   TOTAL SCORE -               Current providers sharing in care for this patient include:   Patient Care Team:  Richmond Martinez MD as PCP - General  Richmond Martinez MD as Assigned  PCP  Amira Castro, APRN CNP as Assigned Heart and Vascular Provider  Hany Saeed MD as MD (Dermatology)  Scottie Saeed MD as Assigned Surgical Provider    The following health maintenance items are reviewed in Epic and correct as of today:  Health Maintenance   Topic Date Due     NICOTINE/TOBACCO CESSATION COUNSELING Q 1 YR  Never done     ANNUAL REVIEW OF HM ORDERS  Never done     MEDICARE ANNUAL WELLNESS VISIT  08/16/2022     INFLUENZA VACCINE (1) 09/01/2022     COVID-19 Vaccine (5 - Booster for Moderna series) 09/06/2022     PHQ-2 (once per calendar year)  01/01/2023     TSH W/FREE T4 REFLEX  09/06/2023     FALL RISK ASSESSMENT  12/19/2023     DTAP/TDAP/TD IMMUNIZATION (3 - Td or Tdap) 08/24/2025     ADVANCE CARE PLANNING  08/16/2026     SPIROMETRY  Completed     COPD ACTION PLAN  Completed     Pneumococcal Vaccine: 65+ Years  Completed     ZOSTER IMMUNIZATION  Completed     IPV IMMUNIZATION  Aged Out     MENINGITIS IMMUNIZATION  Aged Out     COLORECTAL CANCER SCREENING  Discontinued     Labs reviewed in EPIC          Review of Systems   Constitutional: Negative for chills and fever.   HENT: Negative for congestion, ear pain, hearing loss and sore throat.    Eyes: Positive for visual disturbance.   Respiratory: Positive for shortness of breath. Negative for cough.    Cardiovascular: Negative for chest pain, palpitations and peripheral edema.   Gastrointestinal: Negative for abdominal pain, constipation, diarrhea, heartburn, hematochezia and nausea.   Genitourinary: Positive for frequency. Negative for dysuria, genital sores, hematuria, impotence, penile discharge and urgency.   Musculoskeletal: Negative for arthralgias, joint swelling and myalgias.   Skin: Negative for rash.   Neurological: Negative for headaches and paresthesias.   Psychiatric/Behavioral: Negative for mood changes. The patient is not nervous/anxious.       Has glasses. Reduced acuity. Exam last week. Has early cataract right eye. Not needing  "surgery per pt  Chronic SOB. On QVAR and using Albuterol prn. Using 1-2x/day with benefit.  Has some shortness of breath with climbing 1-2 flights of stairs.  No chest pain with this.Had COVID infection in late January 2023.  Fatigue and shortness of breath precedes that.  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  Has follow-up with dermatology again in April  History of prostate cancer.  PSA in December was 4.0.  Patient states he was given a Lupron injection in January 2023.  Will have follow-up PSA lab done this summer with urology  Occasional frequency urination with caffeine intake.  Denies dysuria or hematuria  Patient chronic general fatigue.  Some snoring.  Possible sleep apnea.  However patient declines sleep study      OBJECTIVE:   /70   Pulse 71   Temp 97.6  F (36.4  C) (Oral)   Ht 1.753 m (5' 9\")   Wt 74.2 kg (163 lb 8 oz)   SpO2 98%   BMI 24.14 kg/m   Estimated body mass index is 24.21 kg/m  as calculated from the following:    Height as of 7/29/22: 1.753 m (5' 9.02\").    Weight as of 10/15/22: 74.4 kg (164 lb).  Physical Exam  General appearance -  alert, no distress  Skin - No rashes or lesions.  Head - normocephalic, atraumatic  Eyes - DENITA, EOMI, fundi exam with nondilated pupils negative.  Ears - External ears normal. Canals clear. TM's normal.  Nose/Sinuses - Nares normal. Septum midline. Mucosa normal. No drainage or sinus tenderness.  Oropharynx - No erythema, no adenopathy, no exudates.  Neck - Supple without adenopathy or thyromegaly. No bruits.  Lungs - Clear to auscultation without wheezes/rhonchi.  Slight prolonged expiratory phase  Heart - Irregular rate and rhythm without murmurs, clicks, or gallops.  Nodes - No supraclavicular, axillary, or inguinal adenopathy palpable.  Abdomen - Abdomen soft, non-tender. BS normal. No masses or hepatosplenomegaly palpable. No bruits.  Extremities -No cyanosis, " clubbing. Mild BLE edema.    Musculoskeletal - Spine ROM normal. Muscular strength intact.   Peripheral pulses - radial=4/4, femoral=4/4, posterior tibial=4/4, dorsalis pedis=4/4,  Neuro - Gait normal. Reflexes normal and symmetric. Sensation grossly WNL.  Genital/rectal - deferred       ASSESSMENT / PLAN:   1. Medicare annual wellness visit, subsequent  Independent ADLs.  Vaccinations and healthcare maintenance up-to-date for age.  Patient having 2-3 drinks of alcohol per day.  Counseled regarding risk for stress on his liver and how that will interrupt quality of sleep at night and also contribute to increased urinary frequency.  Counseled to limit alcohol use to 1 drink per day.  Also recommended patient discontinue cigar use    2. Chronic obstructive pulmonary disease, unspecified COPD type (H)  Improved control.  Will stop Qvar and start Dulera  - mometasone-formoterol (DULERA) 200-5 MCG/ACT inhaler; Inhale 2 puffs into the lungs 2 times daily  Dispense: 13 g; Refill: 11  - albuterol (PROAIR HFA/PROVENTIL HFA/VENTOLIN HFA) 108 (90 Base) MCG/ACT inhaler; Inhale 2 puffs into the lungs every 4 hours as needed for shortness of breath  Dispense: 18 g; Refill: 11    3. Prostate cancer (H)  Last PSA risen to 4.0.  Patient states he received Lupron injection in January and will have follow-up PSA monitoring through urology    4. Paroxysmal atrial fibrillation (H)  In A-fib currently.  Rate controlled.  On anticoagulation with Eliquis.  Continue current medications    5. Benign essential hypertension  Controlled.  Continue current medication  - amLODIPine (NORVASC) 5 MG tablet; Take 1 tablet (5 mg) by mouth daily  Dispense: 90 tablet; Refill: 3  - Comprehensive metabolic panel; Future  - Comprehensive metabolic panel    6. Raynaud's disease without gangrene  Rare feelings of cold hands this past winter.  Continue amlodipine therapy and minimizing caffeine intake.  Counseled again regarding wearing mittens rather than  pounds  - amLODIPine (NORVASC) 5 MG tablet; Take 1 tablet (5 mg) by mouth daily  Dispense: 90 tablet; Refill: 3    7. Hypothyroidism, unspecified type  Weight down a couple pounds.  Clinically euthyroid.  On levothyroxine.  Needs lab follow-up  - TSH with free T4 reflex; Future  - TSH with free T4 reflex         Patient has been advised of split billing requirements and indicates understanding: Yes      COUNSELING:  Reviewed preventive health counseling, as reflected in patient instructions        He reports that he has been smoking cigars. He has never used smokeless tobacco.  Nicotine/Tobacco Cessation Plan:   Information offered: Patient not interested at this time      Appropriate preventive services were discussed with this patient, including applicable screening as appropriate for cardiovascular disease, diabetes, osteopenia/osteoporosis, and glaucoma.  As appropriate for age/gender, discussed screening for colorectal cancer, prostate cancer, breast cancer, and cervical cancer. Checklist reviewing preventive services available has been given to the patient.    Reviewed patients plan of care and provided an AVS. The Basic Care Plan (routine screening as documented in Health Maintenance) for Eder meets the Care Plan requirement. This Care Plan has been established and reviewed with the Patient.        PLAN:   Put QVAR aside   Start Dulera 2 inhalations twice a day. Rinse/gargle mouth after use for shortness of breath   Continue Albuterol as needed for additional shortness of breath   Continue other medications. RF Amlodipine and Albuterol    Consider covid bivalent booster vaccination in late May/early June at any pharmacy (had covid infection in late Jan 2023)  Labs  as ordered  Will strongly recommended discontinuation of tobacco use including cigars  Would strongly recommend limiting alcohol intake to 1 drink per day or less    Schedule an appointment with [facility]. Call [phone] to schedule  the  appointment  Pt was informed regarding extra E&M billing for management of new or established medical issues not related to today's wellness/screening visit      Richmond Martinez MD  [facility]

## 2023-03-07 NOTE — PATIENT INSTRUCTIONS
Put QVAR aside   Start Dulera 2 inhalations twice a day. Rinse/gargle mouth after use for shortness of breath   Continue Albuterol as needed for additional shortness of breath   Continue other medications. RF Amlodipine and Albuterol    Consider covid bivalent booster vaccination in late May/early June at any pharmacy (had covid infection in late Jan 2023)  Labs  as ordered  Will strongly recommended discontinuation of tobacco use including cigars  Would strongly recommend limiting alcohol intake to 1 drink per day or less    Schedule an appointment with [facility]. Call [phone] to schedule the  appointment  Pt was informed regarding extra E&M billing for management of new or established medical issues not related to today's wellness/screening visit

## 2023-03-09 PROBLEM — B02.9 SHINGLES: Status: RESOLVED | Noted: 2017-10-24 | Resolved: 2023-03-09

## 2023-03-15 ENCOUNTER — HOSPITAL ENCOUNTER (EMERGENCY)
Facility: CLINIC | Age: 83
Discharge: HOME | End: 2023-03-15
Attending: EMERGENCY MEDICINE | Admitting: EMERGENCY MEDICINE
Payer: COMMERCIAL

## 2023-03-15 ENCOUNTER — APPOINTMENT (OUTPATIENT)
Dept: GENERAL RADIOLOGY | Facility: CLINIC | Age: 83
End: 2023-03-15
Attending: EMERGENCY MEDICINE
Payer: COMMERCIAL

## 2023-03-15 VITALS
TEMPERATURE: 98.1 F | HEART RATE: 107 BPM | BODY MASS INDEX: 23.92 KG/M2 | WEIGHT: 162 LBS | SYSTOLIC BLOOD PRESSURE: 133 MMHG | DIASTOLIC BLOOD PRESSURE: 99 MMHG | RESPIRATION RATE: 20 BRPM | OXYGEN SATURATION: 97 %

## 2023-03-15 DIAGNOSIS — R07.9 CHEST PAIN, UNSPECIFIED TYPE: ICD-10-CM

## 2023-03-15 LAB
ALBUMIN UR-MCNC: NEGATIVE MG/DL
ANION GAP SERPL CALCULATED.3IONS-SCNC: 11 MMOL/L (ref 7–15)
APPEARANCE UR: CLEAR
ATRIAL RATE - MUSE: NORMAL BPM
BASOPHILS # BLD AUTO: 0 10E3/UL (ref 0–0.2)
BASOPHILS NFR BLD AUTO: 0 %
BILIRUB UR QL STRIP: NEGATIVE
BUN SERPL-MCNC: 13.4 MG/DL (ref 8–23)
CALCIUM SERPL-MCNC: 9.1 MG/DL (ref 8.8–10.2)
CHLORIDE SERPL-SCNC: 99 MMOL/L (ref 98–107)
COLOR UR AUTO: ABNORMAL
CREAT SERPL-MCNC: 0.62 MG/DL (ref 0.67–1.17)
DEPRECATED HCO3 PLAS-SCNC: 26 MMOL/L (ref 22–29)
DIASTOLIC BLOOD PRESSURE - MUSE: NORMAL MMHG
EOSINOPHIL # BLD AUTO: 0.1 10E3/UL (ref 0–0.7)
EOSINOPHIL NFR BLD AUTO: 1 %
ERYTHROCYTE [DISTWIDTH] IN BLOOD BY AUTOMATED COUNT: 13.8 % (ref 10–15)
GFR SERPL CREATININE-BSD FRML MDRD: >90 ML/MIN/1.73M2
GLUCOSE SERPL-MCNC: 96 MG/DL (ref 70–99)
GLUCOSE UR STRIP-MCNC: NEGATIVE MG/DL
HCT VFR BLD AUTO: 44.2 % (ref 40–53)
HGB BLD-MCNC: 14.6 G/DL (ref 13.3–17.7)
HGB UR QL STRIP: NEGATIVE
HOLD SPECIMEN: NORMAL
HOLD SPECIMEN: NORMAL
IMM GRANULOCYTES # BLD: 0.1 10E3/UL
IMM GRANULOCYTES NFR BLD: 1 %
INTERPRETATION ECG - MUSE: NORMAL
KETONES UR STRIP-MCNC: NEGATIVE MG/DL
LEUKOCYTE ESTERASE UR QL STRIP: NEGATIVE
LYMPHOCYTES # BLD AUTO: 1 10E3/UL (ref 0.8–5.3)
LYMPHOCYTES NFR BLD AUTO: 7 %
MCH RBC QN AUTO: 34.2 PG (ref 26.5–33)
MCHC RBC AUTO-ENTMCNC: 33 G/DL (ref 31.5–36.5)
MCV RBC AUTO: 104 FL (ref 78–100)
MONOCYTES # BLD AUTO: 1 10E3/UL (ref 0–1.3)
MONOCYTES NFR BLD AUTO: 7 %
MUCOUS THREADS #/AREA URNS LPF: PRESENT /LPF
NEUTROPHILS # BLD AUTO: 11.7 10E3/UL (ref 1.6–8.3)
NEUTROPHILS NFR BLD AUTO: 84 %
NITRATE UR QL: NEGATIVE
NRBC # BLD AUTO: 0 10E3/UL
NRBC BLD AUTO-RTO: 0 /100
NT-PROBNP SERPL-MCNC: 1469 PG/ML (ref 0–1800)
P AXIS - MUSE: NORMAL DEGREES
PH UR STRIP: 7 [PH] (ref 5–7)
PLATELET # BLD AUTO: 241 10E3/UL (ref 150–450)
POTASSIUM SERPL-SCNC: 4 MMOL/L (ref 3.4–5.3)
PR INTERVAL - MUSE: NORMAL MS
PROCALCITONIN SERPL IA-MCNC: 0.03 NG/ML
QRS DURATION - MUSE: 84 MS
QT - MUSE: 358 MS
QTC - MUSE: 437 MS
R AXIS - MUSE: 62 DEGREES
RBC # BLD AUTO: 4.27 10E6/UL (ref 4.4–5.9)
RBC URINE: 1 /HPF
SODIUM SERPL-SCNC: 136 MMOL/L (ref 136–145)
SP GR UR STRIP: 1.02 (ref 1–1.03)
SYSTOLIC BLOOD PRESSURE - MUSE: NORMAL MMHG
T AXIS - MUSE: -24 DEGREES
TROPONIN T SERPL HS-MCNC: 10 NG/L
TROPONIN T SERPL HS-MCNC: 9 NG/L
UROBILINOGEN UR STRIP-MCNC: NORMAL MG/DL
VENTRICULAR RATE- MUSE: 90 BPM
WBC # BLD AUTO: 14 10E3/UL (ref 4–11)
WBC URINE: 0 /HPF

## 2023-03-15 PROCEDURE — 99285 EMERGENCY DEPT VISIT HI MDM: CPT | Mod: 25

## 2023-03-15 PROCEDURE — 85025 COMPLETE CBC W/AUTO DIFF WBC: CPT | Performed by: EMERGENCY MEDICINE

## 2023-03-15 PROCEDURE — 84145 PROCALCITONIN (PCT): CPT | Performed by: EMERGENCY MEDICINE

## 2023-03-15 PROCEDURE — 36415 COLL VENOUS BLD VENIPUNCTURE: CPT | Performed by: EMERGENCY MEDICINE

## 2023-03-15 PROCEDURE — 93005 ELECTROCARDIOGRAM TRACING: CPT

## 2023-03-15 PROCEDURE — 82310 ASSAY OF CALCIUM: CPT | Performed by: EMERGENCY MEDICINE

## 2023-03-15 PROCEDURE — 81001 URINALYSIS AUTO W/SCOPE: CPT | Performed by: EMERGENCY MEDICINE

## 2023-03-15 PROCEDURE — 83880 ASSAY OF NATRIURETIC PEPTIDE: CPT | Performed by: EMERGENCY MEDICINE

## 2023-03-15 PROCEDURE — 250N000013 HC RX MED GY IP 250 OP 250 PS 637: Performed by: EMERGENCY MEDICINE

## 2023-03-15 PROCEDURE — 71046 X-RAY EXAM CHEST 2 VIEWS: CPT

## 2023-03-15 PROCEDURE — 84484 ASSAY OF TROPONIN QUANT: CPT | Performed by: EMERGENCY MEDICINE

## 2023-03-15 RX ORDER — ASPIRIN 81 MG/1
324 TABLET, CHEWABLE ORAL ONCE
Status: COMPLETED | OUTPATIENT
Start: 2023-03-15 | End: 2023-03-15

## 2023-03-15 RX ORDER — NITROGLYCERIN 0.4 MG/1
0.4 TABLET SUBLINGUAL EVERY 5 MIN PRN
Status: DISCONTINUED | OUTPATIENT
Start: 2023-03-15 | End: 2023-03-15 | Stop reason: HOSPADM

## 2023-03-15 RX ADMIN — ASPIRIN 81 MG CHEWABLE TABLET 324 MG: 81 TABLET CHEWABLE at 16:24

## 2023-03-15 ASSESSMENT — ENCOUNTER SYMPTOMS
PALPITATIONS: 0
CHEST TIGHTNESS: 1

## 2023-03-15 ASSESSMENT — ACTIVITIES OF DAILY LIVING (ADL): ADLS_ACUITY_SCORE: 35

## 2023-03-15 NOTE — ED NOTES
Rapid Assessment Note    History:   Eder Horan is a 83 year old male, anticoagulated on Eliquis, with a history of atrial fibrillation who presents with chest tightness since 1330. He continues to have some tightness at bedside, but it has been improving over the past hour. He rates his current pain as a 3/10. It is sometimes exacerbated by deep breaths, but is not exertional. He is not familiar with this pain. Current medications mentioned at bedside include Metoprolol, Amlodipine, Levothyroxine, and Valtrex. He denies palpitations.     Exam:   General:  Alert, interactive  Cardiovascular:  Well perfused  Lungs:  No respiratory distress, no accessory muscle use  Neuro:  Moving all 4 extremities  Skin:  Warm, dry  Psych:  Normal affect  ***    Plan of Care:   I evaluated the patient and developed an initial plan of care. I discussed this plan and explained that I, or one of my partners, would be returning to complete the evaluation.         I, Alberto Harp, am serving as a scribe to document services personally performed by Eder Shah MD, based on my observations and the provider's statements to me.    3/15/2023  [employer]    Portions of this medical record were completed by a scribe. UPON MY REVIEW AND AUTHENTICATION BY ELECTRONIC SIGNATURE, this confirms (a) I performed the applicable clinical services, and (b) the record is accurate.

## 2023-03-15 NOTE — ED PROVIDER NOTES
History     Chief Complaint:  Chest Pain       The history is provided by the patient.      Eder Horan is a 83 year old male, anticoagulated on Eliquis, with a history of atrial fibrillation who presents with chest tightness since 1330. He continues to have some tightness at bedside, but it has been improving over the past hour. He rates his current pain as a 3/10. It is sometimes exacerbated by deep breaths, but is not exertional. He is not familiar with this pain. Current medications mentioned at bedside include Metoprolol, Amlodipine, Levothyroxine, and Valtrex. He denies palpitations.     Independent Historian: None     Review of External Notes: Stress test 7/24/15, fixed inducible inferior septal defect.  Secondary to diaphragmatic attenuation    ROS:  Review of Systems   Respiratory: Positive for chest tightness.    Cardiovascular: Negative for palpitations.   All other systems reviewed and are negative.    Allergies:  Bactrim [Sulfa Drugs]     Medications:    Albuterol   Norvasc   Eliquis   Levothyroxine   Toprol   Dulera   Prednisone acetate   Valtrex     Past Medical History:    Actinic keratosis   ALLERGIC RHINITIS    Atrial fibrillation (H)   B12 deficiency   Back pain   Basal cell carcinoma   Bradycardia   Chronotropic incompetence with sinus node dysfunction (H)   COPD   DVT   Embolic stroke (H)   Hyperlipidemia   Hypertension   Hypothyroidism    Lateral epicondylitis  of elbow   Malignant melanoma (H)   Prostate cancer   Raynaud's disease without gangrene   Squamous cell carcinoma   Syncope   TMJ (temporomandibular joint syndrome)   Tobacco use disorder     Past Surgical History:    Prostatectomy   Prostate bx   L eye lid lumpectomy   Hernia repair   L groin exploratory surgery      Family History:    Father - CAD, MI  Mother - lung cancer   Sister - lung cancer     Social History:  Hx of smoking (cigars) and current alcohol use; denies smokeless tobacco use or drug use   PCP: Richmond Martinez  patient presents to the ED alone via private vehicle     Physical Exam     Patient Vitals for the past 24 hrs:   BP Temp Temp src Pulse Resp SpO2 Weight   03/15/23 1900 127/82 -- -- 94 -- 98 % --   03/15/23 1830 (!) 146/112 -- -- 98 -- 94 % --   03/15/23 1800 112/73 -- -- 76 -- 94 % --   03/15/23 1745 (!) 116/96 -- -- -- -- 95 % --   03/15/23 1558 (!) 141/92 98.1  F (36.7  C) Temporal 86 20 95 % 73.5 kg (162 lb)        Physical Exam  Constitutional: Alert, attentive, GCS 15  HENT:    Nose: Nose normal.    Mouth/Throat: Oropharynx is clear, mucous membranes are moist  Eyes: EOM are normal, anicteric, conjugate gaze  CV: regular rate and rhythm; no murmurs  Chest: Effort normal and breath sounds clear without wheezing or rales, symmetric bilaterally   GI:  non tender. No distension. No guarding or rebound.    MSK: No LE edema, no tenderness to palpation of BLE.  Neurological: Alert, attentive, moving all extremities equally.   Skin: Skin is warm and dry.      Emergency Department Course   ECG  ECG taken at 1601, ECG read at 1612  Atrial fibrillation   Nonspecific T wave abnormality   Abnormal ECG   Rate 90 bpm. KS interval * ms. QRS duration 84 ms. QT/QTc 358/437 ms. P-R-T axes * 62 -24.    Imaging:  Chest XR,  PA & LAT  Final Result  IMPRESSION:   Unchanged mild enlargement of the cardiac silhouette. The descending thoracic aorta is tortuous but unchanged. The vascular pedicle width is normal.  Prominent vessels around the ana. Lungs are hyperinflated with flattening of diaphragmatic curvature consistent with obstructive lung disease. No superimposed airspace opacities or interstitial thickening.  No pleural effusion.  Accentuated thoracic kyphosis and slight anterior wedging of lower thoracic vertebra.    Report per radiology    Laboratory:  Labs Ordered and Resulted from Time of ED Arrival to Time of ED Departure   BASIC METABOLIC PANEL - Abnormal       Result Value    Sodium 136      Potassium 4.0      Chloride  99      Carbon Dioxide (CO2) 26      Anion Gap 11      Urea Nitrogen 13.4      Creatinine 0.62 (*)     Calcium 9.1      Glucose 96      GFR Estimate >90     CBC WITH PLATELETS AND DIFFERENTIAL - Abnormal    WBC Count 14.0 (*)     RBC Count 4.27 (*)     Hemoglobin 14.6      Hematocrit 44.2       (*)     MCH 34.2 (*)     MCHC 33.0      RDW 13.8      Platelet Count 241      % Neutrophils 84      % Lymphocytes 7      % Monocytes 7      % Eosinophils 1      % Basophils 0      % Immature Granulocytes 1      NRBCs per 100 WBC 0      Absolute Neutrophils 11.7 (*)     Absolute Lymphocytes 1.0      Absolute Monocytes 1.0      Absolute Eosinophils 0.1      Absolute Basophils 0.0      Absolute Immature Granulocytes 0.1      Absolute NRBCs 0.0     ROUTINE UA WITH MICROSCOPIC - Abnormal    Color Urine Light Yellow      Appearance Urine Clear      Glucose Urine Negative      Bilirubin Urine Negative      Ketones Urine Negative      Specific Gravity Urine 1.018      Blood Urine Negative      pH Urine 7.0      Protein Albumin Urine Negative      Urobilinogen Urine Normal      Nitrite Urine Negative      Leukocyte Esterase Urine Negative      Mucus Urine Present (*)     RBC Urine 1      WBC Urine 0     NT PROBNP INPATIENT - Normal    N terminal Pro BNP Inpatient 1,469     TROPONIN T, HIGH SENSITIVITY - Normal    Troponin T, High Sensitivity 10     TROPONIN T, HIGH SENSITIVITY - Normal    Troponin T, High Sensitivity 9     PROCALCITONIN - Normal    Procalcitonin 0.03        Emergency Department Course & Assessments:    Interventions:  1624  mg PO    Independent Interpretation (X-rays, CTs, rhythm strip):  None      Consultations/Discussion of Management or Tests:  None      Social Determinants of Health affecting care:  None     Assessments:   I obtained history and examined the patient as noted above.  1846 I rechecked the patient and updated.  1944 I rechecked the patient and explained findings. I discussed plan for  admission to the hospital, but her preferred to be discharged.     Disposition:  The patient was discharged to home.     Impression & Plan    Medical Decision Makin-year-old male past medical history seen for paroxysmal A-fib, on Eliquis, COPD, prior stroke presenting for several hours of substernal chest pressure without associated shortness of breath.  He reports this pain began while working in the garage.  Screening EKG on arrival shows no definite ischemic changes, high-sensitivity troponins are negative x2 and his pain resolved without intervention effectively at the time of his arrival.  Chest x-ray is clear, he does have elevated white count however Pro-Chip is normal with no evidence of pneumonia on chest x-ray, UA is clear.  I have low suspicion for PE given his anticoagulated status, he has no pain in his back or abdomen low suspicion for aortic pathology.  I did recommend admission given his age and risk factors for further provocative testing however patient has declined preferring to go home.  He was able to completely summarize the risks and benefits to this decision, his wife was present for this discussion and she was amenable to his decision as well.  He plans to call his PCP and cardiologist.  He agrees to return to develop worsening chest pain, chest pressure or shortness of breath.    Diagnosis:    ICD-10-CM    1. Chest pain, unspecified type  R07.9          Eder Shah MD  [employer]  8:05 PM 03/15/23     Scribe Disclosure:  I, Alberto Harp, am serving as a scribe at 7:49 PM on 3/15/2023 to document services personally performed by Eder Shah MD based on my observations and the provider's statements to me.    3/15/2023   Eder Shah MD Dunbar, John Forrest, MD  03/15/23 2005

## 2023-03-15 NOTE — PHARMACY-ADMISSION MEDICATION HISTORY
Pharmacy Medication History  Admission medication history interview status for the 3/15/2023  admission is complete. See EPIC admission navigator for prior to admission medications     Location of Interview: Patient room  Medication history sources: Patient, Patient's family/friend (wife) and Surescripts    Significant changes made to the medication list:  None    In the past week, patient estimated taking medication this percent of the time: greater than 90%    Medication Affordability:  Not including over the counter (OTC) medications, was there a time in the past 12 months when you did not take your medications as prescribed because of cost?: No    Additional medication history information:   Pt reports that he does not want to take any medications while in the hospital, and if he does he would like to know what the price he will be charged is. The patient was informed that he has the right to refuse medications, however due to our policies that we are unable to use his own medications (tablets). Have left sticky for provider as well.    Medication reconciliation completed by provider prior to medication history? No    Time spent in this activity: 20 minutes    Prior to Admission medications    Medication Sig Last Dose Taking? Auth Provider Long Term End Date   albuterol (PROAIR HFA/PROVENTIL HFA/VENTOLIN HFA) 108 (90 Base) MCG/ACT inhaler Inhale 2 puffs into the lungs every 4 hours as needed for shortness of breath prn at prn Yes Richmond Martinez MD Yes    amLODIPine (NORVASC) 5 MG tablet Take 1 tablet (5 mg) by mouth daily 3/15/2023 at am Yes Richmond Martniez MD Yes    ELIQUIS ANTICOAGULANT 5 MG tablet TAKE ONE TABLET BY MOUTH TWICE DAILY. 3/15/2023 at x1 Yes Richmond Martinez MD     fluocinonide (LIDEX) 0.05 % external cream Apply topically 2 times daily  Patient taking differently: Apply topically 2 times daily as needed prn at prn Yes Hany Saeed MD     levothyroxine (SYNTHROID/LEVOTHROID) 112 MCG tablet TAKE ONE  TABLET BY MOUTH ONE TIME DAILY 3/15/2023 at am Yes Richmond Martinez MD Yes    metoprolol succinate ER (TOPROL XL) 25 MG 24 hr tablet Take 1 tablet (25 mg) by mouth daily  Patient taking differently: Take 25 mg by mouth every evening 3/14/2023 at pm Yes Amira Castro, APRN CNP Yes    mometasone-formoterol (DULERA) 200-5 MCG/ACT inhaler Inhale 2 puffs into the lungs 2 times daily 3/15/2023 at x1 Yes Richmond Martinez MD Yes    PREDNISOLONE ACETATE OP Apply 1 drop to eye every evening 3/14/2023 at pm Yes Reported, Patient     valACYclovir HCl (VALTREX PO) Take 500 mg by mouth every evening 3/14/2023 at pm Yes Reported, Patient No    vitamin B-12 (CYANOCOBALAMIN) 2500 MCG sublingual tablet Take 1 tab by mouth 3 times a week (WMF) 3/15/2023 at am Yes Richmond Martinez MD         The information provided in this note is only as accurate as the sources available at the time of update(s)

## 2023-03-15 NOTE — ED TRIAGE NOTES
Substernal chest pain starting around 1330. When taking a deep breath has pain in throat. Hx of afib

## 2023-03-16 ENCOUNTER — TELEPHONE (OUTPATIENT)
Dept: CARDIOLOGY | Facility: CLINIC | Age: 83
End: 2023-03-16
Payer: COMMERCIAL

## 2023-03-16 NOTE — DISCHARGE INSTRUCTIONS
You should continue to take your medications.  It was recommended that you stay in the hospital for further evaluation to make sure your chest discomfort was not heart related.  It is well within your right to decline this, I do recommend you call your primary doctor and your cardiologist to let them know as they can facilitate outpatient testing.  Should you develop increasing chest pain, chest pressure or heaviness, passout you should return here to the emergency department.

## 2023-03-27 ENCOUNTER — OFFICE VISIT (OUTPATIENT)
Dept: INTERNAL MEDICINE | Facility: CLINIC | Age: 83
End: 2023-03-27
Payer: COMMERCIAL

## 2023-03-27 VITALS
WEIGHT: 169.7 LBS | BODY MASS INDEX: 25.06 KG/M2 | DIASTOLIC BLOOD PRESSURE: 82 MMHG | TEMPERATURE: 98 F | HEART RATE: 74 BPM | SYSTOLIC BLOOD PRESSURE: 124 MMHG | OXYGEN SATURATION: 98 %

## 2023-03-27 DIAGNOSIS — K22.4 ESOPHAGEAL SPASM: ICD-10-CM

## 2023-03-27 DIAGNOSIS — K75.9 HEPATITIS: ICD-10-CM

## 2023-03-27 DIAGNOSIS — J44.9 CHRONIC OBSTRUCTIVE PULMONARY DISEASE, UNSPECIFIED COPD TYPE (H): ICD-10-CM

## 2023-03-27 PROCEDURE — 99214 OFFICE O/P EST MOD 30 MIN: CPT | Performed by: INTERNAL MEDICINE

## 2023-03-27 NOTE — PROGRESS NOTES
ASSESSMENT:    1. Esophageal spasm  Rare dysphagia for solid and liquid occurring about once a month.  Symptoms not bothersome enough that patient wishes to start PPI therapy or have evaluation at this time.  Ate breakfast fine without any symptoms.  Likely because of atypical chest pain patient not having any chest comfort symptoms with exertional activity    2. Chronic obstructive pulmonary disease, unspecified COPD type (H)  Well-controlled.  Continue Dulera  - mometasone-formoterol (DULERA) 200-5 MCG/ACT inhaler; Inhale 2 puffs into the lungs 2 times daily  Dispense: 39 g; Refill: 3    3. Hepatitis  Mild ALT, AST and alkaline phosphatase elevation few weeks ago.  Denies abdominal pain currently now.  Recheck liver labs.  If still elevated, will get right upper quadrant ultrasound  - Hepatic function panel; Future    PLAN:   Continue current medications  Dulera inhaler refilled  Call  [phone] or use [facility] to schedule a future lab appointment  non-fasting in 1 week to recheck liver labs  Caffeine intake in diet  Contact clinic if recurrent chest pain symptoms or swallowing issues worsen in severity or frequency        (Chart documentation was completed, in part, with [employer] voice-recognition software. Even though reviewed, some grammatical, spelling, and word errors may remain.)    Richmond Martinez MD  Internal Medicine Department  [facility]      Christiane Gaines is a 83 year old, presenting for the following health issues:  Follow Up     HPI       Hospital Follow-up Visit:    Hospital/Nursing Home/IP Rehab Facility: [facility]  Date of Admission: 3/15/23  Date of Discharge: 3/15/23  Reason(s) for Admission: Chest Pain     Was your hospitalization related to COVID-19? No   Problems taking medications regularly:  None  Medication changes since discharge: None  Problems adhering to non-medication therapy:  None    Summary of hospitalization:    "[facility] discharge summary reviewed  Diagnostic Tests/Treatments reviewed.  Follow up needed: none  Other Healthcare Providers Involved in Patient s Care:         cardiology  Update since discharge: improved.           Emergency room note reviewed.  Part of the note as below.     Impression & Plan    Medical Decision Makin-year-old male past medical history seen for paroxysmal A-fib, on Eliquis, COPD, prior stroke presenting for several hours of substernal chest pressure without associated shortness of breath.  He reports this pain began while working in the garage.  Screening EKG on arrival shows no definite ischemic changes, high-sensitivity troponins are negative x2 and his pain resolved without intervention effectively at the time of his arrival.  Chest x-ray is clear, he does have elevated white count however Pro-Chip is normal with no evidence of pneumonia on chest x-ray, UA is clear.  I have low suspicion for PE given his anticoagulated status, he has no pain in his back or abdomen low suspicion for aortic pathology.  I did recommend admission given his age and risk factors for further provocative testing however patient has declined preferring to go home.  He was able to completely summarize the risks and benefits to this decision, his wife was present for this discussion and she was amenable to his decision as well.  He plans to call his PCP and cardiologist.  He agrees to return to develop worsening chest pain, chest pressure or shortness of breath.     Diagnosis:      ICD-10-CM     1. Chest pain, unspecified type  R07.9               Patient states the \"chest pain\" symptoms were more in the lower throat area rather than substernally.  Denies sore throat with swallowing.  Patient describes history of occasional solid and liquid dysphagia without vomiting.  Occasional burps.  Tends to happen if drinking caffeine more.  Patient states previously when he went on a cruise and was eating " "rich food, this happened 4 times in 7 days and send it food back once he has he cannot eat it with the dysphagia symptoms.  Symptoms are not consistent.  Patient ate some fruit, pancakes and omelette this morning and had no difficulty swallowing them.  Patient has not had any recurrence of atypical chest pain symptoms with exertion.  He recently was cleaning his garage and also did shoveling of snow outside without chest pain or shortness of breath.  In general, patient's breathing is much better since starting Dulera.  He used to have shortness of breath with walking 2 flights of stairs.  Now he can go up and down his basement stairs for a total of 6 flights without any shortness of breath.  No abdominal pain.  No vomiting.  Patient has had only 1 episode of dysphagia in the last month and states it happens about once or twice a month maximum and otherwise denies acid reflux issues so does not wish to start PPI therapy at this time.      Additional ROS:   Constitutional, HEENT, Cardiovascular, Pulmonary, GI and , Neuro, MSK and Psych review of systems/symptoms are otherwise negative or unchanged from previous, except as noted above.      OBJECTIVE:  /82   Pulse 74   Temp 98  F (36.7  C) (Oral)   Wt 77 kg (169 lb 11.2 oz)   SpO2 98%   BMI 25.06 kg/m     Estimated body mass index is 25.06 kg/m  as calculated from the following:    Height as of 3/7/23: 1.753 m (5' 9\").    Weight as of this encounter: 77 kg (169 lb 11.2 oz).      Neck: no adenopathy. Thyroid normal to palpation. No bruits  Pulm: Lungs clear to auscultation. Nowheeze or rhinchi  CV: Regular rates and rhythm  GI: Soft, nontender, Normal active bowel sounds, No hepatosplenomegaly or masses palpable  Ext: Peripheral pulses intact. No edema.                 " 58 y/o M with PMH of HTN, Kidney stones, HLD, GERD presents to the ED with c/o dizziness and feeling off balance x 4 days. He saw Dr. Green in the office today and was sent to the ED for eval.  Pt also incidentally reported flank pain yesterday a/w nausea, similar to prior episodes of renal colic. Pt denies visual changes, f/c, vomiting, HA, extremity weakness, CP or SOB. PE as noted above. NIHSS is 0, pts gait is unremarkable. labs reviewed, trops x 2 neg. UA neg. CT head. CTAs neg. CT stone hunt results reviewed with patient. Pt reports feeling better with meclizine. will dc with neuro f/u 60 y/o M with PMH of HTN, Kidney stones, HLD, GERD presents to the ED with c/o dizziness and feeling off balance x 4 days. He saw Dr. Green in the office today and was sent to the ED for eval.  Pt also incidentally reported flank pain yesterday a/w nausea, similar to prior episodes of renal colic. Pt denies visual changes, f/c, vomiting, HA, extremity weakness, CP or SOB. PE as noted above. NIHSS is 0, pts gait is unremarkable. labs reviewed, trops x 2 neg. UA neg. CT head. CTAs neg. CT stone hunt results reviewed with patient. Pt reports feeling better with meclizine. will dc with neuro f/u.

## 2023-03-31 NOTE — PATIENT INSTRUCTIONS
Continue current medications  Dulera inhaler refilled  Call  [phone] or use Mychart to schedule a future lab appointment  non-fasting in 1 week to recheck liver labs  Caffeine intake in diet  Contact clinic if recurrent chest pain symptoms or swallowing issues worsen in severity or frequency

## 2023-04-03 ENCOUNTER — OFFICE VISIT (OUTPATIENT)
Dept: DERMATOLOGY | Facility: CLINIC | Age: 83
End: 2023-04-03
Payer: COMMERCIAL

## 2023-04-03 DIAGNOSIS — Z48.89 ENCOUNTER FOR POSTOPERATIVE WOUND CHECK: ICD-10-CM

## 2023-04-03 DIAGNOSIS — L90.5 SCAR: Primary | ICD-10-CM

## 2023-04-03 PROCEDURE — 99024 POSTOP FOLLOW-UP VISIT: CPT | Mod: GC | Performed by: DERMATOLOGY

## 2023-04-03 ASSESSMENT — PAIN SCALES - GENERAL: PAINLEVEL: NO PAIN (0)

## 2023-04-03 NOTE — NURSING NOTE
Chief Complaint   Patient presents with     Derm Problem     Patient is here today for 3 month scar eval of left cheek.     Katelyn CALDERON RN

## 2023-04-03 NOTE — LETTER
4/3/2023       RE: Eder Horan  [address] [facility] [address]     Dear Colleague,    Thank you for referring your patient, Eder Horan, to the [facility] [place] at [facility]. Please see a copy of my visit note below.    Dermatologic Surgery Clinic Note    Apr 3, 2023    Dermatology Problem List:  1. 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 No concerns for infection today. The patient continues with daily wound cares as recommended.    Patient reports uncomplicated healing at recent surgical site on L cheek. Patient is satisfied with the cosmetic appearance and denies symptoms along scar.    No other associated symptoms, modifying factors, or prior treatments, except when noted above. The patient denies any constitutional symptoms, lymphadenopathy, unintentional weight loss or decreased appetite. No other skin concerns today.    Objective:   Gen: This is a well appearing individual in no acute distress. The patient is alert and oriented x 3.  An exam of the L cheek was performed today and visualized the following:  - Well-healed linear scar on the L cheek.  - The surgical site noted above is clean, dry, and intact. There is no surrounding erythema, purulence, or significant tenderness to palpation. No clinical evidence of infection noted today.    Assessment and Plan:     # Scar 2/2 BCC, L cheek s/p MMS 12/19/2022  - The patient's surgery site(s) is/are healing very well. No evidence of infection on examination today.  -  The patient was told to continue with wound cares until the area(s) is/are no longer crusted.   - The patient should follow up with dermatologic surgery PRN, as well as continue with regular skin exams in general dermatology clinic.    The patient was discussed with and evaluated by attending physician, Scottie Saeed MD.    Paul Paul MD  Dermatology, PGY-5  Mohs surgery fellow    Scribe Disclosure:  I, Morales Anthony, am serving as a scribe to document services personally performed by Scottie Saeed MD based on data collection and the provider's statements to me.     Attending Attestation  I attest that the Scribe recorded the interview and exam that I personally performed.  I have reviewed the note and edited it as necessary.    Scottie Saeed M.D.  Professor  Director of Dermatologic Surgery  Department of Dermatology  [facility]

## 2023-04-03 NOTE — PROGRESS NOTES
Dermatologic Surgery Clinic Note    Apr 3, 2023    Dermatology Problem List:  1. 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 No concerns for infection today. The patient continues with daily wound cares as recommended.    Patient reports uncomplicated healing at recent surgical site on L cheek. Patient is satisfied with the cosmetic appearance and denies symptoms along scar.    No other associated symptoms, modifying factors, or prior treatments, except when noted above. The patient denies any constitutional symptoms, lymphadenopathy, unintentional weight loss or decreased appetite. No other skin concerns today.    Objective:   Gen: This is a well appearing individual in no acute distress. The patient is alert and oriented x 3.  An exam of the L cheek was performed today and visualized the following:  - Well-healed linear scar on the L cheek.  - The surgical site noted above is clean, dry, and intact. There is no surrounding erythema, purulence, or significant tenderness to palpation. No clinical evidence of infection noted today.    Assessment and Plan:     # Scar 2/2 BCC, L cheek s/p MMS 12/19/2022  - The patient's surgery site(s) is/are healing very well. No evidence of infection on examination today.  - The patient was told to continue with wound cares until the area(s) is/are no longer crusted.   - The patient should follow up with dermatologic surgery PRN, as well as continue with regular skin exams in general dermatology clinic.    The patient was discussed with and evaluated by attending physician, Scottie Saeed MD.    Paul  MD Humberto  Dermatology, PGY-5  Mohs surgery fellow    Scribe Disclosure:  I, Moraleshilaria Anthony, am serving as a scribe to document services personally performed by Scottie Saeed MD based on data collection and the provider's statements to me.     Attending Attestation  I attest that the Scribe recorded the interview and exam that I personally performed.  I have reviewed the note and edited it as necessary.    Scottie Saeed M.D.  Professor  Director of Dermatologic Surgery  Department of Dermatology  [facility]

## 2023-04-05 ENCOUNTER — LAB (OUTPATIENT)
Dept: LAB | Facility: CLINIC | Age: 83
End: 2023-04-05
Payer: COMMERCIAL

## 2023-04-05 DIAGNOSIS — K75.9 HEPATITIS: ICD-10-CM

## 2023-04-05 LAB
ALBUMIN SERPL BCG-MCNC: 3.8 G/DL (ref 3.5–5.2)
ALP SERPL-CCNC: 115 U/L (ref 40–129)
ALT SERPL W P-5'-P-CCNC: 11 U/L (ref 10–50)
AST SERPL W P-5'-P-CCNC: 23 U/L (ref 10–50)
BILIRUB DIRECT SERPL-MCNC: <0.2 MG/DL (ref 0–0.3)
BILIRUB SERPL-MCNC: 0.4 MG/DL
PROT SERPL-MCNC: 7.1 G/DL (ref 6.4–8.3)

## 2023-04-05 PROCEDURE — 80076 HEPATIC FUNCTION PANEL: CPT

## 2023-04-05 PROCEDURE — 36415 COLL VENOUS BLD VENIPUNCTURE: CPT

## 2023-06-06 ENCOUNTER — ANCILLARY PROCEDURE (OUTPATIENT)
Dept: GENERAL RADIOLOGY | Facility: CLINIC | Age: 83
End: 2023-06-06
Attending: FAMILY MEDICINE
Payer: COMMERCIAL

## 2023-06-06 ENCOUNTER — ANCILLARY PROCEDURE (OUTPATIENT)
Dept: ULTRASOUND IMAGING | Facility: CLINIC | Age: 83
End: 2023-06-06
Attending: FAMILY MEDICINE
Payer: COMMERCIAL

## 2023-06-06 ENCOUNTER — OFFICE VISIT (OUTPATIENT)
Dept: PEDIATRICS | Facility: CLINIC | Age: 83
End: 2023-06-06
Payer: COMMERCIAL

## 2023-06-06 ENCOUNTER — OFFICE VISIT (OUTPATIENT)
Dept: URGENT CARE | Facility: URGENT CARE | Age: 83
End: 2023-06-06
Payer: COMMERCIAL

## 2023-06-06 VITALS
OXYGEN SATURATION: 100 % | RESPIRATION RATE: 26 BRPM | HEIGHT: 69 IN | WEIGHT: 160 LBS | DIASTOLIC BLOOD PRESSURE: 78 MMHG | BODY MASS INDEX: 23.7 KG/M2 | TEMPERATURE: 97.8 F | SYSTOLIC BLOOD PRESSURE: 110 MMHG | HEART RATE: 85 BPM

## 2023-06-06 VITALS
TEMPERATURE: 97.6 F | RESPIRATION RATE: 20 BRPM | HEART RATE: 83 BPM | SYSTOLIC BLOOD PRESSURE: 134 MMHG | OXYGEN SATURATION: 95 % | DIASTOLIC BLOOD PRESSURE: 97 MMHG

## 2023-06-06 DIAGNOSIS — M79.662 PAIN OF LEFT LOWER LEG: Primary | ICD-10-CM

## 2023-06-06 DIAGNOSIS — M79.605 PAIN OF LEFT LOWER EXTREMITY: Primary | ICD-10-CM

## 2023-06-06 DIAGNOSIS — L03.116 LEFT LEG CELLULITIS: ICD-10-CM

## 2023-06-06 DIAGNOSIS — R60.0 EDEMA OF LEFT LOWER LEG: ICD-10-CM

## 2023-06-06 DIAGNOSIS — M79.89 LEFT LEG SWELLING: ICD-10-CM

## 2023-06-06 LAB
ALBUMIN SERPL BCG-MCNC: 4.2 G/DL (ref 3.5–5.2)
ALP SERPL-CCNC: 141 U/L (ref 40–129)
ALT SERPL W P-5'-P-CCNC: 13 U/L (ref 10–50)
ANION GAP SERPL CALCULATED.3IONS-SCNC: 9 MMOL/L (ref 7–15)
AST SERPL W P-5'-P-CCNC: 20 U/L (ref 10–50)
BASOPHILS # BLD AUTO: 0 10E3/UL (ref 0–0.2)
BASOPHILS NFR BLD AUTO: 0 %
BILIRUB SERPL-MCNC: 0.5 MG/DL
BUN SERPL-MCNC: 8.4 MG/DL (ref 8–23)
CALCIUM SERPL-MCNC: 9 MG/DL (ref 8.8–10.2)
CHLORIDE SERPL-SCNC: 101 MMOL/L (ref 98–107)
CREAT SERPL-MCNC: 0.67 MG/DL (ref 0.67–1.17)
CRP SERPL-MCNC: 10.27 MG/L
DEPRECATED HCO3 PLAS-SCNC: 28 MMOL/L (ref 22–29)
EOSINOPHIL # BLD AUTO: 0.1 10E3/UL (ref 0–0.7)
EOSINOPHIL NFR BLD AUTO: 2 %
ERYTHROCYTE [DISTWIDTH] IN BLOOD BY AUTOMATED COUNT: 13.1 % (ref 10–15)
GFR SERPL CREATININE-BSD FRML MDRD: >90 ML/MIN/1.73M2
GLUCOSE SERPL-MCNC: 110 MG/DL (ref 70–99)
HCT VFR BLD AUTO: 44.5 % (ref 40–53)
HGB BLD-MCNC: 14.7 G/DL (ref 13.3–17.7)
IMM GRANULOCYTES # BLD: 0 10E3/UL
IMM GRANULOCYTES NFR BLD: 0 %
INR PPP: 1.11 (ref 0.85–1.15)
LYMPHOCYTES # BLD AUTO: 1 10E3/UL (ref 0.8–5.3)
LYMPHOCYTES NFR BLD AUTO: 15 %
MCH RBC QN AUTO: 34.3 PG (ref 26.5–33)
MCHC RBC AUTO-ENTMCNC: 33 G/DL (ref 31.5–36.5)
MCV RBC AUTO: 104 FL (ref 78–100)
MONOCYTES # BLD AUTO: 0.6 10E3/UL (ref 0–1.3)
MONOCYTES NFR BLD AUTO: 9 %
NEUTROPHILS # BLD AUTO: 5 10E3/UL (ref 1.6–8.3)
NEUTROPHILS NFR BLD AUTO: 74 %
PLATELET # BLD AUTO: 244 10E3/UL (ref 150–450)
POTASSIUM SERPL-SCNC: 3.9 MMOL/L (ref 3.4–5.3)
PROT SERPL-MCNC: 8 G/DL (ref 6.4–8.3)
RBC # BLD AUTO: 4.29 10E6/UL (ref 4.4–5.9)
SODIUM SERPL-SCNC: 138 MMOL/L (ref 136–145)
URATE SERPL-MCNC: 3.4 MG/DL (ref 3.4–7)
WBC # BLD AUTO: 6.7 10E3/UL (ref 4–11)

## 2023-06-06 PROCEDURE — 99207 REFERRAL TO ACUTE AND DIAGNOSTIC SERVICES: CPT | Performed by: PHYSICIAN ASSISTANT

## 2023-06-06 PROCEDURE — 85610 PROTHROMBIN TIME: CPT

## 2023-06-06 PROCEDURE — 73590 X-RAY EXAM OF LOWER LEG: CPT | Mod: TC | Performed by: RADIOLOGY

## 2023-06-06 PROCEDURE — 93971 EXTREMITY STUDY: CPT | Mod: LT

## 2023-06-06 PROCEDURE — 36415 COLL VENOUS BLD VENIPUNCTURE: CPT

## 2023-06-06 PROCEDURE — 80053 COMPREHEN METABOLIC PANEL: CPT

## 2023-06-06 PROCEDURE — 85025 COMPLETE CBC W/AUTO DIFF WBC: CPT

## 2023-06-06 PROCEDURE — 84550 ASSAY OF BLOOD/URIC ACID: CPT

## 2023-06-06 PROCEDURE — 99214 OFFICE O/P EST MOD 30 MIN: CPT

## 2023-06-06 PROCEDURE — 86140 C-REACTIVE PROTEIN: CPT

## 2023-06-06 RX ORDER — DOXYCYCLINE 100 MG/1
100 CAPSULE ORAL 2 TIMES DAILY
Qty: 14 CAPSULE | Refills: 0 | Status: SHIPPED | OUTPATIENT
Start: 2023-06-06 | End: 2023-06-13

## 2023-06-06 NOTE — PROGRESS NOTES
Assessment & Plan   Problem List Items Addressed This Visit    None  Visit Diagnoses     Pain of left lower extremity    -  Primary    Relevant Orders    CBC with platelets differential (Completed)    Comprehensive metabolic panel (Completed)    CRP inflammation (Completed)    INR (Completed)    Uric acid (Completed)    US Lower Extremity Venous Duplex Left    XR Tibia and Fibula Left 2 Views (Completed)    Left leg swelling        Relevant Orders    CBC with platelets differential (Completed)    Comprehensive metabolic panel (Completed)    CRP inflammation (Completed)    INR (Completed)    Uric acid (Completed)    US Lower Extremity Venous Duplex Left    XR Tibia and Fibula Left 2 Views (Completed)    Left leg cellulitis        Relevant Medications    doxycycline hyclate (VIBRAMYCIN) 100 MG capsule           [facility] DI[facility] PROVIDER  [facility] MARK Gaines is a 83 year old, presenting for the following health issues:  Musculoskeletal Problem (Leg swelling- 2-3 weeks )    HPI      Evaluation for possible DVT  Onset/Duration: swelling for about 2-3 weeks, normal swelling in both ankles,   Description:       Location: left leg       Redness: YES       Pain: squeezing it hurts        Warmth: YES       Joint swelling no   Progression of symptoms worse - hard as a rock  Accompanying signs and symptoms:       Fevers: no        Numbness/tingling/weakness: no        Chest pain/pleurisy: no        Shortness of breath: YES- constant--no change from previous.     history        Trauma: no         Recent travel/when: no         Previous history of DVT: YES- on blood thinner        Family history of DVT: no         Recent surgery: no   Aggravating factors include: at night   Therapies tried and outcome: nothing  Prior surgery on arteries of veins in this area: No      83-year-old male presented to [facility] ambulatory without assistance today complaining of left leg swelling and redness  for couple of weeks.  He denies any pain or difficulty on walking.  He presented to the urgent care at [facility] and was referred here for ultrasound of his leg to rule out DVT.  Patient is on apixaban for atrial fibrillation.  He denies any chest pain or change in shortness of breath(has chronic COPD on inhalers).  No history of gout.  Apparently pt has had some itching and has been scratching the medial aspect of his left distal calf. Left lower leg red for the last few days.  No fall or trauma or injuries.  No hx of MRSA.      Review of Systems   See HPI.  No nausea/vomiting.  No fever/chills.  No chest pain/SOB.  No BM/urine problems.  No dizziness or syncope.        Objective    BP (!) 134/97 (BP Location: Right arm, Patient Position: Chair, Cuff Size: Adult Regular)   Pulse 83   Temp 97.6  F (36.4  C) (Oral)   Resp 20   SpO2 95%   There is no height or weight on file to calculate BMI.  Physical Exam   GENERAL: healthy, alert and no distress, afebrile, no cyanosis or accessory muscle use, moist mucus membrane, appears well  HENT: normal, no adenopathy, no thyromegaly, neck supple  RESP: lungs clear to auscultation - no rales, rhonchi or wheezes  CV: regular rate and rhythm, normal S1 S2, no S3 or S4, no murmur, click or rub  ABDOMEN: soft, nontender, no hepatosplenomegaly, no masses and bowel sounds normal  MS: no gross musculoskeletal defects noted, no edema; left lower leg---some tenderness at medial malleolus and distal tibia medial aspect; no knee or ankle effusion; normal ROM bilateral hips and ankles  SKIN: 2_+ edema left lower leg with scaly stasis dermatitis medial aspect of left calf with surrounding redness and some induration; no pustules or ulcers; no ascending lymphangitis; no other suspicious lesions or rashes  NEURO: Normal strength and tone, mentation intact and speech normal; normal gait; neurovascularly intact bilateral upper and lower extremities  BACK: no CVA tenderness, no paralumbar  tenderness      Labs: CBC+diff, CMP, INR, uric acid within normal limits; CRP increased at 10.27    Imaging:  Xray left tibia and fibula showed:   Normal tibia and fibula.  No fracture. Small corticated  ossicle adjacent to the tibial tubercle. Moderate-sized corticated  ossicle at the posterior margin of the proximal tibiofibular joint.  Small calcaneus enthesophytes.    US left lower extremity negative for DVT (verbal report from US dept.)    Assessment:  Left lower leg cellulitis secondary to stasis dermatitis.      Plan: Doxycycline 100 mg p.o. twice daily x7 days prescribed for cellulitis.  Patient to use compression stockings prescribed before.  He is to cut his fingers and toenails and to avoid scratching.  Elevate left leg whenever possible.  Follow-up primary care physician within 5 days if no improvement or new problems arise.  Warning signs and symptoms explained, to be seen ASAP if worsening.

## 2023-06-06 NOTE — PATIENT INSTRUCTIONS
(B79.742) Pain of left lower leg  (primary encounter diagnosis)  Comment:   Plan: Referral to Acute and Diagnostic Services (Day         of diagnostic / First order acute)            (R60.0) Edema of left lower leg  Comment:   Plan: Referral to Acute and Diagnostic Services (Day         of diagnostic / First order acute)            To [place] acute and diagnostic services clinic now for further evaluation.

## 2023-06-06 NOTE — PROGRESS NOTES
Patient presents with:  Urgent Care  Edema: Left lower leg swelling for years mild but 3 weeks ago became worse.     (M79.662) Pain of left lower leg  (primary encounter diagnosis)  Comment:   Plan: Referral to Acute and Diagnostic Services (Day         of diagnostic / First order acute)            (R60.0) Edema of left lower leg  Comment:   Plan: Referral to Acute and Diagnostic Services (Day         of diagnostic / First order acute)            To [facility] now for further evaluation.             SUBJECTIVE:   Eder Horan is a 83 year old male who presents today with left lower leg edema, chronic, however significantly worse in the past 3 weeks, now with pain, redness and warmth.      He denies any trauma.      He denies any fevers or URI symptoms.      He is on eliquis for atrial fibrillation.        Past Medical History:   Diagnosis Date     Actinic keratosis 6/09    face     ALLERGIC RHINITIS       Atrial fibrillation (H) 6/08     B12 deficiency 1/16/2019     Back pain     s/p LESI through ortho Feb 2010     Basal cell carcinoma      Bradycardia      Chronotropic incompetence with sinus node dysfunction (H)      COPD (chronic obstructive pulmonary disease) (H)      DVT      Left calf 2003. Right calf 6/06     Embolic stroke (H) 06/11/2017    superior cerebellar artery occlusion. Hx A fib     Hyperlipidemia LDL goal <130 5/10/2011     Hypertension      HYPOTHYROIDISM      hypothyroidism     Lateral epicondylitis  of elbow 7/05    left     Malignant melanoma (H)      PLANTAR FASCITIS      Prostate cancer (H) 6/09    on Lupron therapy     Raynaud's disease without gangrene 11/26/2018     Squamous cell carcinoma  Sept 2014     right lower lid, s/p MOHS     Syncope      TMJ (temporomandibular joint syndrome) 4/12/2012     Tobacco use disorder          Current Outpatient Medications   Medication Sig Dispense Refill     Multiple Vitamins-Iron (DAILY-HAZEL/IRON/BETA-CAROTENE) TABS TAKE  "1 TABLET BY MOUTH DAILY. (Patient not taking: Reported on 10/19/2020) 30 tablet 7     Social History     Tobacco Use     Smoking status: Never Smoker     Smokeless tobacco: Never Used   Substance Use Topics     Alcohol use: Not on file     Family History   Problem Relation Age of Onset     Diabetes Mother      Diabetes Father          ROS:    10 point ROS of systems including Constitutional, Eyes, Respiratory, Cardiovascular, Gastroenterology, Genitourinary, Integumentary, Muscularskeletal, Psychiatric ,neurological were all negative except for pertinent positives noted in my HPI       OBJECTIVE:  /78   Pulse 85   Temp 97.8  F (36.6  C) (Temporal)   Resp 26   Ht 1.753 m (5' 9\")   Wt 72.6 kg (160 lb)   SpO2 100%   BMI 23.63 kg/m    Physical Exam:  GENERAL APPEARANCE: healthy, alert and no distress  Extremities: left lower extremity/le+ pitting edema to knee with positive layla's sign, erythema and firm skin around ankle.    SKIN: erythema and scaling skin around ankle.  Tender.  Warm.      "

## 2023-06-19 ENCOUNTER — NURSE TRIAGE (OUTPATIENT)
Dept: INTERNAL MEDICINE | Facility: CLINIC | Age: 83
End: 2023-06-19
Payer: COMMERCIAL

## 2023-06-19 NOTE — TELEPHONE ENCOUNTER
Pt reports bilateral leg edema and ankles feels hard. He also complains of leg itching. He was seen at  for symptoms and was referred to [facility] on 06/06. US was done of  left lower and  pt was given Rx for abx. Pt is now having similar symptoms on right leg- swelling itching, scaly and has pain when he squeeze ankle.  Pt is requesting appt with PCP. He declines to see UC again. He wants to be seen with PCP in the next couple days. Does provider approve overbook visit today or tomorrow?     Pt was advised to seek care at ER if chest pain or breathing problems. He needs a call back on advice from PCP on OV this week.     Reason for Disposition    Thigh, calf, or ankle swelling in both legs, but one side is definitely more swollen (Exception: longstanding difference between legs)    Additional Information    Negative: Sounds like a life-threatening emergency to the triager    Negative: Chest pain    Negative: Small area of swelling and followed an insect bite to the area    Negative: Followed a knee injury    Negative: Ankle or foot injury    Negative: Pregnant with leg swelling or edema    Negative: Difficulty breathing at rest    Negative: Entire foot is cool or blue in comparison to other side    Negative: SEVERE swelling (e.g., swelling extends above knee, entire leg is swollen, weeping fluid)    Protocols used: LEG SWELLING AND EDEMA-A-OH

## 2023-06-23 ENCOUNTER — OFFICE VISIT (OUTPATIENT)
Dept: INTERNAL MEDICINE | Facility: CLINIC | Age: 83
End: 2023-06-23
Payer: COMMERCIAL

## 2023-06-23 VITALS
HEIGHT: 69 IN | DIASTOLIC BLOOD PRESSURE: 82 MMHG | OXYGEN SATURATION: 97 % | HEART RATE: 80 BPM | TEMPERATURE: 97 F | SYSTOLIC BLOOD PRESSURE: 118 MMHG | WEIGHT: 167.2 LBS | BODY MASS INDEX: 24.76 KG/M2

## 2023-06-23 DIAGNOSIS — I10 BENIGN ESSENTIAL HYPERTENSION: ICD-10-CM

## 2023-06-23 DIAGNOSIS — L03.116 CELLULITIS OF LEFT LOWER EXTREMITY: ICD-10-CM

## 2023-06-23 DIAGNOSIS — R60.9 EDEMA, UNSPECIFIED TYPE: ICD-10-CM

## 2023-06-23 PROCEDURE — 99214 OFFICE O/P EST MOD 30 MIN: CPT | Performed by: INTERNAL MEDICINE

## 2023-06-23 RX ORDER — LOSARTAN POTASSIUM 50 MG/1
50 TABLET ORAL DAILY
Qty: 30 TABLET | Refills: 11 | Status: SHIPPED | OUTPATIENT
Start: 2023-06-23 | End: 2023-07-24

## 2023-06-23 RX ORDER — FUROSEMIDE 20 MG
20 TABLET ORAL DAILY
Qty: 30 TABLET | Refills: 0 | Status: SHIPPED | OUTPATIENT
Start: 2023-06-23 | End: 2023-07-24

## 2023-06-23 RX ORDER — POTASSIUM CHLORIDE 1500 MG/1
20 TABLET, EXTENDED RELEASE ORAL DAILY
Qty: 30 TABLET | Refills: 0 | Status: SHIPPED | OUTPATIENT
Start: 2023-06-23 | End: 2023-07-24

## 2023-06-23 RX ORDER — CEPHALEXIN 500 MG/1
500 CAPSULE ORAL 3 TIMES DAILY
Qty: 21 CAPSULE | Refills: 0 | Status: SHIPPED | OUTPATIENT
Start: 2023-06-23 | End: 2023-06-30

## 2023-06-23 ASSESSMENT — PAIN SCALES - GENERAL: PAINLEVEL: MILD PAIN (2)

## 2023-06-23 NOTE — PROGRESS NOTES
ASSESSMENT:   1. Edema, unspecified type  Persistent.  No orthopnea/PND.  Breathing stable.  Will use short-term diuretic/therapy to clear fluid that may be related to amlodipine use and then try stopping and reassess  - furosemide (LASIX) 20 MG tablet; Take 1 tablet (20 mg) by mouth daily  Dispense: 30 tablet; Refill: 0  - potassium chloride ER (K-TAB) 20 MEQ CR tablet; Take 1 tablet (20 mEq) by mouth daily  Dispense: 30 tablet; Refill: 0    2. Cellulitis of left lower extremity  Warmth and erythema left leg persisting despite doxycycline.  Seems out of proportion to simple stasis dermatitis.  Will therefore  treat with 7 days of cephalexin  - cephALEXin (KEFLEX) 500 MG capsule; Take 1 capsule (500 mg) by mouth 3 times daily for 7 days  Dispense: 21 capsule; Refill: 0    3. Benign essential hypertension  Amlodipine possibly contributing to edema.  Will discontinue and start losartan instead  - losartan (COZAAR) 50 MG tablet; Take 1 tablet (50 mg) by mouth daily  Dispense: 30 tablet; Refill: 11      PLAN:   Stop Amlodipine for now   Start Losartan 50mg  tab,  1 tab daily in AM for blood pressure    Send me update in [employer]  in  1 week re: blood pressure status with home monitor and leg swelling status   Cephalexin 500mg tab, 1 tab three times a day for 7 days for probable infection left leg skin (antibiotic)    Furosemide 20mg tab, 1 tab  daily in AM plus potassium 20meq tab, 1 tab daily in AM also  for the next 4 days to pull fluid off the legs. Then try stopping the Furosemide and potassium       (Chart documentation was completed, in part, with [employer] voice-recognition software. Even though reviewed, some grammatical, spelling, and word errors may remain.)    Richmond Martinez MD  Internal Medicine Department  [facility]      Christiane Gaines is a 83 year old, presenting for the following health issues:  Consult (Edema and redness and swelling)        6/23/2023    12:49 PM    Additional Questions   Roomed by ambrose     History of Present Illness       Reason for visit:  Leg,Ankle,Foot Swelling-Redness,Itch & Skin Tightness.  Initially left leg now both, not as bad on Right Side    He eats 4 or more servings of fruits and vegetables daily.He consumes 0 sweetened beverage(s) daily.He exercises with enough effort to increase his heart rate 9 or less minutes per day.  He exercises with enough effort to increase his heart rate 3 or less days per week.   He is taking medications regularly.      Most recent lab results reviewed with pt.       [facility] Part of the DS note as below:     CS ACUTE & DIAG [facility] PROVIDER  [facility] SPECIALTY [facility] MARK Gaines is a 83 year old, presenting for the following health issues:  Musculoskeletal Problem (Leg swelling- 2-3 weeks )     HPI      Evaluation for possible DVT  Onset/Duration: swelling for about 2-3 weeks, normal swelling in both ankles,   Description:       Location: left leg       Redness: YES       Pain: squeezing it hurts        Warmth: YES       Joint swelling no   Progression of symptoms worse - hard as a rock  Accompanying signs and symptoms:       Fevers: no        Numbness/tingling/weakness: no        Chest pain/pleurisy: no        Shortness of breath: YES- constant--no change from previous.     history        Trauma: no         Recent travel/when: no         Previous history of DVT: YES- on blood thinner        Family history of DVT: no         Recent surgery: no   Aggravating factors include: at night   Therapies tried and outcome: nothing  Prior surgery on arteries of veins in this area: No     83-year-old male presented to [facility] ambulatory without assistance today complaining of left leg swelling and redness for couple of weeks.  He denies any pain or difficulty on walking.  He presented to the urgent care at [facility] and was referred here for ultrasound of his leg to rule out DVT.  Patient is  on apixaban for atrial fibrillation.  He denies any chest pain or change in shortness of breath(has chronic COPD on inhalers).  No history of gout.  Apparently pt has had some itching and has been scratching the medial aspect of his left distal calf. Left lower leg red for the last few days.  No fall or trauma or injuries.  No hx of MRSA.         Objective   BP (!) 134/97 (BP Location: Right arm, Patient Position: Chair, Cuff Size: Adult Regular)   Pulse 83   Temp 97.6  F (36.4  C) (Oral)   Resp 20   SpO2 95%   There is no height or weight on file to calculate BMI.  Physical Exam   GENERAL: healthy, alert and no distress, afebrile, no cyanosis or accessory muscle use, moist mucus membrane, appears well  HENT: normal, no adenopathy, no thyromegaly, neck supple  RESP: lungs clear to auscultation - no rales, rhonchi or wheezes  CV: regular rate and rhythm, normal S1 S2, no S3 or S4, no murmur, click or rub  ABDOMEN: soft, nontender, no hepatosplenomegaly, no masses and bowel sounds normal  MS: no gross musculoskeletal defects noted, no edema; left lower leg---some tenderness at medial malleolus and distal tibia medial aspect; no knee or ankle effusion; normal ROM bilateral hips and ankles  SKIN: 2_+ edema left lower leg with scaly stasis dermatitis medial aspect of left calf with surrounding redness and some induration; no pustules or ulcers; no ascending lymphangitis; no other suspicious lesions or rashes  NEURO: Normal strength and tone, mentation intact and speech normal; normal gait; neurovascularly intact bilateral upper and lower extremities  BACK: no CVA tenderness, no paralumbar tenderness        Labs: CBC+diff, CMP, INR, uric acid within normal limits; CRP increased at 10.27     Imaging:  Xray left tibia and fibula showed:   Normal tibia and fibula.  No fracture. Small corticated  ossicle adjacent to the tibial tubercle. Moderate-sized corticated  ossicle at the posterior margin of the proximal  "tibiofibular joint.  Small calcaneus enthesophytes.     US left lower extremity negative for DVT (verbal report from US dept.)     Assessment:  Left lower leg cellulitis secondary to stasis dermatitis.       Plan: Doxycycline 100 mg p.o. twice daily x7 days prescribed for cellulitis.  Patient to use compression stockings prescribed before.  He is to cut his fingers and toenails and to avoid scratching.  Elevate left leg whenever possible.  Follow-up primary care physician within 5 days if no improvement or new problems arise.  Warning signs and symptoms explained, to be seen ASAP if worsening.    Since that visit, patient states the redness in his left leg is the same or slightly more despite edema. being slightly less.  No fevers or chills.  Denies orthopnea, PND or chest pain.  Mild chronic shortness of breath but stable with Dulera.  Recent thyroid function normal.  Using lower salt intake diet. Has finished course of Doxycycline    Additional ROS:   Constitutional, HEENT, Cardiovascular, Pulmonary, GI and , Neuro, MSK and Psych review of systems/symptoms are otherwise negative or unchanged from previous, except as noted above.      OBJECTIVE:  /82   Pulse 80   Temp 97  F (36.1  C) (Temporal)   Ht 1.753 m (5' 9\")   Wt 75.8 kg (167 lb 3.2 oz)   SpO2 97%   BMI 24.69 kg/m     Estimated body mass index is 24.69 kg/m  as calculated from the following:    Height as of this encounter: 1.753 m (5' 9\").    Weight as of this encounter: 75.8 kg (167 lb 3.2 oz).     Neck: no adenopathy. Thyroid normal to palpation. No bruits. No JVD  Pulm: Lungs clear to auscultation   CV: Regular rates and rhythm  GI: Soft, nontender, Normal active bowel sounds, No hepatosplenomegaly or masses palpable  Ext: Peripheral pulses intact. 1 plus  LLE edema and mild RLE. Mild increased warmth and erythema left calf                 "

## 2023-06-23 NOTE — PATIENT INSTRUCTIONS
Stop Amlodipine for now   Start Losartan 50mg  tab,  1 tab daily in AM for blood pressure    Send me update in [employer]  in  1 week re: blood pressure status with home monitor and leg swelling status   Cephalexin 500mg tab, 1 tab three times a day for 7 days for probable infection left leg skin (antibiotic)    Furosemide 20mg tab, 1 tab  daily in AM plus potassium 20meq tab, 1 tab daily in AM also  for the next 4 days to pull fluid off the legs. Then try stopping the Furosemide and potassium

## 2023-06-30 ENCOUNTER — TELEPHONE (OUTPATIENT)
Dept: INTERNAL MEDICINE | Facility: CLINIC | Age: 83
End: 2023-06-30
Payer: COMMERCIAL

## 2023-06-30 DIAGNOSIS — R60.9 EDEMA, UNSPECIFIED TYPE: Primary | ICD-10-CM

## 2023-06-30 NOTE — TELEPHONE ENCOUNTER
COLEMAN: pt was in for edema of his legs and his bp meds had changed. His blood pressure currently is 107/75. He tried the diuretic and didn't stop after 4 days but stopped after 5 days. He stated the first 4 days the swelling didn't go down. He doesn't know what he should do now after he took the diuretic, antibiotic and blood pressure meds. Please advise as his legs are both swelling. The areas of the legs are getting more itchy and states that if he tries to itch it creates further discomfort.

## 2023-07-03 NOTE — TELEPHONE ENCOUNTER
Patient calling back today due to no response to Friday's message.    Patient states that he completed the antibiotic last Thursday. States that his left leg is better, but now there is redness above the mid shin on the right leg.    Advised follow up appointment today. Advised UC.    Patient is refusing UC. Wants message sent to Dr. Martinez.  Sheela Neri RN

## 2023-07-10 ENCOUNTER — MYC MEDICAL ADVICE (OUTPATIENT)
Dept: INTERNAL MEDICINE | Facility: CLINIC | Age: 83
End: 2023-07-10
Payer: COMMERCIAL

## 2023-07-10 DIAGNOSIS — I10 BENIGN ESSENTIAL HYPERTENSION: ICD-10-CM

## 2023-07-10 DIAGNOSIS — R60.9 EDEMA, UNSPECIFIED TYPE: ICD-10-CM

## 2023-07-10 NOTE — TELEPHONE ENCOUNTER
Spoke with pt. No F/C now. Some new redness with slight warmth other leg distal. Edema BLE. NO orthopnea, PND and breathing OK.  BP home 110/70s   Plan as below:    Restart Furosemide 20mg tab daily along with potassium 20meq tab daily for the leg swelling   Remain off of Amlodipine   Continue Losartan 50mg daily for blood pressure control  Remain off of further antibiotics for now  Call  [phone] or use [employer] to schedule a future lab appointment  non-fasting in about 10 days (mid next week)    May use over the counter Benadryl cream on areas of itching on the legs  If the swelling, redness and itching is improving, then continue these medications and call the pharmacy  when needing refills.  If the legs do now improve by later next week or you begin to have spreading redness or new fever earlier, then contact clinic back

## 2023-07-10 NOTE — TELEPHONE ENCOUNTER
Pt calling for status update, expressing concerns he has not had a response to his 6/30/23 update to PCP from 6/23/23 OV. Routing to PCP 3rd attempt.        Patient still having leg edema in legs bilat and is itchy. Swelling in L leg has subsided somewhat, patient is now having swelling in R leg starting approx. 1 week ago.     Patient is asking:  - Should he switch BP medication back to Amlodipine? Stay on Losartan?  - Should he go back on Lasix?   - What should he do about the leg swelling/itching? Go back on antibiotics?      Per 6/23/23 OV Notes:   Stop Amlodipine for now   Start Losartan 50mg  tab,  1 tab daily in AM for blood pressure    Send me update in [employer]  in  1 week re: blood pressure status with home monitor and leg swelling status   Cephalexin 500mg tab, 1 tab three times a day for 7 days for probable infection left leg skin (antibiotic)    Furosemide 20mg tab, 1 tab  daily in AM plus potassium 20meq tab, 1 tab daily in AM also  for the next 4 days to pull fluid off the legs. Then try stopping the Furosemide and potassium       Can we leave a detailed message on this number? YES  Phone number patient can be reached at: Home number on file [phone] (home)    Angie Tinsley RN  [facility] Triage

## 2023-07-18 ENCOUNTER — LAB (OUTPATIENT)
Dept: LAB | Facility: CLINIC | Age: 83
End: 2023-07-18
Payer: COMMERCIAL

## 2023-07-18 DIAGNOSIS — R60.9 EDEMA, UNSPECIFIED TYPE: ICD-10-CM

## 2023-07-18 LAB
ALBUMIN SERPL BCG-MCNC: 3.9 G/DL (ref 3.5–5.2)
ALP SERPL-CCNC: 121 U/L (ref 40–129)
ALT SERPL W P-5'-P-CCNC: 9 U/L (ref 0–70)
ANION GAP SERPL CALCULATED.3IONS-SCNC: 10 MMOL/L (ref 7–15)
AST SERPL W P-5'-P-CCNC: 22 U/L (ref 0–45)
BILIRUB SERPL-MCNC: 0.6 MG/DL
BUN SERPL-MCNC: 11.4 MG/DL (ref 8–23)
CALCIUM SERPL-MCNC: 9 MG/DL (ref 8.8–10.2)
CHLORIDE SERPL-SCNC: 104 MMOL/L (ref 98–107)
CREAT SERPL-MCNC: 0.8 MG/DL (ref 0.67–1.17)
DEPRECATED HCO3 PLAS-SCNC: 25 MMOL/L (ref 22–29)
GFR SERPL CREATININE-BSD FRML MDRD: 87 ML/MIN/1.73M2
GLUCOSE SERPL-MCNC: 104 MG/DL (ref 70–99)
POTASSIUM SERPL-SCNC: 4.7 MMOL/L (ref 3.4–5.3)
PROT SERPL-MCNC: 7.1 G/DL (ref 6.4–8.3)
SODIUM SERPL-SCNC: 139 MMOL/L (ref 136–145)

## 2023-07-18 PROCEDURE — 36415 COLL VENOUS BLD VENIPUNCTURE: CPT

## 2023-07-18 PROCEDURE — 80053 COMPREHEN METABOLIC PANEL: CPT

## 2023-07-24 NOTE — TELEPHONE ENCOUNTER
Patient calling clinic, states swelling has gone down, L leg is still a little itchy   Both legs are almost back to normal.    7/24/23 12:30 pm /82      Refills pended for 90 day supply.     Please advise when patient should follow-up.    Patient prefers response through MyChart.

## 2023-07-26 RX ORDER — POTASSIUM CHLORIDE 1500 MG/1
20 TABLET, EXTENDED RELEASE ORAL DAILY
Qty: 90 TABLET | Refills: 1 | Status: SHIPPED | OUTPATIENT
Start: 2023-07-26 | End: 2024-01-14

## 2023-07-26 RX ORDER — LOSARTAN POTASSIUM 50 MG/1
50 TABLET ORAL DAILY
Qty: 90 TABLET | Refills: 1 | Status: SHIPPED | OUTPATIENT
Start: 2023-07-26 | End: 2024-01-14

## 2023-07-26 RX ORDER — FUROSEMIDE 20 MG
20 TABLET ORAL DAILY
Qty: 90 TABLET | Refills: 1 | Status: SHIPPED | OUTPATIENT
Start: 2023-07-26 | End: 2024-01-14

## 2023-09-03 DIAGNOSIS — I48.0 PAROXYSMAL ATRIAL FIBRILLATION (H): ICD-10-CM

## 2023-09-05 RX ORDER — APIXABAN 5 MG/1
TABLET, FILM COATED ORAL
Qty: 180 TABLET | Refills: 3 | Status: SHIPPED | OUTPATIENT
Start: 2023-09-05 | End: 2024-09-10

## 2023-10-12 ENCOUNTER — LAB (OUTPATIENT)
Dept: LAB | Facility: CLINIC | Age: 83
End: 2023-10-12
Payer: COMMERCIAL

## 2023-10-12 ENCOUNTER — OFFICE VISIT (OUTPATIENT)
Dept: CARDIOLOGY | Facility: CLINIC | Age: 83
End: 2023-10-12
Payer: COMMERCIAL

## 2023-10-12 VITALS
DIASTOLIC BLOOD PRESSURE: 88 MMHG | HEIGHT: 69 IN | WEIGHT: 167 LBS | HEART RATE: 72 BPM | SYSTOLIC BLOOD PRESSURE: 135 MMHG | RESPIRATION RATE: 17 BRPM | BODY MASS INDEX: 24.73 KG/M2 | OXYGEN SATURATION: 100 %

## 2023-10-12 DIAGNOSIS — I48.21 PERMANENT ATRIAL FIBRILLATION (H): Primary | ICD-10-CM

## 2023-10-12 DIAGNOSIS — R06.09 DOE (DYSPNEA ON EXERTION): ICD-10-CM

## 2023-10-12 DIAGNOSIS — I48.21 PERMANENT ATRIAL FIBRILLATION (H): ICD-10-CM

## 2023-10-12 LAB — NT-PROBNP SERPL-MCNC: 1416 PG/ML (ref 0–1800)

## 2023-10-12 PROCEDURE — 93000 ELECTROCARDIOGRAM COMPLETE: CPT | Performed by: INTERNAL MEDICINE

## 2023-10-12 PROCEDURE — 99214 OFFICE O/P EST MOD 30 MIN: CPT | Performed by: INTERNAL MEDICINE

## 2023-10-12 PROCEDURE — 83880 ASSAY OF NATRIURETIC PEPTIDE: CPT | Performed by: INTERNAL MEDICINE

## 2023-10-12 PROCEDURE — 36415 COLL VENOUS BLD VENIPUNCTURE: CPT | Performed by: INTERNAL MEDICINE

## 2023-10-12 NOTE — LETTER
10/12/2023    Richmond Martinez MD  [address] [facility] [address]    RE: Eder Horan       Dear Colleague,     I had the pleasure of seeing Eder Horan in the [facility].  PHYSICIAN NOTE:  This visit was completed in person at the [facility].      I had the pleasure of seeing Mr. Eder zamora in follow-up of sinus node dysfunction and atrial fibrillation.    The patient is a delightful 83-year-old male with the following medical issues:  H/o SSS with chronotropic incompetence.  The patient repeatedly declined pacemaker implantation in the past.  He later developed persistent AF and bradycardia became less of a concern.  Permanent AF.  Used to be on flecainide which was stopped in 2021 after he developed frequent paroxysmal AF despite taking the medication.  After stopping flecainide the arrhythmia became persistent.  [id number] 5.  On apixaban.  CVA 2017.  Treated with tPA.  The patient had not been on anticoagulation before his stroke.  Hypertension.  Hypothyroidism.  COPD.    Eder complains of fatigue.  It seems the start of this symptom correlated with the development of persistent AF.  He tells me he does not have much energy even in the morning shortly after he gets up.    He also complains of HANSEN.  No exertional chest/neck/jaw/arm tightness to suggest angina.  No orthopnea or PND.  He has had lower extremity edema which sounds significant.  It has improved after Dr. Martinez started furosemide.      PHYSICAL EXAMINATION:  Vital signs: 135/88, 72, 75.8 kg, BMI 24.6  General:  in no apparent distress  ENT/Mouth:  no nasal discharge.  Eyes:  normal conjunctivae.   Chest/Lungs:  patient is not dyspneic.  Lungs CTA, without rales or wheezing  Cardiovascular: Normal JVP, rhythm is irregular.  No gallop, murmur or rub.    Abdomen:  no abdominal distention.    Extremities: Trace edema  Skin:  no xanthelasma.    Neurologic:  alert & oriented x 3.    Vascular:  2+ carotids without  bruits.         DIAGNOSTIC STUDIES:  Laboratory studies: Sodium 139, potassium 4.7, creatinine 0.8, albumin 3.9, hematocrit 44.5%  ECG: AF with controlled VR      IMPRESSION:  Permanent atrial fibrillation.  It is well rate controlled.  The patient is tolerating anticoagulation (rivaroxaban).  I suspect it contributes significantly to his fatigue.  Unfortunately, given its longstanding nature, age of 83 and previous refusal of a permanent pacemaker, there are no good options to restore/maintain SR.  HANSEN.  He tells me his room air sat is usually in the low 90s.  He does have history of COPD and uses inhalers.  It is unclear how much his HANSEN is related to his cardiac vs pulmonary condition.  I will order a BNP.    RECOMMENDATIONS:  Echocardiogram.  proBNP.  We will communicate test results to Eder, if no major red flags we will plan to have him see Amira in 1 year.    It was my pleasure seeing this delightful patient.  Please feel free to call with any questions.   Total time spent today, including time for chart review and documentation, was 30 minutes.      Effie Hawkins MD, FACC        Encounter Diagnosis   Name Primary?    PAF (paroxysmal atrial fibrillation) (H) Yes       CURRENT MEDICATIONS:  Current Outpatient Medications   Medication Sig Dispense Refill    albuterol (PROAIR HFA/PROVENTIL HFA/VENTOLIN HFA) 108 (90 Base) MCG/ACT inhaler Inhale 2 puffs into the lungs every 4 hours as needed for shortness of breath 18 g 11    apixaban ANTICOAGULANT (ELIQUIS ANTICOAGULANT) 5 MG tablet TAKE ONE TABLET BY MOUTH TWICE DAILY. 180 tablet 3    fluocinonide (LIDEX) 0.05 % external cream Apply topically 2 times daily 120 g 3    furosemide (LASIX) 20 MG tablet Take 1 tablet (20 mg) by mouth daily 90 tablet 1    levothyroxine (SYNTHROID/LEVOTHROID) 112 MCG tablet TAKE ONE TABLET BY MOUTH ONE TIME DAILY 90 tablet 3    losartan (COZAAR) 50 MG tablet Take 1 tablet (50 mg) by mouth daily 90 tablet 1    metoprolol succinate ER  (TOPROL XL) 25 MG 24 hr tablet Take 1 tablet (25 mg) by mouth daily 90 tablet 3    mometasone-formoterol (DULERA) 200-5 MCG/ACT inhaler Inhale 2 puffs into the lungs 2 times daily 39 g 3    potassium chloride ER (K-TAB) 20 MEQ CR tablet Take 1 tablet (20 mEq) by mouth daily 90 tablet 1    PREDNISOLONE ACETATE OP Apply 1 drop to eye every evening      valACYclovir HCl (VALTREX PO) Take 500 mg by mouth every evening      vitamin B-12 (CYANOCOBALAMIN) 2500 MCG sublingual tablet Take 1 tab by mouth 3 times a week (WMF)         ALLERGIES     Allergies   Allergen Reactions    Bactrim [Sulfa Antibiotics] Rash       PAST MEDICAL HISTORY:  Past Medical History:   Diagnosis Date    Actinic keratosis 6/09    face    ALLERGIC RHINITIS      Atrial fibrillation (H) 6/08    B12 deficiency 1/16/2019    Back pain     s/p LESI through ortho Feb 2010    Basal cell carcinoma     Bradycardia     Chronotropic incompetence with sinus node dysfunction (H)     COPD (chronic obstructive pulmonary disease) (H)     DVT      Left calf 2003. Right calf 6/06    Embolic stroke (H) 06/11/2017    superior cerebellar artery occlusion. Hx A fib    Hyperlipidemia LDL goal <130 5/10/2011    Hypertension     HYPOTHYROIDISM      hypothyroidism    Lateral epicondylitis  of elbow 7/05    left    Malignant melanoma (H)     PLANTAR FASCITIS     Prostate cancer (H) 6/09    on Lupron therapy    Raynaud's disease without gangrene 11/26/2018    Squamous cell carcinoma  Sept 2014     right lower lid, s/p MOHS    Syncope     TMJ (temporomandibular joint syndrome) 4/12/2012    Tobacco use disorder        PAST SURGICAL HISTORY:  Past Surgical History:   Procedure Laterality Date    ABDOMEN SURGERY  2004,06,09    Hernias (2) Prostate Removal(2009)    BIOPSY  2009    Prostate    CARDIOVERSION  10-16-08    failed    COLONOSCOPY  2004    EYE SURGERY      2013 lump from lower  left eye lid removed    GENITOURINARY SURGERY  2009    Prostate    HERNIA REPAIR  2004 - 2006     [facility] NONSPECIFIC PROCEDURE      Left groin exploration and left inguinal herniorrhaphy    [facility] NONSPECIFIC PROCEDURE      Prostate bx (benign)    [facility] NONSPECIFIC PROCEDURE      Wide local excision (left side), robotic-assisted laparoscopic prostatectomy and   Left pelvic lymph node dissection    [facility] NONSPECIFIC PROCEDURE  2010    Right inguinal herniorrhaphy with mesh       FAMILY HISTORY:  Family History   Problem Relation Age of Onset    C.A.D. Father         heart attack- angina    Coronary Artery Disease Father     Myocardial Infarction Father     Cancer Mother         lung    Lung Cancer Mother     Lung Cancer Sister     Unknown/Adopted Maternal Grandmother     Unknown/Adopted Maternal Grandfather     Unknown/Adopted Paternal Grandmother     Unknown/Adopted Paternal Grandfather     Unknown/Adopted Sister        SOCIAL HISTORY:  Social History     Socioeconomic History    Marital status:     Number of children: 3   Occupational History    Occupation: [employer]     Employer: [employer]     Employer: [employer]   Tobacco Use    Smoking status: Some Days     Types: Cigars     Last attempt to quit: 2006     Years since quittin.8    Smokeless tobacco: Never    Tobacco comments:      occasionally cigar   Vaping Use    Vaping Use: Never used   Substance and Sexual Activity    Alcohol use: Yes     Alcohol/week: 0.0 standard drinks of alcohol     Comment: 2-3 drinks a day:  Wine/Beer    Drug use: No    Sexual activity: Never   Other Topics Concern    Parent/sibling w/ CABG, MI or angioplasty before 65F 55M? No    Caffeine Concern No     Comment: 2-3 cups of coffee a day    Sleep Concern No    Stress Concern No    Weight Concern No    Special Diet No    Exercise No    Seat Belt Yes       Review of Systems:  Skin:          Eyes:         ENT:         Respiratory:          Cardiovascular:         Gastroenterology:        Genitourinary:         Musculoskeletal:         Neurologic:          Psychiatric:         Heme/Lymph/Imm:         Endocrine:           Physical Exam:  Vitals: There were no vitals taken for this visit.    Constitutional:           Skin:             Head:           Eyes:           Lymph:      ENT:           Neck:           Respiratory:            Cardiac:                                                           GI:           Extremities and Muscular Skeletal:                 Neurological:           Psych:           CC  No referring provider defined for this encounter.                  Thank you for allowing me to participate in the care of your patient.      Sincerely,     Effie Hawkins MD     [facility]

## 2023-10-12 NOTE — PROGRESS NOTES
PHYSICIAN NOTE:  This visit was completed in person at the [facility].      I had the pleasure of seeing Mr. Eder zamora in follow-up of sinus node dysfunction and atrial fibrillation.    The patient is a delightful 83-year-old male with the following medical issues:  H/o SSS with chronotropic incompetence.  The patient repeatedly declined pacemaker implantation in the past.  He later developed persistent AF and bradycardia became less of a concern.  Permanent AF.  Used to be on flecainide which was stopped in 2021 after he developed frequent paroxysmal AF despite taking the medication.  After stopping flecainide the arrhythmia became persistent.  [id number] 5.  On apixaban.  CVA 2017.  Treated with tPA.  The patient had not been on anticoagulation before his stroke.  Hypertension.  Hypothyroidism.  COPD.    Eder complains of fatigue.  It seems the start of this symptom correlated with the development of persistent AF.  He tells me he does not have much energy even in the morning shortly after he gets up.    He also complains of HANSEN.  No exertional chest/neck/jaw/arm tightness to suggest angina.  No orthopnea or PND.  He has had lower extremity edema which sounds significant.  It has improved after Dr. Martinez started furosemide.      PHYSICAL EXAMINATION:  Vital signs: 135/88, 72, 75.8 kg, BMI 24.6  General:  in no apparent distress  ENT/Mouth:  no nasal discharge.  Eyes:  normal conjunctivae.   Chest/Lungs:  patient is not dyspneic.  Lungs CTA, without rales or wheezing  Cardiovascular: Normal JVP, rhythm is irregular.  No gallop, murmur or rub.    Abdomen:  no abdominal distention.    Extremities: Trace edema  Skin:  no xanthelasma.    Neurologic:  alert & oriented x 3.    Vascular:  2+ carotids without bruits.         DIAGNOSTIC STUDIES:  Laboratory studies: Sodium 139, potassium 4.7, creatinine 0.8, albumin 3.9, hematocrit 44.5%  ECG: AF with controlled VR      IMPRESSION:  Permanent atrial  fibrillation.  It is well rate controlled.  The patient is tolerating anticoagulation (rivaroxaban).  I suspect it contributes significantly to his fatigue.  Unfortunately, given its longstanding nature, age of 83 and previous refusal of a permanent pacemaker, there are no good options to restore/maintain SR.  HANSEN.  He tells me his room air sat is usually in the low 90s.  He does have history of COPD and uses inhalers.  It is unclear how much his HANSEN is related to his cardiac vs pulmonary condition.  I will order a BNP.    RECOMMENDATIONS:  Echocardiogram.  proBNP.  We will communicate test results to Eder, if no major red flags we will plan to have him see Amira in 1 year.    It was my pleasure seeing this delightful patient.  Please feel free to call with any questions.   Total time spent today, including time for chart review and documentation, was 30 minutes.      Effie Hawkins MD, [facility]        Encounter Diagnosis   Name Primary?    PAF (paroxysmal atrial fibrillation) (H) Yes       CURRENT MEDICATIONS:  Current Outpatient Medications   Medication Sig Dispense Refill    albuterol (PROAIR HFA/PROVENTIL HFA/VENTOLIN HFA) 108 (90 Base) MCG/ACT inhaler Inhale 2 puffs into the lungs every 4 hours as needed for shortness of breath 18 g 11    apixaban ANTICOAGULANT (ELIQUIS ANTICOAGULANT) 5 MG tablet TAKE ONE TABLET BY MOUTH TWICE DAILY. 180 tablet 3    fluocinonide (LIDEX) 0.05 % external cream Apply topically 2 times daily 120 g 3    furosemide (LASIX) 20 MG tablet Take 1 tablet (20 mg) by mouth daily 90 tablet 1    levothyroxine (SYNTHROID/LEVOTHROID) 112 MCG tablet TAKE ONE TABLET BY MOUTH ONE TIME DAILY 90 tablet 3    losartan (COZAAR) 50 MG tablet Take 1 tablet (50 mg) by mouth daily 90 tablet 1    metoprolol succinate ER (TOPROL XL) 25 MG 24 hr tablet Take 1 tablet (25 mg) by mouth daily 90 tablet 3    mometasone-formoterol (DULERA) 200-5 MCG/ACT inhaler Inhale 2 puffs into the lungs 2 times daily 39 g 3     potassium chloride ER (K-TAB) 20 MEQ CR tablet Take 1 tablet (20 mEq) by mouth daily 90 tablet 1    PREDNISOLONE ACETATE OP Apply 1 drop to eye every evening      valACYclovir HCl (VALTREX PO) Take 500 mg by mouth every evening      vitamin B-12 (CYANOCOBALAMIN) 2500 MCG sublingual tablet Take 1 tab by mouth 3 times a week (WMF)         ALLERGIES     Allergies   Allergen Reactions    Bactrim [Sulfa Antibiotics] Rash       PAST MEDICAL HISTORY:  Past Medical History:   Diagnosis Date    Actinic keratosis 6/09    face    ALLERGIC RHINITIS      Atrial fibrillation (H) 6/08    B12 deficiency 1/16/2019    Back pain     s/p LESI through ortho Feb 2010    Basal cell carcinoma     Bradycardia     Chronotropic incompetence with sinus node dysfunction (H)     COPD (chronic obstructive pulmonary disease) (H)     DVT      Left calf 2003. Right calf 6/06    Embolic stroke (H) 06/11/2017    superior cerebellar artery occlusion. Hx A fib    Hyperlipidemia LDL goal <130 5/10/2011    Hypertension     HYPOTHYROIDISM      hypothyroidism    Lateral epicondylitis  of elbow 7/05    left    Malignant melanoma (H)     PLANTAR FASCITIS     Prostate cancer (H) 6/09    on Lupron therapy    Raynaud's disease without gangrene 11/26/2018    Squamous cell carcinoma  Sept 2014     right lower lid, s/p MOHS    Syncope     TMJ (temporomandibular joint syndrome) 4/12/2012    Tobacco use disorder        PAST SURGICAL HISTORY:  Past Surgical History:   Procedure Laterality Date    ABDOMEN SURGERY  2004,06,09    Hernias (2) Prostate Removal(2009)    BIOPSY  2009    Prostate    CARDIOVERSION  10-16-08    failed    COLONOSCOPY  2004    EYE SURGERY      2013 lump from lower  left eye lid removed    GENITOURINARY SURGERY  2009    Prostate    HERNIA REPAIR  2004 - 2006    [facility] NONSPECIFIC PROCEDURE  1/07    Left groin exploration and left inguinal herniorrhaphy    [facility] NONSPECIFIC PROCEDURE  5/08    Prostate bx (benign)    [facility] NONSPECIFIC PROCEDURE  8/09     Wide local excision (left side), robotic-assisted laparoscopic prostatectomy and   Left pelvic lymph node dissection    [facility] NONSPECIFIC PROCEDURE  2010    Right inguinal herniorrhaphy with mesh       FAMILY HISTORY:  Family History   Problem Relation Age of Onset    C.A.D. Father         heart attack- angina    Coronary Artery Disease Father     Myocardial Infarction Father     Cancer Mother         lung    Lung Cancer Mother     Lung Cancer Sister     Unknown/Adopted Maternal Grandmother     Unknown/Adopted Maternal Grandfather     Unknown/Adopted Paternal Grandmother     Unknown/Adopted Paternal Grandfather     Unknown/Adopted Sister        SOCIAL HISTORY:  Social History     Socioeconomic History    Marital status:     Number of children: 3   Occupational History    Occupation: [id number]     Employer: [employer]     Employer: RETIRED   Tobacco Use    Smoking status: Some Days     Types: Cigars     Last attempt to quit: 2006     Years since quittin.8    Smokeless tobacco: Never    Tobacco comments:      occasionally cigar   Vaping Use    Vaping Use: Never used   Substance and Sexual Activity    Alcohol use: Yes     Alcohol/week: 0.0 standard drinks of alcohol     Comment: 2-3 drinks a day:  Wine/Beer    Drug use: No    Sexual activity: Never   Other Topics Concern    Parent/sibling w/ CABG, MI or angioplasty before 65F 55M? No    Caffeine Concern No     Comment: 2-3 cups of coffee a day    Sleep Concern No    Stress Concern No    Weight Concern No    Special Diet No    Exercise No    Seat Belt Yes       Review of Systems:  Skin:          Eyes:         ENT:         Respiratory:          Cardiovascular:         Gastroenterology:        Genitourinary:         Musculoskeletal:         Neurologic:         Psychiatric:         Heme/Lymph/Imm:         Endocrine:           Physical Exam:  Vitals: There were no vitals taken for this visit.    Constitutional:           Skin:             Head:            Eyes:           Lymph:      ENT:           Neck:           Respiratory:            Cardiac:                                                           GI:           Extremities and Muscular Skeletal:                 Neurological:           Psych:           CC  No referring provider defined for this encounter.

## 2023-10-24 DIAGNOSIS — I48.0 PAROXYSMAL ATRIAL FIBRILLATION (H): ICD-10-CM

## 2023-10-24 RX ORDER — METOPROLOL SUCCINATE 25 MG/1
25 TABLET, EXTENDED RELEASE ORAL DAILY
Qty: 90 TABLET | Refills: 4 | Status: SHIPPED | OUTPATIENT
Start: 2023-10-24 | End: 2024-09-16

## 2023-10-30 ENCOUNTER — HOSPITAL ENCOUNTER (OUTPATIENT)
Dept: CARDIOLOGY | Facility: CLINIC | Age: 83
Discharge: HOME | End: 2023-10-30
Attending: INTERNAL MEDICINE | Admitting: INTERNAL MEDICINE
Payer: COMMERCIAL

## 2023-10-30 DIAGNOSIS — I48.21 PERMANENT ATRIAL FIBRILLATION (H): ICD-10-CM

## 2023-10-30 LAB — LVEF ECHO: NORMAL

## 2023-10-30 PROCEDURE — 93306 TTE W/DOPPLER COMPLETE: CPT

## 2023-10-30 PROCEDURE — 93306 TTE W/DOPPLER COMPLETE: CPT | Mod: 26 | Performed by: INTERNAL MEDICINE

## 2023-11-02 DIAGNOSIS — I48.21 PERMANENT ATRIAL FIBRILLATION (H): Primary | ICD-10-CM

## 2023-11-13 ENCOUNTER — OFFICE VISIT (OUTPATIENT)
Dept: INTERNAL MEDICINE | Facility: CLINIC | Age: 83
End: 2023-11-13
Payer: COMMERCIAL

## 2023-11-13 VITALS
BODY MASS INDEX: 25.02 KG/M2 | WEIGHT: 168.9 LBS | OXYGEN SATURATION: 99 % | SYSTOLIC BLOOD PRESSURE: 125 MMHG | HEART RATE: 71 BPM | HEIGHT: 69 IN | DIASTOLIC BLOOD PRESSURE: 82 MMHG | TEMPERATURE: 97.2 F

## 2023-11-13 DIAGNOSIS — R60.9 EDEMA, UNSPECIFIED TYPE: Primary | ICD-10-CM

## 2023-11-13 DIAGNOSIS — I34.0 MITRAL VALVE INSUFFICIENCY, UNSPECIFIED ETIOLOGY: ICD-10-CM

## 2023-11-13 DIAGNOSIS — Z23 NEED FOR COVID-19 VACCINE: ICD-10-CM

## 2023-11-13 DIAGNOSIS — R53.83 OTHER FATIGUE: ICD-10-CM

## 2023-11-13 DIAGNOSIS — Z23 NEEDS FLU SHOT: ICD-10-CM

## 2023-11-13 DIAGNOSIS — I07.1 TRICUSPID VALVE INSUFFICIENCY, UNSPECIFIED ETIOLOGY: ICD-10-CM

## 2023-11-13 LAB
ALBUMIN SERPL BCG-MCNC: 4 G/DL (ref 3.5–5.2)
ALP SERPL-CCNC: 109 U/L (ref 40–129)
ALT SERPL W P-5'-P-CCNC: 9 U/L (ref 0–70)
ANION GAP SERPL CALCULATED.3IONS-SCNC: 9 MMOL/L (ref 7–15)
AST SERPL W P-5'-P-CCNC: 19 U/L (ref 0–45)
BILIRUB SERPL-MCNC: 0.6 MG/DL
BUN SERPL-MCNC: 9.8 MG/DL (ref 8–23)
CALCIUM SERPL-MCNC: 8.9 MG/DL (ref 8.8–10.2)
CHLORIDE SERPL-SCNC: 107 MMOL/L (ref 98–107)
CORTIS SERPL-MCNC: 12.2 UG/DL
CREAT SERPL-MCNC: 0.79 MG/DL (ref 0.67–1.17)
DEPRECATED HCO3 PLAS-SCNC: 26 MMOL/L (ref 22–29)
EGFRCR SERPLBLD CKD-EPI 2021: 88 ML/MIN/1.73M2
ERYTHROCYTE [DISTWIDTH] IN BLOOD BY AUTOMATED COUNT: 13.5 % (ref 10–15)
ERYTHROCYTE [SEDIMENTATION RATE] IN BLOOD BY WESTERGREN METHOD: 56 MM/HR (ref 0–20)
GLUCOSE SERPL-MCNC: 103 MG/DL (ref 70–99)
HCT VFR BLD AUTO: 44.2 % (ref 40–53)
HGB BLD-MCNC: 14.7 G/DL (ref 13.3–17.7)
MCH RBC QN AUTO: 35.1 PG (ref 26.5–33)
MCHC RBC AUTO-ENTMCNC: 33.3 G/DL (ref 31.5–36.5)
MCV RBC AUTO: 106 FL (ref 78–100)
PLATELET # BLD AUTO: 215 10E3/UL (ref 150–450)
POTASSIUM SERPL-SCNC: 4.6 MMOL/L (ref 3.4–5.3)
PROT SERPL-MCNC: 7.4 G/DL (ref 6.4–8.3)
RBC # BLD AUTO: 4.19 10E6/UL (ref 4.4–5.9)
SHBG SERPL-SCNC: 58 NMOL/L (ref 11–80)
SODIUM SERPL-SCNC: 142 MMOL/L (ref 135–145)
TSH SERPL DL<=0.005 MIU/L-ACNC: 1.21 UIU/ML (ref 0.3–4.2)
WBC # BLD AUTO: 6.2 10E3/UL (ref 4–11)

## 2023-11-13 PROCEDURE — 85652 RBC SED RATE AUTOMATED: CPT | Performed by: INTERNAL MEDICINE

## 2023-11-13 PROCEDURE — 91320 SARSCV2 VAC 30MCG TRS-SUC IM: CPT | Performed by: INTERNAL MEDICINE

## 2023-11-13 PROCEDURE — 84443 ASSAY THYROID STIM HORMONE: CPT | Performed by: INTERNAL MEDICINE

## 2023-11-13 PROCEDURE — 85027 COMPLETE CBC AUTOMATED: CPT | Performed by: INTERNAL MEDICINE

## 2023-11-13 PROCEDURE — 90480 ADMN SARSCOV2 VAC 1/ONLY CMP: CPT | Performed by: INTERNAL MEDICINE

## 2023-11-13 PROCEDURE — G0008 ADMIN INFLUENZA VIRUS VAC: HCPCS | Mod: 59 | Performed by: INTERNAL MEDICINE

## 2023-11-13 PROCEDURE — 80053 COMPREHEN METABOLIC PANEL: CPT | Performed by: INTERNAL MEDICINE

## 2023-11-13 PROCEDURE — 84270 ASSAY OF SEX HORMONE GLOBUL: CPT | Performed by: INTERNAL MEDICINE

## 2023-11-13 PROCEDURE — 99214 OFFICE O/P EST MOD 30 MIN: CPT | Mod: 25 | Performed by: INTERNAL MEDICINE

## 2023-11-13 PROCEDURE — 82533 TOTAL CORTISOL: CPT | Performed by: INTERNAL MEDICINE

## 2023-11-13 PROCEDURE — 84403 ASSAY OF TOTAL TESTOSTERONE: CPT | Performed by: INTERNAL MEDICINE

## 2023-11-13 PROCEDURE — 36415 COLL VENOUS BLD VENIPUNCTURE: CPT | Performed by: INTERNAL MEDICINE

## 2023-11-13 PROCEDURE — 90662 IIV NO PRSV INCREASED AG IM: CPT | Performed by: INTERNAL MEDICINE

## 2023-11-13 NOTE — PATIENT INSTRUCTIONS
Labs as ordered   Continue current medications   Compression stocking. On AM and and off later PM  Repeat ECHO 6 months   Reduce alcohol intake by half  Referral to [facility] for possible obstructive sleep apnea as cause for fatigue. If you don't hear from a representative within 2 business days, please call [phone].    Covid and  flu vaccine today   Would recommend RSV vaccination. Get at a pharmacy in 1-2 weeks

## 2023-11-13 NOTE — PROGRESS NOTES
ASSESSMENT:    1. Edema, unspecified type  Improved.  Continue diuretic therapy.  Compression stockings.  Counseled regarding low-sodium intake.  Sleep clinic evaluation for possible sleep apnea contributing  - Compression Sleeve/Stocking Order for DME - ONLY FOR DME  - Comprehensive metabolic panel; Future  - Comprehensive metabolic panel    2. Other fatigue  With heart valve issues, A-fib and edema and fatigue, high risk for sleep apnea.  Referral made to sleep clinic.  Labs as ordered in addition to rule out metabolic cause.  Counseled regarding alcohol reduction to help with sleep quality and nocturia  - Cortisol; Future  - Comprehensive metabolic panel; Future  - CBC with platelets; Future  - TSH with free T4 reflex; Future  - Erythrocyte sedimentation rate auto; Future  - Testosterone Free and Total; Future  - Adult Sleep Eval & Management  Referral; Future  - Cortisol  - Comprehensive metabolic panel  - CBC with platelets  - TSH with free T4 reflex  - Erythrocyte sedimentation rate auto  - Testosterone Free and Total    3. Mitral valve insufficiency, unspecified etiology  Moderately severe.  Cardiology recommending echo repeated in 6 months.  Continue furosemide, Losartan    4. Tricuspid valve insufficiency, unspecified etiology  3+ severe on recent echo.  Continue diuretic therapy.  Repeat echo 6 months.  Referral for sleep apnea evaluation which could worsen symptoms of valve function and contribute to fatigue as above    5. Needs flu shot  - INFLUENZA VACCINE 65+ (FLUZONE HD)    6. Need for COVID-19 vaccine  - COVID-19 12+ (2023-24) (PFIZER)      PLAN:   Labs as ordered   Continue current medications   Compression stocking. On AM and and off later PM  Repeat ECHO 6 months   Reduce alcohol intake by half  Referral to [facility] for possible obstructive sleep apnea as cause for fatigue. If you don't hear from a representative within 2 business days, please call [phone].    Covid  "and  flu vaccine today   Would recommend RSV vaccination. Get at a pharmacy in 1-2 weeks        (Chart documentation was completed, in part, with [employer] voice-recognition software. Even though reviewed, some grammatical, spelling, and word errors may remain.)    Richmond Martinez MD  Internal Medicine Department  [facility]      Christiane Gaines is a 83 year old, presenting for the following health issues:  Musculoskeletal Problem (Leg swelling )  Still has the itching and the swelling has gone away since last visit     History of Present Illness       Reason for visit:  Followup on leg swelling    He eats 4 or more servings of fruits and vegetables daily.He consumes 1 sweetened beverage(s) daily.He exercises with enough effort to increase his heart rate 9 or less minutes per day.  He exercises with enough effort to increase his heart rate 3 or less days per week.   He is taking medications regularly.  Musculoskeletal Problem        Most recent lab results reviewed with pt.       Had ECHO in October. Report reviewed. Per attached cardiology note from Dr Durham:  \"ProBNP was WNL.  He has developed moderate MR (2+) and TR (3+), TR  probably explains why he recently developed edema.  We will need to monitor these valve issues, but nothing needs to done acutely. Continue furosemide 20 mg daily, as prescribed by Dr. Martinez.  Please arrange echo in 6 months with f/up with Amira as well.\"          [facility]  [address]  [address]  [place] [address]  Phone [phone]  Fax [phone]     Name: AUGUSTINE MYRICK  MRN: [id number]  : 1940  Study Date: 10/30/2023 10:08 AM  Age: 83 yrs  Gender: Male  Patient Location: [facility]  Reason For Study: Permanent atrial fibrillation (H)  Ordering Physician: EPHRAIM DURHAM  Referring Physician: EPHRAIM DURHAM  Performed By: Mone" "Calos     BSA: 1.9 m2  Height: 69 in  Weight: 167 lb  HR: 87  BP: 112/74 mmHg  ______________________________________________________________________________  Procedure  Complete Echo Adult.     ______________________________________________________________________________  Interpretation Summary     The left ventricle is normal in size.  Left ventricular systolic function is normal.  The visual ejection fraction is 55-60%.  There is moderate biatrial enlargement.  There is moderate (2+) mitral regurgitation.  There is moderately severe (3+) tricuspid regurgitation.  Right ventricular systolic pressure is elevated, consistent with mild  pulmonary hypertension.  The rhythm was atrial fibrillation.     The MR and TR have increased significantly since the previous study.          With use of Lasix, edema is much better. NO orthopnea, PND,  chest pain. Weight up 1 pound. Stable breathing status   Has daytime fatigue. Eats large breakfast and then gest tired and  needs to take a nap.  Mood OK  Has a glass of wine midafternoon, dinnertime later evening.  Nocturia every couple hours.  Urine flow doing okay per patient.  1 cup of tea in the morning with caffeine          Additional ROS:   Constitutional, HEENT, Cardiovascular, Pulmonary, GI and , Neuro, MSK and Psych review of systems/symptoms are otherwise negative or unchanged from previous, except as noted above.      OBJECTIVE:  /82   Pulse 71   Temp 97.2  F (36.2  C) (Temporal)   Ht 1.753 m (5' 9\")   Wt 76.6 kg (168 lb 14.4 oz)   SpO2 99%   BMI 24.94 kg/m     Estimated body mass index is 24.94 kg/m  as calculated from the following:    Height as of this encounter: 1.753 m (5' 9\").    Weight as of this encounter: 76.6 kg (168 lb 14.4 oz).     HENT: ear canals and TM's normal and nose and mouth without ulcers or lesions.  Mildly narrowed posterior pharyngeal airway  Neck: no adenopathy. Thyroid normal to palpation. No bruits  Pulm: Lungs clear to " auscultation   CV: Regular rates and rhythm  GI: Soft, nontender, Normal active bowel sounds, No hepatosplenomegaly or masses palpable  Ext: Peripheral pulses intact.  Trace edema right distal LE and mild left distal   LE edema.  Neuro: Normal strength and tone, sensory exam grossly normal

## 2023-11-15 LAB
TESTOST FREE SERPL-MCNC: 3.78 NG/DL
TESTOST SERPL-MCNC: 282 NG/DL (ref 240–950)

## 2023-11-21 DIAGNOSIS — Z85.828 HISTORY OF SKIN CANCER: ICD-10-CM

## 2023-11-21 DIAGNOSIS — I87.2 VENOUS STASIS DERMATITIS, UNSPECIFIED LATERALITY: ICD-10-CM

## 2023-12-01 NOTE — TELEPHONE ENCOUNTER
Pt called the clinic requesting a refill for fluocinonide (LIDEX) 0.05 % external cream .   Per chart review, this medication was last refilled by the pt's dermatologist.   Pt states his PCP has also refilled this medication in the past, and he would like it to be refilled by PCP if possible.     Routing to refill team for review. Medication pended.   Thank you,  Shauna Mayen RN

## 2023-12-02 RX ORDER — FLUOCINONIDE 0.5 MG/G
CREAM TOPICAL 2 TIMES DAILY
Qty: 120 G | Refills: 2 | Status: SHIPPED | OUTPATIENT
Start: 2023-12-02 | End: 2024-09-16

## 2024-01-13 DIAGNOSIS — R60.9 EDEMA, UNSPECIFIED TYPE: ICD-10-CM

## 2024-01-13 DIAGNOSIS — I10 BENIGN ESSENTIAL HYPERTENSION: ICD-10-CM

## 2024-01-13 DIAGNOSIS — E03.9 HYPOTHYROIDISM, UNSPECIFIED TYPE: ICD-10-CM

## 2024-01-14 RX ORDER — FUROSEMIDE 20 MG
20 TABLET ORAL DAILY
Qty: 90 TABLET | Refills: 1 | Status: SHIPPED | OUTPATIENT
Start: 2024-01-14 | End: 2024-07-22

## 2024-01-14 RX ORDER — LEVOTHYROXINE SODIUM 112 UG/1
TABLET ORAL
Qty: 90 TABLET | Refills: 3 | Status: SHIPPED | OUTPATIENT
Start: 2024-01-14 | End: 2024-09-16

## 2024-01-14 RX ORDER — POTASSIUM CHLORIDE 1500 MG/1
20 TABLET, EXTENDED RELEASE ORAL DAILY
Qty: 90 TABLET | Refills: 1 | Status: SHIPPED | OUTPATIENT
Start: 2024-01-14 | End: 2024-07-22

## 2024-01-14 RX ORDER — LOSARTAN POTASSIUM 50 MG/1
50 TABLET ORAL DAILY
Qty: 90 TABLET | Refills: 3 | Status: SHIPPED | OUTPATIENT
Start: 2024-01-14 | End: 2024-09-16

## 2024-01-14 NOTE — TELEPHONE ENCOUNTER
Most recent blood pressure and labs from November 2023 noted   Severe calcified lesion of right ICA resulting in approximately 70-75% stenosis  The calcified lesion is circumferential and extensive extending approximately 5 cm  There is a 2nd calcified eccentric lesion approximately 1 cm distally  We have discussed the natural history of carotid occlusive disease and the potential for stroke with continued maximal medical therapy  We have discussed the importance of continuing statin and platelet therapy in addition to smoking cessation  Patient wishes to continue with maximal medical therapy and carotid surveillance  Patient will    continue with biannual surveillance carotid duplex

## 2024-02-06 ENCOUNTER — PATIENT OUTREACH (OUTPATIENT)
Dept: CARE COORDINATION | Facility: CLINIC | Age: 84
End: 2024-02-06
Payer: COMMERCIAL

## 2024-02-20 ENCOUNTER — PATIENT OUTREACH (OUTPATIENT)
Dept: CARE COORDINATION | Facility: CLINIC | Age: 84
End: 2024-02-20
Payer: COMMERCIAL

## 2024-03-04 ENCOUNTER — OFFICE VISIT (OUTPATIENT)
Dept: SLEEP MEDICINE | Facility: CLINIC | Age: 84
End: 2024-03-04
Attending: INTERNAL MEDICINE
Payer: COMMERCIAL

## 2024-03-04 VITALS
DIASTOLIC BLOOD PRESSURE: 87 MMHG | BODY MASS INDEX: 24.56 KG/M2 | SYSTOLIC BLOOD PRESSURE: 118 MMHG | OXYGEN SATURATION: 98 % | HEART RATE: 69 BPM | WEIGHT: 165.8 LBS | HEIGHT: 69 IN

## 2024-03-04 DIAGNOSIS — R53.82 CHRONIC FATIGUE: ICD-10-CM

## 2024-03-04 PROCEDURE — 99203 OFFICE O/P NEW LOW 30 MIN: CPT | Performed by: INTERNAL MEDICINE

## 2024-03-04 ASSESSMENT — SLEEP AND FATIGUE QUESTIONNAIRES
HOW LIKELY ARE YOU TO NOD OFF OR FALL ASLEEP WHILE WATCHING TV: MODERATE CHANCE OF DOZING
HOW LIKELY ARE YOU TO NOD OFF OR FALL ASLEEP WHILE SITTING INACTIVE IN A PUBLIC PLACE: MODERATE CHANCE OF DOZING
HOW LIKELY ARE YOU TO NOD OFF OR FALL ASLEEP WHILE SITTING QUIETLY AFTER LUNCH WITHOUT ALCOHOL: MODERATE CHANCE OF DOZING
HOW LIKELY ARE YOU TO NOD OFF OR FALL ASLEEP IN A CAR, WHILE STOPPED FOR A FEW MINUTES IN TRAFFIC: WOULD NEVER DOZE
HOW LIKELY ARE YOU TO NOD OFF OR FALL ASLEEP WHILE LYING DOWN TO REST IN THE AFTERNOON WHEN CIRCUMSTANCES PERMIT: HIGH CHANCE OF DOZING
HOW LIKELY ARE YOU TO NOD OFF OR FALL ASLEEP WHILE SITTING AND TALKING TO SOMEONE: WOULD NEVER DOZE
HOW LIKELY ARE YOU TO NOD OFF OR FALL ASLEEP WHILE SITTING AND READING: HIGH CHANCE OF DOZING
HOW LIKELY ARE YOU TO NOD OFF OR FALL ASLEEP WHEN YOU ARE A PASSENGER IN A CAR FOR AN HOUR WITHOUT A BREAK: MODERATE CHANCE OF DOZING

## 2024-03-04 NOTE — PROGRESS NOTES
Sleep Consultation:    Date on this visit: 3/4/2024    Eder Horan  is referred by Richmond Martinez for a sleep consultation.     Primary Physician: Richmond Martinez     Eder Horan is a 84 years old male with history of Afib, HTN, COPD, history of embolic stroke, who is sent by Dr. Martinez, his primary care physician for an evaluation of sleep apnea.     Eder does not snore. Patient's wife does not complain of snoring and snorting. He does not have witnessed apneas.They never sleep separately.  Patient sleeps on his side and occasionally on his back. He has occasional morning dry mouth, and denies morning headaches.     Eder goes to sleep at 10:00 PM. He wakes up at 8:00 AM. He falls asleep in 5 minutes.  Eder denies difficulty falling asleep.  He wakes up 2-6 times a night for 10 minutes before falling back to sleep.  Sometimes, he has a longer wake period before returning to sleep. Eder wakes up to go to the bathroom.  Patient gets an average of 8 hours of sleep per night.     Eder denies any sleep walking, sleep talking, dream enactment, sleep paralysis, cataplexy, and hypnogogic/hypnopompic hallucinations.    Eder denies difficulty breathing through his nose.      Patient's [place] Sleepiness score 14/24 consistent with excessive  daytime sleepiness.      Eder naps 0 times per week. He takes frequent inadvertant naps.  He denies closing eyes, dozing, and falling asleep while driving. Patient was counseled on the importance of driving while alert, to pull over if drowsy, or nap before getting into the vehicle if sleepy.      He uses 1 cups/day of coffee. Last caffeine intake is usually before noon.    Past Medical/Surgical History:  Past Medical History:   Diagnosis Date    Actinic keratosis 6/09    face    ALLERGIC RHINITIS      Atrial fibrillation (H) 6/08    B12 deficiency 1/16/2019    Back pain     s/p LESI through ortho Feb 2010    Basal cell carcinoma     Bradycardia     Chronotropic incompetence with sinus node  "dysfunction (H)     COPD (chronic obstructive pulmonary disease) (H)     DVT      Left calf . Right calf     Embolic stroke (H) 2017    superior cerebellar artery occlusion. Hx A fib    Hyperlipidemia LDL goal <130 5/10/2011    Hypertension     HYPOTHYROIDISM      hypothyroidism    Lateral epicondylitis  of elbow     left    Malignant melanoma (H)     PLANTAR FASCITIS     Prostate cancer (H)     on Lupron therapy    Raynaud's disease without gangrene 2018    Squamous cell carcinoma  2014     right lower lid, s/p MOHS    Syncope     TMJ (temporomandibular joint syndrome) 2012    Tobacco use disorder      Social History     Tobacco Use    Smoking status: Some Days     Types: Cigars     Last attempt to quit: 2006     Years since quittin.2    Smokeless tobacco: Never    Tobacco comments:      occasionally cigar   Vaping Use    Vaping Use: Never used   Substance Use Topics    Alcohol use: Yes     Alcohol/week: 0.0 standard drinks of alcohol     Comment: 2-3 drinks a day:  Wine/Beer    Drug use: No     Physical Examination:  Vitals: /87   Pulse 69   Ht 1.753 m (5' 9.02\")   Wt 75.2 kg (165 lb 12.8 oz)   SpO2 98%   BMI 24.47 kg/m    BMI= Body mass index is 24.47 kg/m .  GENERAL APPEARANCE: healthy, alert, and no distress  HENT: oropharynx crowded  NEURO: mentation intact and speech normal  PSYCH: mentation appears normal and affect normal/bright  Mallampati Class: III.  Tonsillar Stage: 1  hidden by pillars.    Impression/Plan:    Chronic fatigue     Detailed evaluation of the history does not indicate symptoms of snoring, witnessed apneas or other clinical symptoms characteristic of sleep disordered breathing.  There is no history of movement abnormalities in sleep, parasomnias or clinical symptoms concerning for a central disorder of hypersomnia.  Overall, clinical risk for sleep apnea is low.  Patient did score high on the [place] sleepiness score.  He denies any " irrepressible need to sleep or drowsiness when driving.  He had a detailed discussion regarding pathophysiology of sleep apnea, other sleep fragmenting disorders and role of PSG testing.  Even though clinical symptoms are noncontributory, considering daytime sleepiness, we could consider performing a sleep study.  After informed discussion, patient decided not to pursue any further testing.  Considering low pretest probability for a sleep fragmenting disorder, I agree that testing is unlikely to reveal a primary sleep disorder.    Polysomnography reviewed.  Obstructive sleep apnea reviewed.  Complications of untreated sleep apnea were reviewed.    I spent a total of 40 minutes for this appointment on this date of service which include time spent before, during and after the visit for chart review, patient care, counseling and coordination of care.    Dr. Yuan Gaines       CC: Richmond Martinez

## 2024-03-04 NOTE — NURSING NOTE
"Chief Complaint   Patient presents with    Sleep Problem     Frequent bathroom usage 2-6 times, A-Fib       Initial /87   Pulse 69   Ht 1.753 m (5' 9.02\")   Wt 75.2 kg (165 lb 12.8 oz)   SpO2 98%   BMI 24.47 kg/m   Estimated body mass index is 24.47 kg/m  as calculated from the following:    Height as of this encounter: 1.753 m (5' 9.02\").    Weight as of this encounter: 75.2 kg (165 lb 12.8 oz).    Medication Reconciliation: complete  ESS   Neck circumference: 41 centimeters.  Monico Boland MA     "

## 2024-03-06 DIAGNOSIS — J44.9 CHRONIC OBSTRUCTIVE PULMONARY DISEASE, UNSPECIFIED COPD TYPE (H): ICD-10-CM

## 2024-03-08 RX ORDER — MOMETASONE FUROATE AND FORMOTEROL FUMARATE DIHYDRATE 200; 5 UG/1; UG/1
2 AEROSOL RESPIRATORY (INHALATION) 2 TIMES DAILY
Qty: 39 G | Refills: 3 | Status: SHIPPED | OUTPATIENT
Start: 2024-03-08 | End: 2024-05-30 | Stop reason: ALTCHOICE

## 2024-03-11 DIAGNOSIS — J44.9 CHRONIC OBSTRUCTIVE PULMONARY DISEASE, UNSPECIFIED COPD TYPE (H): ICD-10-CM

## 2024-03-11 RX ORDER — MOMETASONE FUROATE AND FORMOTEROL FUMARATE DIHYDRATE 200; 5 UG/1; UG/1
2 AEROSOL RESPIRATORY (INHALATION) 2 TIMES DAILY
Qty: 39 G | Refills: 3 | OUTPATIENT
Start: 2024-03-11

## 2024-04-14 ENCOUNTER — HEALTH MAINTENANCE LETTER (OUTPATIENT)
Age: 84
End: 2024-04-14

## 2024-05-06 ENCOUNTER — HOSPITAL ENCOUNTER (OUTPATIENT)
Dept: CARDIOLOGY | Facility: CLINIC | Age: 84
Discharge: HOME | End: 2024-05-06
Attending: INTERNAL MEDICINE | Admitting: INTERNAL MEDICINE
Payer: COMMERCIAL

## 2024-05-06 DIAGNOSIS — I48.21 PERMANENT ATRIAL FIBRILLATION (H): ICD-10-CM

## 2024-05-06 LAB — LVEF ECHO: NORMAL

## 2024-05-06 PROCEDURE — 93306 TTE W/DOPPLER COMPLETE: CPT

## 2024-05-06 PROCEDURE — 93306 TTE W/DOPPLER COMPLETE: CPT | Mod: 26 | Performed by: INTERNAL MEDICINE

## 2024-05-08 ENCOUNTER — OFFICE VISIT (OUTPATIENT)
Dept: CARDIOLOGY | Facility: CLINIC | Age: 84
End: 2024-05-08
Payer: COMMERCIAL

## 2024-05-08 VITALS
HEART RATE: 70 BPM | BODY MASS INDEX: 24.81 KG/M2 | HEIGHT: 69 IN | SYSTOLIC BLOOD PRESSURE: 105 MMHG | WEIGHT: 167.5 LBS | DIASTOLIC BLOOD PRESSURE: 74 MMHG | OXYGEN SATURATION: 99 %

## 2024-05-08 DIAGNOSIS — I49.8 CHRONOTROPIC INCOMPETENCE WITH SINUS NODE DYSFUNCTION: ICD-10-CM

## 2024-05-08 DIAGNOSIS — R06.09 DYSPNEA ON EXERTION: ICD-10-CM

## 2024-05-08 DIAGNOSIS — R00.1 BRADYCARDIA: ICD-10-CM

## 2024-05-08 DIAGNOSIS — I48.21 PERMANENT ATRIAL FIBRILLATION (H): Primary | ICD-10-CM

## 2024-05-08 PROCEDURE — 99214 OFFICE O/P EST MOD 30 MIN: CPT | Performed by: NURSE PRACTITIONER

## 2024-05-08 NOTE — PROGRESS NOTES
Cardiology Clinic Progress Note  Eder Horan MRN# [id number]   YOB: 1940 Age: 84 year old       HPI:    Eder Horan is a delightful 84 year old patient with past medical history significant for:    Sick sinus syndrome with chronotropic incompetence.  Patient repeatedly declined pacemaker implantation in the past.  In 2022 developed persistent A-fib with no evidence of bradycardia.  Permanent atrial fibrillation.  Used to be on flecainide stopped in 2021.  [id number] score 5 on apixaban  CVA 2017 treated with tPA.  The patient had not been on anticoagulation before his stroke.  Hypertension  Hypothyroidism  COPD    It is my pleasure seeing Eder and his wife today in follow-up.  He is an established patient of .  I have had the pleasure of seeing him over the years.  He presents today with severe fatigue.  He also gets dyspnea with exertion, but admits to being very sedentary.  At his follow-up with  6 months ago he was noted to have 3+ TR and 2+ MR on echo.  A follow-up echo was ordered and he is here today to review the results.    Echo on 5/6 showed 1-2+ MR, 2+ TR.  His EF was normal at 55 to 60%.  No wall motion abnormalities noted.  Mild elevation of RVSP was noted.  When compared to the previous echo TR and MR appeared to be less.    On exam heart rate appears to be irregular in the 70s.  Had his annual labs completed at his visit with Dr. Martinez.  Everything was stable at that time.  Denies chest pain, orthopnea, PND, syncope, lower extremity edema.      Diagnotic studies:  Echocardiogram 5/6/2024: EF 55 to 60%.  No wall motion abnormalities.  1-2+ MR.  2+ TR.  RVSP was 25 mmHg plus RAP.  Echocardiogram 10/2023: EF 55 to 60%.  2+ MR, 3+ TR, mild pulmonary hypertension.  EKG 10/2023: A-fib at 72 bpm         Plan:   1. Sinus node dysfunction with chronotropic incompetence.  Now permanent atrial fibrillation.  Will do a 1 week Zio patch.  Monitor will be mailed out to the patient.   We will call him with the results.  If Zio patch shows any episodes of bradycardia I will stop his metoprolol to see if fatigue improves.    2.  History of hypertension, currently normotensive on amlodipine 5 mg daily, metoprolol ER 25 mg daily.     3.  Hypothyroidism, on levothyroxine.  Last TSH was 0.69.    4.  COPD  Patient has dyspnea with exertion.  Is fairly sedentary.  Encouraged him to consider starting a daily walking program.  Could consider a mall so the surface is stable with air conditioning to minimize reactive airway.    I have also asked that he discuss with Dr. Martinez a pulmonology consult.  Lungs are diminished throughout on inspiration..  O2 sat was 98% today on room air.    5.  Fatigue-most likely multifactorial.  6.  Mild MR, mild to moderate TR  Stable with slight improvement on echo.    Thank you for including me in his care.  I would like to see him back in 6 months.  I will contact him with the Zio patch report.  Again, if Zio patch is stable I would recommend decreasing metoprolol to 12.5 mg at night.  We could also discontinue the beta-blocker altogether to see if some of his symptoms improved.    Amira Castro, NP, APRN CNP             Today's clinic visit entailed:  Review of the result(s) of each unique test - Echo  Ordering of each unique test  30  minutes spent by me on the date of the encounter doing chart review, review of test results, patient visit, documentation, and discussion with family   Provider  Link to [facility] Help Grid     The level of medical decision making during this visit was of moderate complexity.      No orders of the defined types were placed in this encounter.    No orders of the defined types were placed in this encounter.    There are no discontinued medications.      Encounter Diagnosis   Name Primary?    Permanent atrial fibrillation (H)        CURRENT MEDICATIONS:  Current Outpatient Medications   Medication Sig Dispense Refill    albuterol (PROAIR HFA/PROVENTIL  HFA/VENTOLIN HFA) 108 (90 Base) MCG/ACT inhaler Inhale 2 puffs into the lungs every 4 hours as needed for shortness of breath 18 g 11    apixaban ANTICOAGULANT (ELIQUIS ANTICOAGULANT) 5 MG tablet TAKE ONE TABLET BY MOUTH TWICE DAILY. 180 tablet 3    fluocinonide (LIDEX) 0.05 % external cream Apply topically 2 times daily 120 g 2    furosemide (LASIX) 20 MG tablet Take 1 tablet (20 mg) by mouth daily 90 tablet 1    levothyroxine (SYNTHROID/LEVOTHROID) 112 MCG tablet TAKE ONE TABLET BY MOUTH ONE TIME DAILY 90 tablet 3    losartan (COZAAR) 50 MG tablet Take 1 tablet (50 mg) by mouth daily 90 tablet 3    metoprolol succinate ER (TOPROL XL) 25 MG 24 hr tablet Take 1 tablet (25 mg) by mouth daily 90 tablet 4    mometasone-formoterol (DULERA) 200-5 MCG/ACT inhaler Inhale 2 puffs into the lungs 2 times daily 39 g 3    potassium chloride ER (K-TAB) 20 MEQ CR tablet Take 1 tablet (20 mEq) by mouth daily 90 tablet 1    PREDNISOLONE ACETATE OP Apply 1 drop to eye every evening      vitamin B-12 (CYANOCOBALAMIN) 2500 MCG sublingual tablet Take 1 tab by mouth 3 times a week (WMF)      mometasone-formoterol (DULERA) 200-5 MCG/ACT inhaler Inhale 2 puffs into the lungs 2 times daily      valACYclovir HCl (VALTREX PO) Take 500 mg by mouth every evening         ALLERGIES     Allergies   Allergen Reactions    Bactrim [Sulfa Antibiotics] Rash       PAST MEDICAL HISTORY:  Past Medical History:   Diagnosis Date    Actinic keratosis 6/09    face    ALLERGIC RHINITIS      Atrial fibrillation (H) 6/08    B12 deficiency 1/16/2019    Back pain     s/p LESI through ortho Feb 2010    Basal cell carcinoma     Bradycardia     Chronotropic incompetence with sinus node dysfunction     COPD (chronic obstructive pulmonary disease) (H)     DVT      Left calf 2003. Right calf 6/06    Embolic stroke (H) 06/11/2017    superior cerebellar artery occlusion. Hx A fib    Hyperlipidemia LDL goal <130 5/10/2011    Hypertension     HYPOTHYROIDISM       hypothyroidism    Lateral epicondylitis  of elbow 7/05    left    Malignant melanoma (H)     PLANTAR FASCITIS     Prostate cancer (H) 6/09    on Lupron therapy    Raynaud's disease without gangrene 11/26/2018    Squamous cell carcinoma  Sept 2014     right lower lid, s/p MOHS    Syncope     TMJ (temporomandibular joint syndrome) 4/12/2012    Tobacco use disorder        PAST SURGICAL HISTORY:  Past Surgical History:   Procedure Laterality Date    ABDOMEN SURGERY  2004,06,09    Hernias (2) Prostate Removal(2009)    BIOPSY  2009    Prostate    CARDIOVERSION  10-16-08    failed    COLONOSCOPY  2004    EYE SURGERY      2013 lump from lower  left eye lid removed    GENITOURINARY SURGERY  2009    Prostate    HERNIA REPAIR  2004 - 2006    [facility] NONSPECIFIC PROCEDURE  1/07    Left groin exploration and left inguinal herniorrhaphy    [facility] NONSPECIFIC PROCEDURE  5/08    Prostate bx (benign)    [facility] NONSPECIFIC PROCEDURE  8/09    Wide local excision (left side), robotic-assisted laparoscopic prostatectomy and   Left pelvic lymph node dissection    [facility] NONSPECIFIC PROCEDURE  August 2010    Right inguinal herniorrhaphy with mesh       FAMILY HISTORY:  Family History   Problem Relation Age of Onset    C.A.D. Father         heart attack- angina    Coronary Artery Disease Father     Myocardial Infarction Father     Cancer Mother         lung    Lung Cancer Mother     Lung Cancer Sister     Unknown/Adopted Maternal Grandmother     Unknown/Adopted Maternal Grandfather     Unknown/Adopted Paternal Grandmother     Unknown/Adopted Paternal Grandfather     Unknown/Adopted Sister        SOCIAL HISTORY:  Social History     Socioeconomic History    Marital status:      Spouse name: None    Number of children: 3    Years of education: None    Highest education level: None   Occupational History    Occupation: [employer]     Employer: [employer]     Employer: RETIRED   Tobacco Use    Smoking status: Some Days     Types: Cigars     Last attempt  to quit: 2006     Years since quittin.4    Smokeless tobacco: Never    Tobacco comments:      occasionally cigar   Vaping Use    Vaping status: Never Used   Substance and Sexual Activity    Alcohol use: Yes     Alcohol/week: 0.0 standard drinks of alcohol     Comment: 2-3 drinks a day:  Wine/Beer    Drug use: No    Sexual activity: Never   Other Topics Concern    Parent/sibling w/ CABG, MI or angioplasty before 65F 55M? No    Caffeine Concern No     Comment: 2-3 cups of coffee a day    Sleep Concern No    Stress Concern No    Weight Concern No    Special Diet No    Exercise No    Seat Belt Yes     Social Determinants of Health     Financial Resource Strain: Low Risk  (2023)    Financial Resource Strain     Within the past 12 months, have you or your family members you live with been unable to get utilities (heat, electricity) when it was really needed?: No   Food Insecurity: Low Risk  (2023)    Food Insecurity     Within the past 12 months, did you worry that your food would run out before you got money to buy more?: No     Within the past 12 months, did the food you bought just not last and you didn t have money to get more?: No   Transportation Needs: Low Risk  (2023)    Transportation Needs     Within the past 12 months, has lack of transportation kept you from medical appointments, getting your medicines, non-medical meetings or appointments, work, or from getting things that you need?: No   Housing Stability: Low Risk  (2023)    Housing Stability     Do you have housing? : Yes     Are you worried about losing your housing?: No       Review of Systems:  Skin:        Eyes:       ENT:       Respiratory:  Positive for shortness of breath  Cardiovascular:  Negative;palpitations;chest pain;lightheadedness;dizziness Positive for;edema;fatigue  Gastroenterology:      Genitourinary:       Musculoskeletal:       Neurologic:       Psychiatric:       Heme/Lymph/Imm:       Endocrine:   "Positive for thyroid disorder    Physical Exam:    Vitals: /74 (BP Location: Right arm, Patient Position: Sitting)   Pulse 70   Ht 1.753 m (5' 9\")   Wt 76 kg (167 lb 8 oz)   SpO2 99%   BMI 24.74 kg/m    Constitutional: Well nourished and in no apparent distress.  Eyes: Pupils equal, round. Sclerae anicteric.   HEENT: Normocephalic, atraumatic.   Neck: Supple. No bruits or JVD   Respiratory: Dim breath sounds throughout  Cardiovascular:  Regular rate and rhythm, normal S1 and S2. No murmur, rub, or gallop.  Skin: Warm, dry. No rashes, cyanosis, or xanthelasma.  Extremities: No edema.  Neurologic: No gross motor deficits. Alert, awake, and oriented to person, place and time.  Psychiatric: Affect appropriate.        Recent Lab Results:  LIPID RESULTS:  Lab Results   Component Value Date    CHOL 181 08/12/2021    CHOL 171 08/10/2018    HDL 96 08/12/2021    HDL 80 08/10/2018    LDL 73 08/12/2021    LDL 82 08/10/2018    TRIG 59 08/12/2021    TRIG 47 08/10/2018    CHOLHDLRATIO 2.4 07/24/2015       LIVER ENZYME RESULTS:  Lab Results   Component Value Date    AST 19 11/13/2023    AST 9 07/08/2020    ALT 9 11/13/2023    ALT 15 07/08/2020       CBC RESULTS:  Lab Results   Component Value Date    WBC 6.2 11/13/2023    WBC 5.1 07/08/2020    RBC 4.19 (L) 11/13/2023    RBC 4.38 (L) 07/08/2020    HGB 14.7 11/13/2023    HGB 15.0 07/08/2020    HCT 44.2 11/13/2023    HCT 45.0 07/08/2020     (H) 11/13/2023     (H) 07/08/2020    MCH 35.1 (H) 11/13/2023    MCH 34.2 (H) 07/08/2020    MCHC 33.3 11/13/2023    MCHC 33.3 07/08/2020    RDW 13.5 11/13/2023    RDW 12.7 07/08/2020     11/13/2023     07/08/2020       BMP RESULTS:  Lab Results   Component Value Date     11/13/2023     07/08/2020    POTASSIUM 4.6 11/13/2023    POTASSIUM 4.0 09/06/2022    POTASSIUM 4.3 07/08/2020    CHLORIDE 107 11/13/2023    CHLORIDE 107 09/06/2022    CHLORIDE 107 07/08/2020    CO2 26 11/13/2023    CO2 28 09/06/2022 "    CO2 28 07/08/2020    ANIONGAP 9 11/13/2023    ANIONGAP 4 09/06/2022    ANIONGAP 3 07/08/2020     (H) 11/13/2023     (H) 09/06/2022    GLC 98 07/08/2020    BUN 9.8 11/13/2023    BUN 12 09/06/2022    BUN 10 07/08/2020    CR 0.79 11/13/2023    CR 0.77 07/08/2020    GFRESTIMATED 88 11/13/2023    GFRESTIMATED 85 07/08/2020    GFRESTBLACK >90 07/08/2020    CHRISTIANO 8.9 11/13/2023    CHRISTIANO 8.6 07/08/2020        A1C RESULTS:  Lab Results   Component Value Date    A1C 5.5 06/11/2017       INR RESULTS:  Lab Results   Component Value Date    INR 1.11 06/06/2023    INR 0.91 06/11/2017    INR 0.87 06/11/2017           CC  Effie Hawkins MD  [id number]  [place] [id number]

## 2024-05-08 NOTE — LETTER
5/8/2024    Richmond Martinez MD  [address] [facility] [address]    RE: Eder Horan       Dear Colleague,     I had the pleasure of seeing Eder Horan in the [facility].  Cardiology Clinic Progress Note  Eder Horan MRN# [id number]   YOB: 1940 Age: 84 year old       HPI:    Eder Horan is a delightful 84 year old patient with past medical history significant for:    Sick sinus syndrome with chronotropic incompetence.  Patient repeatedly declined pacemaker implantation in the past.  In 2022 developed persistent A-fib with no evidence of bradycardia.  Permanent atrial fibrillation.  Used to be on flecainide stopped in 2021.  [id number] score 5 on apixaban  CVA 2017 treated with tPA.  The patient had not been on anticoagulation before his stroke.  Hypertension  Hypothyroidism  COPD    It is my pleasure seeing Eder and his wife today in follow-up.  He is an established patient of .  I have had the pleasure of seeing him over the years.  He presents today with severe fatigue.  He also gets dyspnea with exertion, but admits to being very sedentary.  At his follow-up with  6 months ago he was noted to have 3+ TR and 2+ MR on echo.  A follow-up echo was ordered and he is here today to review the results.    Echo on 5/6 showed 1-2+ MR, 2+ TR.  His EF was normal at 55 to 60%.  No wall motion abnormalities noted.  Mild elevation of RVSP was noted.  When compared to the previous echo TR and MR appeared to be less.    On exam heart rate appears to be irregular in the 70s.  Had his annual labs completed at his visit with Dr. Martinez.  Everything was stable at that time.  Denies chest pain, orthopnea, PND, syncope, lower extremity edema.      Diagnotic studies:  Echocardiogram 5/6/2024: EF 55 to 60%.  No wall motion abnormalities.  1-2+ MR.  2+ TR.  RVSP was 25 mmHg plus RAP.  Echocardiogram 10/2023: EF 55 to 60%.  2+ MR, 3+ TR, mild pulmonary hypertension.  EKG 10/2023: A-fib  at 72 bpm         Plan:   1. Sinus node dysfunction with chronotropic incompetence.  Now permanent atrial fibrillation.  Will do a 1 week Zio patch.  Monitor will be mailed out to the patient.  We will call him with the results.  If Zio patch shows any episodes of bradycardia I will stop his metoprolol to see if fatigue improves.    2.  History of hypertension, currently normotensive on amlodipine 5 mg daily, metoprolol ER 25 mg daily.     3.  Hypothyroidism, on levothyroxine.  Last TSH was 0.69.    4.  COPD  Patient has dyspnea with exertion.  Is fairly sedentary.  Encouraged him to consider starting a daily walking program.  Could consider a mall so the surface is stable with air conditioning to minimize reactive airway.    I have also asked that he discuss with Dr. Martinez a pulmonology consult.  Lungs are diminished throughout on inspiration..  O2 sat was 98% today on room air.    5.  Fatigue-most likely multifactorial.  6.  Mild MR, mild to moderate TR  Stable with slight improvement on echo.    Thank you for including me in his care.  I would like to see him back in 6 months.  I will contact him with the Zio patch report.  Again, if Zio patch is stable I would recommend decreasing metoprolol to 12.5 mg at night.  We could also discontinue the beta-blocker altogether to see if some of his symptoms improved.    Amira Castro, NP, APRN CNP             Today's clinic visit entailed:  Review of the result(s) of each unique test - Echo  Ordering of each unique test  30  minutes spent by me on the date of the encounter doing chart review, review of test results, patient visit, documentation, and discussion with family   Provider  Link to [facility] Help Grid     The level of medical decision making during this visit was of moderate complexity.      No orders of the defined types were placed in this encounter.    No orders of the defined types were placed in this encounter.    There are no discontinued  medications.      Encounter Diagnosis   Name Primary?    Permanent atrial fibrillation (H)        CURRENT MEDICATIONS:  Current Outpatient Medications   Medication Sig Dispense Refill    albuterol (PROAIR HFA/PROVENTIL HFA/VENTOLIN HFA) 108 (90 Base) MCG/ACT inhaler Inhale 2 puffs into the lungs every 4 hours as needed for shortness of breath 18 g 11    apixaban ANTICOAGULANT (ELIQUIS ANTICOAGULANT) 5 MG tablet TAKE ONE TABLET BY MOUTH TWICE DAILY. 180 tablet 3    fluocinonide (LIDEX) 0.05 % external cream Apply topically 2 times daily 120 g 2    furosemide (LASIX) 20 MG tablet Take 1 tablet (20 mg) by mouth daily 90 tablet 1    levothyroxine (SYNTHROID/LEVOTHROID) 112 MCG tablet TAKE ONE TABLET BY MOUTH ONE TIME DAILY 90 tablet 3    losartan (COZAAR) 50 MG tablet Take 1 tablet (50 mg) by mouth daily 90 tablet 3    metoprolol succinate ER (TOPROL XL) 25 MG 24 hr tablet Take 1 tablet (25 mg) by mouth daily 90 tablet 4    mometasone-formoterol (DULERA) 200-5 MCG/ACT inhaler Inhale 2 puffs into the lungs 2 times daily 39 g 3    potassium chloride ER (K-TAB) 20 MEQ CR tablet Take 1 tablet (20 mEq) by mouth daily 90 tablet 1    PREDNISOLONE ACETATE OP Apply 1 drop to eye every evening      vitamin B-12 (CYANOCOBALAMIN) 2500 MCG sublingual tablet Take 1 tab by mouth 3 times a week (WMF)      mometasone-formoterol (DULERA) 200-5 MCG/ACT inhaler Inhale 2 puffs into the lungs 2 times daily      valACYclovir HCl (VALTREX PO) Take 500 mg by mouth every evening         ALLERGIES     Allergies   Allergen Reactions    Bactrim [Sulfa Antibiotics] Rash       PAST MEDICAL HISTORY:  Past Medical History:   Diagnosis Date    Actinic keratosis 6/09    face    ALLERGIC RHINITIS      Atrial fibrillation (H) 6/08    B12 deficiency 1/16/2019    Back pain     s/p LESI through ortho Feb 2010    Basal cell carcinoma     Bradycardia     Chronotropic incompetence with sinus node dysfunction     COPD (chronic obstructive pulmonary disease) (H)      DVT      Left calf 2003. Right calf 6/06    Embolic stroke (H) 06/11/2017    superior cerebellar artery occlusion. Hx A fib    Hyperlipidemia LDL goal <130 5/10/2011    Hypertension     HYPOTHYROIDISM      hypothyroidism    Lateral epicondylitis  of elbow 7/05    left    Malignant melanoma (H)     PLANTAR FASCITIS     Prostate cancer (H) 6/09    on Lupron therapy    Raynaud's disease without gangrene 11/26/2018    Squamous cell carcinoma  Sept 2014     right lower lid, s/p MOHS    Syncope     TMJ (temporomandibular joint syndrome) 4/12/2012    Tobacco use disorder        PAST SURGICAL HISTORY:  Past Surgical History:   Procedure Laterality Date    ABDOMEN SURGERY  2004,06,09    Hernias (2) Prostate Removal(2009)    BIOPSY  2009    Prostate    CARDIOVERSION  10-16-08    failed    COLONOSCOPY  2004    EYE SURGERY      2013 lump from lower  left eye lid removed    GENITOURINARY SURGERY  2009    Prostate    HERNIA REPAIR  2004 - 2006    [facility] NONSPECIFIC PROCEDURE  1/07    Left groin exploration and left inguinal herniorrhaphy    [facility] NONSPECIFIC PROCEDURE  5/08    Prostate bx (benign)    [facility] NONSPECIFIC PROCEDURE  8/09    Wide local excision (left side), robotic-assisted laparoscopic prostatectomy and   Left pelvic lymph node dissection    [facility] NONSPECIFIC PROCEDURE  August 2010    Right inguinal herniorrhaphy with mesh       FAMILY HISTORY:  Family History   Problem Relation Age of Onset    C.A.D. Father         heart attack- angina    Coronary Artery Disease Father     Myocardial Infarction Father     Cancer Mother         lung    Lung Cancer Mother     Lung Cancer Sister     Unknown/Adopted Maternal Grandmother     Unknown/Adopted Maternal Grandfather     Unknown/Adopted Paternal Grandmother     Unknown/Adopted Paternal Grandfather     Unknown/Adopted Sister        SOCIAL HISTORY:  Social History     Socioeconomic History    Marital status:      Spouse name: None    Number of children: 3    Years of education:  None    Highest education level: None   Occupational History    Occupation: [employer]     Employer: [employer]     Employer: RETIRED   Tobacco Use    Smoking status: Some Days     Types: Cigars     Last attempt to quit: 2006     Years since quittin.4    Smokeless tobacco: Never    Tobacco comments:      occasionally cigar   Vaping Use    Vaping status: Never Used   Substance and Sexual Activity    Alcohol use: Yes     Alcohol/week: 0.0 standard drinks of alcohol     Comment: 2-3 drinks a day:  Wine/Beer    Drug use: No    Sexual activity: Never   Other Topics Concern    Parent/sibling w/ CABG, MI or angioplasty before 65F 55M? No    Caffeine Concern No     Comment: 2-3 cups of coffee a day    Sleep Concern No    Stress Concern No    Weight Concern No    Special Diet No    Exercise No    Seat Belt Yes     Social Determinants of Health     Financial Resource Strain: Low Risk  (2023)    Financial Resource Strain     Within the past 12 months, have you or your family members you live with been unable to get utilities (heat, electricity) when it was really needed?: No   Food Insecurity: Low Risk  (2023)    Food Insecurity     Within the past 12 months, did you worry that your food would run out before you got money to buy more?: No     Within the past 12 months, did the food you bought just not last and you didn t have money to get more?: No   Transportation Needs: Low Risk  (2023)    Transportation Needs     Within the past 12 months, has lack of transportation kept you from medical appointments, getting your medicines, non-medical meetings or appointments, work, or from getting things that you need?: No   Housing Stability: Low Risk  (2023)    Housing Stability     Do you have housing? : Yes     Are you worried about losing your housing?: No       Review of Systems:  Skin:        Eyes:       ENT:       Respiratory:  Positive for shortness of breath  Cardiovascular:   "Negative;palpitations;chest pain;lightheadedness;dizziness Positive for;edema;fatigue  Gastroenterology:      Genitourinary:       Musculoskeletal:       Neurologic:       Psychiatric:       Heme/Lymph/Imm:       Endocrine:  Positive for thyroid disorder    Physical Exam:    Vitals: /74 (BP Location: Right arm, Patient Position: Sitting)   Pulse 70   Ht 1.753 m (5' 9\")   Wt 76 kg (167 lb 8 oz)   SpO2 99%   BMI 24.74 kg/m    Constitutional: Well nourished and in no apparent distress.  Eyes: Pupils equal, round. Sclerae anicteric.   HEENT: Normocephalic, atraumatic.   Neck: Supple. No bruits or JVD   Respiratory: Dim breath sounds throughout  Cardiovascular:  Regular rate and rhythm, normal S1 and S2. No murmur, rub, or gallop.  Skin: Warm, dry. No rashes, cyanosis, or xanthelasma.  Extremities: No edema.  Neurologic: No gross motor deficits. Alert, awake, and oriented to person, place and time.  Psychiatric: Affect appropriate.        Recent Lab Results:  LIPID RESULTS:  Lab Results   Component Value Date    CHOL 181 08/12/2021    CHOL 171 08/10/2018    HDL 96 08/12/2021    HDL 80 08/10/2018    LDL 73 08/12/2021    LDL 82 08/10/2018    TRIG 59 08/12/2021    TRIG 47 08/10/2018    CHOLHDLRATIO 2.4 07/24/2015       LIVER ENZYME RESULTS:  Lab Results   Component Value Date    AST 19 11/13/2023    AST 9 07/08/2020    ALT 9 11/13/2023    ALT 15 07/08/2020       CBC RESULTS:  Lab Results   Component Value Date    WBC 6.2 11/13/2023    WBC 5.1 07/08/2020    RBC 4.19 (L) 11/13/2023    RBC 4.38 (L) 07/08/2020    HGB 14.7 11/13/2023    HGB 15.0 07/08/2020    HCT 44.2 11/13/2023    HCT 45.0 07/08/2020     (H) 11/13/2023     (H) 07/08/2020    MCH 35.1 (H) 11/13/2023    MCH 34.2 (H) 07/08/2020    MCHC 33.3 11/13/2023    MCHC 33.3 07/08/2020    RDW 13.5 11/13/2023    RDW 12.7 07/08/2020     11/13/2023     07/08/2020       BMP RESULTS:  Lab Results   Component Value Date     11/13/2023    "  07/08/2020    POTASSIUM 4.6 11/13/2023    POTASSIUM 4.0 09/06/2022    POTASSIUM 4.3 07/08/2020    CHLORIDE 107 11/13/2023    CHLORIDE 107 09/06/2022    CHLORIDE 107 07/08/2020    CO2 26 11/13/2023    CO2 28 09/06/2022    CO2 28 07/08/2020    ANIONGAP 9 11/13/2023    ANIONGAP 4 09/06/2022    ANIONGAP 3 07/08/2020     (H) 11/13/2023     (H) 09/06/2022    GLC 98 07/08/2020    BUN 9.8 11/13/2023    BUN 12 09/06/2022    BUN 10 07/08/2020    CR 0.79 11/13/2023    CR 0.77 07/08/2020    GFRESTIMATED 88 11/13/2023    GFRESTIMATED 85 07/08/2020    GFRESTBLACK >90 07/08/2020    CHRISTIANO 8.9 11/13/2023    CHRISTIANO 8.6 07/08/2020        A1C RESULTS:  Lab Results   Component Value Date    A1C 5.5 06/11/2017       INR RESULTS:  Lab Results   Component Value Date    INR 1.11 06/06/2023    INR 0.91 06/11/2017    INR 0.87 06/11/2017           CC  Effie Hawkins MD  [address]  [place] [address]      Thank you for allowing me to participate in the care of your patient.      Sincerely,     Amira Castro, NP, APRN [facility]

## 2024-05-08 NOTE — PATIENT INSTRUCTIONS
Call my nurse with any questions or concerns:  [phone]  *If you have concerns after hours, please call [phone], option 2 to speak with on call Cardiologist.    Marty mail out to you  Call with concerns  Discuss pulmonologist with  due to increased shortness of breath with exertion  Start a walking program daily

## 2024-05-15 ENCOUNTER — ORDERS ONLY (AUTO-RELEASED) (OUTPATIENT)
Dept: CARDIOLOGY | Facility: CLINIC | Age: 84
End: 2024-05-15
Payer: COMMERCIAL

## 2024-05-15 DIAGNOSIS — I48.21 PERMANENT ATRIAL FIBRILLATION (H): ICD-10-CM

## 2024-05-15 DIAGNOSIS — R06.09 DYSPNEA ON EXERTION: ICD-10-CM

## 2024-05-28 PROCEDURE — 93227 XTRNL ECG REC<48 HR R&I: CPT | Performed by: INTERNAL MEDICINE

## 2024-05-30 ENCOUNTER — TELEPHONE (OUTPATIENT)
Dept: INTERNAL MEDICINE | Facility: CLINIC | Age: 84
End: 2024-05-30
Payer: COMMERCIAL

## 2024-05-30 DIAGNOSIS — J44.9 CHRONIC OBSTRUCTIVE PULMONARY DISEASE, UNSPECIFIED COPD TYPE (H): Primary | ICD-10-CM

## 2024-05-30 RX ORDER — FLUTICASONE FUROATE, UMECLIDINIUM BROMIDE AND VILANTEROL TRIFENATATE 200; 62.5; 25 UG/1; UG/1; UG/1
1 POWDER RESPIRATORY (INHALATION) DAILY
Qty: 1 EACH | Refills: 11 | Status: SHIPPED | OUTPATIENT
Start: 2024-05-30 | End: 2024-06-13

## 2024-05-30 NOTE — TELEPHONE ENCOUNTER
Order/Referral Request    Who is requesting: PT    Orders being requested: Referral to     Reason service is needed/diagnosis: COPD    When are orders needed by: no date needed    Has this been discussed with Provider: Yes    Does patient have a preference on a Group/Provider/Facility? [facility]    Does patient have an appointment scheduled?: Yes: 9/16 [facility]    Where to send orders: Place orders within Epic    Could we send this information to you in Angiod[facility] or would you prefer to receive a phone call?:   Patient would like to be contacted via Angiod[facility]

## 2024-05-30 NOTE — TELEPHONE ENCOUNTER
" This message is very unclear. Chronic hx dyspnea on exertion with COPD. Cardiology note reviewed. Currently on Dulera with prn Albuterol.  Cardiology provider suggesting pt be referred to pulmonary and encouraged pt to do walking in a mall where surface flat and temp cooler.  \".Encouraged him to consider starting a daily walking program.  Could consider a mall so the surface is stable with air conditioning to minimize reactive airway.\"    This note states pt asking for \"referral to\" and nothing else.    Suggest the following:  Stop using Dullera for now and instead start triple therapy for lung with the inhaler Trelegy 1 inhalation once a day. Rinse mouth after use. Rx sent to [facility] pharmacy  Referral to   Pulmonary clinic for further eval and treat. [facility] will call pt to coordinate his future appt with pulmonary provider If he doesn't  hear from a representative within 2 business days, pt to  call [phone].     "

## 2024-05-31 DIAGNOSIS — J44.9 CHRONIC OBSTRUCTIVE PULMONARY DISEASE, UNSPECIFIED COPD TYPE (H): Primary | ICD-10-CM

## 2024-05-31 NOTE — Clinical Note
Future Appointments 6/5/2024   9:00 AM    Hany Saeed MD          [facility] 9/16/2024  8:30 AM    Richmond Martinez MD           [facility] 9/23/2024  9:00 AM     PULMONARY FUNCTION      [facility] 9/23/2024  10:00 AM   Beata Avina MD     [facility]

## 2024-06-05 ENCOUNTER — OFFICE VISIT (OUTPATIENT)
Dept: DERMATOLOGY | Facility: CLINIC | Age: 84
End: 2024-06-05
Payer: COMMERCIAL

## 2024-06-05 DIAGNOSIS — Z85.828 HISTORY OF BASAL CELL CARCINOMA: ICD-10-CM

## 2024-06-05 DIAGNOSIS — B07.9 VERRUCA VULGARIS: Primary | ICD-10-CM

## 2024-06-05 DIAGNOSIS — L57.0 ACTINIC KERATOSIS: ICD-10-CM

## 2024-06-05 DIAGNOSIS — L57.8 ACTINIC SKIN DAMAGE: ICD-10-CM

## 2024-06-05 DIAGNOSIS — Z85.828 HX OF SQUAMOUS CELL CARCINOMA OF SKIN: ICD-10-CM

## 2024-06-05 PROCEDURE — 99213 OFFICE O/P EST LOW 20 MIN: CPT | Mod: 25 | Performed by: STUDENT IN AN ORGANIZED HEALTH CARE EDUCATION/TRAINING PROGRAM

## 2024-06-05 PROCEDURE — 17003 DESTRUCT PREMALG LES 2-14: CPT | Mod: XS | Performed by: STUDENT IN AN ORGANIZED HEALTH CARE EDUCATION/TRAINING PROGRAM

## 2024-06-05 PROCEDURE — 17000 DESTRUCT PREMALG LESION: CPT | Mod: XS | Performed by: STUDENT IN AN ORGANIZED HEALTH CARE EDUCATION/TRAINING PROGRAM

## 2024-06-05 PROCEDURE — 17110 DESTRUCTION B9 LES UP TO 14: CPT | Performed by: STUDENT IN AN ORGANIZED HEALTH CARE EDUCATION/TRAINING PROGRAM

## 2024-06-05 ASSESSMENT — PAIN SCALES - GENERAL: PAINLEVEL: NO PAIN (0)

## 2024-06-05 NOTE — PROGRESS NOTES
[facility] Dermatology Note    Encounter Date: Jun 5, 2024    Dermatology Problem List:  09/23/20 nBCC nasal tip              SCCIS L superior shoulder   - 10/31/22 SCC L cheek     Major PMHx  -   ______________________________________    Impression/Plan:  Eder was seen today for skin check.    Diagnoses and all orders for this visit:    Verruca vulgaris  -     DESTRUCT BENIGN LESION, UP TO 14  - see procedure note    Actinic keratosis  -     DESTRUCT PREMALIGNANT LESION, FIRST  -     DESTRUCT PREMALIGNANT LESION, 2-14  - see procedure note    History of basal cell carcinoma  Hx of squamous cell carcinoma of skin  Actinic skin damage  - Reviewed the compounding benefits of incremental changes to sun protective clothing behaviors including increased frequency of sunscreen and sun protective clothing like broad brimmed hats and longsleeved UPF containing clothing        Cryotherapy procedure note: After verbal consent and discussion of risks and benefits including but no limited to dyspigmentation/scar, blister, and pain, 7 AK's on the face and scalp as well as 2 warts on the right neck and right forearm was(were) treated with 1-2mm freeze border for 2 cycles with liquid nitrogen. Post cryotherapy instructions were provided.     Follow-up in 1 year .       Staff Involved:  Staff Only    Hany Saeed MD   of Dermatology  Department of Dermatology  [facility]      CC:   Chief Complaint   Patient presents with    Skin Check       History of Present Illness:  Mr. Eder Horan is a 84 year old male who presents as a return patient.    Pt presents today for concerns about spots on trunk and extremities.  Has several cutaneous horns which are uncomfortable and clothing    Labs:      Physical exam:  Vitals: There were no vitals taken for this visit.  GEN: well developed, well-nourished, in no acute distress, in a pleasant mood.     SKIN: Edwards  phototype 1  - Full skin, which includes the head/face, both arms, chest, back, abdomen,both legs, genitalia and/or groin buttocks, digits and/or nails, was examined.  - Flat brown macules and patches in a sun exposed areas on face and extremities  - gritty pink papules on face and scalp  - verrucous papule R neck and forearm   - No other lesions of concern on areas examined.     Past Medical History:   Past Medical History:   Diagnosis Date    Actinic keratosis 6/09    face    ALLERGIC RHINITIS      Atrial fibrillation (H) 6/08    B12 deficiency 1/16/2019    Back pain     s/p LESI through ortho Feb 2010    Basal cell carcinoma     Bradycardia     Chronotropic incompetence with sinus node dysfunction     COPD (chronic obstructive pulmonary disease) (H)     DVT      Left calf 2003. Right calf 6/06    Embolic stroke (H) 06/11/2017    superior cerebellar artery occlusion. Hx A fib    Hyperlipidemia LDL goal <130 5/10/2011    Hypertension     HYPOTHYROIDISM      hypothyroidism    Lateral epicondylitis  of elbow 7/05    left    Malignant melanoma (H)     PLANTAR FASCITIS     Prostate cancer (H) 6/09    on Lupron therapy    Raynaud's disease without gangrene 11/26/2018    Squamous cell carcinoma  Sept 2014     right lower lid, s/p MOHS    Syncope     TMJ (temporomandibular joint syndrome) 4/12/2012    Tobacco use disorder      Past Surgical History:   Procedure Laterality Date    ABDOMEN SURGERY  2004,06,09    Hernias (2) Prostate Removal(2009)    BIOPSY  2009    Prostate    CARDIOVERSION  10-16-08    failed    COLONOSCOPY  2004    EYE SURGERY      2013 lump from lower  left eye lid removed    GENITOURINARY SURGERY  2009    Prostate    HERNIA REPAIR  2004 - 2006    [facility] NONSPECIFIC PROCEDURE  1/07    Left groin exploration and left inguinal herniorrhaphy    [facility] NONSPECIFIC PROCEDURE  5/08    Prostate bx (benign)    [facility] NONSPECIFIC PROCEDURE  8/09    Wide local excision (left side), robotic-assisted laparoscopic  prostatectomy and   Left pelvic lymph node dissection    [facility] NONSPECIFIC PROCEDURE  August 2010    Right inguinal herniorrhaphy with mesh       Social History:   reports that he has been smoking cigars. He has never used smokeless tobacco. He reports current alcohol use. He reports that he does not use drugs.    Family History:  Family History   Problem Relation Age of Onset    C.A.D. Father         heart attack- angina    Coronary Artery Disease Father     Myocardial Infarction Father     Cancer Mother         lung    Lung Cancer Mother     Lung Cancer Sister     Unknown/Adopted Maternal Grandmother     Unknown/Adopted Maternal Grandfather     Unknown/Adopted Paternal Grandmother     Unknown/Adopted Paternal Grandfather     Unknown/Adopted Sister        Medications:  Current Outpatient Medications   Medication Sig Dispense Refill    fluocinonide (LIDEX) 0.05 % external cream Apply topically 2 times daily 120 g 2    albuterol (PROAIR HFA/PROVENTIL HFA/VENTOLIN HFA) 108 (90 Base) MCG/ACT inhaler Inhale 2 puffs into the lungs every 4 hours as needed for shortness of breath 18 g 11    apixaban ANTICOAGULANT (ELIQUIS ANTICOAGULANT) 5 MG tablet TAKE ONE TABLET BY MOUTH TWICE DAILY. 180 tablet 3    Fluticasone-Umeclidin-Vilanterol (TRELEGY ELLIPTA) 200-62.5-25 MCG/ACT oral inhaler Inhale 1 puff into the lungs daily 1 each 11    furosemide (LASIX) 20 MG tablet Take 1 tablet (20 mg) by mouth daily 90 tablet 1    levothyroxine (SYNTHROID/LEVOTHROID) 112 MCG tablet TAKE ONE TABLET BY MOUTH ONE TIME DAILY 90 tablet 3    losartan (COZAAR) 50 MG tablet Take 1 tablet (50 mg) by mouth daily 90 tablet 3    metoprolol succinate ER (TOPROL XL) 25 MG 24 hr tablet Take 1 tablet (25 mg) by mouth daily 90 tablet 4    potassium chloride ER (K-TAB) 20 MEQ CR tablet Take 1 tablet (20 mEq) by mouth daily 90 tablet 1    PREDNISOLONE ACETATE OP Apply 1 drop to eye every evening      vitamin B-12 (CYANOCOBALAMIN) 2500 MCG sublingual tablet Take  1 tab by mouth 3 times a week (WMF)       Allergies   Allergen Reactions    Bactrim [Sulfa Antibiotics] Rash

## 2024-06-05 NOTE — LETTER
6/5/2024      Eder Horan  [address] [facility] [address]      Dear Colleague,    Thank you for referring your patient, Eder Horan, to the [facility]. Please see a copy of my visit note below.    [facility] Dermatology Note    Encounter Date: Jun 5, 2024    Dermatology Problem List:  09/23/20 nBCC nasal tip              SCCIS L superior shoulder   - 10/31/22 SCC L cheek     Major PMHx  -   ______________________________________    Impression/Plan:  Eder was seen today for skin check.    Diagnoses and all orders for this visit:    Verruca vulgaris  -     DESTRUCT BENIGN LESION, UP TO 14  - see procedure note    Actinic keratosis  -     DESTRUCT PREMALIGNANT LESION, FIRST  -     DESTRUCT PREMALIGNANT LESION, 2-14  - see procedure note    History of basal cell carcinoma  Hx of squamous cell carcinoma of skin  Actinic skin damage  - Reviewed the compounding benefits of incremental changes to sun protective clothing behaviors including increased frequency of sunscreen and sun protective clothing like broad brimmed hats and longsleeved UPF containing clothing        Cryotherapy procedure note: After verbal consent and discussion of risks and benefits including but no limited to dyspigmentation/scar, blister, and pain, 7 AK's on the face and scalp as well as 2 warts on the right neck and right forearm was(were) treated with 1-2mm freeze border for 2 cycles with liquid nitrogen. Post cryotherapy instructions were provided.     Follow-up in 1 year .       Staff Involved:  Staff Only    Hany Saeed MD   of Dermatology  Department of Dermatology  [facility]      CC:   Chief Complaint   Patient presents with     Skin Check       History of Present Illness:  Mr. Eder Horan is a 84 year old male who presents as a return patient.    Pt presents today for concerns about spots on trunk and extremities.   Has several cutaneous horns which are uncomfortable and clothing    Labs:      Physical exam:  Vitals: There were no vitals taken for this visit.  GEN: well developed, well-nourished, in no acute distress, in a pleasant mood.     SKIN: Edwards phototype 1  - Full skin, which includes the head/face, both arms, chest, back, abdomen,both legs, genitalia and/or groin buttocks, digits and/or nails, was examined.  - Flat brown macules and patches in a sun exposed areas on face and extremities  - gritty pink papules on face and scalp  - verrucous papule R neck and forearm   - No other lesions of concern on areas examined.     Past Medical History:   Past Medical History:   Diagnosis Date     Actinic keratosis 6/09    face     ALLERGIC RHINITIS       Atrial fibrillation (H) 6/08     B12 deficiency 1/16/2019     Back pain     s/p LESI through ortho Feb 2010     Basal cell carcinoma      Bradycardia      Chronotropic incompetence with sinus node dysfunction      COPD (chronic obstructive pulmonary disease) (H)      DVT      Left calf 2003. Right calf 6/06     Embolic stroke (H) 06/11/2017    superior cerebellar artery occlusion. Hx A fib     Hyperlipidemia LDL goal <130 5/10/2011     Hypertension      HYPOTHYROIDISM      hypothyroidism     Lateral epicondylitis  of elbow 7/05    left     Malignant melanoma (H)      PLANTAR FASCITIS      Prostate cancer (H) 6/09    on Lupron therapy     Raynaud's disease without gangrene 11/26/2018     Squamous cell carcinoma  Sept 2014     right lower lid, s/p MOHS     Syncope      TMJ (temporomandibular joint syndrome) 4/12/2012     Tobacco use disorder      Past Surgical History:   Procedure Laterality Date     ABDOMEN SURGERY  2004,06,09    Hernias (2) Prostate Removal(2009)     BIOPSY  2009    Prostate     CARDIOVERSION  10-16-08    failed     COLONOSCOPY  2004     EYE SURGERY      2013 lump from lower  left eye lid removed     GENITOURINARY SURGERY  2009    Prostate     HERNIA REPAIR   2004 - 2006     [facility] NONSPECIFIC PROCEDURE  1/07    Left groin exploration and left inguinal herniorrhaphy     [facility] NONSPECIFIC PROCEDURE  5/08    Prostate bx (benign)     [facility] NONSPECIFIC PROCEDURE  8/09    Wide local excision (left side), robotic-assisted laparoscopic prostatectomy and   Left pelvic lymph node dissection     [facility] NONSPECIFIC PROCEDURE  August 2010    Right inguinal herniorrhaphy with mesh       Social History:   reports that he has been smoking cigars. He has never used smokeless tobacco. He reports current alcohol use. He reports that he does not use drugs.    Family History:  Family History   Problem Relation Age of Onset     C.A.D. Father         heart attack- angina     Coronary Artery Disease Father      Myocardial Infarction Father      Cancer Mother         lung     Lung Cancer Mother      Lung Cancer Sister      Unknown/Adopted Maternal Grandmother      Unknown/Adopted Maternal Grandfather      Unknown/Adopted Paternal Grandmother      Unknown/Adopted Paternal Grandfather      Unknown/Adopted Sister        Medications:  Current Outpatient Medications   Medication Sig Dispense Refill     fluocinonide (LIDEX) 0.05 % external cream Apply topically 2 times daily 120 g 2     albuterol (PROAIR HFA/PROVENTIL HFA/VENTOLIN HFA) 108 (90 Base) MCG/ACT inhaler Inhale 2 puffs into the lungs every 4 hours as needed for shortness of breath 18 g 11     apixaban ANTICOAGULANT (ELIQUIS ANTICOAGULANT) 5 MG tablet TAKE ONE TABLET BY MOUTH TWICE DAILY. 180 tablet 3     Fluticasone-Umeclidin-Vilanterol (TRELEGY ELLIPTA) 200-62.5-25 MCG/ACT oral inhaler Inhale 1 puff into the lungs daily 1 each 11     furosemide (LASIX) 20 MG tablet Take 1 tablet (20 mg) by mouth daily 90 tablet 1     levothyroxine (SYNTHROID/LEVOTHROID) 112 MCG tablet TAKE ONE TABLET BY MOUTH ONE TIME DAILY 90 tablet 3     losartan (COZAAR) 50 MG tablet Take 1 tablet (50 mg) by mouth daily 90 tablet 3     metoprolol succinate ER (TOPROL XL) 25 MG  24 hr tablet Take 1 tablet (25 mg) by mouth daily 90 tablet 4     potassium chloride ER (K-TAB) 20 MEQ CR tablet Take 1 tablet (20 mEq) by mouth daily 90 tablet 1     PREDNISOLONE ACETATE OP Apply 1 drop to eye every evening       vitamin B-12 (CYANOCOBALAMIN) 2500 MCG sublingual tablet Take 1 tab by mouth 3 times a week (WMF)       Allergies   Allergen Reactions     Bactrim [Sulfa Antibiotics] Rash               Again, thank you for allowing me to participate in the care of your patient.        Sincerely,        Hany Saeed MD

## 2024-06-07 ENCOUNTER — TRANSFERRED RECORDS (OUTPATIENT)
Dept: HEALTH INFORMATION MANAGEMENT | Facility: CLINIC | Age: 84
End: 2024-06-07
Payer: COMMERCIAL

## 2024-06-12 ENCOUNTER — TELEPHONE (OUTPATIENT)
Dept: INTERNAL MEDICINE | Facility: CLINIC | Age: 84
End: 2024-06-12
Payer: COMMERCIAL

## 2024-06-12 DIAGNOSIS — J44.9 CHRONIC OBSTRUCTIVE PULMONARY DISEASE, UNSPECIFIED COPD TYPE (H): ICD-10-CM

## 2024-06-12 NOTE — TELEPHONE ENCOUNTER
Patient is asking if the the inhaler  Fluticasone-Umeclidin-Vilanterol (TRELEGY ELLIPTA) 200-62.5-25 MCG/ACT oral inhaler can be dispense for three inhalers three at a time ( 90 day supply. )     He understands he has 11 refills but he does not want to go to pharmacy once month.       Liza Prescott RN  [facility]

## 2024-06-13 RX ORDER — FLUTICASONE FUROATE, UMECLIDINIUM BROMIDE AND VILANTEROL TRIFENATATE 200; 62.5; 25 UG/1; UG/1; UG/1
1 POWDER RESPIRATORY (INHALATION) DAILY
Qty: 3 EACH | Refills: 3 | Status: SHIPPED | OUTPATIENT
Start: 2024-06-13 | End: 2024-09-16

## 2024-06-13 NOTE — TELEPHONE ENCOUNTER
Rx changed to 3 inhalers per refill as requested at [facility] pharmacy. Inform pt

## 2024-06-13 NOTE — TELEPHONE ENCOUNTER
"Left a message \"an updated script has been approved to your pharmacy per your request\"     Shakira FLANNERY, Triage RN  [facility]     "

## 2024-07-08 ENCOUNTER — OFFICE VISIT (OUTPATIENT)
Dept: URGENT CARE | Facility: URGENT CARE | Age: 84
End: 2024-07-08
Payer: COMMERCIAL

## 2024-07-08 VITALS
RESPIRATION RATE: 16 BRPM | DIASTOLIC BLOOD PRESSURE: 70 MMHG | HEART RATE: 71 BPM | SYSTOLIC BLOOD PRESSURE: 100 MMHG | TEMPERATURE: 97.1 F | OXYGEN SATURATION: 97 %

## 2024-07-08 DIAGNOSIS — Z98.890 HISTORY OF PROSTATE SURGERY: ICD-10-CM

## 2024-07-08 DIAGNOSIS — R30.0 DYSURIA: Primary | ICD-10-CM

## 2024-07-08 DIAGNOSIS — R35.0 URINARY FREQUENCY: ICD-10-CM

## 2024-07-08 DIAGNOSIS — R39.15 URINARY URGENCY: ICD-10-CM

## 2024-07-08 LAB
ALBUMIN UR-MCNC: NEGATIVE MG/DL
ANION GAP SERPL CALCULATED.3IONS-SCNC: 5 MMOL/L (ref 3–14)
APPEARANCE UR: CLEAR
BILIRUB UR QL STRIP: NEGATIVE
BUN SERPL-MCNC: 8 MG/DL (ref 7–30)
CALCIUM SERPL-MCNC: 9 MG/DL (ref 8.5–10.1)
CHLORIDE BLD-SCNC: 104 MMOL/L (ref 94–109)
CO2 SERPL-SCNC: 31 MMOL/L (ref 20–32)
COLOR UR AUTO: YELLOW
CREAT SERPL-MCNC: 0.8 MG/DL (ref 0.66–1.25)
EGFRCR SERPLBLD CKD-EPI 2021: 87 ML/MIN/1.73M2
GLUCOSE BLD-MCNC: 110 MG/DL (ref 70–99)
GLUCOSE UR STRIP-MCNC: NEGATIVE MG/DL
HGB UR QL STRIP: NEGATIVE
KETONES UR STRIP-MCNC: NEGATIVE MG/DL
LEUKOCYTE ESTERASE UR QL STRIP: NEGATIVE
NITRATE UR QL: NEGATIVE
PH UR STRIP: 5.5 [PH] (ref 5–7)
POTASSIUM BLD-SCNC: 4.7 MMOL/L (ref 3.4–5.3)
SODIUM SERPL-SCNC: 140 MMOL/L (ref 135–145)
SP GR UR STRIP: 1.01 (ref 1–1.03)
UROBILINOGEN UR STRIP-ACNC: 0.2 E.U./DL

## 2024-07-08 PROCEDURE — 87086 URINE CULTURE/COLONY COUNT: CPT | Performed by: FAMILY MEDICINE

## 2024-07-08 PROCEDURE — 80048 BASIC METABOLIC PNL TOTAL CA: CPT | Performed by: FAMILY MEDICINE

## 2024-07-08 PROCEDURE — 99214 OFFICE O/P EST MOD 30 MIN: CPT | Performed by: FAMILY MEDICINE

## 2024-07-08 PROCEDURE — 81003 URINALYSIS AUTO W/O SCOPE: CPT | Performed by: FAMILY MEDICINE

## 2024-07-08 PROCEDURE — 36415 COLL VENOUS BLD VENIPUNCTURE: CPT | Performed by: FAMILY MEDICINE

## 2024-07-08 RX ORDER — AMLODIPINE BESYLATE 5 MG/1
1 TABLET ORAL DAILY
COMMUNITY
End: 2024-09-16

## 2024-07-08 RX ORDER — LEUPROLIDE ACETATE 30 MG
KIT SUBCUTANEOUS
COMMUNITY
Start: 2022-12-09

## 2024-07-08 RX ORDER — CIPROFLOXACIN 250 MG/1
250 TABLET, FILM COATED ORAL 2 TIMES DAILY
Qty: 6 TABLET | Refills: 0 | Status: SHIPPED | OUTPATIENT
Start: 2024-07-08 | End: 2024-07-11

## 2024-07-08 NOTE — PROGRESS NOTES
SUBJECTIVE: Eder Horan is a 84 year old male who  presents today for a possible UTI.   Symptoms of dysuria, urgency, and frequency have been going on forday(s).    Hematuria no.  sudden onset and still present  Past Medical History:   Diagnosis Date    Actinic keratosis     face    ALLERGIC RHINITIS      Atrial fibrillation (H)     B12 deficiency 2019    Back pain     s/p LESI through ortho 2010    Basal cell carcinoma     Bradycardia     Chronotropic incompetence with sinus node dysfunction     COPD (chronic obstructive pulmonary disease) (H)     DVT      Left calf . Right calf     Embolic stroke (H) 2017    superior cerebellar artery occlusion. Hx A fib    Hyperlipidemia LDL goal <130 5/10/2011    Hypertension     HYPOTHYROIDISM      hypothyroidism    Lateral epicondylitis  of elbow     left    Malignant melanoma (H)     PLANTAR FASCITIS     Prostate cancer (H)     on Lupron therapy    Raynaud's disease without gangrene 2018    Squamous cell carcinoma  2014     right lower lid, s/p MOHS    Syncope     TMJ (temporomandibular joint syndrome) 2012    Tobacco use disorder      Allergies   Allergen Reactions    Bactrim [Sulfa Antibiotics] Rash    Sulfamethoxazole-Trimethoprim      Other Reaction(s): Not available     Social History     Tobacco Use    Smoking status: Some Days     Types: Cigars     Last attempt to quit: 2006     Years since quittin.6    Smokeless tobacco: Never    Tobacco comments:      occasionally cigar   Substance Use Topics    Alcohol use: Yes     Alcohol/week: 0.0 standard drinks of alcohol     Comment: 2-3 drinks a day:  Wine/Beer       ROS: CONSTITUTIONAL:NEGATIVE for fever, chills, change in weight    OBJECTIVE:  /70   Pulse 71   Temp 97.1  F (36.2  C) (Temporal)   Resp 16   SpO2 97%     No Flank/abd pain      ICD-10-CM    1. Dysuria  R30.0 UA Macroscopic with reflex to Microscopic and Culture - Clinic Collect     Urine  Culture Aerobic Bacterial - lab collect     Basic metabolic panel     ciprofloxacin (CIPRO) 250 MG tablet      2. Urinary frequency  R35.0 Basic metabolic panel     ciprofloxacin (CIPRO) 250 MG tablet      3. Urinary urgency  R39.15 Basic metabolic panel     ciprofloxacin (CIPRO) 250 MG tablet      4. History of prostate surgery  Z98.890 ciprofloxacin (CIPRO) 250 MG tablet        Pt will f/up Urology if cont  Drink plenty of fluids.  Prevention and treatment of UTI's discussed.Signs and symptoms of pyelonephritis mentioned.  Follow up with primary care physician if not improving

## 2024-07-09 LAB — BACTERIA UR CULT: NO GROWTH

## 2024-07-10 ENCOUNTER — TELEPHONE (OUTPATIENT)
Dept: CARDIOLOGY | Facility: CLINIC | Age: 84
End: 2024-07-10
Payer: COMMERCIAL

## 2024-07-10 NOTE — TELEPHONE ENCOUNTER
Spoke to pt who is calling back from his phone call with Eileen on 5/30.  Pt has an appointment with Pulmonary, but not until September 23 along with a PFT.  Pt inhaler was changed by Dr Martinez on 5/30 from Dullera to Trelegy.  Pt unsure if it is working any better.  Pt states that he was told the he needs to walk more, but states that the most walking is when he mows the yard. Pt stats that he does go up and down his stairs a lot.   Pt also mentions his vision is fuzzy at times. Did mention to pt that he may want to reach out to Dr Martinez and made him aware of your vision changes and that you are not noticing any changes with the inhaler.  Pt has a visit is September also, but told that he can reach out and talk to the nurses also.  Pt states understanding. Errol

## 2024-07-10 NOTE — TELEPHONE ENCOUNTER
Health Call Center    Phone Message    May a detailed message be left on voicemail: yes     Reason for Call: Other: Patient states he was supposed to call Eileen back around the first of July to follow up on his condition. No other information provided. Please call him back to discuss.      Action Taken: Other: cardiology    Travel Screening: Not Applicable  Thank you!  [facility]       Date of Service:

## 2024-07-11 NOTE — TELEPHONE ENCOUNTER
I agree with updating Dr. Martinez.  Hopefully he will start a regular walking program, but it does not sound like it.  Thank you for the update.

## 2024-07-20 DIAGNOSIS — R60.9 EDEMA, UNSPECIFIED TYPE: ICD-10-CM

## 2024-07-22 RX ORDER — POTASSIUM CHLORIDE 1500 MG/1
20 TABLET, EXTENDED RELEASE ORAL DAILY
Qty: 90 TABLET | Refills: 0 | Status: SHIPPED | OUTPATIENT
Start: 2024-07-22 | End: 2024-09-16

## 2024-07-22 RX ORDER — FUROSEMIDE 20 MG
20 TABLET ORAL DAILY
Qty: 90 TABLET | Refills: 0 | Status: SHIPPED | OUTPATIENT
Start: 2024-07-22 | End: 2024-09-16

## 2024-07-31 NOTE — TELEPHONE ENCOUNTER
Spoke to pt who had forgot to reach out to Dr Martinez.  After discussing his vision issues, pt states that he feels that it may be his glasses as he has to push them up hard to his face to see and the fuzziness goes away.  Recommended that pt should contact where he received that glasses recently and ask them to reassess the glasses related to his concerns.  Pt states that he will check with eye clinic first and if that is not the issue could then try to reach out to Dr Martinez office. Errol

## 2024-08-09 ENCOUNTER — OFFICE VISIT (OUTPATIENT)
Dept: URGENT CARE | Facility: URGENT CARE | Age: 84
End: 2024-08-09
Payer: COMMERCIAL

## 2024-08-09 VITALS
DIASTOLIC BLOOD PRESSURE: 58 MMHG | HEART RATE: 74 BPM | WEIGHT: 167 LBS | TEMPERATURE: 98.5 F | OXYGEN SATURATION: 97 % | BODY MASS INDEX: 24.66 KG/M2 | SYSTOLIC BLOOD PRESSURE: 106 MMHG | RESPIRATION RATE: 16 BRPM

## 2024-08-09 DIAGNOSIS — M23.92 INTERNAL DERANGEMENT OF KNEE, LEFT: Primary | ICD-10-CM

## 2024-08-09 PROCEDURE — 99213 OFFICE O/P EST LOW 20 MIN: CPT | Performed by: INTERNAL MEDICINE

## 2024-08-09 NOTE — PATIENT INSTRUCTIONS
I am guessing that you may have some underlying degenerative arthritis in the knee that has flared up for one reason or another.  Will treat with some topical anti-inflammatory (diclofenac gel) and also take Extra-Strength Tylenol (Acetaminophen 500 mg) take two tablets three times daily for the next several days.  If this is not significantly improving over the coming week then we should re-assess.

## 2024-08-09 NOTE — PROGRESS NOTES
SUBJECTIVE:  Chief complaint of discomfort in the left knee.  Pain came on acutely overnight two days ago. There is swelling of the knee. Patient denies any prior issues with problems in this knee.  No prior joint surgeries.   No known significant chronic arthritis history although he does report that for a long time he has noted a feeling of some unsteadiness with walking or turning. Pain is worse when going down stairs.  Pain is only present upon weight-bearing as a sharp and intense pinch that causes him to feel as if the knee is going to buckle. No pain at rest.    OBJECTIVE:  /58 (BP Location: Right arm, Patient Position: Sitting, Cuff Size: Adult Regular)   Pulse 74   Temp 98.5  F (36.9  C) (Tympanic)   Resp 16   Wt 75.8 kg (167 lb)   SpO2 97%   BMI 24.66 kg/m    GEN: elderly male, walking gingerly with antalgic gait  M/SKEL: there is no effusion or swelling of the left knee.  No warmth or erythema.  Passive ROM is full with some pinching at maximal flexion; some pain with pivot shift; mild crepitus is noted; no popliteal swelling; no focal tenderness    ASSESSMENT/PLAN:    ICD-10-CM    1. Internal derangement of knee, left  M23.92 diclofenac (VOLTAREN) 1 % topical gel        Patient Instructions   I am guessing that you may have some underlying degenerative arthritis in the knee that has flared up for one reason or another.  Will treat with some topical anti-inflammatory (diclofenac gel) and also take Extra-Strength Tylenol (Acetaminophen 500 mg) take two tablets three times daily for the next several days.  If this is not significantly improving over the coming week then we should re-assess.     Mesfin Bloom MD

## 2024-09-03 ENCOUNTER — PATIENT OUTREACH (OUTPATIENT)
Dept: CARE COORDINATION | Facility: CLINIC | Age: 84
End: 2024-09-03
Payer: COMMERCIAL

## 2024-09-10 DIAGNOSIS — I48.0 PAROXYSMAL ATRIAL FIBRILLATION (H): ICD-10-CM

## 2024-09-10 RX ORDER — APIXABAN 5 MG/1
TABLET, FILM COATED ORAL
Qty: 180 TABLET | Refills: 3 | Status: SHIPPED | OUTPATIENT
Start: 2024-09-10 | End: 2024-09-16

## 2024-09-16 ENCOUNTER — ANCILLARY PROCEDURE (OUTPATIENT)
Dept: GENERAL RADIOLOGY | Facility: CLINIC | Age: 84
End: 2024-09-16
Attending: INTERNAL MEDICINE
Payer: COMMERCIAL

## 2024-09-16 ENCOUNTER — OFFICE VISIT (OUTPATIENT)
Dept: INTERNAL MEDICINE | Facility: CLINIC | Age: 84
End: 2024-09-16
Payer: COMMERCIAL

## 2024-09-16 VITALS
OXYGEN SATURATION: 96 % | HEART RATE: 68 BPM | TEMPERATURE: 97.3 F | SYSTOLIC BLOOD PRESSURE: 109 MMHG | WEIGHT: 161.9 LBS | BODY MASS INDEX: 23.98 KG/M2 | DIASTOLIC BLOOD PRESSURE: 76 MMHG | HEIGHT: 69 IN

## 2024-09-16 DIAGNOSIS — I87.2 VENOUS STASIS DERMATITIS, UNSPECIFIED LATERALITY: ICD-10-CM

## 2024-09-16 DIAGNOSIS — I10 BENIGN ESSENTIAL HYPERTENSION: ICD-10-CM

## 2024-09-16 DIAGNOSIS — Z00.00 MEDICARE ANNUAL WELLNESS VISIT, SUBSEQUENT: Primary | ICD-10-CM

## 2024-09-16 DIAGNOSIS — E03.9 HYPOTHYROIDISM, UNSPECIFIED TYPE: ICD-10-CM

## 2024-09-16 DIAGNOSIS — J44.9 CHRONIC OBSTRUCTIVE PULMONARY DISEASE, UNSPECIFIED COPD TYPE (H): ICD-10-CM

## 2024-09-16 DIAGNOSIS — I48.0 PAROXYSMAL ATRIAL FIBRILLATION (H): ICD-10-CM

## 2024-09-16 DIAGNOSIS — F10.10 EXCESSIVE DRINKING OF ALCOHOL: ICD-10-CM

## 2024-09-16 DIAGNOSIS — C61 PROSTATE CANCER (H): ICD-10-CM

## 2024-09-16 DIAGNOSIS — R60.9 EDEMA, UNSPECIFIED TYPE: ICD-10-CM

## 2024-09-16 DIAGNOSIS — Z13.6 CARDIOVASCULAR SCREENING; LDL GOAL LESS THAN 100: ICD-10-CM

## 2024-09-16 LAB
ALBUMIN SERPL BCG-MCNC: 3.8 G/DL (ref 3.5–5.2)
ALP SERPL-CCNC: 137 U/L (ref 40–150)
ALT SERPL W P-5'-P-CCNC: 7 U/L (ref 0–70)
ANION GAP SERPL CALCULATED.3IONS-SCNC: 10 MMOL/L (ref 7–15)
AST SERPL W P-5'-P-CCNC: 18 U/L (ref 0–45)
BILIRUB SERPL-MCNC: 0.7 MG/DL
BUN SERPL-MCNC: 13.2 MG/DL (ref 8–23)
CALCIUM SERPL-MCNC: 8.8 MG/DL (ref 8.8–10.4)
CHLORIDE SERPL-SCNC: 103 MMOL/L (ref 98–107)
CHOLEST SERPL-MCNC: 152 MG/DL
CREAT SERPL-MCNC: 0.75 MG/DL (ref 0.67–1.17)
EGFRCR SERPLBLD CKD-EPI 2021: 88 ML/MIN/1.73M2
ERYTHROCYTE [DISTWIDTH] IN BLOOD BY AUTOMATED COUNT: 13.5 % (ref 10–15)
FASTING STATUS PATIENT QL REPORTED: YES
FASTING STATUS PATIENT QL REPORTED: YES
GLUCOSE SERPL-MCNC: 104 MG/DL (ref 70–99)
HCO3 SERPL-SCNC: 26 MMOL/L (ref 22–29)
HCT VFR BLD AUTO: 43.1 % (ref 40–53)
HDLC SERPL-MCNC: 76 MG/DL
HGB BLD-MCNC: 14.1 G/DL (ref 13.3–17.7)
LDLC SERPL CALC-MCNC: 66 MG/DL
MCH RBC QN AUTO: 32.9 PG (ref 26.5–33)
MCHC RBC AUTO-ENTMCNC: 32.7 G/DL (ref 31.5–36.5)
MCV RBC AUTO: 101 FL (ref 78–100)
NONHDLC SERPL-MCNC: 76 MG/DL
PLATELET # BLD AUTO: 263 10E3/UL (ref 150–450)
POTASSIUM SERPL-SCNC: 4.5 MMOL/L (ref 3.4–5.3)
PROT SERPL-MCNC: 7.6 G/DL (ref 6.4–8.3)
RBC # BLD AUTO: 4.28 10E6/UL (ref 4.4–5.9)
SODIUM SERPL-SCNC: 139 MMOL/L (ref 135–145)
TRIGL SERPL-MCNC: 48 MG/DL
TSH SERPL DL<=0.005 MIU/L-ACNC: 1 UIU/ML (ref 0.3–4.2)
WBC # BLD AUTO: 6.5 10E3/UL (ref 4–11)

## 2024-09-16 PROCEDURE — 71046 X-RAY EXAM CHEST 2 VIEWS: CPT | Mod: TC | Performed by: RADIOLOGY

## 2024-09-16 PROCEDURE — 85027 COMPLETE CBC AUTOMATED: CPT | Performed by: INTERNAL MEDICINE

## 2024-09-16 PROCEDURE — 99214 OFFICE O/P EST MOD 30 MIN: CPT | Mod: 25 | Performed by: INTERNAL MEDICINE

## 2024-09-16 PROCEDURE — 80061 LIPID PANEL: CPT | Performed by: INTERNAL MEDICINE

## 2024-09-16 PROCEDURE — 36415 COLL VENOUS BLD VENIPUNCTURE: CPT | Performed by: INTERNAL MEDICINE

## 2024-09-16 PROCEDURE — 80053 COMPREHEN METABOLIC PANEL: CPT | Performed by: INTERNAL MEDICINE

## 2024-09-16 PROCEDURE — 84443 ASSAY THYROID STIM HORMONE: CPT | Performed by: INTERNAL MEDICINE

## 2024-09-16 PROCEDURE — G0439 PPPS, SUBSEQ VISIT: HCPCS | Performed by: INTERNAL MEDICINE

## 2024-09-16 RX ORDER — FLUTICASONE FUROATE, UMECLIDINIUM BROMIDE AND VILANTEROL TRIFENATATE 200; 62.5; 25 UG/1; UG/1; UG/1
1 POWDER RESPIRATORY (INHALATION) DAILY
Qty: 3 EACH | Refills: 3 | Status: SHIPPED | OUTPATIENT
Start: 2024-09-16

## 2024-09-16 RX ORDER — FUROSEMIDE 20 MG
20 TABLET ORAL DAILY
Qty: 90 TABLET | Refills: 1 | Status: SHIPPED | OUTPATIENT
Start: 2024-09-16

## 2024-09-16 RX ORDER — LEVOTHYROXINE SODIUM 112 UG/1
112 TABLET ORAL DAILY
Qty: 90 TABLET | Refills: 3 | Status: SHIPPED | OUTPATIENT
Start: 2024-09-16

## 2024-09-16 RX ORDER — METOPROLOL SUCCINATE 25 MG/1
25 TABLET, EXTENDED RELEASE ORAL DAILY
Qty: 90 TABLET | Refills: 3 | Status: SHIPPED | OUTPATIENT
Start: 2024-09-16

## 2024-09-16 RX ORDER — FLUOCINONIDE 0.5 MG/G
CREAM TOPICAL 2 TIMES DAILY
Qty: 120 G | Refills: 2 | Status: SHIPPED | OUTPATIENT
Start: 2024-09-16

## 2024-09-16 RX ORDER — POTASSIUM CHLORIDE 1500 MG/1
20 TABLET, EXTENDED RELEASE ORAL DAILY
Qty: 90 TABLET | Refills: 3 | Status: SHIPPED | OUTPATIENT
Start: 2024-09-16

## 2024-09-16 NOTE — PROGRESS NOTES
Preventive Care Visit  [facility]  Richmond Martinez MD, Internal Medicine  Sep 16, 2024      ASSESSMENT:   1. Medicare annual wellness visit, subsequent  Please see plan discussion below for healthcare maintenance recommendations.  Otherwise up-to-date for age    2. Edema, unspecified type  Controlled.  Continue current medication.  Labs today as ordered  - potassium chloride ER (K-TAB) 20 MEQ CR tablet; Take 1 tablet (20 mEq) by mouth daily.  Dispense: 90 tablet; Refill: 3  - furosemide (LASIX) 20 MG tablet; Take 1 tablet (20 mg) by mouth daily.  Dispense: 90 tablet; Refill: 1  - Comprehensive metabolic panel; Future    3. Paroxysmal atrial fibrillation (H)  Rate controlled.  Continue metoprolol along with Eliquis anticoagulation  - metoprolol succinate ER (TOPROL XL) 25 MG 24 hr tablet; Take 1 tablet (25 mg) by mouth daily.  Dispense: 90 tablet; Refill: 3  - apixaban ANTICOAGULANT (ELIQUIS ANTICOAGULANT) 5 MG tablet; Take 1 tablet (5 mg) by mouth 2 times daily.  Dispense: 180 tablet; Refill: 3    4. Benign essential hypertension  Lightheadedness issues.  Blood pressure low normal.  Stop losartan and recheck blood pressure in a couple weeks.  - CBC with platelets; Future  - Comprehensive metabolic panel; Future    5. Venous stasis dermatitis, unspecified laterality  Intermittent erythema.  Symptoms minimal currently.  Topical steroid as needed  - fluocinonide (LIDEX) 0.05 % external cream; Apply topically 2 times daily. As needed for rash  Dispense: 120 g; Refill: 2    6. Chronic obstructive pulmonary disease, unspecified COPD type (H)  Chronic shortness of breath.  Continue inhaler therapy.  Chest x-ray to rule out secondary issues.  Has follow-up appointment with pulmonary next week including PFTs  - Fluticasone-Umeclidin-Vilanterol (TRELEGY ELLIPTA) 200-62.5-25 MCG/ACT oral inhaler; Inhale 1 puff into the lungs daily.  Dispense: 3 each; Refill: 3  - XR Chest 2 Views    7. Hypothyroidism,  unspecified type  Previously controlled.  Continue current medication.  Labs ordered  - levothyroxine (SYNTHROID/LEVOTHROID) 112 MCG tablet; Take 1 tablet (112 mcg) by mouth daily.  Dispense: 90 tablet; Refill: 3  - TSH with free T4 reflex; Future    8. Prostate cancer (H)  Managed by [facility].  Most recent PSA 0.47.  Getting intermittent Eligard hormone therapy.  Continue management per urology.  Note from June 2024 reviewed    9. CARDIOVASCULAR SCREENING; LDL GOAL LESS THAN 100  Candidate for screening  - Lipid panel reflex to direct LDL Fasting; Future     10. Excessive drinking of alcohol   HAS 2-3 drinks wine pr beer/day. Discussed risk of falls, worsened memory, etc with alcohol use and counseled best to avoid alcohol intake at pt age except rare occasion          PLAN:   Stop Losartan  Stop  Alcohol and Cigars  In 2  weeks, then check blood pressure  in AM and BP for 2 days and send me an update in [employer] re: the blood pressure readings and how the lightheadedness is doing  with changes above  Continue other medications  Prescriptions refilled on file at your pharmacy to be filled in the future when needed  I would recommend an updated covid vaccination and an influenza/flu vaccination in the Fall (October) 2024 at the clinic or any pharmacy  I would recommend  a  RSV vaccine (respiratory syncytial virus risk reduction) in September. Get at a pharmacy  See Urology in  December as scheduled  Vanicream moisturizer lower legs for skin dryness as needed  Chest Xray today  Follow-up with Pulmonary as scheduled  Labs as  ordered  Pt was informed regarding extra E&M billing for management of new or established medical issues not related to today's wellness/screening visit           Christiane Gaines is a 84 year old, presenting for the following:  Wellness Visit  And follow-up regarding medical issues as above.    Pt would like to also discuss a stressor with provider about pt wife experiencing signs of early  dementia        Health Care Directive  Patient has a Health Care Directive on file  Advance care planning document is on file and is current.    HPI         9/16/2024   General Health   How would you rate your overall physical health? Good   Feel stress (tense, anxious, or unable to sleep) To some extent      (!) STRESS CONCERN      9/16/2024   Nutrition   Diet: Regular (no restrictions)            9/16/2024   Exercise   Days per week of moderate/strenous exercise 5 days            9/16/2024   Social Factors   Frequency of gathering with friends or relatives Twice a week   Worry food won't last until get money to buy more No   Food not last or not have enough money for food? No   Do you have housing? (Housing is defined as stable permanent housing and does not include staying ouside in a car, in a tent, in an abandoned building, in an overnight shelter, or couch-surfing.) Yes   Are you worried about losing your housing? No   Lack of transportation? No   Unable to get utilities (heat,electricity)? No            9/16/2024   Fall Risk   Fallen 2 or more times in the past year? No    No   Trouble with walking or balance? Yes    Yes   Gait Speed Test (Document in seconds) 4.63   Gait Speed Test Interpretation Less than or equal to 5.00 seconds - PASS       Multiple values from one day are sorted in reverse-chronological order          9/16/2024   Activities of Daily Living- Home Safety   Needs help with the following daily activites None of the above   Safety concerns in the home None of the above            9/16/2024   Dental   Dentist two times every year? Yes            9/16/2024   Hearing Screening   Hearing concerns? (!) I NEED TO ASK PEOPLE TO SPEAK UP OR REPEAT THEMSELVES.    (!) IT'S HARD TO FOLLOW A CONVERSATION IN A NOISY RESTAURANT OR CROWDED ROOM.    (!) TROUBLE UNDESTANDING A SPEAKER IN A PUBLIC MEETING OR [employer] SERVICE.       Multiple values from one day are sorted in reverse-chronological order          9/16/2024   Driving Risk Screening   Patient/family members have concerns about driving No            9/16/2024   General Alertness/Fatigue Screening   Have you been more tired than usual lately? (!) YES            9/16/2024   Urinary Incontinence Screening   Bothered by leaking urine in past 6 months Yes            9/16/2024   TB Screening   Were you born outside of the [place]? No            Today's PHQ-2 Score:       9/16/2024     8:22 AM   PHQ-2 ( 1999 [employer])   Q1: Little interest or pleasure in doing things 0   Q2: Feeling down, depressed or hopeless 0   PHQ-2 Score 0   Q1: Little interest or pleasure in doing things Not at all   Q2: Feeling down, depressed or hopeless Not at all   PHQ-2 Score 0           9/16/2024   Substance Use   If I could quit smoking, I would Completely disagree   I want to quit somking, worry about health affects Completely disagree   Willing to make a plan to quit smoking Completely disagree   Willing to cut down before quitting Neutral   Alcohol more than 3/day or more than 7/wk Yes   How often do you have a drink containing alcohol 4 or more times a week   How many alcohol drinks on typical day 3 or 4   How often do you have 5+ drinks at one occasion Never   Audit 2/3 Score 1   How often not able to stop drinking once started Never   How often failed to do what normally expected Never   How often needed first drink in am after a heavy drinking session Never   How often feeling of guilt or remorse after drinking Never   How often unable to remember what happened the night before Never   Have you or someone else been injured because of your drinking No   Has anyone been concerned or suggested you cut down on drinking No   TOTAL SCORE - AUDIT 5   Do you have a current opioid prescription? No   How severe/bad is pain from 1 to 10? 0/10 (No Pain)   Do you use any other substances recreationally? No        Social History     Tobacco Use    Smoking status: Some Days     Types: Cigars     Last  attempt to quit: 2006     Years since quittin.8    Smokeless tobacco: Never    Tobacco comments:      occasionally cigar   Vaping Use    Vaping status: Never Used   Substance Use Topics    Alcohol use: Yes     Alcohol/week: 0.0 standard drinks of alcohol     Comment: 2-3 drinks a day:  Wine/Beer    Drug use: No          Pt's past medical history, family history, habits, medications and allergies were reviewed with the patient today.   Most recent lab results reviewed with pt. Problem list and histories reviewed & adjusted, as indicated.         Current providers sharing in care for this patient include:  Patient Care Team:  Richmond Martinez MD as PCP - General  Richmond Martinez MD as Assigned PCP  Hany Saeed MD as MD (Dermatology)  Yuan Gaines MD as Assigned Sleep Provider  Amira Castro APRN CNP as Assigned Heart and Vascular Provider  Hany Saeed MD as Assigned Surgical Provider    The following health maintenance items are reviewed in Epic and correct as of today:  Health Maintenance   Topic Date Due    RSV VACCINE (1 - 1-dose 75+ series) Never done    NICOTINE/TOBACCO CESSATION COUNSELING Q 1 YR  2024    MEDICARE ANNUAL WELLNESS VISIT  2024    ANNUAL REVIEW OF HM ORDERS  2024    INFLUENZA VACCINE (1) 2024    COVID-19 Vaccine (2024- season) 2024    TSH W/FREE T4 REFLEX  2024    DTAP/TDAP/TD IMMUNIZATION (2 - Td or Tdap) 2025    FALL RISK ASSESSMENT  2025    ADVANCE CARE PLANNING  2028    SPIROMETRY  Completed    COPD ACTION PLAN  Completed    PHQ-2 (once per calendar year)  Completed    Pneumococcal Vaccine: 65+ Years  Completed    ZOSTER IMMUNIZATION  Completed    HPV IMMUNIZATION  Aged Out    MENINGITIS IMMUNIZATION  Aged Out    RSV MONOCLONAL ANTIBODY  Aged Out    COLORECTAL CANCER SCREENING  Discontinued         Review of Systems  CONSTITUTIONAL: NEGATIVE for fever, chills.  Weight down 6 pounds  INTEGUMENTARY/SKIN: NEGATIVE for  "worrisome moles or lesions. Has dryness  bilateral ankle areas  EYES: NEGATIVE for  irritation.  Has glasses.  Eye exam few weeks ago. Has bifocals with occ slight blurring with  fine print. Considering new glasses Rx  ENT/MOUTH: NEGATIVE for ear, mouth and throat problems  RESP: NEGATIVE for significant cough. POSITIVE for chronic SOB  CV: NEGATIVE for chest pain, palpitations or peripheral edema.  Occasional lightheadedness with standing up or first getting up out of bed.  No vertigo.  GI: NEGATIVE for nausea, abdominal pain, heartburn, or change in bowel habits  : NEGATIVE for frequency, dysuria, or hematuria  MUSCULOSKELETAL: NEGATIVE for significant arthralgias or myalgia  NEURO: NEGATIVE for   paresthesias.  Denies headaches.  Ambulating with a cane.  No recent falls  ENDOCRINE: NEGATIVE for temperature intolerance   HEME: NEGATIVE for bleeding problems  PSYCHIATRIC:   Some stress with patient's wife having memory issues.  Denies actual depression.  Discussed option of  support groups through [employer].      Objective    Exam  /76   Pulse 68   Temp 97.3  F (36.3  C) (Temporal)   Ht 1.753 m (5' 9\")   Wt 73.4 kg (161 lb 14.4 oz)   SpO2 96%   BMI 23.91 kg/m     Estimated body mass index is 23.91 kg/m  as calculated from the following:    Height as of this encounter: 1.753 m (5' 9\").    Weight as of this encounter: 73.4 kg (161 lb 14.4 oz).    Physical Exam   General appearance -  alert, no distress  Skin - No lesions.  Mild dryness distal bilateral extremity with scattered mild erythema without warmth  Head - normocephalic, atraumatic  Eyes - DENITA, EOMI, fundi exam with nondilated pupils negative.  Ears - External ears normal. Canals clear. TM's normal.  Nose/Sinuses - Nares normal. Septum midline. Mucosa normal. No drainage or sinus tenderness.  Oropharynx - No erythema, no adenopathy, no exudates.  Neck - Supple without adenopathy or thyromegaly. No bruits.  Lungs - Clear to " auscultation without wheezes/rhonchi.  Prolonged expiratory phase.  Able to speak in full sentences  Heart - Irregular rate and rhythm without clicks, or gallops. 1/6 ADDI apex  Nodes - No supraclavicular, axillary, or inguinal adenopathy palpable.  Abdomen - Abdomen soft, non-tender. BS normal. No masses or hepatosplenomegaly palpable. No bruits.  Extremities -No cyanosis, clubbing . Mild BLE distal edema.    Musculoskeletal - Spine ROM normal. Muscular strength intact.   Peripheral pulses - radial=4/4, femoral=4/4, posterior tibial=4/4, dorsalis pedis=4/4,  Neuro - Gait slow but stable with use of a cane reflexes normal and symmetric. Sensation grossly WNL.  Genital - Normal-appearing male external genitalia. No scrotal masses or inguinal hernia palpable.   Rectal - deferred          9/16/2024   Mini Cog   Clock Draw Score 2 Normal   3 Item Recall 2 objects recalled   Mini Cog Total Score 4           Signed Electronically by: Richmond Martinez MD

## 2024-09-16 NOTE — PATIENT INSTRUCTIONS
Stop Losartan  Stop  Alcohol and Cigars  In 2  weeks, then check blood pressure  in AM and BP for 2 days and send me an update in [employer] re: the blood pressure readings and how the lightheadedness is doing  with changes above  Continue other medications  Prescriptions refilled on file at your pharmacy to be filled in the future when needed  I would recommend an updated covid vaccination and an influenza/flu vaccination in the Fall (October) 2024 at the clinic or any pharmacy  I would recommend  a  RSV vaccine (respiratory syncytial virus risk reduction) in September. Get at a pharmacy  See Urology in  December as scheduled  Vanicream moisturizer lower legs for skin dryness as needed  Chest Xray today  Follow-up with Pulmonary as scheduled  Labs as  ordered  Pt was informed regarding extra E&M billing for management of new or established medical issues not related to today's wellness/screening visit

## 2024-09-23 ENCOUNTER — OFFICE VISIT (OUTPATIENT)
Dept: PULMONOLOGY | Facility: CLINIC | Age: 84
End: 2024-09-23
Attending: INTERNAL MEDICINE
Payer: COMMERCIAL

## 2024-09-23 ENCOUNTER — OFFICE VISIT (OUTPATIENT)
Dept: PULMONOLOGY | Facility: CLINIC | Age: 84
End: 2024-09-23
Payer: COMMERCIAL

## 2024-09-23 VITALS
OXYGEN SATURATION: 97 % | SYSTOLIC BLOOD PRESSURE: 113 MMHG | BODY MASS INDEX: 24.26 KG/M2 | HEART RATE: 73 BPM | RESPIRATION RATE: 20 BRPM | DIASTOLIC BLOOD PRESSURE: 78 MMHG | WEIGHT: 164.3 LBS

## 2024-09-23 DIAGNOSIS — J44.9 CHRONIC OBSTRUCTIVE PULMONARY DISEASE, UNSPECIFIED COPD TYPE (H): ICD-10-CM

## 2024-09-23 PROCEDURE — 94726 PLETHYSMOGRAPHY LUNG VOLUMES: CPT | Performed by: INTERNAL MEDICINE

## 2024-09-23 PROCEDURE — 99205 OFFICE O/P NEW HI 60 MIN: CPT | Mod: 25 | Performed by: INTERNAL MEDICINE

## 2024-09-23 PROCEDURE — 94060 EVALUATION OF WHEEZING: CPT | Performed by: INTERNAL MEDICINE

## 2024-09-23 PROCEDURE — 94729 DIFFUSING CAPACITY: CPT | Performed by: INTERNAL MEDICINE

## 2024-09-23 ASSESSMENT — PAIN SCALES - GENERAL: PAINLEVEL: NO PAIN (0)

## 2024-09-23 NOTE — PROGRESS NOTES
[facility] SPECIALTY [address] [address]  Phone: [phone]  Fax: [phone]    Patient:  Eder Horan, Date of birth 1940  Date of Visit:  09/23/2024  Reason for Consult: COPD    Pulmonary Clinic New Patient Consult  Assessment and Plan:   Eder Horan is a 84 year old male with a history of sick sinus syndrome and chronictropic insufficiency and permanent atrial fibrillation on apixaban (no pacemaker per patient preference), prostate cancer, and COPD who presents to pulmonary clinic today for further evaluation and management of COPD.  COPD.  Moderate obstruction on PFTs with FEV 43% (z-score -2.71). Recently started on high dose Trelegy with seemingly good effect.  Encouraged to continue this 1 puff daily, remembering to rinse mouth out with water.  He should also continue to use albuterol as frequently as 2 puffs every 4-6 hours as needed, for increased symptoms of shortness of breath, chest tightness, cough or wheezing.  We additionally discussed that he may benefit from pre-treatment with albuterol prior to exercise, 2 puffs 10-15 minutes prior to exertion.  His main symptom at this point seems to be HANSEN, which I suspect is multi-factorial and related to cardiac dysfunction, COPD and likely some deconditioning.  I think he would be an excellent candidate for pulmonary rehab. He was agreeable to a referral and this was placed today.   Lung Cancer screening.  Does not qualify based on age.    Questions and concerns were answered to the patient's satisfaction.  he was provided with my contact information should new questions or concerns arise in the interim.  he should return to clinic in 6 months.   Up to date on Pneumonia vaccines (2006, 2015).  Reports he received RSV already (2024) and plans to receive flu and COVID shots in a few weeks.    Yelena Avina MD  Pulmonary and Critical Care Medicine    I spent 67 minutes on the date of encounter  "doing chart review, review of outside records, review of test results, conducting the patient visit, completing documentation and further activities as noted above.      History of Present Illness    Eder Horan is a 84 year old male with a history of sick sinus syndrome and chronictropic insufficiency and permanent atrial fibrillation on apixaban (no pacemaker per patient preference), prostate cancer, and COPD who presents to pulmonary clinic today for further evaluation and management of COPD.  Review of the record is notable for the following:  -Cardiology visit 5/8 - note reviewed and relevant for sick sinus syndrome and permanent a-fib on apixaban.  Not suggests patient has repeatedly declined pacer placement.    Eder presents to clinic today and reports the following:  -Has had \"lung problems\" for quite a while - maybe 10-12 years ago.  With respect to COPD he is currently maintained on Trelegy 200.  -Previously was a kids  but slowly noticed that he couldn't keep up so had to stop.  -Started Trelegy maybe 2 months ago and reports that it's going fine and seems to be helping.  -Still notes some shortness of breath with exertion.  For example, will be winded after taking a flight of stairs.  He is also able to mow his lawn with a self propelled mower but is puffing at completion. When walks with people, he often has to tell them to slow down but if walking at his own pace is able to get around without too much difficulty.  -Has a cough with chronic phlegm production which seems to have gotten thicker recently.  Also states his nose runs all the time. Phlegm is clear.  -No hemoptysis. No fevers.  -Used to get 1 good long cold every winter.  This has not happened in the last few years though.  -Has Ventolin on hand as needed for rescue, not using very much.  Reports less than once per week usage.  -Long history of seasonal allergies/hayfever.  This is better than it was when he was younger.  " Occasionally takes Zyrtec or similar which helps dry up secretions.  -No issues with heartburn or acid reflux.  Gets rare heartburn.      -Long smoking history including both cigarettes and pipe.  Still occasionally smokes cigars.  Started smoking at age 15, quit cigarettes about 5 years later then restarted and quit again in .  Started pipe smoking in  and quit in .    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  Retired in  and started working as a youth .  -No pets at home.  -No bird exposure, jacuzzi use, recent travel.  -uses down bedding in winter time.    Family history notable for mother who passed away from lung cancer (+tobacco).  Younger sister  of emphysema (+ tobacco).      Review of Systems:  10 of 14 systems reviewed and are negative unless otherwise stated in HPI.    Past Medical History:   Diagnosis Date    Actinic keratosis     face    ALLERGIC RHINITIS      Atrial fibrillation (H)     B12 deficiency 2019    Back pain     s/p LESI through ortho 2010    Basal cell carcinoma     Bradycardia     Chronotropic incompetence with sinus node dysfunction     COPD (chronic obstructive pulmonary disease) (H)     DVT      Left calf . Right calf     Embolic stroke (H) 2017    superior cerebellar artery occlusion. Hx A fib    Hyperlipidemia LDL goal <130 5/10/2011    Hypertension     HYPOTHYROIDISM      hypothyroidism    Lateral epicondylitis  of elbow     left    Malignant melanoma (H)     PLANTAR FASCITIS     Prostate cancer (H)     on Lupron therapy    Raynaud's disease without gangrene 2018    Squamous cell carcinoma  2014     right lower lid, s/p MOHS    Syncope     TMJ (temporomandibular joint syndrome) 2012    Tobacco use disorder          Current Outpatient Medications:     albuterol (PROAIR HFA/PROVENTIL HFA/VENTOLIN HFA) 108 (90 Base) MCG/ACT inhaler, Inhale 2 puffs into the lungs every 4 hours as  needed for shortness of breath, Disp: 18 g, Rfl: 11    apixaban ANTICOAGULANT (ELIQUIS ANTICOAGULANT) 5 MG tablet, Take 1 tablet (5 mg) by mouth 2 times daily., Disp: 180 tablet, Rfl: 3    fluocinonide (LIDEX) 0.05 % external cream, Apply topically 2 times daily. As needed for rash, Disp: 120 g, Rfl: 2    Fluticasone-Umeclidin-Vilanterol (TRELEGY ELLIPTA) 200-62.5-25 MCG/ACT oral inhaler, Inhale 1 puff into the lungs daily., Disp: 3 each, Rfl: 3    furosemide (LASIX) 20 MG tablet, Take 1 tablet (20 mg) by mouth daily., Disp: 90 tablet, Rfl: 1    leuprolide, 4 Month, (ELIGARD) 30 MG, Inject 30 mg by subcutaneous route., Disp: , Rfl:     levothyroxine (SYNTHROID/LEVOTHROID) 112 MCG tablet, Take 1 tablet (112 mcg) by mouth daily., Disp: 90 tablet, Rfl: 3    metoprolol succinate ER (TOPROL XL) 25 MG 24 hr tablet, Take 1 tablet (25 mg) by mouth daily., Disp: 90 tablet, Rfl: 3    potassium chloride ER (K-TAB) 20 MEQ CR tablet, Take 1 tablet (20 mEq) by mouth daily., Disp: 90 tablet, Rfl: 3    PREDNISOLONE ACETATE OP, Apply 1 drop to eye every other day., Disp: , Rfl:     vitamin B-12 (CYANOCOBALAMIN) 2500 MCG sublingual tablet, Take 1 tab by mouth 3 times a week (WMF), Disp: , Rfl:     diclofenac (VOLTAREN) 1 % topical gel, Apply 4 g topically 4 times daily (Patient not taking: Reported on 9/23/2024), Disp: 100 g, Rfl: 1      Physical Exam:  /78 (BP Location: Left arm, Patient Position: Sitting, Cuff Size: Adult Regular)   Pulse 73   Resp 20   SpO2 97%   GENERAL: Well developed, well nourished, alert, and in no apparent distress.  HEENT: Normocephalic, atraumatic. PERRL, EOMI. Oral mucosa is moist. No perioral cyanosis.  NECK: supple, no obvious masses.  RESP:  Normal respiratory effort.  CTAB.  No rales, wheezes, rhonchi.  No cyanosis or clubbing.  CV: Normal S1, S2, irregular rhythm, normal rate. Trace b/l LE edema.   ABDOMEN: non-distended.   SKIN: warm and dry. No rash.  NEURO: Alert and oriented. Fluent  speech.  PSYCH: mentation appears normal.     Results (personally reviewed in clinic today):  PFTs: R 34%, FVC 94%, FEV1 43% (-2.71), %, %, DLCO 68%.  Study demonstrates a moderate obstructive ventilatory defect with hyperinflation, air trapping and mild impairment in diffusion.  Imaging:  CXR 9/16: Hyperinflated emphysematous lungs. No infiltrate, pleural effusion or pneumothorax. Stable tortuous thoracic aorta. Normal heart size.    The above note was dictated using voice recognition software and may include typographical errors. Please contact the author for any clarifications.

## 2024-09-23 NOTE — LETTER
9/23/2024      Eder Horan  [address] [facility] [address]      Dear Colleague,    Thank you for referring your patient, Eder Horan, to the [facility]. Please see a copy of my visit note below.      [facility]  [address]  [facility] [address]  Phone: [phone]  Fax: [phone]    Patient:  Eder Horan, Date of birth 1940  Date of Visit:  09/23/2024  Reason for Consult: COPD    Pulmonary Clinic New Patient Consult  Assessment and Plan:   Eder Horan is a 84 year old male with a history of sick sinus syndrome and chronictropic insufficiency and permanent atrial fibrillation on apixaban (no pacemaker per patient preference), prostate cancer, and COPD who presents to pulmonary clinic today for further evaluation and management of COPD.  COPD.  Moderate obstruction on PFTs with FEV 43% (z-score -2.71). Recently started on high dose Trelegy with seemingly good effect.  Encouraged to continue this 1 puff daily, remembering to rinse mouth out with water.  He should also continue to use albuterol as frequently as 2 puffs every 4-6 hours as needed, for increased symptoms of shortness of breath, chest tightness, cough or wheezing.  We additionally discussed that he may benefit from pre-treatment with albuterol prior to exercise, 2 puffs 10-15 minutes prior to exertion.  His main symptom at this point seems to be HANSEN, which I suspect is multi-factorial and related to cardiac dysfunction, COPD and likely some deconditioning.  I think he would be an excellent candidate for pulmonary rehab. He was agreeable to a referral and this was placed today.   Lung Cancer screening.  Does not qualify based on age.    Questions and concerns were answered to the patient's satisfaction.  he was provided with my contact information should new questions or concerns arise in the interim.  he should return to clinic in 6 months.   Up  Make sure that you drink at least 5 glasses of water a day and it should be 8 oz glasses  Recommendations for weight loss:   Eat smaller portions at meals and drink water with meals  Avoid bread and bread products, like bagels and muffins  Avoid fast foods  Eat HEALTHY snacks  Avoid drinking soda or sweet drinks  Eat 3 to 5 servings of fruit and vegetables per day  Eat more fish and only lean meat  Wellness Visit for Adults   AMBULATORY CARE:   A wellness visit  is when you see your healthcare provider to get screened for health problems  You can also get advice on how to stay healthy  Write down your questions so you remember to ask them  Ask your healthcare provider how often you should have a wellness visit  What happens at a wellness visit:  Your healthcare provider will ask about your health, and your family history of health problems  This includes high blood pressure, heart disease, and cancer  He or she will ask if you have symptoms that concern you, if you smoke, and about your mood  You may also be asked about your intake of medicines, supplements, food, and alcohol  Any of the following may be done:  · Your weight  will be checked  Your height may also be checked so your body mass index (BMI) can be calculated  Your BMI shows if you are at a healthy weight  · Your blood pressure  and heart rate will be checked  Your temperature may also be checked  · Blood and urine tests  may be done  Blood tests may be done to check your cholesterol levels  Abnormal cholesterol levels increase your risk for heart disease and stroke  You may also need a blood or urine test to check for diabetes if you are at increased risk  Urine tests may be done to look for signs of an infection or kidney disease  · A physical exam  includes checking your heartbeat and lungs with a stethoscope  Your healthcare provider may also check your skin to look for sun damage       · Screening tests  may be "to date on Pneumonia vaccines (2006, 2015).  Reports he received RSV already (2024) and plans to receive flu and COVID shots in a few weeks.    Yelena Avina MD  Pulmonary and Critical Care Medicine    I spent 67 minutes on the date of encounter doing chart review, review of outside records, review of test results, conducting the patient visit, completing documentation and further activities as noted above.      History of Present Illness    Eder Horan is a 84 year old male with a history of sick sinus syndrome and chronictropic insufficiency and permanent atrial fibrillation on apixaban (no pacemaker per patient preference), prostate cancer, and COPD who presents to pulmonary clinic today for further evaluation and management of COPD.  Review of the record is notable for the following:  -Cardiology visit 5/8 - note reviewed and relevant for sick sinus syndrome and permanent a-fib on apixaban.  Not suggests patient has repeatedly declined pacer placement.    Eder presents to clinic today and reports the following:  -Has had \"lung problems\" for quite a while - maybe 10-12 years ago.  With respect to COPD he is currently maintained on Trelegy 200.  -Previously was a kids  but slowly noticed that he couldn't keep up so had to stop.  -Started Trelegy maybe 2 months ago and reports that it's going fine and seems to be helping.  -Still notes some shortness of breath with exertion.  For example, will be winded after taking a flight of stairs.  He is also able to mow his lawn with a self propelled mower but is puffing at completion. When walks with people, he often has to tell them to slow down but if walking at his own pace is able to get around without too much difficulty.  -Has a cough with chronic phlegm production which seems to have gotten thicker recently.  Also states his nose runs all the time. Phlegm is clear.  -No hemoptysis. No fevers.  -Used to get 1 good long cold every winter.  This has not " recommended  A screening test is done to check for diseases that may not cause symptoms  The screening tests you may need depend on your age, gender, family history, and lifestyle habits  For example, colorectal screening may be recommended if you are 48years old or older  Screening tests you need if you are a woman:   · A Pap smear  is used to screen for cervical cancer  Pap smears are usually done every 3 to 5 years depending on your age  You may need them more often if you have had abnormal Pap smear test results in the past  Ask your healthcare provider how often you should have a Pap smear  · A mammogram  is an x-ray of your breasts to screen for breast cancer  Experts recommend mammograms every 2 years starting at age 48 years  You may need a mammogram at age 52 years or younger if you have an increased risk for breast cancer  Talk to your healthcare provider about when you should start having mammograms and how often you need them  Vaccines you may need:   · Get an influenza vaccine  every year  The influenza vaccine protects you from the flu  Several types of viruses cause the flu  The viruses change over time, so new vaccines are made each year  · Get a tetanus-diphtheria (Td) booster vaccine  every 10 years  This vaccine protects you against tetanus and diphtheria  Tetanus is a severe infection that may cause painful muscle spasms and lockjaw  Diphtheria is a severe bacterial infection that causes a thick covering in the back of your mouth and throat  · Get a human papillomavirus (HPV) vaccine  if you are female and aged 23 to 32 or male 23 to 24 and never received it  This vaccine protects you from HPV infection  HPV is the most common infection spread by sexual contact  HPV may also cause vaginal, penile, and anal cancers  · Get a pneumococcal vaccine  if you are aged 72 years or older  The pneumococcal vaccine is an injection given to protect you from pneumococcal disease   Pneumococcal disease is an infection caused by pneumococcal bacteria  The infection may cause pneumonia, meningitis, or an ear infection  · Get a shingles vaccine  if you are aged 61 or older, even if you have had shingles before  The shingles vaccine is an injection to protect you from the varicella-zoster virus  This is the same virus that causes chickenpox  Shingles is a painful rash that develops in people who had chickenpox or have been exposed to the virus  How to eat healthy:  My Plate is a model for planning healthy meals  It shows the types and amounts of foods that should go on your plate  Fruits and vegetables make up about half of your plate, and grains and protein make up the other half  A serving of dairy is included on the side of your plate  The amount of calories and serving sizes you need depends on your age, gender, weight, and height  Examples of healthy foods are listed below:  · Eat a variety of vegetables  such as dark green, red, and orange vegetables  You can also include canned vegetables low in sodium (salt) and frozen vegetables without added butter or sauces  · Eat a variety of fresh fruits , canned fruit in 100% juice, frozen fruit, and dried fruit  · Include whole grains  At least half of the grains you eat should be whole grains  Examples include whole-wheat bread, wheat pasta, brown rice, and whole-grain cereals such as oatmeal     · Eat a variety of protein foods such as seafood (fish and shellfish), lean meat, and poultry without skin (turkey and chicken)  Examples of lean meats include pork leg, shoulder, or tenderloin, and beef round, sirloin, tenderloin, and extra lean ground beef  Other protein foods include eggs and egg substitutes, beans, peas, soy products, nuts, and seeds  · Choose low-fat dairy products such as skim or 1% milk or low-fat yogurt, cheese, and cottage cheese  · Limit unhealthy fats  such as butter, hard margarine, and shortening         Exercise: happened in the last few years though.  -Has Ventolin on hand as needed for rescue, not using very much.  Reports less than once per week usage.  -Long history of seasonal allergies/hayfever.  This is better than it was when he was younger.  Occasionally takes Zyrtec or similar which helps dry up secretions.  -No issues with heartburn or acid reflux.  Gets rare heartburn.      -Long smoking history including both cigarettes and pipe.  Still occasionally smokes cigars.  Started smoking at age 15, quit cigarettes about 5 years later then restarted and quit again in .  Started pipe smoking in  and quit in .    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  Retired in  and started working as a youth .  -No pets at home.  -No bird exposure, jacuzzi use, recent travel.  -uses down bedding in winter time.    Family history notable for mother who passed away from lung cancer (+tobacco).  Younger sister  of emphysema (+ tobacco).      Review of Systems:  10 of 14 systems reviewed and are negative unless otherwise stated in HPI.    Past Medical History:   Diagnosis Date     Actinic keratosis     face     ALLERGIC RHINITIS       Atrial fibrillation (H)      B12 deficiency 2019     Back pain     s/p LESI through ortho 2010     Basal cell carcinoma      Bradycardia      Chronotropic incompetence with sinus node dysfunction      COPD (chronic obstructive pulmonary disease) (H)      DVT      Left calf . Right calf      Embolic stroke (H) 2017    superior cerebellar artery occlusion. Hx A fib     Hyperlipidemia LDL goal <130 5/10/2011     Hypertension      HYPOTHYROIDISM      hypothyroidism     Lateral epicondylitis  of elbow     left     Malignant melanoma (H)      PLANTAR FASCITIS      Prostate cancer (H)     on Lupron therapy     Raynaud's disease without gangrene 2018     Squamous cell carcinoma  2014     right lower lid, s/p MOHS      Syncope      TMJ (temporomandibular joint syndrome) 4/12/2012     Tobacco use disorder          Current Outpatient Medications:      albuterol (PROAIR HFA/PROVENTIL HFA/VENTOLIN HFA) 108 (90 Base) MCG/ACT inhaler, Inhale 2 puffs into the lungs every 4 hours as needed for shortness of breath, Disp: 18 g, Rfl: 11     apixaban ANTICOAGULANT (ELIQUIS ANTICOAGULANT) 5 MG tablet, Take 1 tablet (5 mg) by mouth 2 times daily., Disp: 180 tablet, Rfl: 3     fluocinonide (LIDEX) 0.05 % external cream, Apply topically 2 times daily. As needed for rash, Disp: 120 g, Rfl: 2     Fluticasone-Umeclidin-Vilanterol (TRELEGY ELLIPTA) 200-62.5-25 MCG/ACT oral inhaler, Inhale 1 puff into the lungs daily., Disp: 3 each, Rfl: 3     furosemide (LASIX) 20 MG tablet, Take 1 tablet (20 mg) by mouth daily., Disp: 90 tablet, Rfl: 1     leuprolide, 4 Month, (ELIGARD) 30 MG, Inject 30 mg by subcutaneous route., Disp: , Rfl:      levothyroxine (SYNTHROID/LEVOTHROID) 112 MCG tablet, Take 1 tablet (112 mcg) by mouth daily., Disp: 90 tablet, Rfl: 3     metoprolol succinate ER (TOPROL XL) 25 MG 24 hr tablet, Take 1 tablet (25 mg) by mouth daily., Disp: 90 tablet, Rfl: 3     potassium chloride ER (K-TAB) 20 MEQ CR tablet, Take 1 tablet (20 mEq) by mouth daily., Disp: 90 tablet, Rfl: 3     PREDNISOLONE ACETATE OP, Apply 1 drop to eye every other day., Disp: , Rfl:      vitamin B-12 (CYANOCOBALAMIN) 2500 MCG sublingual tablet, Take 1 tab by mouth 3 times a week (WMF), Disp: , Rfl:      diclofenac (VOLTAREN) 1 % topical gel, Apply 4 g topically 4 times daily (Patient not taking: Reported on 9/23/2024), Disp: 100 g, Rfl: 1      Physical Exam:  /78 (BP Location: Left arm, Patient Position: Sitting, Cuff Size: Adult Regular)   Pulse 73   Resp 20   SpO2 97%   GENERAL: Well developed, well nourished, alert, and in no apparent distress.  HEENT: Normocephalic, atraumatic. PERRL, EOMI. Oral mucosa is moist. No perioral cyanosis.  NECK: supple, no  Exercise at least 30 minutes per day on most days of the week  Some examples of exercise include walking, biking, dancing, and swimming  You can also fit in more physical activity by taking the stairs instead of the elevator or parking farther away from stores  Include muscle strengthening activities 2 days each week  Regular exercise provides many health benefits  It helps you manage your weight, and decreases your risk for type 2 diabetes, heart disease, stroke, and high blood pressure  Exercise can also help improve your mood  Ask your healthcare provider about the best exercise plan for you  General health and safety guidelines:   · Do not smoke  Nicotine and other chemicals in cigarettes and cigars can cause lung damage  Ask your healthcare provider for information if you currently smoke and need help to quit  E-cigarettes or smokeless tobacco still contain nicotine  Talk to your healthcare provider before you use these products  · Limit alcohol  A drink of alcohol is 12 ounces of beer, 5 ounces of wine, or 1½ ounces of liquor  · Lose weight, if needed  Being overweight increases your risk of certain health conditions  These include heart disease, high blood pressure, type 2 diabetes, and certain types of cancer  · Protect your skin  Do not sunbathe or use tanning beds  Use sunscreen with a SPF 15 or higher  Apply sunscreen at least 15 minutes before you go outside  Reapply sunscreen every 2 hours  Wear protective clothing, hats, and sunglasses when you are outside  · Drive safely  Always wear your seatbelt  Make sure everyone in your car wears a seatbelt  A seatbelt can save your life if you are in an accident  Do not use your cell phone when you are driving  This could distract you and cause an accident  Pull over if you need to make a call or send a text message  · Practice safe sex  Use latex condoms if are sexually active and have more than one partner   Your healthcare provider may recommend screening tests for sexually transmitted infections (STIs)  · Wear helmets, lifejackets, and protective gear  Always wear a helmet when you ride a bike or motorcycle, go skiing, or play sports that could cause a head injury  Wear protective equipment when you play sports  Wear a lifejacket when you are on a boat or doing water sports  © 2017 [address] Information is for End User's use only and may not be sold, redistributed or otherwise used for commercial purposes  All illustrations and images included in CareNotes® are the copyrighted property of [employer]  or Dawit Hdez  The above information is an  only  It is not intended as medical advice for individual conditions or treatments  Talk to your doctor, nurse or pharmacist before following any medical regimen to see if it is safe and effective for you  obvious masses.  RESP:  Normal respiratory effort.  CTAB.  No rales, wheezes, rhonchi.  No cyanosis or clubbing.  CV: Normal S1, S2, irregular rhythm, normal rate. Trace b/l LE edema.   ABDOMEN: non-distended.   SKIN: warm and dry. No rash.  NEURO: Alert and oriented. Fluent speech.  PSYCH: mentation appears normal.     Results (personally reviewed in clinic today):  PFTs: R 34%, FVC 94%, FEV1 43% (-2.71), %, %, DLCO 68%.  Study demonstrates a moderate obstructive ventilatory defect with hyperinflation, air trapping and mild impairment in diffusion.  Imaging:  CXR 9/16: Hyperinflated emphysematous lungs. No infiltrate, pleural effusion or pneumothorax. Stable tortuous thoracic aorta. Normal heart size.    The above note was dictated using voice recognition software and may include typographical errors. Please contact the author for any clarifications.                                      Again, thank you for allowing me to participate in the care of your patient.        Sincerely,        Beata Avina MD

## 2024-09-23 NOTE — NURSING NOTE
Chief Complaint   Patient presents with    New Patient     Chronic obstructive pulmonary disease, unspecified COPD type       Vitals:    09/23/24 0951   BP: 113/78   BP Location: Left arm   Patient Position: Sitting   Cuff Size: Adult Regular   Pulse: 73   Resp: 20   SpO2: 97%   Weight: 74.5 kg (164 lb 4.8 oz)       Body mass index is 24.26 kg/m .      Kye Del Rio MA

## 2024-09-23 NOTE — PATIENT INSTRUCTIONS
-Continue Trelegy 1 puff daily.  Remember to rinse mouth out with water following use to decrease risk of thrush.  -May use albuterol as frequently as 2 puffs every 4-6 hours as needed, for increased symptoms of shortness of breath, chest tightness, cough or wheezing.  You may also benefit from pre-treatment with albuterol prior to exercise, 2 puffs 10-15 minutes prior to exertion.  -Referral placed for pulmonary rehabilitation - they will contact you to provide more information.  -Get Flu and COVID vaccines later this fall, as you plan to.    Return to clinic in 6 months for follow up.

## 2024-09-23 NOTE — PROGRESS NOTES
Eder Horan comes into clinic today at the request of , Ordering Provider for PFT    This service provided today was under the supervising provider of the day Dr. Avina, who was available if needed.    Kb Steele, RT

## 2024-09-24 LAB
DLCOCOR-%PRED-PRE: 68 %
DLCOCOR-PRE: 15.68 ML/MIN/MMHG
DLCOUNC-%PRED-PRE: 67 %
DLCOUNC-PRE: 15.45 ML/MIN/MMHG
DLCOUNC-PRED: 22.93 ML/MIN/MMHG
ERV-%PRED-PRE: 36 %
ERV-PRE: 0.43 L
ERV-PRED: 1.19 L
EXPTIME-PRE: 14.22 SEC
FEF2575-%PRED-POST: 25 %
FEF2575-%PRED-PRE: 18 %
FEF2575-POST: 0.43 L/SEC
FEF2575-PRE: 0.32 L/SEC
FEF2575-PRED: 1.7 L/SEC
FEFMAX-%PRED-PRE: 51 %
FEFMAX-PRE: 3.2 L/SEC
FEFMAX-PRED: 6.25 L/SEC
FEV1-%PRED-PRE: 43 %
FEV1-PRE: 1.07 L
FEV1FEV6-PRE: 44 %
FEV1FEV6-PRED: 75 %
FEV1FVC-PRE: 34 %
FEV1FVC-PRED: 75 %
FEV1SVC-PRE: 35 %
FEV1SVC-PRED: 67 %
FIFMAX-PRE: 4.11 L/SEC
FRCPLETH-%PRED-PRE: 177 %
FRCPLETH-PRE: 6.72 L
FRCPLETH-PRED: 3.78 L
FVC-%PRED-PRE: 94 %
FVC-PRE: 3.13 L
FVC-PRED: 3.3 L
IC-%PRED-PRE: 108 %
IC-PRE: 2.6 L
IC-PRED: 2.38 L
RVPLETH-%PRED-PRE: 213 %
RVPLETH-PRE: 6.29 L
RVPLETH-PRED: 2.95 L
TLCPLETH-%PRED-PRE: 134 %
TLCPLETH-PRE: 9.32 L
TLCPLETH-PRED: 6.92 L
VA-%PRED-PRE: 78 %
VA-PRE: 4.71 L
VC-%PRED-PRE: 82 %
VC-PRE: 3.03 L
VC-PRED: 3.66 L

## 2024-09-28 PROBLEM — I87.2 VENOUS STASIS DERMATITIS, UNSPECIFIED LATERALITY: Status: ACTIVE | Noted: 2024-09-28

## 2024-09-28 PROBLEM — F10.10 EXCESSIVE DRINKING OF ALCOHOL: Status: ACTIVE | Noted: 2024-09-28

## 2024-10-02 ENCOUNTER — MYC MEDICAL ADVICE (OUTPATIENT)
Dept: INTERNAL MEDICINE | Facility: CLINIC | Age: 84
End: 2024-10-02

## 2024-10-17 ENCOUNTER — HOSPITAL ENCOUNTER (OUTPATIENT)
Dept: CARDIAC REHAB | Facility: CLINIC | Age: 84
Discharge: HOME | End: 2024-10-17
Attending: INTERNAL MEDICINE
Payer: COMMERCIAL

## 2024-10-17 DIAGNOSIS — J44.9 CHRONIC OBSTRUCTIVE PULMONARY DISEASE, UNSPECIFIED COPD TYPE (H): ICD-10-CM

## 2024-10-17 PROCEDURE — 94625 PHY/QHP OP PULM RHB W/O MNTR: CPT | Performed by: OCCUPATIONAL THERAPIST

## 2024-10-17 PROCEDURE — 94626 PHY/QHP OP PULM RHB W/MNTR: CPT

## 2024-10-24 ENCOUNTER — HOSPITAL ENCOUNTER (OUTPATIENT)
Dept: CARDIAC REHAB | Facility: CLINIC | Age: 84
Discharge: HOME | End: 2024-10-24
Attending: INTERNAL MEDICINE
Payer: COMMERCIAL

## 2024-10-24 PROCEDURE — 94625 PHY/QHP OP PULM RHB W/O MNTR: CPT

## 2024-10-31 ENCOUNTER — HOSPITAL ENCOUNTER (OUTPATIENT)
Dept: CARDIAC REHAB | Facility: CLINIC | Age: 84
Discharge: HOME | End: 2024-10-31
Attending: INTERNAL MEDICINE
Payer: COMMERCIAL

## 2024-10-31 PROCEDURE — 94625 PHY/QHP OP PULM RHB W/O MNTR: CPT

## 2024-11-09 ENCOUNTER — OFFICE VISIT (OUTPATIENT)
Dept: URGENT CARE | Facility: URGENT CARE | Age: 84
End: 2024-11-09
Payer: COMMERCIAL

## 2024-11-09 VITALS
BODY MASS INDEX: 24.66 KG/M2 | TEMPERATURE: 97.9 F | WEIGHT: 167 LBS | SYSTOLIC BLOOD PRESSURE: 126 MMHG | HEART RATE: 82 BPM | DIASTOLIC BLOOD PRESSURE: 90 MMHG | OXYGEN SATURATION: 97 % | RESPIRATION RATE: 20 BRPM

## 2024-11-09 DIAGNOSIS — J06.9 VIRAL UPPER RESPIRATORY TRACT INFECTION WITH COUGH: Primary | ICD-10-CM

## 2024-11-09 LAB
DEPRECATED S PYO AG THROAT QL EIA: NEGATIVE
FLUAV AG SPEC QL IA: NEGATIVE
FLUBV AG SPEC QL IA: NEGATIVE
GROUP A STREP BY PCR: NOT DETECTED

## 2024-11-09 PROCEDURE — 87651 STREP A DNA AMP PROBE: CPT | Performed by: PHYSICIAN ASSISTANT

## 2024-11-09 PROCEDURE — 99213 OFFICE O/P EST LOW 20 MIN: CPT | Performed by: PHYSICIAN ASSISTANT

## 2024-11-09 PROCEDURE — 87804 INFLUENZA ASSAY W/OPTIC: CPT

## 2024-11-09 NOTE — PATIENT INSTRUCTIONS
Patient was educated on the natural course of viral illness which typically lasts up to 10 days.  Flu was negative. Declined COVID testing. Conservative measures discussed including increased fluids, nasal saline irrigation (neti pot), warm steamy shower, expectorants (Mucinex), and analgesics (Tylenol and/or Ibuprofen). See your primary care provider if symptoms worsen or do not improve in 7 days. Seek emergency care if you develop fever over 104 or shortness of breath.

## 2024-11-09 NOTE — PROGRESS NOTES
URGENT CARE VISIT:    SUBJECTIVE:   Eder Horan is a 84 year old male presenting with a chief complaint of fever, stuffy nose, and cough - productive.  Onset was 3 day(s) ago.   He denies the following symptoms: shortness of breath, vomiting, and diarrhea  Course of illness is improving today.   Treatment measures tried include None tried with some relief of symptoms.  Predisposing factors include None.    PMH:   Past Medical History:   Diagnosis Date    Actinic keratosis 06/2009    face    ALLERGIC RHINITIS      Atrial fibrillation (H) 06/2008    B12 deficiency 01/16/2019    Back pain     s/p LESI through ortho Feb 2010    Basal cell carcinoma     Benign essential hypertension     Bradycardia     Chronic obstructive pulmonary disease, unspecified COPD type (H) 02/09/2017    Chronotropic incompetence with sinus node dysfunction     COPD (chronic obstructive pulmonary disease) (H)     DVT      Left calf 2003. Right calf 6/06    Edema, unspecified type 07/25/2020    Embolic stroke (H) 06/11/2017    superior cerebellar artery occlusion. Hx A fib    Excessive drinking of alcohol 09/28/2024    Hyperlipidemia LDL goal <130 05/10/2011    Hypertension     HYPOTHYROIDISM      hypothyroidism    Hypothyroidism, unspecified type 03/15/2020    Lateral epicondylitis  of elbow 07/2005    left    Malignant melanoma (H)     Paroxysmal atrial fibrillation (H)     PLANTAR FASCITIS     Prostate cancer (H) 06/2009    on Lupron therapy    Raynaud's disease without gangrene 11/26/2018    Squamous cell carcinoma 09/2014     right lower lid, s/p MOHS    Syncope     TMJ (temporomandibular joint syndrome) 04/12/2012    Tobacco use disorder     Venous stasis dermatitis, unspecified laterality 09/28/2024     Allergies: Bactrim [sulfa antibiotics] and Sulfamethoxazole-trimethoprim   Medications:   Current Outpatient Medications   Medication Sig Dispense Refill    albuterol (PROAIR HFA/PROVENTIL HFA/VENTOLIN HFA) 108 (90 Base) MCG/ACT inhaler  Inhale 2 puffs into the lungs every 4 hours as needed for shortness of breath 18 g 11    apixaban ANTICOAGULANT (ELIQUIS ANTICOAGULANT) 5 MG tablet Take 1 tablet (5 mg) by mouth 2 times daily. 180 tablet 3    fluocinonide (LIDEX) 0.05 % external cream Apply topically 2 times daily. As needed for rash 120 g 2    Fluticasone-Umeclidin-Vilanterol (TRELEGY ELLIPTA) 200-62.5-25 MCG/ACT oral inhaler Inhale 1 puff into the lungs daily. 3 each 3    furosemide (LASIX) 20 MG tablet Take 1 tablet (20 mg) by mouth daily. 90 tablet 1    levothyroxine (SYNTHROID/LEVOTHROID) 112 MCG tablet Take 1 tablet (112 mcg) by mouth daily. 90 tablet 3    metoprolol succinate ER (TOPROL XL) 25 MG 24 hr tablet Take 1 tablet (25 mg) by mouth daily. 90 tablet 3    potassium chloride ER (K-TAB) 20 MEQ CR tablet Take 1 tablet (20 mEq) by mouth daily. 90 tablet 3    PREDNISOLONE ACETATE OP Apply 1 drop to eye every other day.      vitamin B-12 (CYANOCOBALAMIN) 2500 MCG sublingual tablet Take 1 tab by mouth 3 times a week (WMF)      diclofenac (VOLTAREN) 1 % topical gel Apply 4 g topically 4 times daily (Patient not taking: Reported on 2024) 100 g 1    leuprolide, 4 Month, (ELIGARD) 30 MG Inject 30 mg by subcutaneous route. (Patient not taking: Reported on 2024)       Social History:   Social History     Tobacco Use    Smoking status: Some Days     Types: Cigars     Last attempt to quit: 2006     Years since quittin.9    Smokeless tobacco: Never    Tobacco comments:      occasionally cigar   Substance Use Topics    Alcohol use: Yes     Alcohol/week: 0.0 standard drinks of alcohol     Comment: 2-3 drinks a day:  Wine/Beer       ROS:  Review of systems negative except as stated above.    OBJECTIVE:  BP (!) 126/90 (BP Location: Right arm, Patient Position: Sitting, Cuff Size: Adult Large)   Pulse 82   Temp 97.9  F (36.6  C) (Tympanic)   Resp 20   Wt 75.8 kg (167 lb)   SpO2 97%   BMI 24.66 kg/m    GENERAL APPEARANCE: healthy,  alert and no distress  EYES: EOMI,  PERRL, conjunctiva clear  HENT: ear canals and TM's normal.  Nose and mouth without ulcers, erythema or lesions  NECK: supple, nontender, no lymphadenopathy  RESP: lungs clear to auscultation - no rales, rhonchi or wheezes  CV: regular rates and rhythm, normal S1 S2, no murmur noted  SKIN: no suspicious lesions or rashes    Labs:    Results for orders placed or performed in visit on 11/09/24   Streptococcus A Rapid Screen w/Reflex to PCR - Clinic Collect     Status: Normal    Specimen: Throat; Swab   Result Value Ref Range    Group A Strep antigen Negative Negative   Influenza A & B Antigen - Clinic Collect     Status: Normal    Specimen: Nose; Swab   Result Value Ref Range    Influenza A antigen Negative Negative    Influenza B antigen Negative Negative    Narrative    Test results must be correlated with clinical data. If necessary, results should be confirmed by a molecular assay or viral culture.       ASSESSMENT:    ICD-10-CM    1. Viral upper respiratory tract infection with cough  J06.9 Streptococcus A Rapid Screen w/Reflex to PCR - Clinic Collect     Influenza A & B Antigen - Clinic Collect     Group A Streptococcus PCR Throat Swab          PLAN:  Patient Instructions   Patient was educated on the natural course of viral illness which typically lasts up to 10 days.  Flu was negative. Declined COVID testing. Conservative measures discussed including increased fluids, nasal saline irrigation (neti pot), warm steamy shower, expectorants (Mucinex), and analgesics (Tylenol and/or Ibuprofen). See your primary care provider if symptoms worsen or do not improve in 7 days. Seek emergency care if you develop fever over 104 or shortness of breath.    Patient verbalized understanding and is agreeable to plan. The patient was discharged ambulatory and in stable condition.    Marichuy Juarez PA-C ....................  11/9/2024   11:18 AM

## 2024-11-12 ENCOUNTER — HOSPITAL ENCOUNTER (OUTPATIENT)
Dept: CARDIAC REHAB | Facility: CLINIC | Age: 84
Discharge: HOME | End: 2024-11-12
Attending: INTERNAL MEDICINE
Payer: COMMERCIAL

## 2024-11-12 PROCEDURE — 94625 PHY/QHP OP PULM RHB W/O MNTR: CPT

## 2024-11-19 ENCOUNTER — HOSPITAL ENCOUNTER (OUTPATIENT)
Dept: CARDIAC REHAB | Facility: CLINIC | Age: 84
Discharge: HOME | End: 2024-11-19
Attending: INTERNAL MEDICINE
Payer: COMMERCIAL

## 2024-11-19 PROCEDURE — 94625 PHY/QHP OP PULM RHB W/O MNTR: CPT | Performed by: REHABILITATION PRACTITIONER

## 2024-11-26 ENCOUNTER — HOSPITAL ENCOUNTER (OUTPATIENT)
Dept: CARDIAC REHAB | Facility: CLINIC | Age: 84
Discharge: HOME | End: 2024-11-26
Attending: INTERNAL MEDICINE
Payer: COMMERCIAL

## 2024-11-26 PROCEDURE — 94625 PHY/QHP OP PULM RHB W/O MNTR: CPT | Performed by: REHABILITATION PRACTITIONER

## 2024-12-10 ENCOUNTER — HOSPITAL ENCOUNTER (OUTPATIENT)
Dept: CARDIAC REHAB | Facility: CLINIC | Age: 84
Discharge: HOME | End: 2024-12-10
Attending: INTERNAL MEDICINE
Payer: COMMERCIAL

## 2024-12-10 PROCEDURE — 94625 PHY/QHP OP PULM RHB W/O MNTR: CPT

## 2024-12-13 ENCOUNTER — TRANSFERRED RECORDS (OUTPATIENT)
Dept: HEALTH INFORMATION MANAGEMENT | Facility: CLINIC | Age: 84
End: 2024-12-13
Payer: COMMERCIAL

## 2025-01-08 ENCOUNTER — OFFICE VISIT (OUTPATIENT)
Dept: CARDIOLOGY | Facility: CLINIC | Age: 85
End: 2025-01-08
Attending: NURSE PRACTITIONER
Payer: COMMERCIAL

## 2025-01-08 VITALS
HEIGHT: 69 IN | BODY MASS INDEX: 25 KG/M2 | WEIGHT: 168.8 LBS | HEART RATE: 68 BPM | OXYGEN SATURATION: 98 % | SYSTOLIC BLOOD PRESSURE: 121 MMHG | DIASTOLIC BLOOD PRESSURE: 79 MMHG

## 2025-01-08 DIAGNOSIS — I49.8 CHRONOTROPIC INCOMPETENCE WITH SINUS NODE DYSFUNCTION: ICD-10-CM

## 2025-01-08 DIAGNOSIS — I48.21 PERMANENT ATRIAL FIBRILLATION (H): Primary | ICD-10-CM

## 2025-01-08 DIAGNOSIS — R06.09 DYSPNEA ON EXERTION: ICD-10-CM

## 2025-01-08 DIAGNOSIS — I10 BENIGN ESSENTIAL HYPERTENSION: ICD-10-CM

## 2025-01-08 RX ORDER — METOPROLOL SUCCINATE 25 MG/1
12.5 TABLET, EXTENDED RELEASE ORAL DAILY
COMMUNITY
Start: 2025-01-08

## 2025-01-08 NOTE — PROGRESS NOTES
Cardiology Clinic Progress Note  Eder Horan MRN# [id number]   YOB: 1940 Age: 85 year old       HPI:    Eder Horan is a delightful 85 year old patient with past medical history significant for:    Sick sinus syndrome with chronotropic incompetence.  Patient repeatedly declined pacemaker implantation in the past.  In 2022 developed persistent A-fib with no evidence of bradycardia.  Permanent atrial fibrillation.  Used to be on flecainide stopped in 2021. On metoprolol ER 25 mg qpm  [id number] score 5 on Apixaban 5 mg bid  CVA 2017 treated with tPA.  The patient had not been on anticoagulation before his stroke.  Hypertension  Hypothyroidism  COPD    It is my pleasure seeing Eder today in follow-up.  Biggest concern is daytime somnolence.  He tends to be more sedentary in the winter.  He has a treadmill at home, but admits they do not use it.  He used to be in Silver sneakers years ago, but his wife does not like to leave the house in the wintertime.  He was doing pulmonary rehab, but states it was on Tuesdays and Thursdays which are days he tends to have other activities (such as cards).    Denies chest pain, orthopnea, PND, syncope, lower extremity edema.  He does get dyspnea with exertion which is not new.  He also complains of thickened mucus in the morning which could be very likely related to his COPD.      Diagnotic studies:  Zio patch 5/28/2024: Patient wore monitor for 1.5 days.  In atrial fibrillation with average heart rate of 85 bpm, minimum 58 bpm and max of 143 bpm.  Echocardiogram 5/6/2024: EF 55 to 60%.  No wall motion abnormalities.  1-2+ MR.  2+ TR.  RVSP was 25 mmHg plus RAP.  Echocardiogram 10/2023: EF 55 to 60%.  2+ MR, 3+ TR, mild pulmonary hypertension.  EKG 10/2023: A-fib at 72 bpm      Latest Reference Range & Units 09/16/24 09:21   Sodium 135 - 145 mmol/L 139   Potassium 3.4 - 5.3 mmol/L 4.5   Chloride 98 - 107 mmol/L 103   Carbon Dioxide (CO2) 22 - 29 mmol/L 26   Urea  Nitrogen 8.0 - 23.0 mg/dL 13.2   Creatinine 0.67 - 1.17 mg/dL 0.75   GFR Estimate >60 mL/min/1.73m2 88   Calcium 8.8 - 10.4 mg/dL 8.8   Anion Gap 7 - 15 mmol/L 10   Albumin 3.5 - 5.2 g/dL 3.8   Protein Total 6.4 - 8.3 g/dL 7.6   Alkaline Phosphatase 40 - 150 U/L 137   ALT 0 - 70 U/L 7   AST 0 - 45 U/L 18   Bilirubin Total <=1.2 mg/dL 0.7   Cholesterol <200 mg/dL 152         Plan:   1. Sinus node dysfunction with chronotropic incompetence/permanent atrial fibrillation.  Last monitor showed adequate heart rate variation.  Patient still has daytime fatigue especially after his morning breakfast.  Instructed him to decrease metoprolol ER to 12.5 mg every afternoon.  Will consider discontinuing this altogether at our next visit if still having fatigue.  I would then place a monitor if metoprolol was stopped completely.      2. CHADS-VASc 5 on Eliquis 5 mg bid.  Appropriate dosing for weight and creatinine    3.  Hypertension, currently normotensive on metoprolol ER 25 mg daily.  Metoprolol was decreased to 12.5 mg daily     4.  Hypothyroidism, on levothyroxine.  Last TSH was 1     5.  COPD.  [facility] patient to consider going back to pulmonary rehab.  Complaining of thick mucus which is difficult to cough up in the morning.  Recommended Mucinex OTC    6.  Fatigue most likely multifactorial.  Patient is very sedentary.  Encouraged a walking program 3 times a week.  Patient could either go to the mall or use his treadmill at home.  I also recommended restarting pulmonary rehab.    I will see him back in 6 months.  If still fatigued I will then stop his metoprolol.  I will place a Zio patch on him once metoprolol is discontinued.  We may need to consider a calcium channel blocker.    Thank you for including me in his care.  Please feel free to contact us with questions or concerns.  30 minutes was completed today greater than 50% was related to counseling.    Amira Castro, NP, APRN CNP            Today's clinic visit  entailed:  Review of the result(s) of each unique test - Zio  Prescription drug management  30 minutes spent by me on the date of the encounter doing chart review, review of test results, patient visit, and documentation   Provider  Link to [facility] Help Grid     The level of medical decision making during this visit was of moderate complexity.      Orders Placed This Encounter   Procedures    Follow-Up with Cardiology ELISHA     Orders Placed This Encounter   Medications    metoprolol succinate ER (TOPROL XL) 25 MG 24 hr tablet     Sig: Take 0.5 tablets (12.5 mg) by mouth daily.     Medications Discontinued During This Encounter   Medication Reason    metoprolol succinate ER (TOPROL XL) 25 MG 24 hr tablet     diclofenac (VOLTAREN) 1 % topical gel          Encounter Diagnoses   Name Primary?    Permanent atrial fibrillation (H)     Paroxysmal atrial fibrillation (H)        CURRENT MEDICATIONS:  Current Outpatient Medications   Medication Sig Dispense Refill    albuterol (PROAIR HFA/PROVENTIL HFA/VENTOLIN HFA) 108 (90 Base) MCG/ACT inhaler Inhale 2 puffs into the lungs every 4 hours as needed for shortness of breath 18 g 11    apixaban ANTICOAGULANT (ELIQUIS ANTICOAGULANT) 5 MG tablet Take 1 tablet (5 mg) by mouth 2 times daily. 180 tablet 3    fluocinonide (LIDEX) 0.05 % external cream Apply topically 2 times daily. As needed for rash 120 g 2    Fluticasone-Umeclidin-Vilanterol (TRELEGY ELLIPTA) 200-62.5-25 MCG/ACT oral inhaler Inhale 1 puff into the lungs daily. 3 each 3    furosemide (LASIX) 20 MG tablet Take 1 tablet (20 mg) by mouth daily. 90 tablet 1    leuprolide, 4 Month, (ELIGARD) 30 MG       levothyroxine (SYNTHROID/LEVOTHROID) 112 MCG tablet Take 1 tablet (112 mcg) by mouth daily. 90 tablet 3    metoprolol succinate ER (TOPROL XL) 25 MG 24 hr tablet Take 0.5 tablets (12.5 mg) by mouth daily.      potassium chloride ER (K-TAB) 20 MEQ CR tablet Take 1 tablet (20 mEq) by mouth daily. 90 tablet 3    PREDNISOLONE  ACETATE OP Apply 1 drop to eye every other day.      vitamin B-12 (CYANOCOBALAMIN) 2500 MCG sublingual tablet Take 1 tab by mouth 3 times a week (WMF)         ALLERGIES     Allergies   Allergen Reactions    Bactrim [Sulfa Antibiotics] Rash    Sulfamethoxazole-Trimethoprim      Other Reaction(s): Not available       PAST MEDICAL HISTORY:  Past Medical History:   Diagnosis Date    Actinic keratosis 06/2009    face    ALLERGIC RHINITIS      Atrial fibrillation (H) 06/2008    B12 deficiency 01/16/2019    Back pain     s/p LESI through ortho Feb 2010    Basal cell carcinoma     Benign essential hypertension     Bradycardia     Chronic obstructive pulmonary disease, unspecified COPD type (H) 02/09/2017    Chronotropic incompetence with sinus node dysfunction     COPD (chronic obstructive pulmonary disease) (H)     DVT      Left calf 2003. Right calf 6/06    Edema, unspecified type 07/25/2020    Embolic stroke (H) 06/11/2017    superior cerebellar artery occlusion. Hx A fib    Excessive drinking of alcohol 09/28/2024    Hyperlipidemia LDL goal <130 05/10/2011    Hypertension     HYPOTHYROIDISM      hypothyroidism    Hypothyroidism, unspecified type 03/15/2020    Lateral epicondylitis  of elbow 07/2005    left    Malignant melanoma (H)     Paroxysmal atrial fibrillation (H)     PLANTAR FASCITIS     Prostate cancer (H) 06/2009    on Lupron therapy    Raynaud's disease without gangrene 11/26/2018    Squamous cell carcinoma 09/2014     right lower lid, s/p MOHS    Syncope     TMJ (temporomandibular joint syndrome) 04/12/2012    Tobacco use disorder     Venous stasis dermatitis, unspecified laterality 09/28/2024       PAST SURGICAL HISTORY:  Past Surgical History:   Procedure Laterality Date    ABDOMEN SURGERY  2004,06,09    Hernias (2) Prostate Removal(2009)    BIOPSY  2009    Prostate    CARDIOVERSION  10-16-08    failed    COLONOSCOPY  2004    EYE SURGERY      2013 lump from lower  left eye lid removed    GENITOURINARY  SURGERY  2009    Prostate    HERNIA REPAIR   -     [facility] NONSPECIFIC PROCEDURE      Left groin exploration and left inguinal herniorrhaphy    [facility] NONSPECIFIC PROCEDURE      Prostate bx (benign)    [facility] NONSPECIFIC PROCEDURE      Wide local excision (left side), robotic-assisted laparoscopic prostatectomy and   Left pelvic lymph node dissection    [facility] NONSPECIFIC PROCEDURE  2010    Right inguinal herniorrhaphy with mesh       FAMILY HISTORY:  Family History   Problem Relation Age of Onset    C.A.D. Father         heart attack- angina    Coronary Artery Disease Father     Myocardial Infarction Father     Cancer Mother         lung    Lung Cancer Mother     Lung Cancer Sister     Unknown/Adopted Maternal Grandmother     Unknown/Adopted Maternal Grandfather     Unknown/Adopted Paternal Grandmother     Unknown/Adopted Paternal Grandfather     Unknown/Adopted Sister        SOCIAL HISTORY:  Social History     Socioeconomic History    Marital status:      Spouse name: None    Number of children: 3    Years of education: None    Highest education level: None   Occupational History    Occupation: [employer]     Employer: [employer]     Employer: [employer]   Tobacco Use    Smoking status: Some Days     Types: Cigars     Last attempt to quit: 2006     Years since quittin.1    Smokeless tobacco: Never    Tobacco comments:      occasionally cigar   Vaping Use    Vaping status: Never Used   Substance and Sexual Activity    Alcohol use: Yes     Alcohol/week: 0.0 standard drinks of alcohol     Comment: 2-3 drinks a day:  Wine/Beer    Drug use: No    Sexual activity: Never   Other Topics Concern    Parent/sibling w/ CABG, MI or angioplasty before 65F 55M? No    Caffeine Concern No     Comment: 2-3 cups of coffee a day    Sleep Concern No    Stress Concern No    Weight Concern No    Special Diet No    Exercise No    Seat Belt Yes     Social Drivers of Health     Financial Resource Strain: Low Risk   (9/16/2024)    Financial Resource Strain     Within the past 12 months, have you or your family members you live with been unable to get utilities (heat, electricity) when it was really needed?: No   Food Insecurity: Low Risk  (9/16/2024)    Food Insecurity     Within the past 12 months, did you worry that your food would run out before you got money to buy more?: No     Within the past 12 months, did the food you bought just not last and you didn t have money to get more?: No   Transportation Needs: Low Risk  (9/16/2024)    Transportation Needs     Within the past 12 months, has lack of transportation kept you from medical appointments, getting your medicines, non-medical meetings or appointments, work, or from getting things that you need?: No   Physical Activity: Unknown (9/16/2024)    Exercise Vital Sign     Days of Exercise per Week: 5 days   Stress: Stress Concern Present (9/16/2024)    [employer] [place] [employer] - Occupational Stress Questionnaire     Feeling of Stress : To some extent   Social Connections: Unknown (9/16/2024)    Social Connection and Isolation Panel [NHANES]     Frequency of Social Gatherings with Friends and Family: Twice a week   Interpersonal Safety: Low Risk  (9/16/2024)    Interpersonal Safety     Do you feel physically and emotionally safe where you currently live?: Yes     Within the past 12 months, have you been hit, slapped, kicked or otherwise physically hurt by someone?: No     Within the past 12 months, have you been humiliated or emotionally abused in other ways by your partner or ex-partner?: No   Housing Stability: Low Risk  (9/16/2024)    Housing Stability     Do you have housing? : Yes     Are you worried about losing your housing?: No       Review of Systems:  Skin:        Eyes:       ENT:  Positive for    Respiratory:  Positive for cough  Cardiovascular:  Negative, palpitations, chest pain, syncope or near-syncope, cyanosis Positive for, lightheadedness,  "fatigue  Gastroenterology:      Genitourinary:       Musculoskeletal:       Neurologic:       Psychiatric:       Heme/Lymph/Imm:       Endocrine:         Physical Exam:    Vitals: /79   Pulse 68   Ht 1.753 m (5' 9\")   Wt 76.6 kg (168 lb 12.8 oz)   SpO2 98%   BMI 24.93 kg/m    Constitutional: Well nourished and in no apparent distress.  Eyes: Pupils equal, round. Sclerae anicteric.   HEENT: Normocephalic, atraumatic.   Neck: Supple. No carotid bruits or JVD   Respiratory: Breathing non-labored. Lungs clear to auscultation bilaterally. No crackles, wheezes, rhonchi, or rales.  Cardiovascular: Irregular irregular S1-S2.  No murmur  Skin: Warm, dry. No rashes, cyanosis, or xanthelasma.  Extremities: No edema.  Neurologic: No gross motor deficits. Alert, awake, and oriented to person, place and time.  Psychiatric: Affect appropriate.        Recent Lab Results:  LIPID RESULTS:  Lab Results   Component Value Date    CHOL 152 09/16/2024    CHOL 171 08/10/2018    HDL 76 09/16/2024    HDL 80 08/10/2018    LDL 66 09/16/2024    LDL 82 08/10/2018    TRIG 48 09/16/2024    TRIG 47 08/10/2018    CHOLHDLRATIO 2.4 07/24/2015       LIVER ENZYME RESULTS:  Lab Results   Component Value Date    AST 18 09/16/2024    AST 9 07/08/2020    ALT 7 09/16/2024    ALT 15 07/08/2020       CBC RESULTS:  Lab Results   Component Value Date    WBC 6.5 09/16/2024    WBC 5.1 07/08/2020    RBC 4.28 (L) 09/16/2024    RBC 4.38 (L) 07/08/2020    HGB 14.1 09/16/2024    HGB 15.0 07/08/2020    HCT 43.1 09/16/2024    HCT 45.0 07/08/2020     (H) 09/16/2024     (H) 07/08/2020    MCH 32.9 09/16/2024    MCH 34.2 (H) 07/08/2020    MCHC 32.7 09/16/2024    MCHC 33.3 07/08/2020    RDW 13.5 09/16/2024    RDW 12.7 07/08/2020     09/16/2024     07/08/2020       BMP RESULTS:  Lab Results   Component Value Date     09/16/2024     07/08/2020    POTASSIUM 4.5 09/16/2024    POTASSIUM 4.7 07/08/2024    POTASSIUM 4.3 07/08/2020 "    CHLORIDE 103 09/16/2024    CHLORIDE 104 07/08/2024    CHLORIDE 107 07/08/2020    CO2 26 09/16/2024    CO2 31 07/08/2024    CO2 28 07/08/2020    ANIONGAP 10 09/16/2024    ANIONGAP 5 07/08/2024    ANIONGAP 3 07/08/2020     (H) 09/16/2024     (H) 07/08/2024    GLC 98 07/08/2020    BUN 13.2 09/16/2024    BUN 8 07/08/2024    BUN 10 07/08/2020    CR 0.75 09/16/2024    CR 0.77 07/08/2020    GFRESTIMATED 88 09/16/2024    GFRESTIMATED 85 07/08/2020    GFRESTBLACK >90 07/08/2020    CHRISTIANO 8.8 09/16/2024    CHRISTIANO 8.6 07/08/2020        A1C RESULTS:  Lab Results   Component Value Date    A1C 5.5 06/11/2017       INR RESULTS:  Lab Results   Component Value Date    INR 1.11 06/06/2023    INR 0.91 06/11/2017    INR 0.87 06/11/2017           CC  Amira Castro, APRN CNP  [id number]  [place] [address]

## 2025-01-08 NOTE — PATIENT INSTRUCTIONS
Call my nurse with any questions or concerns:  [phone]  *If you have concerns after hours, please call [phone], option 2 to speak with on call Cardiologist.    1. Medication changes from today:    Lets decrease metoprolol 1/2 tablet every evening and see if your energy level improves  Consider walking in the mall 3 times a week or walk on your treadmill    Try using Mucinex over the counter will help thin your secretions

## 2025-01-08 NOTE — LETTER
1/8/2025    Richmond Martinez MD  [address] [facility] [address]    RE: Eder Horan       Dear Colleague,     I had the pleasure of seeing Eder Horan in the [facility].  Cardiology Clinic Progress Note  Eder Horan MRN# [id number]   YOB: 1940 Age: 85 year old       HPI:    Eder Horan is a delightful 85 year old patient with past medical history significant for:    Sick sinus syndrome with chronotropic incompetence.  Patient repeatedly declined pacemaker implantation in the past.  In 2022 developed persistent A-fib with no evidence of bradycardia.  Permanent atrial fibrillation.  Used to be on flecainide stopped in 2021. On metoprolol ER 25 mg qpm  [id number]-LCTd score 5 on Apixaban 5 mg bid  CVA 2017 treated with tPA.  The patient had not been on anticoagulation before his stroke.  Hypertension  Hypothyroidism  COPD    It is my pleasure seeing Eder today in follow-up.  Biggest concern is daytime somnolence.  He tends to be more sedentary in the winter.  He has a treadmill at home, but admits they do not use it.  He used to be in Silver sneakers years ago, but his wife does not like to leave the house in the wintertime.  He was doing pulmonary rehab, but states it was on Tuesdays and Thursdays which are days he tends to have other activities (such as cards).    Denies chest pain, orthopnea, PND, syncope, lower extremity edema.  He does get dyspnea with exertion which is not new.  He also complains of thickened mucus in the morning which could be very likely related to his COPD.      Diagnotic studies:  Zio patch 5/28/2024: Patient wore monitor for 1.5 days.  In atrial fibrillation with average heart rate of 85 bpm, minimum 58 bpm and max of 143 bpm.  Echocardiogram 5/6/2024: EF 55 to 60%.  No wall motion abnormalities.  1-2+ MR.  2+ TR.  RVSP was 25 mmHg plus RAP.  Echocardiogram 10/2023: EF 55 to 60%.  2+ MR, 3+ TR, mild pulmonary hypertension.  EKG 10/2023: A-fib at 72 bpm       Latest Reference Range & Units 09/16/24 09:21   Sodium 135 - 145 mmol/L 139   Potassium 3.4 - 5.3 mmol/L 4.5   Chloride 98 - 107 mmol/L 103   Carbon Dioxide (CO2) 22 - 29 mmol/L 26   Urea Nitrogen 8.0 - 23.0 mg/dL 13.2   Creatinine 0.67 - 1.17 mg/dL 0.75   GFR Estimate >60 mL/min/1.73m2 88   Calcium 8.8 - 10.4 mg/dL 8.8   Anion Gap 7 - 15 mmol/L 10   Albumin 3.5 - 5.2 g/dL 3.8   Protein Total 6.4 - 8.3 g/dL 7.6   Alkaline Phosphatase 40 - 150 U/L 137   ALT 0 - 70 U/L 7   AST 0 - 45 U/L 18   Bilirubin Total <=1.2 mg/dL 0.7   Cholesterol <200 mg/dL 152         Plan:   1. Sinus node dysfunction with chronotropic incompetence/permanent atrial fibrillation.  Last monitor showed adequate heart rate variation.  Patient still has daytime fatigue especially after his morning breakfast.  Instructed him to decrease metoprolol ER to 12.5 mg every afternoon.  Will consider discontinuing this altogether at our next visit if still having fatigue.  I would then place a monitor if metoprolol was stopped completely.      2. CHADS-VASc 5 on Eliquis 5 mg bid.  Appropriate dosing for weight and creatinine    3.  Hypertension, currently normotensive on metoprolol ER 25 mg daily.  Metoprolol was decreased to 12.5 mg daily     4.  Hypothyroidism, on levothyroxine.  Last TSH was 1     5.  COPD.  [facility] patient to consider going back to pulmonary rehab.  Complaining of thick mucus which is difficult to cough up in the morning.  Recommended Mucinex OTC    6.  Fatigue most likely multifactorial.  Patient is very sedentary.  Encouraged a walking program 3 times a week.  Patient could either go to the mall or use his treadmill at home.  I also recommended restarting pulmonary rehab.    I will see him back in 6 months.  If still fatigued I will then stop his metoprolol.  I will place a Zio patch on him once metoprolol is discontinued.  We may need to consider a calcium channel blocker.    Thank you for including me in his care.  Please feel free  to contact us with questions or concerns.  30 minutes was completed today greater than 50% was related to counseling.    Amira Castro, NP, APRN CNP            Today's clinic visit entailed:  Review of the result(s) of each unique test - Zio  Prescription drug management  30 minutes spent by me on the date of the encounter doing chart review, review of test results, patient visit, and documentation   Provider  Link to [facility] Help Grid     The level of medical decision making during this visit was of moderate complexity.      Orders Placed This Encounter   Procedures     Follow-Up with Cardiology ELISHA     Orders Placed This Encounter   Medications     metoprolol succinate ER (TOPROL XL) 25 MG 24 hr tablet     Sig: Take 0.5 tablets (12.5 mg) by mouth daily.     Medications Discontinued During This Encounter   Medication Reason     metoprolol succinate ER (TOPROL XL) 25 MG 24 hr tablet      diclofenac (VOLTAREN) 1 % topical gel          Encounter Diagnoses   Name Primary?     Permanent atrial fibrillation (H)      Paroxysmal atrial fibrillation (H)        CURRENT MEDICATIONS:  Current Outpatient Medications   Medication Sig Dispense Refill     albuterol (PROAIR HFA/PROVENTIL HFA/VENTOLIN HFA) 108 (90 Base) MCG/ACT inhaler Inhale 2 puffs into the lungs every 4 hours as needed for shortness of breath 18 g 11     apixaban ANTICOAGULANT (ELIQUIS ANTICOAGULANT) 5 MG tablet Take 1 tablet (5 mg) by mouth 2 times daily. 180 tablet 3     fluocinonide (LIDEX) 0.05 % external cream Apply topically 2 times daily. As needed for rash 120 g 2     Fluticasone-Umeclidin-Vilanterol (TRELEGY ELLIPTA) 200-62.5-25 MCG/ACT oral inhaler Inhale 1 puff into the lungs daily. 3 each 3     furosemide (LASIX) 20 MG tablet Take 1 tablet (20 mg) by mouth daily. 90 tablet 1     leuprolide, 4 Month, (ELIGARD) 30 MG        levothyroxine (SYNTHROID/LEVOTHROID) 112 MCG tablet Take 1 tablet (112 mcg) by mouth daily. 90 tablet 3     metoprolol succinate ER  (TOPROL XL) 25 MG 24 hr tablet Take 0.5 tablets (12.5 mg) by mouth daily.       potassium chloride ER (K-TAB) 20 MEQ CR tablet Take 1 tablet (20 mEq) by mouth daily. 90 tablet 3     PREDNISOLONE ACETATE OP Apply 1 drop to eye every other day.       vitamin B-12 (CYANOCOBALAMIN) 2500 MCG sublingual tablet Take 1 tab by mouth 3 times a week (WMF)         ALLERGIES     Allergies   Allergen Reactions     Bactrim [Sulfa Antibiotics] Rash     Sulfamethoxazole-Trimethoprim      Other Reaction(s): Not available       PAST MEDICAL HISTORY:  Past Medical History:   Diagnosis Date     Actinic keratosis 06/2009    face     ALLERGIC RHINITIS       Atrial fibrillation (H) 06/2008     B12 deficiency 01/16/2019     Back pain     s/p LESI through ortho Feb 2010     Basal cell carcinoma      Benign essential hypertension      Bradycardia      Chronic obstructive pulmonary disease, unspecified COPD type (H) 02/09/2017     Chronotropic incompetence with sinus node dysfunction      COPD (chronic obstructive pulmonary disease) (H)      DVT      Left calf 2003. Right calf 6/06     Edema, unspecified type 07/25/2020     Embolic stroke (H) 06/11/2017    superior cerebellar artery occlusion. Hx A fib     Excessive drinking of alcohol 09/28/2024     Hyperlipidemia LDL goal <130 05/10/2011     Hypertension      HYPOTHYROIDISM      hypothyroidism     Hypothyroidism, unspecified type 03/15/2020     Lateral epicondylitis  of elbow 07/2005    left     Malignant melanoma (H)      Paroxysmal atrial fibrillation (H)      PLANTAR FASCITIS      Prostate cancer (H) 06/2009    on Lupron therapy     Raynaud's disease without gangrene 11/26/2018     Squamous cell carcinoma 09/2014     right lower lid, s/p MOHS     Syncope      TMJ (temporomandibular joint syndrome) 04/12/2012     Tobacco use disorder      Venous stasis dermatitis, unspecified laterality 09/28/2024       PAST SURGICAL HISTORY:  Past Surgical History:   Procedure Laterality Date     ABDOMEN  SURGERY  ,,    Hernias (2) Prostate Removal(2009)     BIOPSY  2009    Prostate     CARDIOVERSION  10-16-08    failed     COLONOSCOPY       EYE SURGERY       lump from lower  left eye lid removed     GENITOURINARY SURGERY      Prostate     HERNIA REPAIR   -      [email or web address] NONSPECIFIC PROCEDURE      Left groin exploration and left inguinal herniorrhaphy     [email or web address] NONSPECIFIC PROCEDURE      Prostate bx (benign)     [email or web address] NONSPECIFIC PROCEDURE      Wide local excision (left side), robotic-assisted laparoscopic prostatectomy and   Left pelvic lymph node dissection     [email or web address] NONSPECIFIC PROCEDURE  2010    Right inguinal herniorrhaphy with mesh       FAMILY HISTORY:  Family History   Problem Relation Age of Onset     C.A.D. Father         heart attack- angina     Coronary Artery Disease Father      Myocardial Infarction Father      Cancer Mother         lung     Lung Cancer Mother      Lung Cancer Sister      Unknown/Adopted Maternal Grandmother      Unknown/Adopted Maternal Grandfather      Unknown/Adopted Paternal Grandmother      Unknown/Adopted Paternal Grandfather      Unknown/Adopted Sister        SOCIAL HISTORY:  Social History     Socioeconomic History     Marital status:      Spouse name: None     Number of children: 3     Years of education: None     Highest education level: None   Occupational History     Occupation: [employer]     Employer: [employer]     Employer: [email or web address]   Tobacco Use     Smoking status: Some Days     Types: Cigars     Last attempt to quit: 2006     Years since quittin.1     Smokeless tobacco: Never     Tobacco comments:      occasionally cigar   Vaping Use     Vaping status: Never Used   Substance and Sexual Activity     Alcohol use: Yes     Alcohol/week: 0.0 standard drinks of alcohol     Comment: 2-3 drinks a day:  Wine/Beer     Drug use: No     Sexual activity: Never   Other Topics Concern     Parent/sibling w/ CABG, MI or angioplasty before  65F 55M? No     Caffeine Concern No     Comment: 2-3 cups of coffee a day     Sleep Concern No     Stress Concern No     Weight Concern No     Special Diet No     Exercise No     Seat Belt Yes     Social Drivers of Health     Financial Resource Strain: Low Risk  (9/16/2024)    Financial Resource Strain      Within the past 12 months, have you or your family members you live with been unable to get utilities (heat, electricity) when it was really needed?: No   Food Insecurity: Low Risk  (9/16/2024)    Food Insecurity      Within the past 12 months, did you worry that your food would run out before you got money to buy more?: No      Within the past 12 months, did the food you bought just not last and you didn t have money to get more?: No   Transportation Needs: Low Risk  (9/16/2024)    Transportation Needs      Within the past 12 months, has lack of transportation kept you from medical appointments, getting your medicines, non-medical meetings or appointments, work, or from getting things that you need?: No   Physical Activity: Unknown (9/16/2024)    Exercise Vital Sign      Days of Exercise per Week: 5 days   Stress: Stress Concern Present (9/16/2024)    [employer] - Occupational Stress Questionnaire      Feeling of Stress : To some extent   Social Connections: Unknown (9/16/2024)    Social Connection and Isolation Panel [NHANES]      Frequency of Social Gatherings with Friends and Family: Twice a week   Interpersonal Safety: Low Risk  (9/16/2024)    Interpersonal Safety      Do you feel physically and emotionally safe where you currently live?: Yes      Within the past 12 months, have you been hit, slapped, kicked or otherwise physically hurt by someone?: No      Within the past 12 months, have you been humiliated or emotionally abused in other ways by your partner or ex-partner?: No   Housing Stability: Low Risk  (9/16/2024)    Housing Stability      Do you have housing? : Yes       "Are you worried about losing your housing?: No       Review of Systems:  Skin:        Eyes:       ENT:  Positive for    Respiratory:  Positive for cough  Cardiovascular:  Negative, palpitations, chest pain, syncope or near-syncope, cyanosis Positive for, lightheadedness, fatigue  Gastroenterology:      Genitourinary:       Musculoskeletal:       Neurologic:       Psychiatric:       Heme/Lymph/Imm:       Endocrine:         Physical Exam:    Vitals: /79   Pulse 68   Ht 1.753 m (5' 9\")   Wt 76.6 kg (168 lb 12.8 oz)   SpO2 98%   BMI 24.93 kg/m    Constitutional: Well nourished and in no apparent distress.  Eyes: Pupils equal, round. Sclerae anicteric.   HEENT: Normocephalic, atraumatic.   Neck: Supple. No carotid bruits or JVD   Respiratory: Breathing non-labored. Lungs clear to auscultation bilaterally. No crackles, wheezes, rhonchi, or rales.  Cardiovascular: Irregular irregular S1-S2.  No murmur  Skin: Warm, dry. No rashes, cyanosis, or xanthelasma.  Extremities: No edema.  Neurologic: No gross motor deficits. Alert, awake, and oriented to person, place and time.  Psychiatric: Affect appropriate.        Recent Lab Results:  LIPID RESULTS:  Lab Results   Component Value Date    CHOL 152 09/16/2024    CHOL 171 08/10/2018    HDL 76 09/16/2024    HDL 80 08/10/2018    LDL 66 09/16/2024    LDL 82 08/10/2018    TRIG 48 09/16/2024    TRIG 47 08/10/2018    CHOLHDLRATIO 2.4 07/24/2015       LIVER ENZYME RESULTS:  Lab Results   Component Value Date    AST 18 09/16/2024    AST 9 07/08/2020    ALT 7 09/16/2024    ALT 15 07/08/2020       CBC RESULTS:  Lab Results   Component Value Date    WBC 6.5 09/16/2024    WBC 5.1 07/08/2020    RBC 4.28 (L) 09/16/2024    RBC 4.38 (L) 07/08/2020    HGB 14.1 09/16/2024    HGB 15.0 07/08/2020    HCT 43.1 09/16/2024    HCT 45.0 07/08/2020     (H) 09/16/2024     (H) 07/08/2020    MCH 32.9 09/16/2024    MCH 34.2 (H) 07/08/2020    MCHC 32.7 09/16/2024    MCHC 33.3 07/08/2020 "    RDW 13.5 09/16/2024    RDW 12.7 07/08/2020     09/16/2024     07/08/2020       BMP RESULTS:  Lab Results   Component Value Date     09/16/2024     07/08/2020    POTASSIUM 4.5 09/16/2024    POTASSIUM 4.7 07/08/2024    POTASSIUM 4.3 07/08/2020    CHLORIDE 103 09/16/2024    CHLORIDE 104 07/08/2024    CHLORIDE 107 07/08/2020    CO2 26 09/16/2024    CO2 31 07/08/2024    CO2 28 07/08/2020    ANIONGAP 10 09/16/2024    ANIONGAP 5 07/08/2024    ANIONGAP 3 07/08/2020     (H) 09/16/2024     (H) 07/08/2024    GLC 98 07/08/2020    BUN 13.2 09/16/2024    BUN 8 07/08/2024    BUN 10 07/08/2020    CR 0.75 09/16/2024    CR 0.77 07/08/2020    GFRESTIMATED 88 09/16/2024    GFRESTIMATED 85 07/08/2020    GFRESTBLACK >90 07/08/2020    CHRISTIANO 8.8 09/16/2024    CHRISTIANO 8.6 07/08/2020        A1C RESULTS:  Lab Results   Component Value Date    A1C 5.5 06/11/2017       INR RESULTS:  Lab Results   Component Value Date    INR 1.11 06/06/2023    INR 0.91 06/11/2017    INR 0.87 06/11/2017           CC  MARCELO Myers CNP  [address]  [place] [id number]                  Thank you for allowing me to participate in the care of your patient.      Sincerely,     Amira Castro, NP, APRN CNP     [facility]  cc:   MARCELO Myers CNP  [address]  [place] [id number]

## 2025-01-15 ENCOUNTER — TELEPHONE (OUTPATIENT)
Dept: PHARMACY | Facility: OTHER | Age: 85
End: 2025-01-15
Payer: COMMERCIAL

## 2025-01-15 NOTE — TELEPHONE ENCOUNTER
ZULEIKA Recruitment: [facility] insurance     Referral outreach attempt #1 on January 15, 2025      Outcome: patient opted out. Not interested because he goes through his retail pharmacist for everything.     See Joseph TO   [phone]

## 2025-03-24 ENCOUNTER — OFFICE VISIT (OUTPATIENT)
Dept: PULMONOLOGY | Facility: CLINIC | Age: 85
End: 2025-03-24
Attending: INTERNAL MEDICINE
Payer: COMMERCIAL

## 2025-03-24 VITALS
WEIGHT: 168.5 LBS | SYSTOLIC BLOOD PRESSURE: 136 MMHG | BODY MASS INDEX: 24.88 KG/M2 | HEART RATE: 77 BPM | DIASTOLIC BLOOD PRESSURE: 96 MMHG | RESPIRATION RATE: 16 BRPM | OXYGEN SATURATION: 96 %

## 2025-03-24 DIAGNOSIS — J30.89 NON-SEASONAL ALLERGIC RHINITIS, UNSPECIFIED TRIGGER: ICD-10-CM

## 2025-03-24 DIAGNOSIS — J44.9 CHRONIC OBSTRUCTIVE PULMONARY DISEASE, UNSPECIFIED COPD TYPE (H): Primary | ICD-10-CM

## 2025-03-24 PROCEDURE — 99214 OFFICE O/P EST MOD 30 MIN: CPT | Mod: GC | Performed by: INTERNAL MEDICINE

## 2025-03-24 PROCEDURE — 3080F DIAST BP >= 90 MM HG: CPT | Performed by: INTERNAL MEDICINE

## 2025-03-24 PROCEDURE — 1126F AMNT PAIN NOTED NONE PRSNT: CPT | Performed by: INTERNAL MEDICINE

## 2025-03-24 PROCEDURE — 3075F SYST BP GE 130 - 139MM HG: CPT | Performed by: INTERNAL MEDICINE

## 2025-03-24 RX ORDER — MONTELUKAST SODIUM 10 MG/1
10 TABLET ORAL AT BEDTIME
Qty: 30 TABLET | Refills: 3 | Status: SHIPPED | OUTPATIENT
Start: 2025-03-24

## 2025-03-24 RX ORDER — ALBUTEROL SULFATE 90 UG/1
2 INHALANT RESPIRATORY (INHALATION) EVERY 4 HOURS PRN
Qty: 18 G | Refills: 11 | Status: SHIPPED | OUTPATIENT
Start: 2025-03-24

## 2025-03-24 ASSESSMENT — PAIN SCALES - GENERAL: PAINLEVEL_OUTOF10: NO PAIN (0)

## 2025-03-24 NOTE — PROGRESS NOTES
[facility] SPECIALTY CLINIC [address] [address]  Phone: [phone]  Fax: [phone]    Patient:  dEer Horan, Date of birth 1940  Date of Visit:  03/24/2025    Return Pulmonary Clinic Patient Visit  Assessment and Plan:   Eder Horan is a 85 year old male with a history of sick sinus syndrome and chronictropic insufficiency and permanent atrial fibrillation on apixaban (no pacemaker per patient preference), prostate cancer, and COPD who presents to pulmonary clinic today for follow up and management of COPD.  COPD.  Moderate obstruction on PFTs from 9/2024 with FEV 43% (z-score -2.71). Recently started on high dose Trelegy with seemingly good effect.  Encouraged to continue this 1 puff daily, remembering to rinse mouth out with water.  He should also continue to use albuterol as frequently as 2 puffs every 4-6 hours as needed, for increased symptoms of shortness of breath, chest tightness, cough or wheezing.  We additionally discussed that he may benefit from pre-treatment with albuterol prior to exercise, 2 puffs 10-15 minutes prior to exertion.  His main symptoms at this time appear to be fatigue/HANSEN, which is likely multifactorial (Deconditioning, Cardiac, Pulmonary) in nature. Following last visit, referred to pulmonary rehab but stopped going around the 2024 holiday season citing that Tues/Thurs scheduled, parking lot and co-pays made it unrealistic. Encouraged patient to be physically active as he is able, discussed options like fixing treadmill, walking outdoors or in malls during the winter months. Plan to see patient back in 6 months with repeat PFT testing. Patient was agreeable to completing A1AT testing today.   Allergic Rhinitis and Post-Nasal Drip. Reports bothersome mucous production with cough that seems to be related to chronic post-nasal drip/allergies. Had previously been recommended mucinex, which he takes as needed and finds to be  effective. He is agreeable to 30 day trial of singular to see if this is helpful with symptoms and will notify clinic of efficacy.  Vaccinations are up to date. Reviewed in [facility]. Pneumonia vaccines (2006, 2015). RSV (1/9/2024), Influenza & Covid vaccines (10/22/2024).   Tobacco Use; continues to smoke cigars occasionally during summer months. Counseled on smoking cessation.   Lung Cancer screening.  Does not qualify based on age.  Questions and concerns were answered to the patient's satisfaction.  he was provided with my contact information should new questions or concerns arise in the interim.  He should return to clinic in 6 months.   This patient was discussed and seen with Dr. Avina.   Nuvia Lynn MD  PGY-2  Internal Medicine  [facility]     Physician Attestation   I, Beata Avina MD, saw and discussed this patient with the resident/fellow.  I agree with the resident/fellow findings and plan of care as documented in their note. I have personally reviewed today's vital signs, medications, laboratory results and imaging results.    I have personally edited this note to reflect our joint assessment and medical decision making.    Beata Avina MD  Date of Service (when I saw the patient): 03/24/25        History of Present Illness    Eder Horan is a 85 year old male with a history of sick sinus syndrome and chronotropic insufficiency and permanent atrial fibrillation on apixaban (no pacemaker per patient preference), prostate cancer, and COPD who presents to pulmonary clinic today for further evaluation and management of COPD.      - Cards visit on 1/8/25; decreased metoprolol ER to 12.5 mg (for daytime fatigue)  - has been taking 1 pill every 2 days of metoprolol, too small of pills to cut in half. Hasn't noticed a difference in symptoms.     - Stopped going to pulmonary rehab during the holiday season. Difficulty with 25$ copay, parking lot issues and scheduled for Tuesdays and  Thursdays which he has conflicting appointments     - Treadmill stopped working, hasn't been able to use it, attempting to get a repair person out to fix it     - No intolerance to physical activity when walking on flat ground can go at his pace. He does get tired with stairs, walking too fast or any degree of incline which causes him to 'run out of air'. Takes about 5 to 10 minutes to recover.     ROS: Does have wheezing and has notificed more mucous in his throat. Gets pretty thick. Has taken mucinex and that seems to help. Clear to yellow/tan mucous/ very faint.   No fevers, chills, chest pain, nausea, vomitting or diarrhea.     - he believes cough seems to be related to the mucous; coughing fit at night and sometimes is panting at night - tries albuterol for this, it sometimes helps.    - No URIs, usually gets one every winter but hasn't in more recent years    - making breakfast tires him out, needs to take a nap after    - sometimes has a cigar in the summertime out on the deck     - says that he gets vaccines at [facility] - covered better under insurance and is up to date on all vaccinations.       Review of Systems:  10 of 14 systems reviewed and are negative unless otherwise stated in HPI.    Past Medical History:   Diagnosis Date    Actinic keratosis 06/2009    face    ALLERGIC RHINITIS      Atrial fibrillation (H) 06/2008    B12 deficiency 01/16/2019    Back pain     s/p LESI through ortho Feb 2010    Basal cell carcinoma     Benign essential hypertension     Bradycardia     Chronic obstructive pulmonary disease, unspecified COPD type (H) 02/09/2017    Chronotropic incompetence with sinus node dysfunction     COPD (chronic obstructive pulmonary disease) (H)     DVT      Left calf 2003. Right calf 6/06    Edema, unspecified type 07/25/2020    Embolic stroke (H) 06/11/2017    superior cerebellar artery occlusion. Hx A fib    Excessive drinking of alcohol 09/28/2024    Hyperlipidemia LDL goal <130 05/10/2011     Hypertension     HYPOTHYROIDISM      hypothyroidism    Hypothyroidism, unspecified type 03/15/2020    Lateral epicondylitis  of elbow 07/2005    left    Malignant melanoma (H)     Paroxysmal atrial fibrillation (H)     PLANTAR FASCITIS     Prostate cancer (H) 06/2009    on Lupron therapy    Raynaud's disease without gangrene 11/26/2018    Squamous cell carcinoma 09/2014     right lower lid, s/p MOHS    Syncope     TMJ (temporomandibular joint syndrome) 04/12/2012    Tobacco use disorder     Venous stasis dermatitis, unspecified laterality 09/28/2024         Current Outpatient Medications:     albuterol (PROAIR HFA/PROVENTIL HFA/VENTOLIN HFA) 108 (90 Base) MCG/ACT inhaler, Inhale 2 puffs into the lungs every 4 hours as needed for shortness of breath., Disp: 18 g, Rfl: 11    apixaban ANTICOAGULANT (ELIQUIS ANTICOAGULANT) 5 MG tablet, Take 1 tablet (5 mg) by mouth 2 times daily., Disp: 180 tablet, Rfl: 3    fluocinonide (LIDEX) 0.05 % external cream, Apply topically 2 times daily. As needed for rash, Disp: 120 g, Rfl: 2    Fluticasone-Umeclidin-Vilanterol (TRELEGY ELLIPTA) 200-62.5-25 MCG/ACT oral inhaler, Inhale 1 puff into the lungs daily., Disp: 3 each, Rfl: 3    furosemide (LASIX) 20 MG tablet, Take 1 tablet (20 mg) by mouth daily. (Patient taking differently: Take 20 mg by mouth as needed.), Disp: 90 tablet, Rfl: 1    leuprolide, 4 Month, (ELIGARD) 30 MG, Inject 30 mg subcutaneously. Once a year as per pt, Disp: , Rfl:     levothyroxine (SYNTHROID/LEVOTHROID) 112 MCG tablet, Take 1 tablet (112 mcg) by mouth daily., Disp: 90 tablet, Rfl: 3    metoprolol succinate ER (TOPROL XL) 25 MG 24 hr tablet, Take 0.5 tablets (12.5 mg) by mouth daily., Disp: , Rfl:     montelukast (SINGULAIR) 10 MG tablet, Take 1 tablet (10 mg) by mouth at bedtime., Disp: 30 tablet, Rfl: 3    potassium chloride ER (K-TAB) 20 MEQ CR tablet, Take 1 tablet (20 mEq) by mouth daily., Disp: 90 tablet, Rfl: 3    PREDNISOLONE ACETATE OP, Apply 1 drop  to eye every other day., Disp: , Rfl:     vitamin B-12 (CYANOCOBALAMIN) 2500 MCG sublingual tablet, Take 1 tab by mouth 3 times a week ([facility]), Disp: , Rfl:       Physical Exam:  BP (!) 136/96 (BP Location: Left arm, Patient Position: Sitting, Cuff Size: Adult Regular)   Pulse 77   Resp 16   Wt 76.4 kg (168 lb 8 oz)   SpO2 96%   BMI 24.88 kg/m    GENERAL: Well developed, well nourished, alert, and in no apparent distress.  HEENT: Normocephalic, atraumatic. PERRL, EOMI. Oral mucosa is moist. No perioral cyanosis.  NECK: supple, no obvious masses.  RESP:  Normal respiratory effort.  CTAB, decreased airmovement. No rales, wheezes, rhonchi.  No cyanosis or clubbing.  CV: Irregular Rhythm, normal rate. No murmurs appreciated. Peripheral pulses intact.   ABDOMEN: non-distended.   SKIN: warm and dry. No rash.  NEURO: Alert and oriented. Fluent speech.  PSYCH: Mentation appears normal.     Results (personally reviewed in clinic today):  Reviewed previous PFTs and CXR, no new studies today.   9/23/2024 PFTs: R 34%, FVC 94%, FEV1 43% (-2.71), %, %, DLCO 68%.  Study demonstrates a moderate obstructive ventilatory defect with hyperinflation, air trapping and mild impairment in diffusion.  Imaging:  CXR 9/16/24: Hyperinflated emphysematous lungs. No infiltrate, pleural effusion or pneumothorax. Stable tortuous thoracic aorta. Normal heart size.

## 2025-03-24 NOTE — LETTER
3/24/2025      Eder Horan  [address] [facility] [address]      Dear Colleague,    Thank you for referring your patient, Eder Horan, to the [facility]TY [facility]. Please see a copy of my visit note below.      [facility]  [address] [place] [address] [address]  [facility] [address]  Phone: [phone]  Fax: [phone]    Patient:  Eder Horan, Date of birth 1940  Date of Visit:  03/24/2025    Return Pulmonary Clinic Patient Visit  Assessment and Plan:   Eder Horan is a 85 year old male with a history of sick sinus syndrome and chronictropic insufficiency and permanent atrial fibrillation on apixaban (no pacemaker per patient preference), prostate cancer, and COPD who presents to pulmonary clinic today for follow up and management of COPD.  COPD.  Moderate obstruction on PFTs from 9/2024 with FEV 43% (z-score -2.71). Recently started on high dose Trelegy with seemingly good effect.  Encouraged to continue this 1 puff daily, remembering to rinse mouth out with water.  He should also continue to use albuterol as frequently as 2 puffs every 4-6 hours as needed, for increased symptoms of shortness of breath, chest tightness, cough or wheezing.  We additionally discussed that he may benefit from pre-treatment with albuterol prior to exercise, 2 puffs 10-15 minutes prior to exertion.  His main symptoms at this time appear to be fatigue/HANSEN, which is likely multifactorial (Deconditioning, Cardiac, Pulmonary) in nature. Following last visit, referred to pulmonary rehab but stopped going around the 2024 holiday season citing that Tues/Thurs scheduled, parking lot and co-pays made it unrealistic. Encouraged patient to be physically active as he is able, discussed options like fixing treadmill, walking outdoors or in malls during the winter months. Plan to see patient back in 6 months with repeat PFT testing. Patient was agreeable to completing A1AT  testing today.   Allergic Rhinitis and Post-Nasal Drip. Reports bothersome mucous production with cough that seems to be related to chronic post-nasal drip/allergies. Had previously been recommended mucinex, which he takes as needed and finds to be effective. He is agreeable to 30 day trial of singular to see if this is helpful with symptoms and will notify clinic of efficacy.  Vaccinations are up to date. Reviewed in [facility]. Pneumonia vaccines (2006, 2015). RSV (1/9/2024), Influenza & Covid vaccines (10/22/2024).   Tobacco Use; continues to smoke cigars occasionally during summer months. Counseled on smoking cessation.   Lung Cancer screening.  Does not qualify based on age.  Questions and concerns were answered to the patient's satisfaction.  he was provided with my contact information should new questions or concerns arise in the interim.  He should return to clinic in 6 months.   This patient was discussed and seen with Dr. Avina.   Nuvia Lynn MD  PGY-2  Internal Medicine  [facility]     Physician Attestation  I, Beata Avina MD, saw and discussed this patient with the resident/fellow.  I agree with the resident/fellow findings and plan of care as documented in their note. I have personally reviewed today's vital signs, medications, laboratory results and imaging results.    I have personally edited this note to reflect our joint assessment and medical decision making.    Beata Avina MD  Date of Service (when I saw the patient): 03/24/25        History of Present Illness    Eder Horan is a 85 year old male with a history of sick sinus syndrome and chronotropic insufficiency and permanent atrial fibrillation on apixaban (no pacemaker per patient preference), prostate cancer, and COPD who presents to pulmonary clinic today for further evaluation and management of COPD.      - Cards visit on 1/8/25; decreased metoprolol ER to 12.5 mg (for daytime fatigue)  - has been taking 1  pill every 2 days of metoprolol, too small of pills to cut in half. Hasn't noticed a difference in symptoms.     - Stopped going to pulmonary rehab during the holiday season. Difficulty with 25$ copay, parking lot issues and scheduled for Tuesdays and Thursdays which he has conflicting appointments     - Treadmill stopped working, hasn't been able to use it, attempting to get a repair person out to fix it     - No intolerance to physical activity when walking on flat ground can go at his pace. He does get tired with stairs, walking too fast or any degree of incline which causes him to 'run out of air'. Takes about 5 to 10 minutes to recover.     ROS: Does have wheezing and has notificed more mucous in his throat. Gets pretty thick. Has taken mucinex and that seems to help. Clear to yellow/tan mucous/ very faint.   No fevers, chills, chest pain, nausea, vomitting or diarrhea.     - he believes cough seems to be related to the mucous; coughing fit at night and sometimes is panting at night - tries albuterol for this, it sometimes helps.    - No URIs, usually gets one every winter but hasn't in more recent years    - making breakfast tires him out, needs to take a nap after    - sometimes has a cigar in the summertime out on the deck     - says that he gets vaccines at [facility] - covered better under insurance and is up to date on all vaccinations.       Review of Systems:  10 of 14 systems reviewed and are negative unless otherwise stated in HPI.    Past Medical History:   Diagnosis Date     Actinic keratosis 06/2009    face     ALLERGIC RHINITIS       Atrial fibrillation (H) 06/2008     B12 deficiency 01/16/2019     Back pain     s/p LESI through ortho Feb 2010     Basal cell carcinoma      Benign essential hypertension      Bradycardia      Chronic obstructive pulmonary disease, unspecified COPD type (H) 02/09/2017     Chronotropic incompetence with sinus node dysfunction      COPD (chronic obstructive pulmonary disease)  (H)      DVT      Left calf 2003. Right calf 6/06     Edema, unspecified type 07/25/2020     Embolic stroke (H) 06/11/2017    superior cerebellar artery occlusion. Hx A fib     Excessive drinking of alcohol 09/28/2024     Hyperlipidemia LDL goal <130 05/10/2011     Hypertension      HYPOTHYROIDISM      hypothyroidism     Hypothyroidism, unspecified type 03/15/2020     Lateral epicondylitis  of elbow 07/2005    left     Malignant melanoma (H)      Paroxysmal atrial fibrillation (H)      PLANTAR FASCITIS      Prostate cancer (H) 06/2009    on Lupron therapy     Raynaud's disease without gangrene 11/26/2018     Squamous cell carcinoma 09/2014     right lower lid, s/p MOHS     Syncope      TMJ (temporomandibular joint syndrome) 04/12/2012     Tobacco use disorder      Venous stasis dermatitis, unspecified laterality 09/28/2024         Current Outpatient Medications:      albuterol (PROAIR HFA/PROVENTIL HFA/VENTOLIN HFA) 108 (90 Base) MCG/ACT inhaler, Inhale 2 puffs into the lungs every 4 hours as needed for shortness of breath., Disp: 18 g, Rfl: 11     apixaban ANTICOAGULANT (ELIQUIS ANTICOAGULANT) 5 MG tablet, Take 1 tablet (5 mg) by mouth 2 times daily., Disp: 180 tablet, Rfl: 3     fluocinonide (LIDEX) 0.05 % external cream, Apply topically 2 times daily. As needed for rash, Disp: 120 g, Rfl: 2     Fluticasone-Umeclidin-Vilanterol (TRELEGY ELLIPTA) 200-62.5-25 MCG/ACT oral inhaler, Inhale 1 puff into the lungs daily., Disp: 3 each, Rfl: 3     furosemide (LASIX) 20 MG tablet, Take 1 tablet (20 mg) by mouth daily. (Patient taking differently: Take 20 mg by mouth as needed.), Disp: 90 tablet, Rfl: 1     leuprolide, 4 Month, (ELIGARD) 30 MG, Inject 30 mg subcutaneously. Once a year as per pt, Disp: , Rfl:      levothyroxine (SYNTHROID/LEVOTHROID) 112 MCG tablet, Take 1 tablet (112 mcg) by mouth daily., Disp: 90 tablet, Rfl: 3     metoprolol succinate ER (TOPROL XL) 25 MG 24 hr tablet, Take 0.5 tablets (12.5 mg) by mouth  daily., Disp: , Rfl:      montelukast (SINGULAIR) 10 MG tablet, Take 1 tablet (10 mg) by mouth at bedtime., Disp: 30 tablet, Rfl: 3     potassium chloride ER (K-TAB) 20 MEQ CR tablet, Take 1 tablet (20 mEq) by mouth daily., Disp: 90 tablet, Rfl: 3     PREDNISOLONE ACETATE OP, Apply 1 drop to eye every other day., Disp: , Rfl:      vitamin B-12 (CYANOCOBALAMIN) 2500 MCG sublingual tablet, Take 1 tab by mouth 3 times a week ([facility]), Disp: , Rfl:       Physical Exam:  BP (!) 136/96 (BP Location: Left arm, Patient Position: Sitting, Cuff Size: Adult Regular)   Pulse 77   Resp 16   Wt 76.4 kg (168 lb 8 oz)   SpO2 96%   BMI 24.88 kg/m    GENERAL: Well developed, well nourished, alert, and in no apparent distress.  HEENT: Normocephalic, atraumatic. PERRL, EOMI. Oral mucosa is moist. No perioral cyanosis.  NECK: supple, no obvious masses.  RESP:  Normal respiratory effort.  CTAB, decreased airmovement. No rales, wheezes, rhonchi.  No cyanosis or clubbing.  CV: Irregular Rhythm, normal rate. No murmurs appreciated. Peripheral pulses intact.   ABDOMEN: non-distended.   SKIN: warm and dry. No rash.  NEURO: Alert and oriented. Fluent speech.  PSYCH: Mentation appears normal.     Results (personally reviewed in clinic today):  Reviewed previous PFTs and CXR, no new studies today.   9/23/2024 PFTs: R 34%, FVC 94%, FEV1 43% (-2.71), %, %, DLCO 68%.  Study demonstrates a moderate obstructive ventilatory defect with hyperinflation, air trapping and mild impairment in diffusion.  Imaging:  CXR 9/16/24: Hyperinflated emphysematous lungs. No infiltrate, pleural effusion or pneumothorax. Stable tortuous thoracic aorta. Normal heart size.                              Again, thank you for allowing me to participate in the care of your patient.        Sincerely,        Beata Avina MD    Electronically signed

## 2025-03-24 NOTE — NURSING NOTE
Chief Complaint   Patient presents with    RECHECK     Chronic obstructive pulmonary disease, unspecified COPD type       Vitals:    03/24/25 0917 03/24/25 0923   BP: (!) 161/94 (!) 136/96   BP Location: Left arm Left arm   Patient Position: Sitting Sitting   Cuff Size: Adult Regular Adult Regular   Pulse: 77    Resp: 16    SpO2: 96%    Weight: 76.4 kg (168 lb 8 oz)        Body mass index is 24.88 kg/m .      Kye Del Rio MA

## 2025-03-24 NOTE — PATIENT INSTRUCTIONS
-Continue Trelegy 1 puff once daily.  Remember to rinse your mouth out with water following use.  -May use albuterol as frequently as 2 puffs every 4-6 hours as needed, for increased symptoms of shortness of breath, chest tightness, cough or wheezing.  You may also benefit from pre-treatment with albuterol prior to exercise, 2 puffs 10-15 minutes prior to exertion.  -START Singulair 10 mg tab  (1 pill) daily at bedtime.  Try this for 30 days to see if it helps with mucous and runny nose.  If no benefit after 30 days, ok to stop it and let us know.  -Try to get some exercise. Let us know if you would like a new referral to pulmonary rehab      Return to clinic in 6 months with myself or Naila.

## 2025-04-02 LAB
A1AD NUMERIC: 157 MG/DL (ref 90–200)
A1AD STRING: NORMAL

## 2025-04-23 ENCOUNTER — OFFICE VISIT (OUTPATIENT)
Dept: INTERNAL MEDICINE | Facility: CLINIC | Age: 85
End: 2025-04-23
Payer: COMMERCIAL

## 2025-04-23 VITALS
HEART RATE: 71 BPM | RESPIRATION RATE: 16 BRPM | HEIGHT: 69 IN | SYSTOLIC BLOOD PRESSURE: 134 MMHG | OXYGEN SATURATION: 97 % | BODY MASS INDEX: 24.78 KG/M2 | DIASTOLIC BLOOD PRESSURE: 83 MMHG | TEMPERATURE: 97.9 F | WEIGHT: 167.3 LBS

## 2025-04-23 DIAGNOSIS — R60.9 EDEMA, UNSPECIFIED TYPE: ICD-10-CM

## 2025-04-23 DIAGNOSIS — C61 PROSTATE CANCER (H): ICD-10-CM

## 2025-04-23 DIAGNOSIS — J44.9 CHRONIC OBSTRUCTIVE PULMONARY DISEASE, UNSPECIFIED COPD TYPE (H): ICD-10-CM

## 2025-04-23 DIAGNOSIS — I10 BENIGN ESSENTIAL HYPERTENSION: ICD-10-CM

## 2025-04-23 DIAGNOSIS — R35.1 NOCTURIA: ICD-10-CM

## 2025-04-23 PROCEDURE — 3075F SYST BP GE 130 - 139MM HG: CPT | Performed by: INTERNAL MEDICINE

## 2025-04-23 PROCEDURE — 99214 OFFICE O/P EST MOD 30 MIN: CPT | Performed by: INTERNAL MEDICINE

## 2025-04-23 PROCEDURE — 3079F DIAST BP 80-89 MM HG: CPT | Performed by: INTERNAL MEDICINE

## 2025-04-23 RX ORDER — FUROSEMIDE 20 MG/1
TABLET ORAL
Qty: 90 TABLET | Refills: 1 | Status: SHIPPED | OUTPATIENT
Start: 2025-04-23

## 2025-04-23 RX ORDER — POTASSIUM CHLORIDE 1500 MG/1
TABLET, EXTENDED RELEASE ORAL
Qty: 90 TABLET | Refills: 3 | Status: SHIPPED | OUTPATIENT
Start: 2025-04-23

## 2025-04-23 NOTE — PROGRESS NOTES
ASSESSMENT:    1. Edema, unspecified type  Controlled.  No orthopnea or PND.  To lessen urinary frequency, will reduce diuretic use to 3 times a week  - potassium chloride ER (K-TAB) 20 MEQ CR tablet; Take 1 tab three times a week (MWF) scheduled and other days of the week as needed for increased edema  Dispense: 90 tablet; Refill: 3  - furosemide (LASIX) 20 MG tablet; Take 1 tab three times a week (MWF) scheduled and other days of the week as needed for increased edema  Dispense: 90 tablet; Refill: 1    2. Nocturia  Chronic.  Followed by urology.  Decrease furosemide dose as above.  Counseled regarding stopping all alcohol and caffeine use for 1 week to see if it makes further difference.  If not improving, will follow-up with urology    3. Prostate cancer (H)  History of prostatectomy.  Has been on hormone therapy.  Last seen by urology in December 2024.  PSA at that time had risen to 7.0 after previously being 0.47.  With given Eligard injection at that time.  Continue management per urology    4. Chronic obstructive pulmonary disease, unspecified COPD type (H)  Stable with Trelegy.  Rare use albuterol.  Continue current management    5. Benign essential hypertension  Controlled.  Continue current medication.  Walking exercises able      PLAN:  Change  Lasix/Furosemide and potassium, Take 1 tab of each  three times a week (MWF) scheduled and other days of the week as needed for increased edema/leg swelling.  Weigh self daily and if weight up 2 pounds from the day before, then take potassium and Furosemide  Trial off of all coffee and alcohol  for 1 week to see if less nighttime urination.  If not helping, then follow-up with Dr Aguilar  Continue other medications including inhaler therapy  Walking exercise as able  Stay low on sodium intake. Goal < 2,000mg/day  I would recommend  a  updated Pfizer covid booster vaccine. Get at a pharmacy             Christiane Gaines is a 85 year old, presenting for the  "following health issues:  Hypertension  Pt would like to discuss furosemide and potassium chloride with provider. Pt states that they have to go to the restroom 30 min after taking and wants to discuss is it necessary to take. Pt states the swelling is gone but does still having itching in the legs        History of Present Illness       Vascular Disease:  He presents for follow up of vascular disease.     He never takes nitroglycerin. He is not taking daily aspirin.    Reason for visit:  Follow up    He eats 4 or more servings of fruits and vegetables daily.He consumes 0 sweetened beverage(s) daily.He exercises with enough effort to increase his heart rate 9 or less minutes per day.  He exercises with enough effort to increase his heart rate 3 or less days per week.   He is taking medications regularly.        Most recent lab results reviewed with pt.      History of prostate cancer.  Last seen by urology in December when PSA had worsened to 7.0.  Previous prostatectomy and getting intermittent hormone treatment.  Was given dose of Eligard at that time with plan to follow-up in 6 months with urology.  Patient states he was also given a additional Eligard injection in March 2025.  Did not have improvement in symptoms with trial of tamsulosin and off that medication now.  Has coffee 2-3 times a day.  Sometimes regular and sometimes decaf.  Will have 2 drinks alcohol at night.  Patient states postvoid residual was okay when tested by urology.  Denies dysuria.  On furosemide daily.  Denies current lower extremity edema.  Orthopnea or PND       Additional ROS:   Constitutional, HEENT, Cardiovascular, Pulmonary, GI and , Neuro, MSK and Psych review of systems/symptoms are otherwise negative or unchanged from previous, except as noted above.      OBJECTIVE:  /83   Pulse 71   Temp 97.9  F (36.6  C) (Temporal)   Resp 16   Ht 1.753 m (5' 9\")   Wt 75.9 kg (167 lb 4.8 oz)   SpO2 97%   BMI 24.71 kg/m   " "  Estimated body mass index is 24.71 kg/m  as calculated from the following:    Height as of this encounter: 1.753 m (5' 9\").    Weight as of this encounter: 75.9 kg (167 lb 4.8 oz).     Neck: no adenopathy. Thyroid normal to palpation. No bruits  Pulm: Lungs clear to auscultation.  Mild prolonged expiratory phase.  No accessory muscle use.  Speaking easily  CV: Regular rates and rhythm  GI: Soft, nontender, Normal active bowel sounds, No hepatosplenomegaly or masses palpable  Ext: Peripheral pulses intact. Trace BLE ankle  edema.         (Chart documentation was completed, in part, with [employer] voice-recognition software. Even though reviewed, some grammatical, spelling, and word errors may remain.)    Richmond Martinez MD  Internal Medicine Department  [facility]            "

## 2025-04-23 NOTE — PATIENT INSTRUCTIONS
Change  Lasix/Furosemide and potassium, Take 1 tab of each  three times a week (MWF) scheduled and other days of the week as needed for increased edema/leg swelling.  Weigh self daily and if weight up 2 pounds from the day before, then take potassium and Furosemide  Trial off of all coffee and alcohol  for 1 week to see if less nighttime urination.  If not helping, then follow-up with Dr Aguilar  Continue other medications including inhaler therapy  Walking exercise as able  Stay low on sodium intake. Goal < 2,000mg/day  I would recommend  a  updated Pfizer covid booster vaccine. Get at a pharmacy

## 2025-06-09 ENCOUNTER — OFFICE VISIT (OUTPATIENT)
Dept: DERMATOLOGY | Facility: CLINIC | Age: 85
End: 2025-06-09
Payer: COMMERCIAL

## 2025-06-09 DIAGNOSIS — L57.8 ACTINIC SKIN DAMAGE: ICD-10-CM

## 2025-06-09 DIAGNOSIS — L81.4 LENTIGINES: ICD-10-CM

## 2025-06-09 DIAGNOSIS — D48.5 NEOPLASM OF UNCERTAIN BEHAVIOR OF SKIN: Primary | ICD-10-CM

## 2025-06-09 DIAGNOSIS — L57.0 ACTINIC KERATOSIS: ICD-10-CM

## 2025-06-09 DIAGNOSIS — L82.0 INFLAMED SEBORRHEIC KERATOSIS: ICD-10-CM

## 2025-06-09 NOTE — PROGRESS NOTES
[facility] Dermatology Note    Encounter Date: Jun 9, 2025    Dermatology Problem List:  - 09/23/20 nBCC nasal tip              SCCIS L superior shoulder   - 10/31/22 SCC L cheek     Major PMHx  -   ______________________________________    Impression/Plan:  Eder was seen today for skin check.    Diagnoses and all orders for this visit:    Neoplasm of uncertain behavior of skin  -     SHAVE 1 [[id number]]  -     Dermatological Path Order and Indications; Standing  -     Dermatological Path Order and Indications  - R neck likely SCC  - see procedure note      Inflamed seborrheic keratosis  -     DESTRUCT BENIGN LESION, UP TO 14  - see procedure note    Actinic keratosis  -     AK 15 or more [[id number]]  - discussed efudex vs LN2  - prefers LN2   - see procedure note    Actinic skin damage  Lentigines  - Reviewed the compounding benefits of incremental changes to sun protective behaviors including increased frequency of sunscreen and sun protective clothing like broad brimmed hats and longsleeved UPF containing clothing        Cryotherapy procedure note: After verbal consent and discussion of risks and benefits including but no limited to dyspigmentation/scar, blister, and pain, 18 aks on face, scalp, and hands as well as 5 ISKs on chest and arms was(were) treated with 1-2mm freeze border for 2 cycles with liquid nitrogen. Post cryotherapy instructions were provided.     Follow-up in 6 mo.       Staff Involved:  Staff Only    Hany Saeed MD   of Dermatology  Department of Dermatology  [facility]      CC:   Chief Complaint   Patient presents with    Skin Check     FBSC  2. Back of neck, right side-present for many years-changing and growing       History of Present Illness:  Mr. Eder Horan is a 85 year old male who presents as a new patient.    Pt presents today for concerns about spots on trunk and extremities       Labs:      Physical  exam:  Vitals: There were no vitals taken for this visit.  GEN: well developed, well-nourished, in no acute distress, in a pleasant mood.     SKIN: Edwards phototype 1  - Full skin, which includes the head/face, both arms, chest, back, abdomen,both legs, genitalia and/or groin buttocks, digits and/or nails, was examined.  - Flat brown macules and patches in a sun exposed areas on face and extremities  - Stuck on brown papules on trunk and extremities   - keratotic nodule R neck  - gritty pink papules on head and arms   - No other lesions of concern on areas examined.     Past Medical History:   Past Medical History:   Diagnosis Date    Actinic keratosis 06/2009    face    ALLERGIC RHINITIS      Atrial fibrillation (H) 06/2008    B12 deficiency 01/16/2019    Back pain     s/p LESI through ortho Feb 2010    Basal cell carcinoma     Benign essential hypertension     Bradycardia     Chronic obstructive pulmonary disease, unspecified COPD type (H) 02/09/2017    Chronotropic incompetence with sinus node dysfunction     COPD (chronic obstructive pulmonary disease) (H)     DVT      Left calf 2003. Right calf 6/06    Edema, unspecified type 07/25/2020    Embolic stroke (H) 06/11/2017    superior cerebellar artery occlusion. Hx A fib    Excessive drinking of alcohol 09/28/2024    Hyperlipidemia LDL goal <130 05/10/2011    Hypertension     HYPOTHYROIDISM      hypothyroidism    Hypothyroidism, unspecified type 03/15/2020    Lateral epicondylitis  of elbow 07/2005    left    Malignant melanoma (H)     Paroxysmal atrial fibrillation (H)     PLANTAR FASCITIS     Prostate cancer (H) 06/2009    on Lupron therapy    Raynaud's disease without gangrene 11/26/2018    Squamous cell carcinoma 09/2014     right lower lid, s/p MOHS    Syncope     TMJ (temporomandibular joint syndrome) 04/12/2012    Tobacco use disorder     Venous stasis dermatitis, unspecified laterality 09/28/2024     Past Surgical History:   Procedure Laterality Date     ABDOMEN SURGERY  2004,06,09    Hernias (2) Prostate Removal(2009)    BIOPSY  2009    Prostate    CARDIOVERSION  10-16-08    failed    COLONOSCOPY  2004    EYE SURGERY      2013 lump from lower  left eye lid removed    GENITOURINARY SURGERY  2009    Prostate    HERNIA REPAIR  2004 - 2006    [facility] NONSPECIFIC PROCEDURE  1/07    Left groin exploration and left inguinal herniorrhaphy    [facility] NONSPECIFIC PROCEDURE  5/08    Prostate bx (benign)    [facility] NONSPECIFIC PROCEDURE  8/09    Wide local excision (left side), robotic-assisted laparoscopic prostatectomy and   Left pelvic lymph node dissection    [facility] NONSPECIFIC PROCEDURE  August 2010    Right inguinal herniorrhaphy with mesh       Social History:   reports that he quit smoking about 18 years ago. His smoking use included cigars. He has never used smokeless tobacco. He reports current alcohol use. He reports that he does not use drugs.    Family History:  Family History   Problem Relation Age of Onset    C.A.D. Father         heart attack- angina    Coronary Artery Disease Father     Myocardial Infarction Father     Cancer Mother         lung    Lung Cancer Mother     Lung Cancer Sister     Unknown/Adopted Maternal Grandmother     Unknown/Adopted Maternal Grandfather     Unknown/Adopted Paternal Grandmother     Unknown/Adopted Paternal Grandfather     Unknown/Adopted Sister        Medications:  Current Outpatient Medications   Medication Sig Dispense Refill    albuterol (PROAIR HFA/PROVENTIL HFA/VENTOLIN HFA) 108 (90 Base) MCG/ACT inhaler Inhale 2 puffs into the lungs every 4 hours as needed for shortness of breath. 18 g 11    apixaban ANTICOAGULANT (ELIQUIS ANTICOAGULANT) 5 MG tablet Take 1 tablet (5 mg) by mouth 2 times daily. 180 tablet 3    fluocinonide (LIDEX) 0.05 % external cream Apply topically 2 times daily. As needed for rash 120 g 2    Fluticasone-Umeclidin-Vilanterol (TRELEGY ELLIPTA) 200-62.5-25 MCG/ACT oral inhaler Inhale 1 puff into the lungs daily. 3  each 3    furosemide (LASIX) 20 MG tablet Take 1 tab three times a week (MWF) scheduled and other days of the week as needed for increased edema 90 tablet 1    leuprolide, 4 Month, (ELIGARD) 30 MG Inject 30 mg subcutaneously. Once a year as per pt      levothyroxine (SYNTHROID/LEVOTHROID) 112 MCG tablet Take 1 tablet (112 mcg) by mouth daily. 90 tablet 3    metoprolol succinate ER (TOPROL XL) 25 MG 24 hr tablet Take 0.5 tablets (12.5 mg) by mouth daily.      montelukast (SINGULAIR) 10 MG tablet Take 1 tablet (10 mg) by mouth at bedtime. 30 tablet 3    potassium chloride ER (K-TAB) 20 MEQ CR tablet Take 1 tab three times a week (MWF) scheduled and other days of the week as needed for increased edema 90 tablet 3    PREDNISOLONE ACETATE OP Apply 1 drop to eye every other day.      vitamin B-12 (CYANOCOBALAMIN) 2500 MCG sublingual tablet Take 1 tab by mouth 3 times a week (WMF)       Allergies   Allergen Reactions    Sulfamethoxazole-Trimethoprim      rash

## 2025-06-09 NOTE — PATIENT INSTRUCTIONS
Wound Care After a Biopsy    What is a skin biopsy?  A skin biopsy allows the doctor to examine a very small piece of tissue under the microscope to determine the diagnosis and the best treatment for the skin condition. A local anesthetic (numbing medicine) is injected with a very small needle into the skin area to be tested. A small piece of skin is taken from the area. Sometimes a suture (stitch) is used.     What are the risks of a skin biopsy?  I will experience scar, bleeding, swelling, pain, crusting and redness. I may experience incomplete removal or recurrence. Risks of this procedure are excessive bleeding, bruising, infection, nerve damage, numbness, thick (hypertrophic or keloidal) scar and non-diagnostic biopsy.    How should I care for my wound for the first 24 hours?  Keep the wound dry and covered for 24 hours  If it bleeds, hold direct pressure on the area for 15 minutes. If bleeding does not stop, call us or go to the emergency room  Avoid strenuous exercise the first 1-2 days or as your doctor instructs you    How should I care for the wound after 24 hours?  After 24 hours, remove the bandage  You may bathe or shower as normal  If you had a scalp biopsy, you can shampoo as usual and can use shower water to clean the biopsy site daily  Clean the wound once a day with gentle soap and water  Do not scrub, be gentle  Apply white petroleum/Vaseline after cleaning the wound with a cotton swab or a clean finger, and keep the site covered with a Bandaid /bandage. Bandages are not necessary with a scalp biopsy  If you are unable to cover the site with a Bandaid /bandage, re-apply ointment 2-3 times a day to keep the site moist. Moisture will help with healing  Avoid strenuous activity for first 1-2 days  Avoid lakes, rivers, pools, and oceans until the stitches are removed or the site is healed    How do I clean my wound?  Wash hands thoroughly with soap or use hand  before all wound care  Clean  the wound with gentle soap and water  Apply white petroleum/Vaseline  to wound after it is clean  Replace the Bandaid /bandage to keep the wound covered for the first few days or as instructed by your doctor  If you had a scalp biopsy, warm shower water to the area on a daily basis should suffice    What should I use to clean my wound?   Cotton-tipped applicators (Qtips )  White petroleum jelly (Vaseline ). Use a clean new container and use Q-tips to apply.  Bandaids  as needed  Gentle soap     How should I care for my wound long term?  Do not get your wound dirty  Keep up with wound care for one week or until the area is healed.  A small scab will form and fall off by itself when the area is completely healed. The area will be red and will become pink in color as it heals. Sun protection is very important for how your scar will turn out. Sunscreen with an SPF 30 or greater is recommended once the area is healed.  You should have some soreness but it should be mild and slowly go away over several days. Talk to your doctor about using tylenol for pain,    When should I call my doctor?  If you have increased:   Pain or swelling  Pus or drainage (clear or slightly yellow drainage is ok)  Temperature over 100F  Spreading redness or warmth around wound    When will I hear about my results?  The biopsy results can take 2 weeks to come back.  Your results will automatically release to [employer] before your provider has even reviewed them.  The clinic will call you with the results, send you a [employer] message, or have you schedule a follow-up clinic or phone time to discuss the results.  Contact our clinics if you do not hear from us in 2 weeks.    Who should I call with questions?  [facility]: [phone]  [facility]: [phone]  For urgent needs outside of business hours call the [facility] at [phone] and ask for the dermatology resident on call    CRYOTHERAPY    What is it?  Use of a very cold liquid, such as liquid nitrogen, to freeze and destroy abnormal skin cells that need to be removed    What should I expect?  Tenderness and redness  A small blister that might grow and fill with dark purple blood.  More than one treatment may be needed if the lesions do not go away.    How do I care for the treated area?  Gently wash the area with your hands when bathing.  Use a thin layer of VaselineÆ to help with healing.   The area should heal within 7-10 days.  Do not use an antibiotic or NeosporinÆ ointment.   You may take acetaminophen (TylenolÆ) for pain.     Call your Doctor if you have:  Severe pain  Signs of infection (warmth, redness, cloudy yellow drainage, and or a bad smell)  Questions or concerns

## 2025-06-09 NOTE — LETTER
6/9/2025      Eder Horan  [address] [facility] [address]      Dear Colleague,    Thank you for referring your patient, Eder Horan, to the [facility]. Please see a copy of my visit note below.    [facility] Dermatology Note    Encounter Date: Jun 9, 2025    Dermatology Problem List:  - 09/23/20 nBCC nasal tip              SCCIS L superior shoulder   - 10/31/22 SCC L cheek     Major PMHx  -   ______________________________________    Impression/Plan:  Eder was seen today for skin check.    Diagnoses and all orders for this visit:    Neoplasm of uncertain behavior of skin  -     SHAVE 1 [[id number]]  -     Dermatological Path Order and Indications; Standing  -     Dermatological Path Order and Indications  - R neck likely SCC  - see procedure note      Inflamed seborrheic keratosis  -     DESTRUCT BENIGN LESION, UP TO 14  - see procedure note    Actinic keratosis  -     AK 15 or more [[id number]]  - discussed efudex vs LN2  - prefers LN2   - see procedure note    Actinic skin damage  Lentigines  - Reviewed the compounding benefits of incremental changes to sun protective behaviors including increased frequency of sunscreen and sun protective clothing like broad brimmed hats and longsleeved UPF containing clothing        Cryotherapy procedure note: After verbal consent and discussion of risks and benefits including but no limited to dyspigmentation/scar, blister, and pain, 18 aks on face, scalp, and hands as well as 5 ISKs on chest and arms was(were) treated with 1-2mm freeze border for 2 cycles with liquid nitrogen. Post cryotherapy instructions were provided.     Follow-up in 6 mo.       Staff Involved:  Staff Only    Hany Saeed MD   of Dermatology  Department of Dermatology  [facility]      CC:   Chief Complaint   Patient presents with     Skin Check     FBSC  2. Back of neck, right  side-present for many years-changing and growing       History of Present Illness:  Mr. Eder Horan is a 85 year old male who presents as a new patient.    Pt presents today for concerns about spots on trunk and extremities       Labs:      Physical exam:  Vitals: There were no vitals taken for this visit.  GEN: well developed, well-nourished, in no acute distress, in a pleasant mood.     SKIN: Edwards phototype 1  - Full skin, which includes the head/face, both arms, chest, back, abdomen,both legs, genitalia and/or groin buttocks, digits and/or nails, was examined.  - Flat brown macules and patches in a sun exposed areas on face and extremities  - Stuck on brown papules on trunk and extremities   - keratotic nodule R neck  - gritty pink papules on head and arms   - No other lesions of concern on areas examined.     Past Medical History:   Past Medical History:   Diagnosis Date     Actinic keratosis 06/2009    face     ALLERGIC RHINITIS       Atrial fibrillation (H) 06/2008     B12 deficiency 01/16/2019     Back pain     s/p LESI through ortho Feb 2010     Basal cell carcinoma      Benign essential hypertension      Bradycardia      Chronic obstructive pulmonary disease, unspecified COPD type (H) 02/09/2017     Chronotropic incompetence with sinus node dysfunction      COPD (chronic obstructive pulmonary disease) (H)      DVT      Left calf 2003. Right calf 6/06     Edema, unspecified type 07/25/2020     Embolic stroke (H) 06/11/2017    superior cerebellar artery occlusion. Hx A fib     Excessive drinking of alcohol 09/28/2024     Hyperlipidemia LDL goal <130 05/10/2011     Hypertension      HYPOTHYROIDISM      hypothyroidism     Hypothyroidism, unspecified type 03/15/2020     Lateral epicondylitis  of elbow 07/2005    left     Malignant melanoma (H)      Paroxysmal atrial fibrillation (H)      PLANTAR FASCITIS      Prostate cancer (H) 06/2009    on Lupron therapy     Raynaud's disease without gangrene  11/26/2018     Squamous cell carcinoma 09/2014     right lower lid, s/p MOHS     Syncope      TMJ (temporomandibular joint syndrome) 04/12/2012     Tobacco use disorder      Venous stasis dermatitis, unspecified laterality 09/28/2024     Past Surgical History:   Procedure Laterality Date     ABDOMEN SURGERY  2004,06,09    Hernias (2) Prostate Removal(2009)     BIOPSY  2009    Prostate     CARDIOVERSION  10-16-08    failed     COLONOSCOPY  2004     EYE SURGERY      2013 lump from lower  left eye lid removed     GENITOURINARY SURGERY  2009    Prostate     HERNIA REPAIR  2004 - 2006     [facility] NONSPECIFIC PROCEDURE  1/07    Left groin exploration and left inguinal herniorrhaphy     [facility] NONSPECIFIC PROCEDURE  5/08    Prostate bx (benign)     [facility] NONSPECIFIC PROCEDURE  8/09    Wide local excision (left side), robotic-assisted laparoscopic prostatectomy and   Left pelvic lymph node dissection     [facility] NONSPECIFIC PROCEDURE  August 2010    Right inguinal herniorrhaphy with mesh       Social History:   reports that he quit smoking about 18 years ago. His smoking use included cigars. He has never used smokeless tobacco. He reports current alcohol use. He reports that he does not use drugs.    Family History:  Family History   Problem Relation Age of Onset     C.A.D. Father         heart attack- angina     Coronary Artery Disease Father      Myocardial Infarction Father      Cancer Mother         lung     Lung Cancer Mother      Lung Cancer Sister      Unknown/Adopted Maternal Grandmother      Unknown/Adopted Maternal Grandfather      Unknown/Adopted Paternal Grandmother      Unknown/Adopted Paternal Grandfather      Unknown/Adopted Sister        Medications:  Current Outpatient Medications   Medication Sig Dispense Refill     albuterol (PROAIR HFA/PROVENTIL HFA/VENTOLIN HFA) 108 (90 Base) MCG/ACT inhaler Inhale 2 puffs into the lungs every 4 hours as needed for shortness of breath. 18 g 11     apixaban ANTICOAGULANT (ELIQUIS  ANTICOAGULANT) 5 MG tablet Take 1 tablet (5 mg) by mouth 2 times daily. 180 tablet 3     fluocinonide (LIDEX) 0.05 % external cream Apply topically 2 times daily. As needed for rash 120 g 2     Fluticasone-Umeclidin-Vilanterol (TRELEGY ELLIPTA) 200-62.5-25 MCG/ACT oral inhaler Inhale 1 puff into the lungs daily. 3 each 3     furosemide (LASIX) 20 MG tablet Take 1 tab three times a week (MWF) scheduled and other days of the week as needed for increased edema 90 tablet 1     leuprolide, 4 Month, (ELIGARD) 30 MG Inject 30 mg subcutaneously. Once a year as per pt       levothyroxine (SYNTHROID/LEVOTHROID) 112 MCG tablet Take 1 tablet (112 mcg) by mouth daily. 90 tablet 3     metoprolol succinate ER (TOPROL XL) 25 MG 24 hr tablet Take 0.5 tablets (12.5 mg) by mouth daily.       montelukast (SINGULAIR) 10 MG tablet Take 1 tablet (10 mg) by mouth at bedtime. 30 tablet 3     potassium chloride ER (K-TAB) 20 MEQ CR tablet Take 1 tab three times a week (MWF) scheduled and other days of the week as needed for increased edema 90 tablet 3     PREDNISOLONE ACETATE OP Apply 1 drop to eye every other day.       vitamin B-12 (CYANOCOBALAMIN) 2500 MCG sublingual tablet Take 1 tab by mouth 3 times a week (WMF)       Allergies   Allergen Reactions     Sulfamethoxazole-Trimethoprim      rash               Again, thank you for allowing me to participate in the care of your patient.        Sincerely,        Hany Saeed MD    Electronically signed

## 2025-06-11 ENCOUNTER — RESULTS FOLLOW-UP (OUTPATIENT)
Dept: DERMATOLOGY | Facility: CLINIC | Age: 85
End: 2025-06-11

## 2025-06-11 DIAGNOSIS — D48.5 NEOPLASM OF UNCERTAIN BEHAVIOR OF SKIN: Primary | ICD-10-CM

## 2025-06-13 ENCOUNTER — ANCILLARY PROCEDURE (OUTPATIENT)
Dept: GENERAL RADIOLOGY | Facility: CLINIC | Age: 85
End: 2025-06-13
Attending: FAMILY MEDICINE
Payer: COMMERCIAL

## 2025-06-13 PROCEDURE — 71046 X-RAY EXAM CHEST 2 VIEWS: CPT | Mod: TC | Performed by: RADIOLOGY

## 2025-07-02 ENCOUNTER — OFFICE VISIT (OUTPATIENT)
Dept: URGENT CARE | Facility: URGENT CARE | Age: 85
End: 2025-07-02
Payer: COMMERCIAL

## 2025-07-02 ENCOUNTER — ANCILLARY PROCEDURE (OUTPATIENT)
Dept: GENERAL RADIOLOGY | Facility: CLINIC | Age: 85
End: 2025-07-02
Attending: PHYSICIAN ASSISTANT
Payer: COMMERCIAL

## 2025-07-02 VITALS
BODY MASS INDEX: 24.51 KG/M2 | TEMPERATURE: 99 F | DIASTOLIC BLOOD PRESSURE: 74 MMHG | RESPIRATION RATE: 24 BRPM | OXYGEN SATURATION: 95 % | WEIGHT: 166 LBS | HEART RATE: 90 BPM | SYSTOLIC BLOOD PRESSURE: 120 MMHG

## 2025-07-02 DIAGNOSIS — J98.8 WHEEZING-ASSOCIATED RESPIRATORY INFECTION (WARI): ICD-10-CM

## 2025-07-02 DIAGNOSIS — J98.8 WHEEZING-ASSOCIATED RESPIRATORY INFECTION (WARI): Primary | ICD-10-CM

## 2025-07-02 PROCEDURE — 3074F SYST BP LT 130 MM HG: CPT | Performed by: PHYSICIAN ASSISTANT

## 2025-07-02 PROCEDURE — 99214 OFFICE O/P EST MOD 30 MIN: CPT | Performed by: PHYSICIAN ASSISTANT

## 2025-07-02 PROCEDURE — 87635 SARS-COV-2 COVID-19 AMP PRB: CPT | Performed by: PHYSICIAN ASSISTANT

## 2025-07-02 PROCEDURE — 3078F DIAST BP <80 MM HG: CPT | Performed by: PHYSICIAN ASSISTANT

## 2025-07-02 PROCEDURE — 71046 X-RAY EXAM CHEST 2 VIEWS: CPT | Mod: TC | Performed by: RADIOLOGY

## 2025-07-02 RX ORDER — DOXYCYCLINE 100 MG/1
100 CAPSULE ORAL 2 TIMES DAILY
Qty: 20 CAPSULE | Refills: 0 | Status: SHIPPED | OUTPATIENT
Start: 2025-07-02 | End: 2025-07-12

## 2025-07-02 RX ORDER — PREDNISONE 10 MG/1
10 TABLET ORAL 2 TIMES DAILY
Qty: 10 TABLET | Refills: 0 | Status: SHIPPED | OUTPATIENT
Start: 2025-07-02 | End: 2025-07-07

## 2025-07-02 NOTE — PROGRESS NOTES
Urgent Care Clinic Visit    Chief Complaint   Patient presents with    Cough     Cough, SOB, congestion-seen on 6/13 for this and it hasn't gone away                7/2/2025     1:42 PM   Additional Questions   Roomed by Ky   Accompanied by Self

## 2025-07-02 NOTE — PROGRESS NOTES
SUBJECTIVE:   Eder Horan is a 85 year old male presenting with a chief complaint of fever, runny nose, stuffy nose, cough - non-productive, cough - productive, shortness of breath, sore throat, hoarse voice, body aches, and fatigue.  Onset of symptoms was 1 week(s) ago.  Course of illness is worsening.    Severity mild  Current and Associated symptoms: all of the above  Treatment measures tried include Fluids and Rest.  Predisposing factors include HX of COPD.    Past Medical History:   Diagnosis Date    Actinic keratosis 06/2009    face    ALLERGIC RHINITIS      Atrial fibrillation (H) 06/2008    B12 deficiency 01/16/2019    Back pain     s/p LESI through ortho Feb 2010    Basal cell carcinoma     Benign essential hypertension     Bradycardia     Chronic obstructive pulmonary disease, unspecified COPD type (H) 02/09/2017    Chronotropic incompetence with sinus node dysfunction     COPD (chronic obstructive pulmonary disease) (H)     DVT      Left calf 2003. Right calf 6/06    Edema, unspecified type 07/25/2020    Embolic stroke (H) 06/11/2017    superior cerebellar artery occlusion. Hx A fib    Excessive drinking of alcohol 09/28/2024    Hyperlipidemia LDL goal <130 05/10/2011    Hypertension     HYPOTHYROIDISM      hypothyroidism    Hypothyroidism, unspecified type 03/15/2020    Lateral epicondylitis  of elbow 07/2005    left    Malignant melanoma (H)     Paroxysmal atrial fibrillation (H)     PLANTAR FASCITIS     Prostate cancer (H) 06/2009    on Lupron therapy    Raynaud's disease without gangrene 11/26/2018    Squamous cell carcinoma 09/2014     right lower lid, s/p MOHS    Syncope     TMJ (temporomandibular joint syndrome) 04/12/2012    Tobacco use disorder     Venous stasis dermatitis, unspecified laterality 09/28/2024     Current Outpatient Medications   Medication Sig Dispense Refill    albuterol (PROAIR HFA/PROVENTIL HFA/VENTOLIN HFA) 108 (90 Base) MCG/ACT inhaler Inhale 2 puffs into the lungs every 4  hours as needed for shortness of breath. 18 g 11    apixaban ANTICOAGULANT (ELIQUIS ANTICOAGULANT) 5 MG tablet Take 1 tablet (5 mg) by mouth 2 times daily. 180 tablet 3    fluocinonide (LIDEX) 0.05 % external cream Apply topically 2 times daily. As needed for rash 120 g 2    Fluticasone-Umeclidin-Vilanterol (TRELEGY ELLIPTA) 200-62.5-25 MCG/ACT oral inhaler Inhale 1 puff into the lungs daily. 3 each 3    furosemide (LASIX) 20 MG tablet Take 1 tab three times a week (MWF) scheduled and other days of the week as needed for increased edema 90 tablet 1    leuprolide, 4 Month, (ELIGARD) 30 MG Inject 30 mg subcutaneously. Once a year as per pt      levothyroxine (SYNTHROID/LEVOTHROID) 112 MCG tablet Take 1 tablet (112 mcg) by mouth daily. 90 tablet 3    metoprolol succinate ER (TOPROL XL) 25 MG 24 hr tablet Take 0.5 tablets (12.5 mg) by mouth daily.      montelukast (SINGULAIR) 10 MG tablet Take 1 tablet (10 mg) by mouth at bedtime. 30 tablet 3    potassium chloride ER (K-TAB) 20 MEQ CR tablet Take 1 tab three times a week (MWF) scheduled and other days of the week as needed for increased edema 90 tablet 3    PREDNISOLONE ACETATE OP Apply 1 drop to eye every other day.      vitamin B-12 (CYANOCOBALAMIN) 2500 MCG sublingual tablet Take 1 tab by mouth 3 times a week (WMF)       Social History     Tobacco Use    Smoking status: Former     Types: Cigars     Quit date: 2006     Years since quittin.5    Smokeless tobacco: Never    Tobacco comments:      occasionally cigar   Substance Use Topics    Alcohol use: Yes     Alcohol/week: 0.0 standard drinks of alcohol     Comment: 2-3 drinks a day:  Wine/Beer       ROS:  Review of systems negative except as stated above.    OBJECTIVE:  /74   Pulse 90   Temp 99  F (37.2  C) (Tympanic)   Resp 24   Wt 75.3 kg (166 lb)   SpO2 95%   BMI 24.51 kg/m    GENERAL APPEARANCE: healthy, alert and no distress  RESP: expiratory wheezes  CV: regular rates and rhythm, normal S1  S2, no murmur noted  ABDOMEN:  soft, nontender, no HSM or masses and bowel sounds normal  NEURO: Normal strength and tone, sensory exam grossly normal,  normal speech and mentation  SKIN: no suspicious lesions or rashes      X-ray image initially interpreted in clinic by me in order to rule out pneumonia.  No evidence appreciated.    ASSESSMENT:  (J98.8) Wheezing-associated respiratory infection (WARI)  (primary encounter diagnosis)  Plan: XR Chest 2 Views, COVID-19 Virus (Coronavirus)         by PCR Nose, predniSONE (DELTASONE) 10 MG         tablet, doxycycline hyclate (VIBRAMYCIN) 100 MG        capsule        Red flags and emergent follow up discussed, and understood by patient  Follow up with PCP if symptoms worsen or fail to improve

## 2025-07-03 LAB — SARS-COV-2 RNA RESP QL NAA+PROBE: NEGATIVE

## 2025-07-07 ENCOUNTER — OFFICE VISIT (OUTPATIENT)
Dept: DERMATOLOGY | Facility: CLINIC | Age: 85
End: 2025-07-07
Attending: STUDENT IN AN ORGANIZED HEALTH CARE EDUCATION/TRAINING PROGRAM
Payer: COMMERCIAL

## 2025-07-07 DIAGNOSIS — D48.5 NEOPLASM OF UNCERTAIN BEHAVIOR OF SKIN: ICD-10-CM

## 2025-07-07 PROCEDURE — 13132 CMPLX RPR F/C/C/M/N/AX/G/H/F: CPT | Performed by: STUDENT IN AN ORGANIZED HEALTH CARE EDUCATION/TRAINING PROGRAM

## 2025-07-07 PROCEDURE — 88305 TISSUE EXAM BY PATHOLOGIST: CPT | Performed by: DERMATOLOGY

## 2025-07-07 PROCEDURE — 13133 CMPLX RPR F/C/C/M/N/AX/G/H/F: CPT | Performed by: STUDENT IN AN ORGANIZED HEALTH CARE EDUCATION/TRAINING PROGRAM

## 2025-07-07 PROCEDURE — 11623 EXC S/N/H/F/G MAL+MRG 2.1-3: CPT | Performed by: STUDENT IN AN ORGANIZED HEALTH CARE EDUCATION/TRAINING PROGRAM

## 2025-07-07 NOTE — PROGRESS NOTES
DERMATOLOGIC SURGERY REPORT    NAME OF PROCEDURE:  EXCISION AND CLOSURE    Surgeon:  Hany Saeed MD    PREOPERATIVE DIAGNOSIS: SCC  POSTOPERATIVE DIAGNOSIS: Same  Lesion Size: 18 mm lesion with 4 mm margins  Final excision size: 26 mm  Repair type: Complex  FINAL REPAIR LENGTH:  77 mm  Location: R neck  Prior Biopsy Accession #: [id number]     INDICATIONS:Excision was indicated for treatment. We discussed the principles of treatment and most likely complications including bleeding, infection, wound dehiscence, pain, nerve damage, and scarring. Informed consent was obtained and the patient underwent the procedure as follows.    PROCEDURE:  The patient was taken to the operative suite. The treatment area was anesthetized with 1% lidocaine with 1:133735 epinephrine buffered with bicarbonate. The area was washed with hibiclens, rinsed with saline and draped with sterile towels. The lesion was delineated and excised down to subcutaneous fat. Hemostasis was obtained by electrocoagulation.     REPAIR:  In order to repair this defect while maintaining the normal anatomic relations and function, we elected to utilize a linear closure. Closure was oriented so that the wound was in the patient's natural skin tension lines. Two redundant cones were removed by triangulation. Due to tightness of the surrounding skin deeper layers of the subcutaneous tissue were undermined extensively to a distance  greater than width of the defect on both sides by dissection in the subcutaneous plane until adequate tissue mobility was obtained. Deep layering suturing was performed using 3-0 monocryl deep, intradermal and subcutaneous sutures.  Final cutaneous approximation was achieved with 3-0 monocryl running subcuticular sutures.      A total of 5 mL of anesthesia was administered for all surgical sites. Estimated blood loss was less than 5mL for all surgical sites. A sterile pressure dressing was applied and wound care instructions, with a  written handout, were given. The patient was discharged alert and ambulatory.    Hany Saeed M.D.      Department of Dermatology

## 2025-07-07 NOTE — LETTER
7/7/2025      Eder Horan  [address] [address]      Dear Colleague,    Thank you for referring your patient, Eder Horan, to the [facility]. Please see a copy of my visit note below.    DERMATOLOGIC SURGERY REPORT    NAME OF PROCEDURE:  EXCISION AND CLOSURE    Surgeon:  Hany Saeed MD    PREOPERATIVE DIAGNOSIS: SCC  POSTOPERATIVE DIAGNOSIS: Same  Lesion Size: 18 mm lesion with 4 mm margins  Final excision size: 26 mm  Repair type: Complex  FINAL REPAIR LENGTH:  77 mm  Location: R neck  Prior Biopsy Accession #: [id number]     INDICATIONS:Excision was indicated for treatment. We discussed the principles of treatment and most likely complications including bleeding, infection, wound dehiscence, pain, nerve damage, and scarring. Informed consent was obtained and the patient underwent the procedure as follows.    PROCEDURE:  The patient was taken to the operative suite. The treatment area was anesthetized with 1% lidocaine with 1:421632 epinephrine buffered with bicarbonate. The area was washed with hibiclens, rinsed with saline and draped with sterile towels. The lesion was delineated and excised down to subcutaneous fat. Hemostasis was obtained by electrocoagulation.     REPAIR:  In order to repair this defect while maintaining the normal anatomic relations and function, we elected to utilize a linear closure. Closure was oriented so that the wound was in the patient's natural skin tension lines. Two redundant cones were removed by triangulation. Due to tightness of the surrounding skin deeper layers of the subcutaneous tissue were undermined extensively to a distance  greater than width of the defect on both sides by dissection in the subcutaneous plane until adequate tissue mobility was obtained. Deep layering suturing was performed using 3-0 monocryl deep, intradermal and subcutaneous sutures.  Final cutaneous approximation was achieved with 3-0  monocryl running subcuticular sutures.      A total of 5 mL of anesthesia was administered for all surgical sites. Estimated blood loss was less than 5mL for all surgical sites. A sterile pressure dressing was applied and wound care instructions, with a written handout, were given. The patient was discharged alert and ambulatory.    Hany Saeed M.D.      Department of Dermatology       Again, thank you for allowing me to participate in the care of your patient.        Sincerely,        Hany Saeed MD    Electronically signed

## 2025-07-07 NOTE — PATIENT INSTRUCTIONS
Excision Wound Care Instructions  I will experience scar, altered skin color, bleeding, swelling, pain, crusting and redness. I may experience altered sensation. Risks are excessive bleeding, infection, muscle weakness, thick (hypertrophic or keloidal) scar, and recurrence. A second procedure may be recommended to obtain the best cosmetic or functional result.  Possible complications of any surgical procedure are bleeding, infection, scarring, alteration in skin color and sensation, muscle weakness in the area, wound dehiscence or seperation, or recurrence of the lesion or disease. On occasion, after healing, a secondary procedure or revision may be recommended in order to obtain the best cosmetic or functional result.   After your surgery, a pressure bandage will be placed over the area that has sutures. This will help prevent bleeding. Please follow these instructions as they will help you to prevent complications as your wound heals.    For the First 24 hours After Surgery:  Leave the pressure bandage on and keep it dry. If it should come loose, you may retape it, but do not take it off.  Relax and take it easy. Do not do any vigorous exercise, heavy lifting, or bending forward. This could cause the wound to bleed.  Post-operative pain is usually mild. You may alternate between 1000 mg of Tylenol (acetaminophen) and 400 mg of Ibuprofen every 4 hours.  Do not take more than 4,000mg of acetaminophen in a 24 hour period or 3200 mg of Ibuprofen in a 24 hr period.  Avoid alcohol and vitamin E as these may increase your tendency to bleed.  You may put an ice pack around the bandaged area for 20 minutes every 2-3 hours. This may help reduce swelling, bruising, and pain. Make sure the ice pack is waterproof so that the pressure bandage does not get wet.   You may see a small amount of drainage or blood on your pressure bandage. This is normal. However, if drainage or bleeding continues or saturates the bandage, you will  need to apply firm pressure over the bandage with a washcloth for 15 minutes. If bleeding continues after applying pressure for 15 minutes then go to the nearest emergency room.    After the first 24 hours from Surgery  Carefully remove the bandage. Since your wound was closed with sutures underneath the skin and sealed with Derma-bond, you do not have to do daily wound care or dressing changes. The blue Derma-bond polymer takes the place of a bandage. It naturally detaches within a week. At that point the skin is knitted together and daily wound washing along with dressing changes are not required.     After the first 2 weeks from Surgery  At 2 weeks post surgery you can begin gently massaging the scar using light pressure applied in a circular motion. Do this for 5 minutes 3 times a day. Doing this consistently and regularly will improve the appearance of your final scar. You should continue to do this for AT LEAST 2 weeks, but ideally 4 in order to give your scar the best possible outcome        Call Us If:  You have pain that is not controlled with Tylenol/Ibuprofen  You have signs or symptoms of an infection, such as: fever over 100 degrees F, redness, warmth, or foul-smelling or yellow drainage from the wound.  Who should I call with questions?  [facility]: [phone]   [facility]: [phone]  [facility]: [phone]  For urgent needs outside of business hours call the [facility] at [phone] and ask to speak with the dermatology resident on call

## 2025-07-09 LAB
PATH REPORT.COMMENTS IMP SPEC: NORMAL
PATH REPORT.COMMENTS IMP SPEC: NORMAL
PATH REPORT.FINAL DX SPEC: NORMAL
PATH REPORT.GROSS SPEC: NORMAL
PATH REPORT.MICROSCOPIC SPEC OTHER STN: NORMAL
PATH REPORT.RELEVANT HX SPEC: NORMAL

## 2025-08-18 ENCOUNTER — PATIENT OUTREACH (OUTPATIENT)
Dept: CARE COORDINATION | Facility: CLINIC | Age: 85
End: 2025-08-18
Payer: COMMERCIAL

## 2025-08-30 DIAGNOSIS — J44.9 CHRONIC OBSTRUCTIVE PULMONARY DISEASE, UNSPECIFIED COPD TYPE (H): ICD-10-CM

## 2025-08-30 RX ORDER — FLUTICASONE FUROATE, UMECLIDINIUM BROMIDE AND VILANTEROL TRIFENATATE 200; 62.5; 25 UG/1; UG/1; UG/1
1 POWDER RESPIRATORY (INHALATION) DAILY
Qty: 120 EACH | Refills: 3 | Status: SHIPPED | OUTPATIENT
Start: 2025-08-30

## 2025-09-01 ENCOUNTER — PATIENT OUTREACH (OUTPATIENT)
Dept: CARE COORDINATION | Facility: CLINIC | Age: 85
End: 2025-09-01
Payer: COMMERCIAL

## (undated) RX ORDER — LIDOCAINE HYDROCHLORIDE AND EPINEPHRINE 10; 10 MG/ML; UG/ML
INJECTION, SOLUTION INFILTRATION; PERINEURAL
Status: DISPENSED
Start: 2022-12-19